# Patient Record
Sex: FEMALE | Race: WHITE | Employment: UNEMPLOYED | URBAN - METROPOLITAN AREA
[De-identification: names, ages, dates, MRNs, and addresses within clinical notes are randomized per-mention and may not be internally consistent; named-entity substitution may affect disease eponyms.]

---

## 2017-01-26 PROCEDURE — 86225 DNA ANTIBODY NATIVE: CPT | Performed by: PHYSICIAN ASSISTANT

## 2017-01-26 PROCEDURE — 86160 COMPLEMENT ANTIGEN: CPT | Performed by: PHYSICIAN ASSISTANT

## 2017-01-26 PROCEDURE — 84156 ASSAY OF PROTEIN URINE: CPT | Performed by: PHYSICIAN ASSISTANT

## 2017-01-26 PROCEDURE — 81001 URINALYSIS AUTO W/SCOPE: CPT | Performed by: PHYSICIAN ASSISTANT

## 2017-01-26 PROCEDURE — 82570 ASSAY OF URINE CREATININE: CPT | Performed by: PHYSICIAN ASSISTANT

## 2017-01-30 PROCEDURE — 86480 TB TEST CELL IMMUN MEASURE: CPT | Performed by: PHYSICIAN ASSISTANT

## 2017-02-07 PROBLEM — N28.9 RENAL INSUFFICIENCY: Status: ACTIVE | Noted: 2017-02-07

## 2017-02-09 ENCOUNTER — HOSPITAL ENCOUNTER (OUTPATIENT)
Age: 52
Discharge: HOME | End: 2017-02-09
Attending: FAMILY MEDICINE
Payer: COMMERCIAL

## 2017-02-09 VITALS
HEIGHT: 65 IN | SYSTOLIC BLOOD PRESSURE: 119 MMHG | WEIGHT: 160 LBS | OXYGEN SATURATION: 98 % | RESPIRATION RATE: 17 BRPM | DIASTOLIC BLOOD PRESSURE: 76 MMHG | HEART RATE: 76 BPM | BODY MASS INDEX: 26.66 KG/M2 | TEMPERATURE: 98 F

## 2017-02-09 DIAGNOSIS — H10.13 ALLERGIC CONJUNCTIVITIS, BILATERAL: Primary | ICD-10-CM

## 2017-02-09 PROCEDURE — 99204 OFFICE O/P NEW MOD 45 MIN: CPT

## 2017-02-09 PROCEDURE — 99213 OFFICE O/P EST LOW 20 MIN: CPT

## 2017-02-09 RX ORDER — OLOPATADINE HYDROCHLORIDE 1 MG/ML
1 SOLUTION/ DROPS OPHTHALMIC 2 TIMES DAILY
Qty: 1 BOTTLE | Refills: 0 | Status: SHIPPED | OUTPATIENT
Start: 2017-02-09 | End: 2017-02-16

## 2017-02-10 NOTE — ED PROVIDER NOTES
Patient Seen in: [address] [facility]    History   Patient presents with:   Eye Visual Problem (opthalmic)    Stated Complaint: PINK EYE     HPI    81-year-old female presents to the clinic today with chief complaints of bilateral eye redness, Raynaud disease            Past Surgical History    APPENDECTOMY      CHOLECYSTECTOMY      HERNIA SURGERY      HYSTERECTOMY  1999    Comment LUCA BSO for endometriosis    TONSILLECTOMY      BIOPSY SALIVARY GLAND,INCISIONAL  12/1/09    Comment Performed by ALON 2000 UNITS Oral Tab,  Take 4,000 Units by mouth daily. calciTRIOL (ROCALTROL) 0.25 MCG Oral Cap,  Take 0.25 mcg by mouth 2 (two) times daily.      cyanocobalamin (VITAMIN B12) 1000 MCG/ML Injection Solution,  Inject 1,000 mcg into the muscle every 30 (t Current:/76 mmHg  Pulse 76  Temp(Src) 98.2 °F (36.8 °C) (Temporal)  Resp 17  Ht 165.1 cm (5' 5\")  Wt 72.576 kg  BMI 26.63 kg/m2  SpO2 98%        Physical Exam    General: Well-nourished, well hydrated. No acute distress. No pallor.  No tachypne PM    START taking these medications    Olopatadine HCl 0.1 % Ophthalmic Solution  Place 1 drop into both eyes 2 (two) times daily. , Normal, Disp-1 Bottle, R-0

## 2017-03-23 ENCOUNTER — HOSPITAL ENCOUNTER (OUTPATIENT)
Dept: CV DIAGNOSTICS | Facility: HOSPITAL | Age: 52
Discharge: HOME | End: 2017-03-23
Attending: INTERNAL MEDICINE
Payer: COMMERCIAL

## 2017-03-23 ENCOUNTER — PRIOR ORIGINAL RECORDS (OUTPATIENT)
Dept: OTHER | Age: 52
End: 2017-03-23

## 2017-03-23 ENCOUNTER — LAB ENCOUNTER (OUTPATIENT)
Dept: LAB | Facility: HOSPITAL | Age: 52
End: 2017-03-23
Attending: INTERNAL MEDICINE
Payer: COMMERCIAL

## 2017-03-23 ENCOUNTER — MYAURORA ACCOUNT LINK (OUTPATIENT)
Dept: OTHER | Age: 52
End: 2017-03-23

## 2017-03-23 DIAGNOSIS — E55.9 VITAMIN D DEFICIENCY: ICD-10-CM

## 2017-03-23 DIAGNOSIS — E78.2 MIXED HYPERLIPIDEMIA: ICD-10-CM

## 2017-03-23 DIAGNOSIS — I77.810 ASCENDING AORTA DILATATION (HCC): ICD-10-CM

## 2017-03-23 DIAGNOSIS — D64.9 ANEMIA, UNSPECIFIED: Primary | ICD-10-CM

## 2017-03-23 DIAGNOSIS — R00.2 PALPITATIONS: ICD-10-CM

## 2017-03-23 DIAGNOSIS — I11.9 MALIGNANT HYPERTENSIVE HEART DISEASE WITHOUT HEART FAILURE: ICD-10-CM

## 2017-03-23 DIAGNOSIS — E05.90 PRETIBIAL MYXEDEMA: ICD-10-CM

## 2017-03-23 DIAGNOSIS — N18.30 CHRONIC KIDNEY DISEASE, STAGE III (MODERATE) (HCC): ICD-10-CM

## 2017-03-23 LAB
25-HYDROXYVITAMIN D (TOTAL): 26.2 NG/ML (ref 30–100)
ALBUMIN SERPL-MCNC: 3.9 G/DL (ref 3.5–4.8)
ALP LIVER SERPL-CCNC: 58 U/L (ref 39–100)
ALT SERPL-CCNC: 38 U/L (ref 14–54)
AST SERPL-CCNC: 32 U/L (ref 15–41)
BASOPHILS # BLD AUTO: 0.04 X10(3) UL (ref 0–0.1)
BASOPHILS NFR BLD AUTO: 0.8 %
BETA NATRIURETIC PEPTIDE: 106 PG/ML (ref 2–99)
BILIRUB SERPL-MCNC: 0.4 MG/DL (ref 0.1–2)
BUN BLD-MCNC: 19 MG/DL (ref 8–20)
CALCIUM BLD-MCNC: 8.1 MG/DL (ref 8.3–10.3)
CHLORIDE: 111 MMOL/L (ref 101–111)
CHOLEST SMN-MCNC: 126 MG/DL (ref ?–200)
CO2: 25 MMOL/L (ref 22–32)
CREAT BLD-MCNC: 1.34 MG/DL (ref 0.55–1.02)
EOSINOPHIL # BLD AUTO: 0.01 X10(3) UL (ref 0–0.3)
EOSINOPHIL NFR BLD AUTO: 0.2 %
ERYTHROCYTE [DISTWIDTH] IN BLOOD BY AUTOMATED COUNT: 15.1 % (ref 11.5–16)
EST. AVERAGE GLUCOSE BLD GHB EST-MCNC: 100 MG/DL (ref 68–126)
GLUCOSE BLD-MCNC: 84 MG/DL (ref 70–99)
HBA1C MFR BLD HPLC: 5.1 % (ref ?–5.7)
HCT VFR BLD AUTO: 34.9 % (ref 34–50)
HDLC SERPL-MCNC: 68 MG/DL (ref 45–?)
HDLC SERPL: 1.85 {RATIO} (ref ?–4.44)
HGB BLD-MCNC: 11.9 G/DL (ref 12–16)
IMMATURE GRANULOCYTE COUNT: 0.01 X10(3) UL (ref 0–1)
IMMATURE GRANULOCYTE RATIO %: 0.2 %
LDLC SERPL CALC-MCNC: 44 MG/DL (ref ?–130)
LYMPHOCYTES # BLD AUTO: 1.26 X10(3) UL (ref 0.9–4)
LYMPHOCYTES NFR BLD AUTO: 24.5 %
M PROTEIN MFR SERPL ELPH: 6.9 G/DL (ref 6.1–8.3)
MCH RBC QN AUTO: 32.7 PG (ref 27–33.2)
MCHC RBC AUTO-ENTMCNC: 34.1 G/DL (ref 31–37)
MCV RBC AUTO: 95.9 FL (ref 81–100)
MONOCYTES # BLD AUTO: 0.43 X10(3) UL (ref 0.1–0.6)
MONOCYTES NFR BLD AUTO: 8.4 %
NEUTROPHIL ABS PRELIM: 3.39 X10 (3) UL (ref 1.3–6.7)
NEUTROPHILS # BLD AUTO: 3.39 X10(3) UL (ref 1.3–6.7)
NEUTROPHILS NFR BLD AUTO: 65.9 %
NONHDLC SERPL-MCNC: 58 MG/DL (ref ?–130)
PLATELET # BLD AUTO: 239 10(3)UL (ref 150–450)
POTASSIUM SERPL-SCNC: 4 MMOL/L (ref 3.6–5.1)
RBC # BLD AUTO: 3.64 X10(6)UL (ref 3.8–5.1)
RED CELL DISTRIBUTION WIDTH-SD: 52.2 FL (ref 35.1–46.3)
SODIUM SERPL-SCNC: 144 MMOL/L (ref 136–144)
TRIGLYCERIDES: 71 MG/DL (ref ?–150)
TSI SER-ACNC: 0.48 MIU/ML (ref 0.35–5.5)
VLDL: 14 MG/DL (ref 5–40)
WBC # BLD AUTO: 5.1 X10(3) UL (ref 4–13)

## 2017-03-23 PROCEDURE — 80053 COMPREHEN METABOLIC PANEL: CPT

## 2017-03-23 PROCEDURE — 36415 COLL VENOUS BLD VENIPUNCTURE: CPT

## 2017-03-23 PROCEDURE — 84443 ASSAY THYROID STIM HORMONE: CPT

## 2017-03-23 PROCEDURE — 83036 HEMOGLOBIN GLYCOSYLATED A1C: CPT

## 2017-03-23 PROCEDURE — 80061 LIPID PANEL: CPT

## 2017-03-23 PROCEDURE — 82306 VITAMIN D 25 HYDROXY: CPT

## 2017-03-23 PROCEDURE — 83880 ASSAY OF NATRIURETIC PEPTIDE: CPT

## 2017-03-23 PROCEDURE — 85025 COMPLETE CBC W/AUTO DIFF WBC: CPT

## 2017-04-05 ENCOUNTER — PRIOR ORIGINAL RECORDS (OUTPATIENT)
Dept: OTHER | Age: 52
End: 2017-04-05

## 2017-04-11 LAB
ALBUMIN: 3.9 G/DL
ALKALINE PHOSPHATATE(ALK PHOS): 58 IU/L
BILIRUBIN TOTAL: 0.4 MG/DL
BNP: 106 PMOL/L
BUN: 19 MG/DL
CALCIUM: 8.1 MG/DL
CHLORIDE: 111 MEQ/L
CHOLESTEROL, TOTAL: 126 MG/DL
CREATININE, SERUM: 1.34 MG/DL
GLUCOSE: 84 MG/DL
HDL CHOLESTEROL: 68 MG/DL
HEMATOCRIT: 34.9 %
HEMOGLOBIN A1C: 5.1 %
HEMOGLOBIN: 11.9 G/DL
LDL CHOLESTEROL: 44 MG/DL
PLATELETS: 239 K/UL
POTASSIUM, SERUM: 4 MEQ/L
PROTEIN, TOTAL: 6.9 G/DL
RED BLOOD COUNT: 3.64 X 10-6/U
SGOT (AST): 32 IU/L
SGPT (ALT): 38 IU/L
SODIUM: 144 MEQ/L
THYROID STIMULATING HORMONE: 0.48 MLU/L
TRIGLYCERIDES: 71 MG/DL
VITAMIN D 25-OH: 26.2 NG/ML
WHITE BLOOD COUNT: 5.1 X 10-3/U

## 2017-04-17 ENCOUNTER — PRIOR ORIGINAL RECORDS (OUTPATIENT)
Dept: OTHER | Age: 52
End: 2017-04-17

## 2017-04-17 PROCEDURE — 86225 DNA ANTIBODY NATIVE: CPT | Performed by: INTERNAL MEDICINE

## 2017-04-17 PROCEDURE — 86160 COMPLEMENT ANTIGEN: CPT | Performed by: INTERNAL MEDICINE

## 2017-06-23 ENCOUNTER — PRIOR ORIGINAL RECORDS (OUTPATIENT)
Dept: OTHER | Age: 52
End: 2017-06-23

## 2017-06-28 LAB
ALKALINE PHOSPHATATE(ALK PHOS): 63 IU/L
BILIRUBIN TOTAL: 0.43 MG/DL
BUN: 25 MG/DL
CREATININE, SERUM: 1.35 MG/DL
ESR (SED RATE): 14 MM/HR
HEMATOCRIT: 34.8 %
HEMOGLOBIN: 11.9 G/DL
PLATELETS: 267 K/UL
PROTEIN, TOTAL: 7.2 G/DL
RED BLOOD COUNT: 3.61 X 10-6/U
SGOT (AST): 38 IU/L
SGPT (ALT): 44 IU/L
VITAMIN D 25-OH: 29.92 NG/ML
WHITE BLOOD COUNT: 5.7 X 10-3/U

## 2017-10-25 ENCOUNTER — HOSPITAL ENCOUNTER (OUTPATIENT)
Age: 52
Discharge: HOME | End: 2017-10-25
Attending: FAMILY MEDICINE
Payer: COMMERCIAL

## 2017-10-25 ENCOUNTER — APPOINTMENT (OUTPATIENT)
Dept: GENERAL RADIOLOGY | Age: 52
End: 2017-10-25
Attending: FAMILY MEDICINE
Payer: COMMERCIAL

## 2017-10-25 VITALS
DIASTOLIC BLOOD PRESSURE: 78 MMHG | SYSTOLIC BLOOD PRESSURE: 117 MMHG | TEMPERATURE: 97 F | RESPIRATION RATE: 16 BRPM | OXYGEN SATURATION: 98 % | HEART RATE: 90 BPM

## 2017-10-25 DIAGNOSIS — R05.8 SPASMODIC COUGH: ICD-10-CM

## 2017-10-25 DIAGNOSIS — J20.9 BRONCHITIS WITH BRONCHOSPASM: Primary | ICD-10-CM

## 2017-10-25 PROCEDURE — 36415 COLL VENOUS BLD VENIPUNCTURE: CPT

## 2017-10-25 PROCEDURE — 99214 OFFICE O/P EST MOD 30 MIN: CPT

## 2017-10-25 PROCEDURE — 85025 COMPLETE CBC W/AUTO DIFF WBC: CPT | Performed by: FAMILY MEDICINE

## 2017-10-25 PROCEDURE — 71020 XR CHEST PA + LAT CHEST (CPT=71020): CPT | Performed by: FAMILY MEDICINE

## 2017-10-25 RX ORDER — AZITHROMYCIN 250 MG/1
TABLET, FILM COATED ORAL
Qty: 1 PACKAGE | Refills: 0 | Status: SHIPPED | OUTPATIENT
Start: 2017-10-25 | End: 2017-10-30

## 2017-10-25 RX ORDER — CODEINE PHOSPHATE AND GUAIFENESIN 10; 100 MG/5ML; MG/5ML
10 SOLUTION ORAL NIGHTLY PRN
Qty: 50 ML | Refills: 0 | Status: SHIPPED | OUTPATIENT
Start: 2017-10-25 | End: 2017-10-30

## 2017-10-25 RX ORDER — BENZONATATE 100 MG/1
100 CAPSULE ORAL 3 TIMES DAILY PRN
Qty: 9 CAPSULE | Refills: 0 | Status: SHIPPED | OUTPATIENT
Start: 2017-10-25 | End: 2017-10-28

## 2017-10-25 NOTE — ED PROVIDER NOTES
Patient Seen in: [address] [facility]    History   Patient presents with:  Cough/URI    Stated Complaint: coughing chest hurst body aches sore throat    HPI  50 yo F here with complaints of coughing and notes that her chest is hurting excessiv [facility])    • Sjogren's disease [facility])        Past Surgical History:  No date: APPENDECTOMY  No date: BACK SURGERY      Comment: 3/16 lumbar discectomy  9/2016 lumbar fusion  12/1/09: BIOPSY SALIVARY GLAND,INCISIONAL      Comment: Performed by Natalia Beltrán Normocephalic, atraumatic  EENT: OP - wnl, moist, cobblestoning noted over the posterior OP, mildly erythematous, TMs - mild middle ear effusion, Nares normal  NECK: FROM, supple, anterior cervical LAD - mild   LUNGS: CTAB, no RRW  CV: RRR  ABD: not disten daily OTC Probiotics to avoid any GI distress   If any worsening of symptoms to go to the ER immediately   Rest and hydration emphasized       · Please increase your fluids and rest.   · Avoid pseudoephedrine or phenylephrine if you have heart problems or

## 2017-10-25 NOTE — ED INITIAL ASSESSMENT (HPI)
Pt states last May hospitalized for pneumonia. States at that time had very low WBC's. States has been coughing for approx 10 days. States achy and hot and cold. States now coughing up greenish yellow phlem. Pain in chest and back.  States at night it hurts

## 2017-10-27 NOTE — ED NOTES
Received [employer] requesting refill on cough med. Explained to pt the MD on duty today advised against a refill and suggested to f/u with PMD if not feeling better. Pt verb understanding.  Sts has only been using it at night and it has been helping her slee

## 2017-12-05 ENCOUNTER — PRIOR ORIGINAL RECORDS (OUTPATIENT)
Dept: OTHER | Age: 52
End: 2017-12-05

## 2017-12-07 ENCOUNTER — PRIOR ORIGINAL RECORDS (OUTPATIENT)
Dept: OTHER | Age: 52
End: 2017-12-07

## 2017-12-07 ENCOUNTER — MYAURORA ACCOUNT LINK (OUTPATIENT)
Dept: OTHER | Age: 52
End: 2017-12-07

## 2017-12-07 LAB
ALKALINE PHOSPHATATE(ALK PHOS): 56 IU/L
BILIRUBIN TOTAL: 0.6 MG/DL
BUN: 20 MG/DL
CALCIUM: 8.9 MG/DL
CHLORIDE: 107 MEQ/L
CREATININE, SERUM: 1.2 MG/DL
HEMATOCRIT: 35.9 %
HEMOGLOBIN: 12.2 G/DL
PLATELETS: 220 K/UL
PROTEIN, TOTAL: 7.4 G/DL
SGOT (AST): 52 IU/L
SGPT (ALT): 49 IU/L
SODIUM: 141 MEQ/L
WHITE BLOOD COUNT: 4.4 X 10-3/U

## 2017-12-08 LAB
ALT (SGPT): 49 U/L
AST (SGOT): 52 U/L
CHOLESTEROL, TOTAL: 138 MG/DL
HDL CHOLESTEROL: 68 MG/DL
LDL CHOLESTEROL: 47 MG/DL
PROBNP: 409 PG/ML
TRIGLYCERIDES: 79 MG/DL

## 2017-12-18 PROBLEM — M06.9 RHEUMATOID ARTHRITIS INVOLVING BOTH WRISTS, UNSPECIFIED RHEUMATOID FACTOR PRESENCE: Status: ACTIVE | Noted: 2017-12-18

## 2018-01-24 PROCEDURE — 86225 DNA ANTIBODY NATIVE: CPT | Performed by: INTERNAL MEDICINE

## 2018-01-24 PROCEDURE — 86160 COMPLEMENT ANTIGEN: CPT | Performed by: INTERNAL MEDICINE

## 2018-01-29 PROCEDURE — 86480 TB TEST CELL IMMUN MEASURE: CPT | Performed by: INTERNAL MEDICINE

## 2018-01-29 PROCEDURE — 36415 COLL VENOUS BLD VENIPUNCTURE: CPT | Performed by: INTERNAL MEDICINE

## 2018-01-31 PROBLEM — M06.9: Status: ACTIVE | Noted: 2018-01-31

## 2018-02-20 ENCOUNTER — HOSPITAL ENCOUNTER (EMERGENCY)
Facility: HOSPITAL | Age: 53
Discharge: HOME | End: 2018-02-20
Attending: EMERGENCY MEDICINE
Payer: COMMERCIAL

## 2018-02-20 ENCOUNTER — APPOINTMENT (OUTPATIENT)
Dept: GENERAL RADIOLOGY | Facility: HOSPITAL | Age: 53
End: 2018-02-20
Attending: EMERGENCY MEDICINE
Payer: COMMERCIAL

## 2018-02-20 VITALS
DIASTOLIC BLOOD PRESSURE: 68 MMHG | BODY MASS INDEX: 24.99 KG/M2 | RESPIRATION RATE: 16 BRPM | OXYGEN SATURATION: 99 % | TEMPERATURE: 99 F | WEIGHT: 150 LBS | HEIGHT: 65 IN | SYSTOLIC BLOOD PRESSURE: 110 MMHG | HEART RATE: 88 BPM

## 2018-02-20 DIAGNOSIS — B34.9 VIRAL SYNDROME: Primary | ICD-10-CM

## 2018-02-20 DIAGNOSIS — M79.10 MYALGIA: ICD-10-CM

## 2018-02-20 DIAGNOSIS — R50.9 FEVER, UNSPECIFIED FEVER CAUSE: ICD-10-CM

## 2018-02-20 LAB
ALBUMIN SERPL-MCNC: 4 G/DL (ref 3.5–4.8)
ALP LIVER SERPL-CCNC: 62 U/L (ref 41–108)
ALT SERPL-CCNC: 48 U/L (ref 14–54)
AST SERPL-CCNC: 62 U/L (ref 15–41)
BASOPHILS # BLD AUTO: 0.02 X10(3) UL (ref 0–0.1)
BASOPHILS NFR BLD AUTO: 0.5 %
BILIRUB SERPL-MCNC: 0.4 MG/DL (ref 0.1–2)
BILIRUB UR QL STRIP.AUTO: NEGATIVE
BUN BLD-MCNC: 18 MG/DL (ref 8–20)
CALCIUM BLD-MCNC: 8.7 MG/DL (ref 8.3–10.3)
CHLORIDE: 105 MMOL/L (ref 101–111)
CLARITY UR REFRACT.AUTO: CLEAR
CO2: 26 MMOL/L (ref 22–32)
COLOR UR AUTO: YELLOW
CREAT BLD-MCNC: 1.44 MG/DL (ref 0.55–1.02)
EOSINOPHIL # BLD AUTO: 0 X10(3) UL (ref 0–0.3)
EOSINOPHIL NFR BLD AUTO: 0 %
ERYTHROCYTE [DISTWIDTH] IN BLOOD BY AUTOMATED COUNT: 13.7 % (ref 11.5–16)
GLUCOSE BLD-MCNC: 92 MG/DL (ref 70–99)
GLUCOSE UR STRIP.AUTO-MCNC: NEGATIVE MG/DL
HCT VFR BLD AUTO: 39.1 % (ref 34–50)
HGB BLD-MCNC: 13.3 G/DL (ref 12–16)
IMMATURE GRANULOCYTE COUNT: 0.02 X10(3) UL (ref 0–1)
IMMATURE GRANULOCYTE RATIO %: 0.5 %
KETONES UR STRIP.AUTO-MCNC: NEGATIVE MG/DL
LEUKOCYTE ESTERASE UR QL STRIP.AUTO: NEGATIVE
LYMPHOCYTES # BLD AUTO: 0.29 X10(3) UL (ref 0.9–4)
LYMPHOCYTES NFR BLD AUTO: 6.9 %
M PROTEIN MFR SERPL ELPH: 8 G/DL (ref 6.1–8.3)
MCH RBC QN AUTO: 32.5 PG (ref 27–33.2)
MCHC RBC AUTO-ENTMCNC: 34 G/DL (ref 31–37)
MCV RBC AUTO: 95.6 FL (ref 81–100)
MONOCYTES # BLD AUTO: 0.18 X10(3) UL (ref 0.1–1)
MONOCYTES NFR BLD AUTO: 4.3 %
NEUTROPHIL ABS PRELIM: 3.67 X10 (3) UL (ref 1.3–6.7)
NEUTROPHILS # BLD AUTO: 3.67 X10(3) UL (ref 1.3–6.7)
NEUTROPHILS NFR BLD AUTO: 87.8 %
NITRITE UR QL STRIP.AUTO: NEGATIVE
PH UR STRIP.AUTO: 6 [PH] (ref 4.5–8)
PLATELET # BLD AUTO: 164 10(3)UL (ref 150–450)
POTASSIUM SERPL-SCNC: 3.9 MMOL/L (ref 3.6–5.1)
PROT UR STRIP.AUTO-MCNC: 100 MG/DL
RBC # BLD AUTO: 4.09 X10(6)UL (ref 3.8–5.1)
RBC UR QL AUTO: NEGATIVE
RED CELL DISTRIBUTION WIDTH-SD: 48.2 FL (ref 35.1–46.3)
SODIUM SERPL-SCNC: 137 MMOL/L (ref 136–144)
SP GR UR STRIP.AUTO: 1.02 (ref 1–1.03)
UROBILINOGEN UR STRIP.AUTO-MCNC: <2 MG/DL
WBC # BLD AUTO: 4.2 X10(3) UL (ref 4–13)

## 2018-02-20 PROCEDURE — 96360 HYDRATION IV INFUSION INIT: CPT

## 2018-02-20 PROCEDURE — 85025 COMPLETE CBC W/AUTO DIFF WBC: CPT | Performed by: EMERGENCY MEDICINE

## 2018-02-20 PROCEDURE — 81001 URINALYSIS AUTO W/SCOPE: CPT | Performed by: EMERGENCY MEDICINE

## 2018-02-20 PROCEDURE — 87081 CULTURE SCREEN ONLY: CPT | Performed by: EMERGENCY MEDICINE

## 2018-02-20 PROCEDURE — 80053 COMPREHEN METABOLIC PANEL: CPT | Performed by: EMERGENCY MEDICINE

## 2018-02-20 PROCEDURE — 87430 STREP A AG IA: CPT | Performed by: EMERGENCY MEDICINE

## 2018-02-20 PROCEDURE — 96361 HYDRATE IV INFUSION ADD-ON: CPT

## 2018-02-20 PROCEDURE — 71046 X-RAY EXAM CHEST 2 VIEWS: CPT | Performed by: EMERGENCY MEDICINE

## 2018-02-20 PROCEDURE — 87040 BLOOD CULTURE FOR BACTERIA: CPT | Performed by: EMERGENCY MEDICINE

## 2018-02-20 PROCEDURE — 99284 EMERGENCY DEPT VISIT MOD MDM: CPT

## 2018-02-20 RX ORDER — CYCLOBENZAPRINE HCL 10 MG
10 TABLET ORAL 3 TIMES DAILY PRN
Qty: 20 TABLET | Refills: 0 | Status: SHIPPED | OUTPATIENT
Start: 2018-02-20 | End: 2018-02-27

## 2018-02-20 RX ORDER — ACETAMINOPHEN 500 MG
1000 TABLET ORAL ONCE
Status: COMPLETED | OUTPATIENT
Start: 2018-02-20 | End: 2018-02-20

## 2018-02-20 RX ORDER — OSELTAMIVIR PHOSPHATE 75 MG/1
75 CAPSULE ORAL 2 TIMES DAILY
Qty: 10 CAPSULE | Refills: 0 | Status: SHIPPED | OUTPATIENT
Start: 2018-02-20 | End: 2018-02-25

## 2018-02-21 NOTE — ED PROVIDER NOTES
Patient Seen in: [facility] Emergency Department    History   Patient presents with:  Cough/URI  Fever (infectious)    Stated Complaint: URI, cough, fever    HPI    This is a 64-year-old female who presents with cold, runny nose, cough, congestion. discectomy  9/2016 lumbar fusion  12/1/09: BIOPSY SALIVARY GLAND,INCISIONAL      Comment: Performed by Michael Ulloa at [address]  No date: CHOLECYSTECTOMY  No date: HERNIA SURGERY  1/14: [facility] There is no rebound. There is no guarding. EXT: There is good pulses bilaterally. There is no calf tenderness. There is no rash noted. There is no edema    NEURO: Alert and oriented x3.   Muscle strength and sensory exam is grossly normal.  And the p fluid    Rapid strep was negative. ,Xr Chest Pa + Lat Chest (cpt=71046)    Result Date: 2/20/2018  PROCEDURE:  XR CHEST PA + LAT CHEST (CPT=71046)  INDICATIONS:  URI, cough, fever  COMPARISON:  None.   TECHNIQUE:  PA and lateral chest radiographs were obtain 10 MG Oral Tab  Take 1 tablet (10 mg total) by mouth 3 (three) times daily as needed for Muscle spasms. , Print Script, Disp-20 tablet, R-0

## 2018-05-15 PROBLEM — M06.9 RHEUMATOID ARTHRITIS INVOLVING BOTH WRISTS, UNSPECIFIED RHEUMATOID FACTOR PRESENCE: Status: RESOLVED | Noted: 2017-12-18 | Resolved: 2018-05-15

## 2018-05-15 PROBLEM — G89.29 CHRONIC BILATERAL LOW BACK PAIN WITH LEFT-SIDED SCIATICA: Status: ACTIVE | Noted: 2018-05-15

## 2018-05-15 PROBLEM — M06.9: Status: RESOLVED | Noted: 2018-01-31 | Resolved: 2018-05-15

## 2018-05-15 PROBLEM — M54.42 CHRONIC BILATERAL LOW BACK PAIN WITH LEFT-SIDED SCIATICA: Status: ACTIVE | Noted: 2018-05-15

## 2018-08-13 PROBLEM — T37.2X5A TOXIC MACULOPATHY FROM PLAQUENIL IN THERAPEUTIC USE: Status: ACTIVE | Noted: 2018-08-13

## 2018-08-13 PROBLEM — M85.80 OSTEOPENIA, UNSPECIFIED LOCATION: Status: ACTIVE | Noted: 2018-08-13

## 2018-08-13 PROBLEM — H35.389 TOXIC MACULOPATHY FROM PLAQUENIL IN THERAPEUTIC USE: Status: ACTIVE | Noted: 2018-08-13

## 2018-08-29 PROBLEM — M25.531 RIGHT WRIST PAIN: Status: ACTIVE | Noted: 2018-08-29

## 2018-09-05 ENCOUNTER — PRIOR ORIGINAL RECORDS (OUTPATIENT)
Dept: OTHER | Age: 53
End: 2018-09-05

## 2018-09-05 PROCEDURE — 81001 URINALYSIS AUTO W/SCOPE: CPT | Performed by: INTERNAL MEDICINE

## 2018-09-05 PROCEDURE — 84156 ASSAY OF PROTEIN URINE: CPT | Performed by: INTERNAL MEDICINE

## 2018-09-05 PROCEDURE — 82570 ASSAY OF URINE CREATININE: CPT | Performed by: INTERNAL MEDICINE

## 2018-09-05 PROCEDURE — 86160 COMPLEMENT ANTIGEN: CPT | Performed by: INTERNAL MEDICINE

## 2018-09-05 PROCEDURE — 86225 DNA ANTIBODY NATIVE: CPT | Performed by: INTERNAL MEDICINE

## 2018-09-05 PROCEDURE — 87086 URINE CULTURE/COLONY COUNT: CPT | Performed by: INTERNAL MEDICINE

## 2018-10-11 ENCOUNTER — HOSPITAL ENCOUNTER (OUTPATIENT)
Age: 53
Discharge: HOME | End: 2018-10-11
Attending: FAMILY MEDICINE
Payer: COMMERCIAL

## 2018-10-11 VITALS
RESPIRATION RATE: 16 BRPM | DIASTOLIC BLOOD PRESSURE: 78 MMHG | SYSTOLIC BLOOD PRESSURE: 118 MMHG | TEMPERATURE: 98 F | HEART RATE: 71 BPM | OXYGEN SATURATION: 98 %

## 2018-10-11 DIAGNOSIS — H92.02 LEFT EAR PAIN: Primary | ICD-10-CM

## 2018-10-11 PROCEDURE — 99213 OFFICE O/P EST LOW 20 MIN: CPT

## 2018-10-11 PROCEDURE — 99212 OFFICE O/P EST SF 10 MIN: CPT

## 2018-10-11 NOTE — ED PROVIDER NOTES
Patient [place]en [place] [address] [facility]    History   Patient presents with:  Ear Pain    Stated Complaint: ear pain    HPI    **52-year-old female presents to the immediate care today with a chief complaint of left ear pain.   Patient states abou hydronephrosi s and kidney stones   • KIDNEY STONE    • Lupus (systemic lupus erythematosus) (HCC)    • Migraines     bright lights,wine,sulfites,cheeses,sweeteners,barometeric pressure   • Muscle weakness     arm weakness   • Neuropathy     Boths legs   • presenting problem. Social History    Tobacco Use      Smoking status: Never Smoker      Smokeless tobacco: Never Used    Alcohol use:  Yes      Alcohol/week: 0.0 oz      Comment: rarely    Drug use: No      Review of Systems    Positive for stated compl Course   Labs Reviewed - No data to display      MDM   Patient was reassured. No signs of otitis media/ear infection. Recommend symptomatic treatment with Motrin 600 mg 3 times a day with food.   Maintain follow-up appointment with dentist.  In the interi

## 2018-10-23 ENCOUNTER — HOSPITAL ENCOUNTER (OUTPATIENT)
Dept: CV DIAGNOSTICS | Age: 53
Discharge: HOME | End: 2018-10-23
Attending: FAMILY MEDICINE
Payer: COMMERCIAL

## 2018-10-23 DIAGNOSIS — R94.31 INVERTED T WAVE: ICD-10-CM

## 2018-10-23 PROCEDURE — 93306 TTE W/DOPPLER COMPLETE: CPT | Performed by: FAMILY MEDICINE

## 2018-10-24 ENCOUNTER — LAB ENCOUNTER (OUTPATIENT)
Dept: LAB | Facility: HOSPITAL | Age: 53
End: 2018-10-24
Attending: INTERNAL MEDICINE
Payer: COMMERCIAL

## 2018-10-24 ENCOUNTER — PRIOR ORIGINAL RECORDS (OUTPATIENT)
Dept: OTHER | Age: 53
End: 2018-10-24

## 2018-10-24 DIAGNOSIS — Z01.818 PREOP TESTING: Primary | ICD-10-CM

## 2018-10-24 DIAGNOSIS — M96.0 PSEUDARTHROSIS AFTER FUSION OR ARTHRODESIS: ICD-10-CM

## 2018-10-24 DIAGNOSIS — Z01.818 PREOPERATIVE TESTING: ICD-10-CM

## 2018-10-24 PROCEDURE — 36415 COLL VENOUS BLD VENIPUNCTURE: CPT

## 2018-10-24 PROCEDURE — 80048 BASIC METABOLIC PNL TOTAL CA: CPT

## 2018-10-25 ENCOUNTER — PRIOR ORIGINAL RECORDS (OUTPATIENT)
Dept: OTHER | Age: 53
End: 2018-10-25

## 2018-10-25 ENCOUNTER — HOSPITAL ENCOUNTER (OUTPATIENT)
Dept: CT IMAGING | Facility: HOSPITAL | Age: 53
Discharge: HOME | End: 2018-10-25
Attending: INTERNAL MEDICINE
Payer: COMMERCIAL

## 2018-10-25 DIAGNOSIS — R07.9 CHEST PAIN: ICD-10-CM

## 2018-10-25 PROCEDURE — 36410 VNPNXR 3YR/> PHY/QHP DX/THER: CPT

## 2018-10-25 PROCEDURE — 75574 CT ANGIO HRT W/3D IMAGE: CPT | Performed by: INTERNAL MEDICINE

## 2018-10-25 PROCEDURE — 76937 US GUIDE VASCULAR ACCESS: CPT

## 2018-10-25 RX ORDER — NITROGLYCERIN 0.4 MG/1
TABLET SUBLINGUAL
Status: COMPLETED
Start: 2018-10-25 | End: 2018-10-25

## 2018-10-25 RX ADMIN — NITROGLYCERIN 0.4 MG: 0.4 TABLET SUBLINGUAL at 09:07:00

## 2018-10-25 NOTE — IMAGING NOTE
GATED CTA NOTE      Pt returned to CT department with 20G RAC PIV placed by PICC RN. Gated coronary CTA protocols implemented. Pt tolerated all procedures well.  After contrast injection, pt denied any difficulty breathing but noted some nausea and mild

## 2018-10-25 NOTE — IMAGING NOTE
GATED CTA NOTE      Patient here for outpatient gated coronary CTA, ordered by Dr. Claudio Zendejas, [address] [place] [address]. Pt arrived pre-medicated for contrast allergy.     Peripheral IV placed but infiltrated during 0.9NS flush prior to contrast injection and D

## 2018-10-26 ENCOUNTER — PRIOR ORIGINAL RECORDS (OUTPATIENT)
Dept: OTHER | Age: 53
End: 2018-10-26

## 2018-10-30 LAB
BUN: 27 MG/DL
CALCIUM: 9 MG/DL
CHLORIDE: 109 MEQ/L
CREATININE, SERUM: 1.2 MG/DL
GLUCOSE: 105 MG/DL
POTASSIUM, SERUM: 4.7 MEQ/L
SODIUM: 142 MEQ/L

## 2018-10-30 NOTE — PAT NURSING NOTE
Patient states she is a difficult IV start and may need to have vascular access placed. Instructed patient to notify doctors office prior to surgery.

## 2018-11-04 ENCOUNTER — HOSPITAL ENCOUNTER (OUTPATIENT)
Dept: MRI IMAGING | Age: 53
Discharge: HOME | End: 2018-11-04
Attending: ORTHOPAEDIC SURGERY
Payer: COMMERCIAL

## 2018-11-04 DIAGNOSIS — M54.16 LUMBAR RADICULOPATHY: ICD-10-CM

## 2018-11-04 PROCEDURE — 72148 MRI LUMBAR SPINE W/O DYE: CPT | Performed by: ORTHOPAEDIC SURGERY

## 2018-11-05 ENCOUNTER — APPOINTMENT (OUTPATIENT)
Dept: LAB | Facility: HOSPITAL | Age: 53
End: 2018-11-05
Attending: ORTHOPAEDIC SURGERY
Payer: COMMERCIAL

## 2018-11-05 ENCOUNTER — HOSPITAL ENCOUNTER (OUTPATIENT)
Dept: GENERAL RADIOLOGY | Facility: HOSPITAL | Age: 53
Discharge: HOME | End: 2018-11-05
Attending: ORTHOPAEDIC SURGERY
Payer: COMMERCIAL

## 2018-11-05 ENCOUNTER — PRIOR ORIGINAL RECORDS (OUTPATIENT)
Dept: OTHER | Age: 53
End: 2018-11-05

## 2018-11-05 DIAGNOSIS — M96.0 PSEUDARTHROSIS AFTER FUSION OR ARTHRODESIS: ICD-10-CM

## 2018-11-05 LAB
ALBUMIN: 4.3 G/DL
ALKALINE PHOSPHATATE(ALK PHOS): 47 IU/L
BILIRUBIN TOTAL: 0.45 MG/DL
BUN: 25 MG/DL
CREATININE, SERUM: 1.27 MG/DL
ESR (SED RATE): 9 MM/HR
HEMATOCRIT: 36 %
HEMOGLOBIN: 12.6 G/DL
PLATELETS: 213 K/UL
PROTEIN, TOTAL: 7.2 G/DL
RED BLOOD COUNT: 3.75 X 10-6/U
SGOT (AST): 45 IU/L
SGPT (ALT): 43 IU/L
WHITE BLOOD COUNT: 3.11 X 10-3/U

## 2018-11-05 PROCEDURE — 85611 PROTHROMBIN TEST: CPT

## 2018-11-05 PROCEDURE — 86901 BLOOD TYPING SEROLOGIC RH(D): CPT

## 2018-11-05 PROCEDURE — 80053 COMPREHEN METABOLIC PANEL: CPT

## 2018-11-05 PROCEDURE — 86900 BLOOD TYPING SEROLOGIC ABO: CPT

## 2018-11-05 PROCEDURE — 81003 URINALYSIS AUTO W/O SCOPE: CPT

## 2018-11-05 PROCEDURE — 86850 RBC ANTIBODY SCREEN: CPT

## 2018-11-05 PROCEDURE — 71045 X-RAY EXAM CHEST 1 VIEW: CPT | Performed by: ORTHOPAEDIC SURGERY

## 2018-11-05 PROCEDURE — 87081 CULTURE SCREEN ONLY: CPT

## 2018-11-05 PROCEDURE — 85025 COMPLETE CBC W/AUTO DIFF WBC: CPT

## 2018-11-05 PROCEDURE — 36415 COLL VENOUS BLD VENIPUNCTURE: CPT

## 2018-11-05 PROCEDURE — 85730 THROMBOPLASTIN TIME PARTIAL: CPT

## 2018-11-05 PROCEDURE — 85610 PROTHROMBIN TIME: CPT

## 2018-11-07 NOTE — PAT NURSING NOTE
Spoke to Emerson at Dr. Carrol Moon office and notified her of abnormal PT/PTT. Per Emerson she will inform Dr. Lieutenant Santiago of results.

## 2018-11-08 ENCOUNTER — ANESTHESIA (OUTPATIENT)
Dept: SURGERY | Facility: HOSPITAL | Age: 53
DRG: 460 | End: 2018-11-08
Payer: COMMERCIAL

## 2018-11-08 ENCOUNTER — APPOINTMENT (OUTPATIENT)
Dept: GENERAL RADIOLOGY | Facility: HOSPITAL | Age: 53
DRG: 460 | End: 2018-11-08
Attending: ORTHOPAEDIC SURGERY
Payer: COMMERCIAL

## 2018-11-08 ENCOUNTER — APPOINTMENT (OUTPATIENT)
Dept: PICC SERVICES | Facility: HOSPITAL | Age: 53
DRG: 460 | End: 2018-11-08
Attending: ORTHOPAEDIC SURGERY
Payer: COMMERCIAL

## 2018-11-08 ENCOUNTER — HOSPITAL ENCOUNTER (INPATIENT)
Facility: HOSPITAL | Age: 53
LOS: 3 days | Discharge: HOME | DRG: 460 | End: 2018-11-11
Attending: ORTHOPAEDIC SURGERY | Admitting: ORTHOPAEDIC SURGERY
Payer: COMMERCIAL

## 2018-11-08 ENCOUNTER — ANESTHESIA EVENT (OUTPATIENT)
Dept: SURGERY | Facility: HOSPITAL | Age: 53
DRG: 460 | End: 2018-11-08
Payer: COMMERCIAL

## 2018-11-08 DIAGNOSIS — Z01.818 PREOPERATIVE TESTING: Primary | ICD-10-CM

## 2018-11-08 PROBLEM — S32.009K LUMBAR PSEUDOARTHROSIS: Status: ACTIVE | Noted: 2018-11-08

## 2018-11-08 PROCEDURE — 76001 XR C-ARM FLUORO >1 HOUR  (CPT=76001): CPT | Performed by: ORTHOPAEDIC SURGERY

## 2018-11-08 PROCEDURE — 02HV33Z INSERTION OF INFUSION DEVICE INTO SUPERIOR VENA CAVA, PERCUTANEOUS APPROACH: ICD-10-PCS | Performed by: ORTHOPAEDIC SURGERY

## 2018-11-08 PROCEDURE — 0SG00K1 FUSION OF LUMBAR VERTEBRAL JOINT WITH NONAUTOLOGOUS TISSUE SUBSTITUTE, POSTERIOR APPROACH, POSTERIOR COLUMN, OPEN APPROACH: ICD-10-PCS | Performed by: ORTHOPAEDIC SURGERY

## 2018-11-08 PROCEDURE — 0SP004Z REMOVAL OF INTERNAL FIXATION DEVICE FROM LUMBAR VERTEBRAL JOINT, OPEN APPROACH: ICD-10-PCS | Performed by: ORTHOPAEDIC SURGERY

## 2018-11-08 PROCEDURE — B5181ZA FLUOROSCOPY OF SUPERIOR VENA CAVA USING LOW OSMOLAR CONTRAST, GUIDANCE: ICD-10-PCS | Performed by: ORTHOPAEDIC SURGERY

## 2018-11-08 PROCEDURE — 99232 SBSQ HOSP IP/OBS MODERATE 35: CPT | Performed by: HOSPITALIST

## 2018-11-08 DEVICE — RELINE MAS RED SCREW 8.5X45 PO: Type: IMPLANTABLE DEVICE | Site: BACK | Status: FUNCTIONAL

## 2018-11-08 DEVICE — RELINE MAS TI ROD 5.5X35 LRDTC: Type: IMPLANTABLE DEVICE | Site: BACK | Status: FUNCTIONAL

## 2018-11-08 DEVICE — OSTEOCEL PRO LARGE BULK BUY: Type: IMPLANTABLE DEVICE | Site: BACK | Status: FUNCTIONAL

## 2018-11-08 DEVICE — RELINE LCK SCRW 5.5 OPEN TULIP: Type: IMPLANTABLE DEVICE | Site: BACK | Status: FUNCTIONAL

## 2018-11-08 DEVICE — RELINE MAS RED SCREW 7.5X40 2C: Type: IMPLANTABLE DEVICE | Site: BACK | Status: FUNCTIONAL

## 2018-11-08 RX ORDER — MIDAZOLAM HYDROCHLORIDE 1 MG/ML
INJECTION INTRAMUSCULAR; INTRAVENOUS AS NEEDED
Status: DISCONTINUED | OUTPATIENT
Start: 2018-11-08 | End: 2018-11-08 | Stop reason: SURG

## 2018-11-08 RX ORDER — DOCUSATE SODIUM 100 MG/1
100 CAPSULE, LIQUID FILLED ORAL 2 TIMES DAILY
Status: DISCONTINUED | OUTPATIENT
Start: 2018-11-08 | End: 2018-11-11

## 2018-11-08 RX ORDER — SENNA AND DOCUSATE SODIUM 50; 8.6 MG/1; MG/1
2 TABLET, FILM COATED ORAL DAILY
Status: DISCONTINUED | OUTPATIENT
Start: 2018-11-08 | End: 2018-11-08

## 2018-11-08 RX ORDER — ROPINIROLE 0.5 MG/1
0.75 TABLET, FILM COATED ORAL 3 TIMES DAILY
Status: DISCONTINUED | OUTPATIENT
Start: 2018-11-08 | End: 2018-11-11

## 2018-11-08 RX ORDER — HYDROCODONE BITARTRATE AND ACETAMINOPHEN 5; 325 MG/1; MG/1
1 TABLET ORAL ONCE
Status: COMPLETED | OUTPATIENT
Start: 2018-11-08 | End: 2018-11-08

## 2018-11-08 RX ORDER — EPHEDRINE SULFATE 50 MG/ML
INJECTION, SOLUTION INTRAVENOUS AS NEEDED
Status: DISCONTINUED | OUTPATIENT
Start: 2018-11-08 | End: 2018-11-08 | Stop reason: SURG

## 2018-11-08 RX ORDER — ONDANSETRON 2 MG/ML
4 INJECTION INTRAMUSCULAR; INTRAVENOUS ONCE AS NEEDED
Status: DISCONTINUED | OUTPATIENT
Start: 2018-11-08 | End: 2018-11-08 | Stop reason: HOSPADM

## 2018-11-08 RX ORDER — HYDROCODONE BITARTRATE AND ACETAMINOPHEN 5; 325 MG/1; MG/1
1 TABLET ORAL EVERY 4 HOURS PRN
Status: DISCONTINUED | OUTPATIENT
Start: 2018-11-08 | End: 2018-11-10

## 2018-11-08 RX ORDER — CYCLOBENZAPRINE HCL 10 MG
10 TABLET ORAL ONCE
Status: COMPLETED | OUTPATIENT
Start: 2018-11-08 | End: 2018-11-08

## 2018-11-08 RX ORDER — NALOXONE HYDROCHLORIDE 0.4 MG/ML
80 INJECTION, SOLUTION INTRAMUSCULAR; INTRAVENOUS; SUBCUTANEOUS AS NEEDED
Status: DISCONTINUED | OUTPATIENT
Start: 2018-11-08 | End: 2018-11-08 | Stop reason: HOSPADM

## 2018-11-08 RX ORDER — LIDOCAINE HYDROCHLORIDE 10 MG/ML
0.5 INJECTION, SOLUTION INFILTRATION; PERINEURAL ONCE AS NEEDED
Status: DISCONTINUED | OUTPATIENT
Start: 2018-11-08 | End: 2018-11-08

## 2018-11-08 RX ORDER — ONDANSETRON 2 MG/ML
INJECTION INTRAMUSCULAR; INTRAVENOUS AS NEEDED
Status: DISCONTINUED | OUTPATIENT
Start: 2018-11-08 | End: 2018-11-08 | Stop reason: SURG

## 2018-11-08 RX ORDER — SODIUM CHLORIDE 0.9 % (FLUSH) 0.9 %
10 SYRINGE (ML) INJECTION AS NEEDED
Status: DISCONTINUED | OUTPATIENT
Start: 2018-11-08 | End: 2018-11-11

## 2018-11-08 RX ORDER — ACETAMINOPHEN 325 MG/1
650 TABLET ORAL EVERY 4 HOURS PRN
Status: DISCONTINUED | OUTPATIENT
Start: 2018-11-08 | End: 2018-11-11

## 2018-11-08 RX ORDER — SENNOSIDES 8.6 MG
8.6 TABLET ORAL NIGHTLY
Status: DISCONTINUED | OUTPATIENT
Start: 2018-11-08 | End: 2018-11-11

## 2018-11-08 RX ORDER — MORPHINE SULFATE 4 MG/ML
4 INJECTION, SOLUTION INTRAMUSCULAR; INTRAVENOUS EVERY 10 MIN PRN
Status: DISCONTINUED | OUTPATIENT
Start: 2018-11-08 | End: 2018-11-08 | Stop reason: HOSPADM

## 2018-11-08 RX ORDER — CLINDAMYCIN PHOSPHATE 600 MG/50ML
600 INJECTION INTRAVENOUS ONCE
Status: DISCONTINUED | OUTPATIENT
Start: 2018-11-08 | End: 2018-11-08 | Stop reason: HOSPADM

## 2018-11-08 RX ORDER — BUPIVACAINE HYDROCHLORIDE AND EPINEPHRINE 5; 5 MG/ML; UG/ML
INJECTION, SOLUTION PERINEURAL AS NEEDED
Status: DISCONTINUED | OUTPATIENT
Start: 2018-11-08 | End: 2018-11-08 | Stop reason: HOSPADM

## 2018-11-08 RX ORDER — PREGABALIN 75 MG/1
150 CAPSULE ORAL ONCE
Status: COMPLETED | OUTPATIENT
Start: 2018-11-08 | End: 2018-11-08

## 2018-11-08 RX ORDER — DIPHENHYDRAMINE HCL 25 MG
25 CAPSULE ORAL EVERY 4 HOURS PRN
Status: DISCONTINUED | OUTPATIENT
Start: 2018-11-08 | End: 2018-11-11

## 2018-11-08 RX ORDER — FAMOTIDINE 20 MG/1
20 TABLET ORAL ONCE
Status: DISCONTINUED | OUTPATIENT
Start: 2018-11-08 | End: 2018-11-08 | Stop reason: HOSPADM

## 2018-11-08 RX ORDER — CLINDAMYCIN PHOSPHATE 150 MG/ML
INJECTION, SOLUTION INTRAVENOUS AS NEEDED
Status: DISCONTINUED | OUTPATIENT
Start: 2018-11-08 | End: 2018-11-08 | Stop reason: SURG

## 2018-11-08 RX ORDER — MORPHINE SULFATE 2 MG/ML
1 INJECTION, SOLUTION INTRAMUSCULAR; INTRAVENOUS EVERY 2 HOUR PRN
Status: DISCONTINUED | OUTPATIENT
Start: 2018-11-08 | End: 2018-11-11

## 2018-11-08 RX ORDER — METOPROLOL TARTRATE 5 MG/5ML
2.5 INJECTION INTRAVENOUS ONCE
Status: DISCONTINUED | OUTPATIENT
Start: 2018-11-08 | End: 2018-11-08 | Stop reason: HOSPADM

## 2018-11-08 RX ORDER — DIPHENHYDRAMINE HYDROCHLORIDE 50 MG/ML
25 INJECTION INTRAMUSCULAR; INTRAVENOUS EVERY 4 HOURS PRN
Status: DISCONTINUED | OUTPATIENT
Start: 2018-11-08 | End: 2018-11-11

## 2018-11-08 RX ORDER — MORPHINE SULFATE 2 MG/ML
2 INJECTION, SOLUTION INTRAMUSCULAR; INTRAVENOUS EVERY 2 HOUR PRN
Status: DISCONTINUED | OUTPATIENT
Start: 2018-11-08 | End: 2018-11-11

## 2018-11-08 RX ORDER — SODIUM CHLORIDE, SODIUM LACTATE, POTASSIUM CHLORIDE, CALCIUM CHLORIDE 600; 310; 30; 20 MG/100ML; MG/100ML; MG/100ML; MG/100ML
INJECTION, SOLUTION INTRAVENOUS CONTINUOUS
Status: DISCONTINUED | OUTPATIENT
Start: 2018-11-08 | End: 2018-11-11

## 2018-11-08 RX ORDER — ROCURONIUM BROMIDE 10 MG/ML
INJECTION, SOLUTION INTRAVENOUS AS NEEDED
Status: DISCONTINUED | OUTPATIENT
Start: 2018-11-08 | End: 2018-11-08 | Stop reason: SURG

## 2018-11-08 RX ORDER — BACITRACIN 50000 [USP'U]/1
INJECTION, POWDER, LYOPHILIZED, FOR SOLUTION INTRAMUSCULAR AS NEEDED
Status: DISCONTINUED | OUTPATIENT
Start: 2018-11-08 | End: 2018-11-08 | Stop reason: HOSPADM

## 2018-11-08 RX ORDER — VANCOMYCIN HYDROCHLORIDE 1 G/20ML
INJECTION, POWDER, LYOPHILIZED, FOR SOLUTION INTRAVENOUS AS NEEDED
Status: DISCONTINUED | OUTPATIENT
Start: 2018-11-08 | End: 2018-11-08 | Stop reason: HOSPADM

## 2018-11-08 RX ORDER — MORPHINE SULFATE 10 MG/ML
6 INJECTION, SOLUTION INTRAMUSCULAR; INTRAVENOUS EVERY 10 MIN PRN
Status: DISCONTINUED | OUTPATIENT
Start: 2018-11-08 | End: 2018-11-08 | Stop reason: HOSPADM

## 2018-11-08 RX ORDER — ACETAMINOPHEN 500 MG
1000 TABLET ORAL ONCE
Status: COMPLETED | OUTPATIENT
Start: 2018-11-08 | End: 2018-11-08

## 2018-11-08 RX ORDER — SCOLOPAMINE TRANSDERMAL SYSTEM 1 MG/1
1 PATCH, EXTENDED RELEASE TRANSDERMAL ONCE
Status: COMPLETED | OUTPATIENT
Start: 2018-11-08 | End: 2018-11-11

## 2018-11-08 RX ORDER — CYCLOBENZAPRINE HCL 10 MG
10 TABLET ORAL 3 TIMES DAILY PRN
Status: DISCONTINUED | OUTPATIENT
Start: 2018-11-08 | End: 2018-11-11

## 2018-11-08 RX ORDER — ALLOPURINOL 100 MG/1
100 TABLET ORAL NIGHTLY
Status: DISCONTINUED | OUTPATIENT
Start: 2018-11-08 | End: 2018-11-11

## 2018-11-08 RX ORDER — ONDANSETRON 2 MG/ML
4 INJECTION INTRAMUSCULAR; INTRAVENOUS EVERY 4 HOURS PRN
Status: DISPENSED | OUTPATIENT
Start: 2018-11-08 | End: 2018-11-09

## 2018-11-08 RX ORDER — CLINDAMYCIN PHOSPHATE 900 MG/50ML
900 INJECTION INTRAVENOUS EVERY 8 HOURS
Status: COMPLETED | OUTPATIENT
Start: 2018-11-09 | End: 2018-11-09

## 2018-11-08 RX ORDER — FOLIC ACID 1 MG/1
6 TABLET ORAL DAILY
Status: DISCONTINUED | OUTPATIENT
Start: 2018-11-08 | End: 2018-11-11

## 2018-11-08 RX ORDER — METOCLOPRAMIDE HYDROCHLORIDE 5 MG/ML
10 INJECTION INTRAMUSCULAR; INTRAVENOUS EVERY 6 HOURS PRN
Status: DISCONTINUED | OUTPATIENT
Start: 2018-11-08 | End: 2018-11-08

## 2018-11-08 RX ORDER — SERTRALINE HYDROCHLORIDE 100 MG/1
200 TABLET, FILM COATED ORAL DAILY
Status: DISCONTINUED | OUTPATIENT
Start: 2018-11-09 | End: 2018-11-11

## 2018-11-08 RX ORDER — DIPHENOXYLATE HYDROCHLORIDE AND ATROPINE SULFATE 2.5; .025 MG/1; MG/1
1 TABLET ORAL 4 TIMES DAILY PRN
Status: DISCONTINUED | OUTPATIENT
Start: 2018-11-08 | End: 2018-11-11

## 2018-11-08 RX ORDER — MORPHINE SULFATE 4 MG/ML
4 INJECTION, SOLUTION INTRAMUSCULAR; INTRAVENOUS EVERY 2 HOUR PRN
Status: DISCONTINUED | OUTPATIENT
Start: 2018-11-08 | End: 2018-11-11

## 2018-11-08 RX ORDER — GABAPENTIN 300 MG/1
300 CAPSULE ORAL 3 TIMES DAILY
Status: DISCONTINUED | OUTPATIENT
Start: 2018-11-08 | End: 2018-11-11

## 2018-11-08 RX ORDER — DEXTROSE MONOHYDRATE 25 G/50ML
50 INJECTION, SOLUTION INTRAVENOUS
Status: DISCONTINUED | OUTPATIENT
Start: 2018-11-08 | End: 2018-11-08 | Stop reason: HOSPADM

## 2018-11-08 RX ORDER — HYDROCODONE BITARTRATE AND ACETAMINOPHEN 5; 325 MG/1; MG/1
2 TABLET ORAL EVERY 4 HOURS PRN
Status: DISCONTINUED | OUTPATIENT
Start: 2018-11-08 | End: 2018-11-10

## 2018-11-08 RX ORDER — MORPHINE SULFATE 4 MG/ML
2 INJECTION, SOLUTION INTRAMUSCULAR; INTRAVENOUS EVERY 10 MIN PRN
Status: DISCONTINUED | OUTPATIENT
Start: 2018-11-08 | End: 2018-11-08 | Stop reason: HOSPADM

## 2018-11-08 RX ORDER — ONDANSETRON 4 MG/1
4 TABLET, FILM COATED ORAL EVERY 8 HOURS PRN
Status: DISCONTINUED | OUTPATIENT
Start: 2018-11-08 | End: 2018-11-11

## 2018-11-08 RX ORDER — CEVIMELINE HYDROCHLORIDE 30 MG/1
30 CAPSULE ORAL 3 TIMES DAILY
Status: DISCONTINUED | OUTPATIENT
Start: 2018-11-08 | End: 2018-11-10

## 2018-11-08 RX ORDER — DEXAMETHASONE SODIUM PHOSPHATE 4 MG/ML
VIAL (ML) INJECTION AS NEEDED
Status: DISCONTINUED | OUTPATIENT
Start: 2018-11-08 | End: 2018-11-08 | Stop reason: SURG

## 2018-11-08 RX ORDER — HYDROCODONE BITARTRATE AND ACETAMINOPHEN 5; 325 MG/1; MG/1
1 TABLET ORAL AS NEEDED
Status: DISCONTINUED | OUTPATIENT
Start: 2018-11-08 | End: 2018-11-08 | Stop reason: HOSPADM

## 2018-11-08 RX ORDER — PANTOPRAZOLE SODIUM 40 MG/1
40 TABLET, DELAYED RELEASE ORAL
Status: DISCONTINUED | OUTPATIENT
Start: 2018-11-09 | End: 2018-11-11

## 2018-11-08 RX ORDER — SODIUM CHLORIDE, SODIUM LACTATE, POTASSIUM CHLORIDE, CALCIUM CHLORIDE 600; 310; 30; 20 MG/100ML; MG/100ML; MG/100ML; MG/100ML
INJECTION, SOLUTION INTRAVENOUS CONTINUOUS
Status: DISCONTINUED | OUTPATIENT
Start: 2018-11-08 | End: 2018-11-08 | Stop reason: HOSPADM

## 2018-11-08 RX ORDER — METOPROLOL SUCCINATE 25 MG/1
25 TABLET, EXTENDED RELEASE ORAL
Status: DISCONTINUED | OUTPATIENT
Start: 2018-11-09 | End: 2018-11-11

## 2018-11-08 RX ORDER — HYDROCODONE BITARTRATE AND ACETAMINOPHEN 5; 325 MG/1; MG/1
2 TABLET ORAL AS NEEDED
Status: DISCONTINUED | OUTPATIENT
Start: 2018-11-08 | End: 2018-11-08 | Stop reason: HOSPADM

## 2018-11-08 RX ADMIN — CLINDAMYCIN PHOSPHATE 900 MG: 150 INJECTION, SOLUTION INTRAVENOUS at 16:44:00

## 2018-11-08 RX ADMIN — EPHEDRINE SULFATE 10 MG: 50 INJECTION, SOLUTION INTRAVENOUS at 16:45:00

## 2018-11-08 RX ADMIN — ROCURONIUM BROMIDE 50 MG: 10 INJECTION, SOLUTION INTRAVENOUS at 16:37:00

## 2018-11-08 RX ADMIN — EPHEDRINE SULFATE 5 MG: 50 INJECTION, SOLUTION INTRAVENOUS at 18:12:00

## 2018-11-08 RX ADMIN — DEXAMETHASONE SODIUM PHOSPHATE 4 MG: 4 MG/ML VIAL (ML) INJECTION at 16:37:00

## 2018-11-08 RX ADMIN — SODIUM CHLORIDE, SODIUM LACTATE, POTASSIUM CHLORIDE, CALCIUM CHLORIDE: 600; 310; 30; 20 INJECTION, SOLUTION INTRAVENOUS at 18:28:00

## 2018-11-08 RX ADMIN — SODIUM CHLORIDE, SODIUM LACTATE, POTASSIUM CHLORIDE, CALCIUM CHLORIDE: 600; 310; 30; 20 INJECTION, SOLUTION INTRAVENOUS at 16:35:00

## 2018-11-08 RX ADMIN — EPHEDRINE SULFATE 5 MG: 50 INJECTION, SOLUTION INTRAVENOUS at 17:59:00

## 2018-11-08 RX ADMIN — MIDAZOLAM HYDROCHLORIDE 2 MG: 1 INJECTION INTRAMUSCULAR; INTRAVENOUS at 16:33:00

## 2018-11-08 RX ADMIN — SODIUM CHLORIDE, SODIUM LACTATE, POTASSIUM CHLORIDE, CALCIUM CHLORIDE: 600; 310; 30; 20 INJECTION, SOLUTION INTRAVENOUS at 18:58:00

## 2018-11-08 RX ADMIN — SODIUM CHLORIDE, SODIUM LACTATE, POTASSIUM CHLORIDE, CALCIUM CHLORIDE: 600; 310; 30; 20 INJECTION, SOLUTION INTRAVENOUS at 17:10:00

## 2018-11-08 RX ADMIN — ONDANSETRON 4 MG: 2 INJECTION INTRAMUSCULAR; INTRAVENOUS at 18:25:00

## 2018-11-08 NOTE — PROCEDURES
[facility] MAIN OR  Patients Name: Jared Teodora  Attending Physician: Kelle Mehta MD  Operating Physician: Arabella Castro MD  CSN: [id number]     Location:  OR  MRN: [id number]    YOB: 1965  Admission Date: 11/8/2018  Operation Date: 11/8/201

## 2018-11-08 NOTE — H&P
[facility] Patient Status:  Surgery Admit    1965 MRN [id number]   Location [facility] Attending Flaquita Lawrence MD   Hosp Day # 0 PCP [facility]     Active Problems:    Preoperativ STONE    • Lupus (systemic lupus erythematosus) (HCC)    • Migraines     bright lights,wine,sulfites,cheeses,sweeteners,barometeric pressure   • Muscle weakness     arm weakness   • Neuropathy     Boths legs   • OBESITY    • Osteoarthritis    • Pleurisy

## 2018-11-08 NOTE — ANESTHESIA PROCEDURE NOTES
ANESTHESIA INTUBATION  Urgency: elective    Airway not difficult    General Information and Staff    Patient location during procedure: OR  Anesthesiologist: Joselin Aquino DO  Resident/CRNA: Liliana Hernandezu, CRNA  Performed: CRNA     Indications

## 2018-11-08 NOTE — PROGRESS NOTES
Vascular Access Note    Vascular Access Screening:   Allergies to Lidocaine: No allergy to Lidocaine Injection as verified on the consent  Allergies to Latex: yes  Presence of Pacemaker/Defibrillator: No  Mastectomy with Lymph Node Dissection: No  AV Fistul

## 2018-11-08 NOTE — ANESTHESIA PROCEDURE NOTES
Peripheral IV  Date/Time: 11/8/2018 5:04 PM  Inserted by: Romel Amos MD    Placement  Needle size: 20 G  Laterality: left  Location: hand  Local anesthetic: none  Site prep: alcohol  Technique: anatomical landmarks  Attempts: 1

## 2018-11-08 NOTE — ANESTHESIA PREPROCEDURE EVALUATION
Anesthesia PreOp Note    HPI:     Usha Zamorano is a 47year old female who presents for preoperative consultation requested by: Andrei Mann MD    Date of Surgery: 11/8/2018    Procedure(s):  POSTERIOR LUMBAR INTERBODY FUSION - MIS TLIF 1 LEVEL  [place] Date Noted: 03/03/2010      Connective tissue disease, undifferentiated ([facility] [address] [address]         Date Noted: 02/05/2010      Inflammatory polyarthritis ([facility]         Date Noted: 02/05/2010      Vitamin D deficiency         Date Noted: 10/22/2009      Restless legs s SALIVARY GLAND,INCISIONAL  12/1/09    Performed by Eric Parsons at [address]     • EXCISIONAL BIOPSY CHEEK MASS N/A 12/1/2009    Performed by Tonny Prakash MD at [address] [facility] [address]   Ondansetron HCl (ZOFRAN) 4 mg tablet TAKE 1 TABLET(4 MG) BY MOUTH EVERY 8 HOURS AS NEEDED FOR NAUSEA Disp: 20 tablet Rfl: 0 Past Week at Unknown time   Metoprolol Succinate ER 25 MG Oral Tablet 24 Hr  Disp:  Rfl: 6 11/8/2018 at 1200   ergocalciferol SYRINGE 25G X 5/8\" 1 ML Does not apply Misc INJECT 1 SYRINGE INTO THE SKIN ONCE A WEEK Disp: 12 each Rfl: 3 11/6/2018   diphenoxylate-atropine 2.5-0.025 MG Oral Tab Take 1 tablet by mouth 4 (four) times daily as needed for Diarrhea.  Disp:  Rfl:  More than Relation Age of Onset   • Hypertension Father    • Prostate Cancer Father    • High Cholesterol Father    • Hypertension Mother      Social History    Socioeconomic History      Marital status:       Spouse name: Not on file      Number of children: blood pressure is 148/84 and her pulse is 77. Her respiration is 20 and oxygen saturation is 98%.     10/30/18  1630 11/08/18  1323   BP:  148/84   BP Location:  Right arm   Pulse:  77   Resp:  20   Temp:  98.3 °F (36.8 °C)   TempSrc:  Oral   SpO2:  98%   W

## 2018-11-09 ENCOUNTER — PRIOR ORIGINAL RECORDS (OUTPATIENT)
Dept: OTHER | Age: 53
End: 2018-11-09

## 2018-11-09 ENCOUNTER — APPOINTMENT (OUTPATIENT)
Dept: GENERAL RADIOLOGY | Facility: HOSPITAL | Age: 53
DRG: 460 | End: 2018-11-09
Attending: ORTHOPAEDIC SURGERY
Payer: COMMERCIAL

## 2018-11-09 PROCEDURE — 99233 SBSQ HOSP IP/OBS HIGH 50: CPT | Performed by: HOSPITALIST

## 2018-11-09 PROCEDURE — 72110 X-RAY EXAM L-2 SPINE 4/>VWS: CPT | Performed by: ORTHOPAEDIC SURGERY

## 2018-11-09 RX ORDER — TRAMADOL HYDROCHLORIDE 50 MG/1
100 TABLET ORAL EVERY 6 HOURS PRN
Status: DISCONTINUED | OUTPATIENT
Start: 2018-11-09 | End: 2018-11-11

## 2018-11-09 RX ORDER — HYDROCODONE BITARTRATE AND ACETAMINOPHEN 5; 325 MG/1; MG/1
2 TABLET ORAL EVERY 4 HOURS PRN
Qty: 30 TABLET | Refills: 0 | Status: SHIPPED | OUTPATIENT
Start: 2018-11-09 | End: 2019-01-21

## 2018-11-09 NOTE — PHYSICAL THERAPY NOTE
PHYSICAL THERAPY TREATMENT NOTE - INPATIENT     Room Number: 427/427-A       Presenting Problem: lumbar pseudoarthrosis s/p L4-5 revision posterior spinal fusion    Problem List  Principal Problem:    Lumbar pseudoarthrosis  Active Problems:    Connective Fair -    ACTIVITY TOLERANCE  Pulse: 76  Heart Rate Source: Monitor                   O2 WALK  SPO2 on Room Air at Rest: 96               AM-PAC '6-Clicks' INPATIENT SHORT FORM - BASIC MOBILITY  How much difficulty does the patient currently have. ..  -   T and supervision   Goal #4   Current Status NT    Goal #5 Patient to demonstrate independence with home activity/exercise instructions provided to patient in preparation for discharge.    Goal #5   Current Status NT    Goal #6    Goal #6  Current Status

## 2018-11-09 NOTE — ANESTHESIA POSTPROCEDURE EVALUATION
Patient: Marvel Galeana    Procedure Summary     Date:  11/08/18 Room / Location:  [address] [place] [address] MAIN OR 09 / 300 [facility] MAIN OR    Anesthesia Start:  0178 Anesthesia Stop:  1904    Procedure:  POSTERIOR LUMBAR INTERBODY FUSION - MIS TLIF 1 LEVEL (N/A Back) Diagnosis:  (P

## 2018-11-09 NOTE — PROGRESS NOTES
[facility] HOSP - [facility]    Progress Note    Aubree Kan Patient Status:  Surgery Admit    1965 MRN [id number]   Location [address] UNIT Attending Delma Garcia MD   Hosp Day # 0 PCP Brooke Iglesias DRAIN, PT/OT. Connective tissue disease, undifferentiated ([facility] [address]  HOLD IMMUNOMODULATORS       History of DVT (deep vein thrombosis)  HOLD XARELTO UNTIL OK TO CONT PER SPINE SURGERY. Results:     Lab Results   Component Value Date    WBC 5.

## 2018-11-09 NOTE — PHYSICAL THERAPY NOTE
PHYSICAL THERAPY EVALUATION - INPATIENT     Room Number: 427/427-A  Evaluation Date: 11/9/2018  Type of Evaluation: Initial   Physician Order: PT Eval and Treat    Presenting Problem: lumbar pseudoarthrosis s/p L4-5 revision posterior spinal fusion  Reaso training  Rehab Potential : Good  Frequency (Obs): (1-2xday)       PHYSICAL THERAPY MEDICAL/SOCIAL HISTORY     History related to current admission: Patient with hx of OA, HTN, HLD, and gout, and fibromyalgia     Problem List  Principal Problem:    Lumbar [address]   • CHOLECYSTECTOMY     • EXCISIONAL BIOPSY CHEEK MASS N/A 12/1/2009    Performed by Ryne Herrera MD at [address] [place] [address]   • HERNIA SURGERY     • HIP REPLACEMENT SURGERY  1/14    right: Dr. Amber Perales ASSESSMENT  Ratin  Location: lower back  Management Techniques: Activity promotion; Body mechanics;Repositioning    COGNITION  · Overall Cognitive Status:  WFL - within functional limits  · Following Commands:  follows all commands and directions withou assist     Transfers: CGA    Exercise/Education Provided:  Bed mobility  Body mechanics  Energy conservation  Gait training  Posture  Strengthening  Transfer training    Patient End of Session: Needs met;Call light within reach;RN aware of session/findings

## 2018-11-09 NOTE — PROGRESS NOTES
[facility]    Progress Note    Ibis Panchaladrien Patient Status:  Inpatient    1965 MRN [id number]   Location [facility] 4W/SW/SE Attending Annabella Harman, [address] [facility] Day # 1 PCP [facility]       Subjective:   Saba Miramontes 2 mg 2 mg Intravenous Q2H PRN   Or      morphINE sulfate (PF) 4 MG/ML injection 4 mg 4 mg Intravenous Q2H PRN   allopurinol (ZYLOPRIM) tab 100 mg 100 mg Oral Nightly   Cevimeline HCl (EVOXAC) cap 30 mg 30 mg Oral TID   diphenoxylate-atropine (LOMOTIL) 2.5- FLUOROSCOPY TIME:   21.2 sec # OF FLUOROGRAPHIC IMAGES:   3  * Multiple C-arm images were saved from the operating room. * Postop changes are noted. * Previous posterior fusion at the L4 and L5 levels the identified.  * Images were utilized during loose ins

## 2018-11-10 PROCEDURE — 99233 SBSQ HOSP IP/OBS HIGH 50: CPT | Performed by: HOSPITALIST

## 2018-11-10 RX ORDER — HYDROCODONE BITARTRATE AND ACETAMINOPHEN 10; 325 MG/1; MG/1
2 TABLET ORAL EVERY 6 HOURS PRN
Status: DISCONTINUED | OUTPATIENT
Start: 2018-11-10 | End: 2018-11-11

## 2018-11-10 RX ORDER — HYDROCODONE BITARTRATE AND ACETAMINOPHEN 10; 325 MG/1; MG/1
1-2 TABLET ORAL EVERY 4 HOURS PRN
Qty: 50 TABLET | Refills: 0 | Status: SHIPPED | OUTPATIENT
Start: 2018-11-10 | End: 2019-01-21

## 2018-11-10 RX ORDER — CYCLOBENZAPRINE HCL 10 MG
10 TABLET ORAL 3 TIMES DAILY
Status: DISCONTINUED | OUTPATIENT
Start: 2018-11-10 | End: 2018-11-11

## 2018-11-10 RX ORDER — CYCLOBENZAPRINE HCL 10 MG
10 TABLET ORAL 3 TIMES DAILY PRN
Qty: 50 TABLET | Refills: 0 | Status: SHIPPED | OUTPATIENT
Start: 2018-11-10 | End: 2019-01-21

## 2018-11-10 RX ORDER — HYDROCODONE BITARTRATE AND ACETAMINOPHEN 10; 325 MG/1; MG/1
1 TABLET ORAL EVERY 4 HOURS PRN
Status: DISCONTINUED | OUTPATIENT
Start: 2018-11-10 | End: 2018-11-11

## 2018-11-10 NOTE — PROGRESS NOTES
[facility] - [facility]    Progress Note    Daxa Groveaaron Patient Status:  Inpatient    1965 MRN [id number]   Location [facility] 4W/SW/SE Attending Jocy Mi, [address] [facility] Day # 2 PCP [facility]       Subjective:   Stephanie Stokes Intravenous Q2H PRN   Or      morphINE sulfate (PF) 4 MG/ML injection 4 mg 4 mg Intravenous Q2H PRN   allopurinol (ZYLOPRIM) tab 100 mg 100 mg Oral Nightly   Cevimeline HCl (EVOXAC) cap 30 mg 30 mg Oral TID   diphenoxylate-atropine (LOMOTIL) 2.5-0.025 MG p FLUOROSCOPY TIME:   21.2 sec # OF FLUOROGRAPHIC IMAGES:   3  * Multiple C-arm images were saved from the operating room. * Postop changes are noted. * Previous posterior fusion at the L4 and L5 levels the identified.  * Images were utilized during loose ins

## 2018-11-10 NOTE — PHYSICAL THERAPY NOTE
PHYSICAL THERAPY TREATMENT NOTE - INPATIENT     Room Number: 427/427-A       Presenting Problem: Lumbar pseudoarthrosis s/p L4-5 revision posterior spinal fusion     Problem List  Principal Problem:    Lumbar pseudoarthrosis  Active Problems:    Connective BEARING RESTRICTION  Weight Bearing Restriction: None                PAIN ASSESSMENT   Ratin  Location: lower back  Management Techniques:  Activity promotion;Breathing techniques;Relaxation;Repositioning    BALANCE modified independent      Goal #1   Current Status CGA / min assist for donning B LEs onto bed   Goal #2 Patient is able to demonstrate transfers Sit to/from Stand at assistance level: modified independent with walker - rolling      Goal #2  Current Status

## 2018-11-10 NOTE — PROGRESS NOTES
SPINE ATTENDING NOTE     No acute distress  Alert and oriented  No overnight issues  Preoperative pain resolved  Complains of rheumatoid pain      11/10/18  0351   BP: 112/65   Pulse: 87   Resp: 17   Temp: 98.1 °F (36.7 °C)      Alert and oriented  Dressin

## 2018-11-11 VITALS
HEART RATE: 83 BPM | WEIGHT: 153 LBS | HEIGHT: 65 IN | TEMPERATURE: 99 F | BODY MASS INDEX: 25.49 KG/M2 | RESPIRATION RATE: 18 BRPM | DIASTOLIC BLOOD PRESSURE: 63 MMHG | OXYGEN SATURATION: 96 % | SYSTOLIC BLOOD PRESSURE: 111 MMHG

## 2018-11-11 NOTE — PHYSICAL THERAPY NOTE
PHYSICAL THERAPY TREATMENT NOTE - INPATIENT     Room Number: 421/421-A       Presenting Problem: Lumbar pseudoarthrosis s/p L4-5 revision posterior spinal fusion     Problem List  Principal Problem:    Lumbar pseudoarthrosis  Active Problems:    Connective have surgery on \"torn labrum\" at a later date. Recommend pt to have 24hr SV initially upon discharge due to decreased insight and safety awareness.  Spoke with RN Gary Richardson, regarding pt's current level of mobility, symptoms during session, safety considerati Scale): 53.28   CMS Modifier (G-Code): CJ    FUNCTIONAL ABILITY STATUS  Gait Assessment   Gait Assistance: Supervision  Distance (ft): 300'  Assistive Device: Rolling walker  Pattern: L Decreased stance time(slow gait speed, decreased malika, decreased st

## 2018-11-14 NOTE — DISCHARGE SUMMARY
[facility]  Discharge Summary    Aubreebetsy James Patient Status:  Inpatient    1965 MRN [id number]   Location [facility] 4W/SW/SE Attending No att. providers found   Hosp Day # 3 PCP Brooke Iglesias     Date of Admission:  conjunctiva/corneas clear, EOM's intact, fundi     benign, both eyes                   Back:     Symmetric, no curvature, ROM normal, no CVA tenderness   Lungs:     Clear to auscultation bilaterally, respirations unlabored   Chest Wall:    No tenderness or medications which have NOT CHANGED    ROPINIROLE HCL ER 2 MG Oral Tablet 24 Hr  TAKE 1 TABLET BY MOUTH DAILY, Normal, Disp-90 tablet, R-0    GABAPENTIN 300 MG Oral Cap  TAKE ONE CAPSULE BY MOUTH THREE TIMES DAILY, Normal, Disp-270 capsule, R-0    !! HYDROc 1,000 mcg into the muscle every 30 (thirty) days. , Historical    Esomeprazole Magnesium (NEXIUM) 40 MG Oral Capsule Delayed Release  Take 40 mg by mouth 2 (two) times daily.   , Historical    Sertraline HCl (ZOLOFT) 100 MG Oral Tab  Take 200 mg by mouth d

## 2018-11-14 NOTE — OPERATIVE REPORT
PATIENT Bennetta Cabot    DATE OF SURGERY  11/8/18    SURGEON   Rupal Hamilton MD    ASSISTANT  Kimberly Singh, SARAH    PREOPERATIVE DIAGNOSIS   Pseudarthrosis L4-5    POSTOPERATIVE DIAGNOSIS   Pseudarthrosis L4-5    PROCEDURES   1.  Revision posterolateral fluoroscopy, an incision was made just off the   At the L4-5 level. The skin was sharply incised and bovie cautery was used to dissect the scar and paraspinal muscles subperiosteally from the midline. Care was taken on the right facetectomy side.  [facility]

## 2018-11-17 NOTE — PAYOR COMM NOTE
--------------  DISCHARGE REVIEW    Payor: ELDER AIDEN  Subscriber #:  [id number]  Authorization Number: [id number]    Admit date: 11/8/18  Admit time:  2141  Discharge Date: 11/11/2018 12:15 PM     Admitting Physician: Bret Monaco MD  Primary Care Phys

## 2018-12-14 ENCOUNTER — LAB ENCOUNTER (OUTPATIENT)
Dept: LAB | Facility: HOSPITAL | Age: 53
End: 2018-12-14
Attending: INTERNAL MEDICINE
Payer: COMMERCIAL

## 2018-12-14 ENCOUNTER — MYAURORA ACCOUNT LINK (OUTPATIENT)
Dept: OTHER | Age: 53
End: 2018-12-14

## 2018-12-14 ENCOUNTER — PRIOR ORIGINAL RECORDS (OUTPATIENT)
Dept: OTHER | Age: 53
End: 2018-12-14

## 2018-12-14 DIAGNOSIS — D64.9 ANEMIA, UNSPECIFIED: Primary | ICD-10-CM

## 2018-12-14 PROCEDURE — 84443 ASSAY THYROID STIM HORMONE: CPT

## 2018-12-14 PROCEDURE — 83540 ASSAY OF IRON: CPT

## 2018-12-14 PROCEDURE — 83550 IRON BINDING TEST: CPT

## 2018-12-14 PROCEDURE — 36415 COLL VENOUS BLD VENIPUNCTURE: CPT

## 2018-12-14 PROCEDURE — 82728 ASSAY OF FERRITIN: CPT

## 2018-12-19 ENCOUNTER — HOSPITAL ENCOUNTER (OUTPATIENT)
Dept: CV DIAGNOSTICS | Facility: HOSPITAL | Age: 53
Discharge: HOME | End: 2018-12-19
Attending: INTERNAL MEDICINE
Payer: COMMERCIAL

## 2018-12-19 DIAGNOSIS — R00.2 PALPITATIONS: ICD-10-CM

## 2018-12-19 PROCEDURE — 93270 REMOTE 30 DAY ECG REV/REPORT: CPT | Performed by: INTERNAL MEDICINE

## 2018-12-19 PROCEDURE — 93272 ECG/REVIEW INTERPRET ONLY: CPT | Performed by: INTERNAL MEDICINE

## 2018-12-19 PROCEDURE — 93271 ECG/MONITORING AND ANALYSIS: CPT | Performed by: INTERNAL MEDICINE

## 2018-12-28 LAB
ALBUMIN: 4 G/DL
ALKALINE PHOSPHATATE(ALK PHOS): 54 IU/L
BILIRUBIN TOTAL: 0.4 MG/DL
BUN: 25 MG/DL
CALCIUM: 8.3 MG/DL
CHLORIDE: 111 MEQ/L
CREATININE, SERUM: 1.02 MG/DL
GLOBULIN: 3.4 G/DL
GLUCOSE: 92 MG/DL
HEMATOCRIT: 32.1 %
HEMOGLOBIN: 10.9 G/DL
PLATELETS: 175 K/UL
POTASSIUM, SERUM: 4.4 MEQ/L
PROTEIN, TOTAL: 7.4 G/DL
RED BLOOD COUNT: 3.38 X 10-6/U
SGOT (AST): 38 IU/L
SGPT (ALT): 42 IU/L
SODIUM: 140 MEQ/L
WHITE BLOOD COUNT: 6.9 X 10-3/U

## 2019-01-01 ENCOUNTER — EXTERNAL RECORD (OUTPATIENT)
Dept: HEALTH INFORMATION MANAGEMENT | Facility: OTHER | Age: 54
End: 2019-01-01

## 2019-01-02 ENCOUNTER — PRIOR ORIGINAL RECORDS (OUTPATIENT)
Dept: OTHER | Age: 54
End: 2019-01-02

## 2019-01-03 LAB
IRON, TOTAL: 64 MCG/DL
THYROID STIMULATING HORMONE: 2.06 MLU/L

## 2019-01-25 ENCOUNTER — ANESTHESIA EVENT (OUTPATIENT)
Dept: SURGERY | Facility: HOSPITAL | Age: 54
End: 2019-01-25
Payer: COMMERCIAL

## 2019-01-25 ENCOUNTER — ANESTHESIA (OUTPATIENT)
Dept: SURGERY | Facility: HOSPITAL | Age: 54
End: 2019-01-25
Payer: COMMERCIAL

## 2019-01-25 ENCOUNTER — APPOINTMENT (OUTPATIENT)
Dept: GENERAL RADIOLOGY | Facility: HOSPITAL | Age: 54
End: 2019-01-25
Attending: ORTHOPAEDIC SURGERY
Payer: COMMERCIAL

## 2019-01-25 ENCOUNTER — PRIOR ORIGINAL RECORDS (OUTPATIENT)
Dept: OTHER | Age: 54
End: 2019-01-25

## 2019-01-25 ENCOUNTER — HOSPITAL ENCOUNTER (OUTPATIENT)
Facility: HOSPITAL | Age: 54
Setting detail: HOSPITAL OUTPATIENT SURGERY
Discharge: HOME | End: 2019-01-25
Attending: ORTHOPAEDIC SURGERY | Admitting: ORTHOPAEDIC SURGERY
Payer: COMMERCIAL

## 2019-01-25 ENCOUNTER — APPOINTMENT (OUTPATIENT)
Dept: PICC SERVICES | Facility: HOSPITAL | Age: 54
End: 2019-01-25
Attending: ORTHOPAEDIC SURGERY
Payer: COMMERCIAL

## 2019-01-25 VITALS
HEART RATE: 67 BPM | DIASTOLIC BLOOD PRESSURE: 79 MMHG | BODY MASS INDEX: 25.99 KG/M2 | TEMPERATURE: 97 F | WEIGHT: 156 LBS | HEIGHT: 65 IN | RESPIRATION RATE: 16 BRPM | SYSTOLIC BLOOD PRESSURE: 120 MMHG | OXYGEN SATURATION: 95 %

## 2019-01-25 DIAGNOSIS — M06.9: ICD-10-CM

## 2019-01-25 DIAGNOSIS — M25.831 ABUTMENT SYNDROME OF WRIST, RIGHT: ICD-10-CM

## 2019-01-25 DIAGNOSIS — M25.531 RIGHT WRIST PAIN: ICD-10-CM

## 2019-01-25 PROCEDURE — 05HY33Z INSERTION OF INFUSION DEVICE INTO UPPER VEIN, PERCUTANEOUS APPROACH: ICD-10-PCS | Performed by: ORTHOPAEDIC SURGERY

## 2019-01-25 PROCEDURE — 76000 FLUOROSCOPY <1 HR PHYS/QHP: CPT | Performed by: ORTHOPAEDIC SURGERY

## 2019-01-25 PROCEDURE — 0PBK0ZZ EXCISION OF RIGHT ULNA, OPEN APPROACH: ICD-10-PCS | Performed by: ORTHOPAEDIC SURGERY

## 2019-01-25 PROCEDURE — 76942 ECHO GUIDE FOR BIOPSY: CPT | Performed by: ORTHOPAEDIC SURGERY

## 2019-01-25 PROCEDURE — 36569 INSJ PICC 5 YR+ W/O IMAGING: CPT

## 2019-01-25 PROCEDURE — 99152 MOD SED SAME PHYS/QHP 5/>YRS: CPT | Performed by: ORTHOPAEDIC SURGERY

## 2019-01-25 PROCEDURE — 64415 NJX AA&/STRD BRCH PLXS IMG: CPT | Performed by: ORTHOPAEDIC SURGERY

## 2019-01-25 PROCEDURE — 94010 BREATHING CAPACITY TEST: CPT | Performed by: ORTHOPAEDIC SURGERY

## 2019-01-25 PROCEDURE — 76937 US GUIDE VASCULAR ACCESS: CPT

## 2019-01-25 DEVICE — 3.5MM X 12MM LOCKING HEXALOBE SCREW
Type: IMPLANTABLE DEVICE | Site: WRIST | Status: FUNCTIONAL
Brand: ACUMED

## 2019-01-25 DEVICE — 3.5MM X 14MM NON-LOCKING HEXALOBE SCREW
Type: IMPLANTABLE DEVICE | Site: WRIST | Status: FUNCTIONAL
Brand: ACUMED

## 2019-01-25 DEVICE — 3.5MM X 10MM NON-LOCKING HEXALOBE SCREW
Type: IMPLANTABLE DEVICE | Site: WRIST | Status: FUNCTIONAL
Brand: ACUMED

## 2019-01-25 DEVICE — 3.5MM X 10MM LOCKING HEXALOBE SCREW
Type: IMPLANTABLE DEVICE | Site: WRIST | Status: FUNCTIONAL
Brand: ACUMED

## 2019-01-25 RX ORDER — LIDOCAINE HYDROCHLORIDE 10 MG/ML
0.5 INJECTION, SOLUTION INFILTRATION; PERINEURAL ONCE AS NEEDED
Status: DISCONTINUED | OUTPATIENT
Start: 2019-01-25 | End: 2019-01-25

## 2019-01-25 RX ORDER — MIDAZOLAM HYDROCHLORIDE 1 MG/ML
INJECTION INTRAMUSCULAR; INTRAVENOUS
Status: COMPLETED | OUTPATIENT
Start: 2019-01-25 | End: 2019-01-25

## 2019-01-25 RX ORDER — DEXAMETHASONE SODIUM PHOSPHATE 10 MG/ML
INJECTION, SOLUTION INTRAMUSCULAR; INTRAVENOUS
Status: COMPLETED | OUTPATIENT
Start: 2019-01-25 | End: 2019-01-25

## 2019-01-25 RX ORDER — ONDANSETRON 2 MG/ML
4 INJECTION INTRAMUSCULAR; INTRAVENOUS EVERY 6 HOURS PRN
Status: DISCONTINUED | OUTPATIENT
Start: 2019-01-25 | End: 2019-01-25

## 2019-01-25 RX ORDER — DEXAMETHASONE SODIUM PHOSPHATE 4 MG/ML
4 VIAL (ML) INJECTION ONCE
Status: COMPLETED | OUTPATIENT
Start: 2019-01-25 | End: 2019-01-25

## 2019-01-25 RX ORDER — METOPROLOL TARTRATE 5 MG/5ML
2.5 INJECTION INTRAVENOUS ONCE
Status: DISCONTINUED | OUTPATIENT
Start: 2019-01-25 | End: 2019-01-25

## 2019-01-25 RX ORDER — NALOXONE HYDROCHLORIDE 0.4 MG/ML
80 INJECTION, SOLUTION INTRAMUSCULAR; INTRAVENOUS; SUBCUTANEOUS AS NEEDED
Status: DISCONTINUED | OUTPATIENT
Start: 2019-01-25 | End: 2019-01-25

## 2019-01-25 RX ORDER — LIDOCAINE HYDROCHLORIDE 10 MG/ML
INJECTION, SOLUTION EPIDURAL; INFILTRATION; INTRACAUDAL; PERINEURAL AS NEEDED
Status: DISCONTINUED | OUTPATIENT
Start: 2019-01-25 | End: 2019-01-25 | Stop reason: SURG

## 2019-01-25 RX ORDER — ACETAMINOPHEN 500 MG
1000 TABLET ORAL EVERY 8 HOURS
Status: DISCONTINUED | OUTPATIENT
Start: 2019-01-25 | End: 2019-01-25

## 2019-01-25 RX ORDER — HYDROCODONE BITARTRATE AND ACETAMINOPHEN 10; 325 MG/1; MG/1
1-2 TABLET ORAL EVERY 6 HOURS PRN
Qty: 30 TABLET | Refills: 0 | Status: SHIPPED | OUTPATIENT
Start: 2019-01-25 | End: 2019-02-04

## 2019-01-25 RX ORDER — ONDANSETRON 2 MG/ML
4 INJECTION INTRAMUSCULAR; INTRAVENOUS ONCE AS NEEDED
Status: DISCONTINUED | OUTPATIENT
Start: 2019-01-25 | End: 2019-01-25

## 2019-01-25 RX ORDER — EPHEDRINE SULFATE 50 MG/ML
INJECTION, SOLUTION INTRAVENOUS AS NEEDED
Status: DISCONTINUED | OUTPATIENT
Start: 2019-01-25 | End: 2019-01-25 | Stop reason: SURG

## 2019-01-25 RX ORDER — SODIUM CHLORIDE, SODIUM LACTATE, POTASSIUM CHLORIDE, CALCIUM CHLORIDE 600; 310; 30; 20 MG/100ML; MG/100ML; MG/100ML; MG/100ML
INJECTION, SOLUTION INTRAVENOUS CONTINUOUS
Status: DISCONTINUED | OUTPATIENT
Start: 2019-01-25 | End: 2019-01-25

## 2019-01-25 RX ORDER — ONDANSETRON 2 MG/ML
INJECTION INTRAMUSCULAR; INTRAVENOUS AS NEEDED
Status: DISCONTINUED | OUTPATIENT
Start: 2019-01-25 | End: 2019-01-25 | Stop reason: SURG

## 2019-01-25 RX ORDER — FAMOTIDINE 20 MG/1
20 TABLET ORAL ONCE
Status: DISCONTINUED | OUTPATIENT
Start: 2019-01-25 | End: 2019-01-25 | Stop reason: HOSPADM

## 2019-01-25 RX ORDER — ACETAMINOPHEN 500 MG
1000 TABLET ORAL ONCE
Status: COMPLETED | OUTPATIENT
Start: 2019-01-25 | End: 2019-01-25

## 2019-01-25 RX ORDER — DEXAMETHASONE SODIUM PHOSPHATE 4 MG/ML
VIAL (ML) INJECTION AS NEEDED
Status: DISCONTINUED | OUTPATIENT
Start: 2019-01-25 | End: 2019-01-25 | Stop reason: SURG

## 2019-01-25 RX ORDER — CLINDAMYCIN PHOSPHATE 900 MG/50ML
900 INJECTION INTRAVENOUS ONCE
Status: DISCONTINUED | OUTPATIENT
Start: 2019-01-25 | End: 2019-01-25 | Stop reason: HOSPADM

## 2019-01-25 RX ORDER — CLINDAMYCIN PHOSPHATE 150 MG/ML
INJECTION, SOLUTION INTRAVENOUS AS NEEDED
Status: DISCONTINUED | OUTPATIENT
Start: 2019-01-25 | End: 2019-01-25 | Stop reason: SURG

## 2019-01-25 RX ORDER — DIPHENHYDRAMINE HYDROCHLORIDE 50 MG/ML
12.5 INJECTION INTRAMUSCULAR; INTRAVENOUS ONCE
Status: COMPLETED | OUTPATIENT
Start: 2019-01-25 | End: 2019-01-25

## 2019-01-25 RX ORDER — SODIUM CHLORIDE 0.9 % (FLUSH) 0.9 %
10 SYRINGE (ML) INJECTION AS NEEDED
Status: DISCONTINUED | OUTPATIENT
Start: 2019-01-25 | End: 2019-01-25

## 2019-01-25 RX ORDER — ROPIVACAINE HYDROCHLORIDE 5 MG/ML
INJECTION, SOLUTION EPIDURAL; INFILTRATION; PERINEURAL
Status: COMPLETED | OUTPATIENT
Start: 2019-01-25 | End: 2019-01-25

## 2019-01-25 RX ADMIN — SODIUM CHLORIDE, SODIUM LACTATE, POTASSIUM CHLORIDE, CALCIUM CHLORIDE: 600; 310; 30; 20 INJECTION, SOLUTION INTRAVENOUS at 14:43:00

## 2019-01-25 RX ADMIN — LIDOCAINE HYDROCHLORIDE 25 MG: 10 INJECTION, SOLUTION EPIDURAL; INFILTRATION; INTRACAUDAL; PERINEURAL at 12:58:00

## 2019-01-25 RX ADMIN — SODIUM CHLORIDE, SODIUM LACTATE, POTASSIUM CHLORIDE, CALCIUM CHLORIDE: 600; 310; 30; 20 INJECTION, SOLUTION INTRAVENOUS at 12:52:00

## 2019-01-25 RX ADMIN — CLINDAMYCIN PHOSPHATE 900 MG: 150 INJECTION, SOLUTION INTRAVENOUS at 13:04:00

## 2019-01-25 RX ADMIN — ROPIVACAINE HYDROCHLORIDE 30 ML: 5 INJECTION, SOLUTION EPIDURAL; INFILTRATION; PERINEURAL at 12:31:00

## 2019-01-25 RX ADMIN — DEXAMETHASONE SODIUM PHOSPHATE 4 MG: 4 MG/ML VIAL (ML) INJECTION at 13:51:00

## 2019-01-25 RX ADMIN — MIDAZOLAM HYDROCHLORIDE 2 MG: 1 INJECTION INTRAMUSCULAR; INTRAVENOUS at 12:31:00

## 2019-01-25 RX ADMIN — EPHEDRINE SULFATE 10 MG: 50 INJECTION, SOLUTION INTRAVENOUS at 13:11:00

## 2019-01-25 RX ADMIN — ONDANSETRON 4 MG: 2 INJECTION INTRAMUSCULAR; INTRAVENOUS at 13:51:00

## 2019-01-25 RX ADMIN — MIDAZOLAM HYDROCHLORIDE 2 MG: 1 INJECTION INTRAMUSCULAR; INTRAVENOUS at 12:52:00

## 2019-01-25 RX ADMIN — DEXAMETHASONE SODIUM PHOSPHATE 4 MG: 10 INJECTION, SOLUTION INTRAMUSCULAR; INTRAVENOUS at 12:31:00

## 2019-01-25 NOTE — OR PREOP
RN called to patient's room to examine patients back of throat, patient c/o of pain in throat and into ear,  observed scrapes and bleeding in the back of the throat, this RN observed the same upon assessment.   Will notify anesthesia    Dr Duy Pittman wi

## 2019-01-25 NOTE — ANESTHESIA PROCEDURE NOTES
ANESTHESIA INTUBATION  Date/Time: 1/25/2019 12:58 PM  Urgency: elective    Airway not difficult    General Information and Staff    Patient location during procedure: OR  Anesthesiologist: Miladys Pichardo MD  Resident/CRNA: AJAY Magallanes

## 2019-01-25 NOTE — BRIEF OP NOTE
Pre-Operative Diagnosis: Right wrist pain [M25.531]  Rheumatoid arthritis involving right wrist, unspecified rheumatoid factor presence (HCC)   Abutment syndrome of wrist, right [M25.831]     Post-Operative Diagnosis: same     Procedure Performed:   Proced

## 2019-01-25 NOTE — ANESTHESIA PROCEDURE NOTES
Peripheral Block  Date/Time: 1/25/2019 12:31 PM    Anesthesiologist:  Adam Lorenzo MD  Performed by:   Anesthesiologist  Patient Location:  PACU  Start Time:  1/25/2019 12:31 PM  End Time:  1/25/2019 12:41 PM  Site Identification: ultrasound guided, re

## 2019-01-25 NOTE — OR PREOP
Dr Effie Mccarthy came to assess patient, order for decadron IV once and Cepacol lozenges, okay for patient to resume xarelto.

## 2019-01-25 NOTE — ANESTHESIA PREPROCEDURE EVALUATION
Anesthesia PreOp Note    HPI:     Jayme Reardon is a 47year old female who presents for preoperative consultation requested by: Eric Hendrickson MD    Date of Surgery: 1/25/2019    Procedure(s):  ULNAR SHORTENING OSTEOTOMY  Indication: Right wrist pain Osteoarthrosis, unspecified whether generalized or localized, pelvic region and thigh         Date Noted: 09/03/2013      Seizure [facility])         Date Noted: 08/10/2011      Neck pain         Date Noted: 04/07/2011      Preoperative testing         Date No Procedure Laterality Date   • APPENDECTOMY     • ARTHROSCOPY WRIST Right 6/25/2018    Performed by Brien Andres MD at [facility]   • BACK SURGERY      3/16 lumbar discectomy  9/2016 lumbar fusion   • CHOLECYSTECTOMY     • EXCISIONAL BIOP Calcium Carbonate-Vit D-Min (CALCIUM 1200 OR) Take by mouth.  Disp:  Rfl:  1/24/2019 at 2300   Ondansetron HCl (ZOFRAN) 4 mg tablet TAKE 1 TABLET(4 MG) BY MOUTH EVERY 8 HOURS AS NEEDED FOR NAUSEA Disp: 20 tablet Rfl: 0 Past Week at Unknown time   Metoprolol diphenoxylate-atropine 2.5-0.025 MG Oral Tab Take 1 tablet by mouth 4 (four) times daily as needed for Diarrhea.  Disp:  Rfl:  More than a month at Unknown time       Current Facility-Administered Medications Ordered in Epic:  lactated ringers infusion  Int Problem Relation Age of Onset   • Hypertension Father    • Prostate Cancer Father    • High Cholesterol Father    • Hypertension Mother      Social History    Socioeconomic History      Marital status:       Spouse name: Not on file      Number of c height is 1.651 m (5' 5\") and weight is 70.8 kg (156 lb). Her oral temperature is 98.5 °F (36.9 °C). Her blood pressure is 135/83 and her pulse is 71. Her respiration is 12 and oxygen saturation is 98%.     01/21/19  1450 01/25/19  1027 01/25/19  1212   B

## 2019-01-25 NOTE — ANESTHESIA POSTPROCEDURE EVALUATION
Patient: Tameka Pollard    Procedure Summary     Date:  01/25/19 Room / Location:  [facility] OR  / [facility] OR    Anesthesia Start:  1541 Anesthesia Stop:  6941    Procedure:  ULNAR SHORTENING OSTEOTOMY (Right Lower Arm) Diagnosis:       Right wrist pain

## 2019-01-25 NOTE — PROGRESS NOTES
Vascular Access Note    Vascular Access Screening:   Allergies to Lidocaine: no  Allergies to Latex: yes  Presence of Pacemaker/Defibrillator: No  Mastectomy with Lymph Node Dissection: No  AV Fistula / AV Graft: No  Dialysis Catheter: No  Central Line: No

## 2019-01-25 NOTE — H&P
Stephanie Forbes  155/1965  47year old   female  Anneliese James MD     HPI:   Patient presents with: Follow - Up: right wrist        The patient complains of pain located right wrist ulnarly. The pain is moderate and increased with use.   The patient d Oral Tablet 24 Hr TAKE 1 TABLET BY MOUTH DAILY Disp: 90 tablet Rfl: 0   GABAPENTIN 300 MG Oral Cap TAKE ONE CAPSULE BY MOUTH THREE TIMES DAILY Disp: 270 capsule Rfl: 0   HYDROcodone-acetaminophen (NORCO) 5-325 MG Oral Tab Take 1-2 tablets by mouth every 6 Connective tissue disease, undifferentiated ([facility]     • Deep vein thrombosis ([facility] 2016     Multiple DVTs: 2 in leg and 2 in arm   • DEPRESSION     • Depression     • Diabetes mellitus ([facility]       Only diet controlled now; lost weight   • Esophageal reflux SURGICAL HISTORY         parathyroidectomy   • OTHER SURGICAL HISTORY Right 2018     wrist surgery   • OTHER SURGICAL HISTORY Left 2018     left wrist surgery   • POSTERIOR LUMBAR INTERBODY FUSION - MIS TLIF 1 LEVEL N/A 11/8/2018     Performed by Desean Patterson supination of 80 degrees and pronation of 90 degrees on the opposite side. The patient's DRUJ is stable. She has no effusion or tenderness at her radiocarpal joint. The patient has negative Hunter's testing and negative pre-dynamic instability testing.

## 2019-01-26 NOTE — OPERATIVE REPORT
[facility]    PATIENT'S NAME: Val Logan   ATTENDING PHYSICIAN: Jacek Sewell MD   OPERATING PHYSICIAN: Jacek Sewell MD   PATIENT ACCOUNT#:   [id number]    LOCATION:  [place] [address]  MEDICAL RECORD #:   [id number]       D to the operating room and transferred to the operating table in the supine position. General anesthesia was induced. Preoperatively, she received an interscalene block. She was given antibiotics as prophylaxis within 1 hour of incision time.   Her right patient's tourniquet was deflated following wound closure. Blood loss was minimal.  Complications were none. Condition of the patient was aroused from anesthesia and transferred to the recovery room in stable condition.       DISPOSITION:  She was dischar

## 2019-01-28 ENCOUNTER — PRIOR ORIGINAL RECORDS (OUTPATIENT)
Dept: OTHER | Age: 54
End: 2019-01-28

## 2019-01-30 ENCOUNTER — PRIOR ORIGINAL RECORDS (OUTPATIENT)
Dept: OTHER | Age: 54
End: 2019-01-30

## 2019-01-30 ENCOUNTER — MYAURORA ACCOUNT LINK (OUTPATIENT)
Dept: OTHER | Age: 54
End: 2019-01-30

## 2019-02-05 PROBLEM — Z47.89 AFTERCARE FOLLOWING SURGERY OF THE MUSCULOSKELETAL SYSTEM: Status: ACTIVE | Noted: 2019-02-05

## 2019-02-07 ENCOUNTER — PRIOR ORIGINAL RECORDS (OUTPATIENT)
Dept: OTHER | Age: 54
End: 2019-02-07

## 2019-02-11 ENCOUNTER — HOSPITAL ENCOUNTER (OUTPATIENT)
Dept: MRI IMAGING | Facility: HOSPITAL | Age: 54
End: 2019-02-11
Attending: INTERNAL MEDICINE
Payer: COMMERCIAL

## 2019-02-11 ENCOUNTER — HOSPITAL ENCOUNTER (OUTPATIENT)
Dept: PERIOP | Facility: HOSPITAL | Age: 54
Discharge: HOME | End: 2019-02-11
Attending: INTERNAL MEDICINE
Payer: COMMERCIAL

## 2019-02-14 ENCOUNTER — PRIOR ORIGINAL RECORDS (OUTPATIENT)
Dept: OTHER | Age: 54
End: 2019-02-14

## 2019-02-21 ENCOUNTER — PRIOR ORIGINAL RECORDS (OUTPATIENT)
Dept: OTHER | Age: 54
End: 2019-02-21

## 2019-02-28 ENCOUNTER — PRIOR ORIGINAL RECORDS (OUTPATIENT)
Dept: OTHER | Age: 54
End: 2019-02-28

## 2019-02-28 VITALS
HEART RATE: 65 BPM | HEIGHT: 64 IN | SYSTOLIC BLOOD PRESSURE: 102 MMHG | BODY MASS INDEX: 26.12 KG/M2 | DIASTOLIC BLOOD PRESSURE: 52 MMHG | WEIGHT: 153 LBS

## 2019-02-28 VITALS
DIASTOLIC BLOOD PRESSURE: 76 MMHG | HEART RATE: 87 BPM | WEIGHT: 154 LBS | OXYGEN SATURATION: 98 % | SYSTOLIC BLOOD PRESSURE: 122 MMHG

## 2019-02-28 VITALS
WEIGHT: 156 LBS | SYSTOLIC BLOOD PRESSURE: 110 MMHG | DIASTOLIC BLOOD PRESSURE: 72 MMHG | HEIGHT: 64 IN | HEART RATE: 91 BPM | BODY MASS INDEX: 26.63 KG/M2

## 2019-02-28 VITALS
HEIGHT: 64 IN | BODY MASS INDEX: 26.29 KG/M2 | HEART RATE: 74 BPM | WEIGHT: 154 LBS | DIASTOLIC BLOOD PRESSURE: 70 MMHG | SYSTOLIC BLOOD PRESSURE: 112 MMHG

## 2019-02-28 VITALS
WEIGHT: 152 LBS | RESPIRATION RATE: 14 BRPM | HEIGHT: 64 IN | HEART RATE: 80 BPM | DIASTOLIC BLOOD PRESSURE: 70 MMHG | SYSTOLIC BLOOD PRESSURE: 106 MMHG | BODY MASS INDEX: 25.95 KG/M2

## 2019-03-01 PROCEDURE — 84156 ASSAY OF PROTEIN URINE: CPT | Performed by: INTERNAL MEDICINE

## 2019-03-01 PROCEDURE — 86225 DNA ANTIBODY NATIVE: CPT | Performed by: INTERNAL MEDICINE

## 2019-03-01 PROCEDURE — 81003 URINALYSIS AUTO W/O SCOPE: CPT | Performed by: INTERNAL MEDICINE

## 2019-03-01 PROCEDURE — 86160 COMPLEMENT ANTIGEN: CPT | Performed by: INTERNAL MEDICINE

## 2019-03-07 RX ORDER — VALACYCLOVIR HYDROCHLORIDE 500 MG/1
TABLET, FILM COATED ORAL
Qty: 180 TABLET | Refills: 0 | Status: SHIPPED | OUTPATIENT
Start: 2019-03-07 | End: 2019-09-12 | Stop reason: SDUPTHER

## 2019-03-12 ENCOUNTER — TELEPHONE (OUTPATIENT)
Dept: CARDIOLOGY | Age: 54
End: 2019-03-12

## 2019-03-12 ENCOUNTER — HOSPITAL ENCOUNTER (OUTPATIENT)
Age: 54
Discharge: HOME | End: 2019-03-12
Attending: FAMILY MEDICINE
Payer: COMMERCIAL

## 2019-03-12 VITALS
TEMPERATURE: 98 F | WEIGHT: 158 LBS | DIASTOLIC BLOOD PRESSURE: 78 MMHG | SYSTOLIC BLOOD PRESSURE: 123 MMHG | BODY MASS INDEX: 26 KG/M2 | RESPIRATION RATE: 16 BRPM | HEART RATE: 96 BPM | OXYGEN SATURATION: 97 %

## 2019-03-12 DIAGNOSIS — R05.8 SPASMODIC COUGH: ICD-10-CM

## 2019-03-12 DIAGNOSIS — J04.0 VIRAL LARYNGITIS: Primary | ICD-10-CM

## 2019-03-12 DIAGNOSIS — R09.81 SINUS CONGESTION: ICD-10-CM

## 2019-03-12 DIAGNOSIS — R07.89 CHEST DISCOMFORT: Primary | ICD-10-CM

## 2019-03-12 DIAGNOSIS — B97.89 VIRAL LARYNGITIS: Primary | ICD-10-CM

## 2019-03-12 LAB — POCT RAPID STREP: NEGATIVE

## 2019-03-12 PROCEDURE — 99214 OFFICE O/P EST MOD 30 MIN: CPT

## 2019-03-12 PROCEDURE — 87081 CULTURE SCREEN ONLY: CPT | Performed by: FAMILY MEDICINE

## 2019-03-12 PROCEDURE — 87430 STREP A AG IA: CPT | Performed by: FAMILY MEDICINE

## 2019-03-12 RX ORDER — CODEINE PHOSPHATE AND GUAIFENESIN 10; 100 MG/5ML; MG/5ML
10 SOLUTION ORAL NIGHTLY PRN
Qty: 50 ML | Refills: 0 | Status: SHIPPED | OUTPATIENT
Start: 2019-03-12 | End: 2019-03-17

## 2019-03-12 RX ORDER — VERAPAMIL HYDROCHLORIDE 120 MG/1
120 TABLET, FILM COATED ORAL 3 TIMES DAILY
COMMUNITY
End: 2020-03-19 | Stop reason: ALTCHOICE

## 2019-03-12 RX ORDER — BENZONATATE 100 MG/1
100 CAPSULE ORAL 3 TIMES DAILY PRN
Qty: 15 CAPSULE | Refills: 0 | Status: SHIPPED | OUTPATIENT
Start: 2019-03-12 | End: 2019-03-17

## 2019-03-12 NOTE — ED PROVIDER NOTES
Patient Seen in: [address] [facility]    History   Patient presents with:  Sore Throat  Cough/URI    Stated Complaint: sore throat    HPI  This is a 50-year-old female coming in with complaints of one week of sore throat open (pain associated at [address] REPLACEMENT SURGERY  1/14    right: Dr. Edgardo Dance   • Kathryn Can for endometriosis   • INTRAOPERATIVE NEURO MONITORING N/A 9/6/2016    Performed by Denita Sunshine MD at [facility] MAIN src 03/12/19 1713 Temporal   SpO2 03/12/19 1713 97 %   O2 Device 03/12/19 1713 None (Room air)       Current:/78   Pulse 96   Temp 98.3 °F (36.8 °C) (Temporal)   Resp 16   Wt 71.7 kg   SpO2 97%   BMI 26.29 kg/m²         Physical Exam      GEN: Not in negative, Throat Cx pending     Rx Tessalon Perles 100 mg three times per day X 3-5 days as needed for cough     Rx Cheratussin AC 10 ml at night time for cough spasms during night time - do not drive or operate heavy machinery while on this medication

## 2019-03-13 ENCOUNTER — TELEPHONE (OUTPATIENT)
Dept: CARDIOLOGY | Age: 54
End: 2019-03-13

## 2019-03-20 ENCOUNTER — TELEPHONE (OUTPATIENT)
Dept: CARDIOLOGY | Age: 54
End: 2019-03-20

## 2019-04-01 ENCOUNTER — HOSPITAL (OUTPATIENT)
Dept: OTHER | Age: 54
End: 2019-04-01
Attending: INTERNAL MEDICINE

## 2019-04-01 LAB
CREAT BLD-MCNC: 1.1 MG/DL (ref 0.51–0.95)
CREATININE, POC: 1.1 MG/DL (ref 0.51–0.95)

## 2019-04-02 PROCEDURE — 36415 COLL VENOUS BLD VENIPUNCTURE: CPT | Performed by: INTERNAL MEDICINE

## 2019-04-02 PROCEDURE — 86480 TB TEST CELL IMMUN MEASURE: CPT | Performed by: INTERNAL MEDICINE

## 2019-04-04 RX ORDER — BUTALBITAL, ACETAMINOPHEN AND CAFFEINE 50; 325; 40 MG/1; MG/1; MG/1
TABLET ORAL
COMMUNITY
Start: 2012-08-22 | End: 2019-05-30 | Stop reason: ALTCHOICE

## 2019-04-04 RX ORDER — ESOMEPRAZOLE MAGNESIUM 40 MG/1
CAPSULE, DELAYED RELEASE ORAL
COMMUNITY
Start: 2012-08-22 | End: 2019-05-15 | Stop reason: SDUPTHER

## 2019-04-04 RX ORDER — ERGOCALCIFEROL 1.25 MG/1
CAPSULE ORAL
COMMUNITY
Start: 2018-12-14 | End: 2019-11-11 | Stop reason: CLARIF

## 2019-04-04 RX ORDER — ALLOPURINOL 100 MG/1
TABLET ORAL
COMMUNITY
Start: 2010-04-15

## 2019-04-04 RX ORDER — VERAPAMIL HYDROCHLORIDE 120 MG/1
CAPSULE, EXTENDED RELEASE ORAL
COMMUNITY
End: 2019-05-06 | Stop reason: SDUPTHER

## 2019-04-04 RX ORDER — EAR PLUGS
EACH OTIC (EAR)
COMMUNITY
End: 2019-04-04 | Stop reason: CLARIF

## 2019-04-04 RX ORDER — ROPINIROLE 2 MG/1
TABLET, FILM COATED, EXTENDED RELEASE ORAL
COMMUNITY
Start: 2012-08-22 | End: 2019-05-15 | Stop reason: SDUPTHER

## 2019-04-04 RX ORDER — FOLIC ACID 1 MG/1
TABLET ORAL
COMMUNITY
Start: 2014-08-13 | End: 2019-05-15 | Stop reason: SDUPTHER

## 2019-04-04 RX ORDER — SERTRALINE HYDROCHLORIDE 100 MG/1
TABLET, FILM COATED ORAL
COMMUNITY
Start: 2018-12-14 | End: 2019-05-15 | Stop reason: SDUPTHER

## 2019-04-04 RX ORDER — FENOFIBRATE 145 MG/1
145 TABLET, COATED ORAL EVERY OTHER DAY
COMMUNITY
Start: 2010-04-15

## 2019-04-04 RX ORDER — PROMETHAZINE HYDROCHLORIDE 25 MG/1
TABLET ORAL
COMMUNITY
Start: 2012-08-22 | End: 2019-05-15 | Stop reason: SDUPTHER

## 2019-04-04 RX ORDER — DIPHENOXYLATE HYDROCHLORIDE AND ATROPINE SULFATE 2.5; .025 MG/1; MG/1
TABLET ORAL
COMMUNITY
Start: 2013-09-04 | End: 2019-05-30 | Stop reason: ALTCHOICE

## 2019-04-11 ENCOUNTER — ANCILLARY PROCEDURE (OUTPATIENT)
Dept: CARDIOLOGY | Age: 54
End: 2019-04-11
Attending: INTERNAL MEDICINE

## 2019-04-11 ENCOUNTER — OFFICE VISIT (OUTPATIENT)
Dept: CARDIOLOGY | Age: 54
End: 2019-04-11

## 2019-04-11 VITALS
HEART RATE: 96 BPM | BODY MASS INDEX: 27.59 KG/M2 | SYSTOLIC BLOOD PRESSURE: 128 MMHG | DIASTOLIC BLOOD PRESSURE: 80 MMHG | WEIGHT: 162 LBS

## 2019-04-11 DIAGNOSIS — I47.19 ATRIAL TACHYCARDIA: Primary | ICD-10-CM

## 2019-04-11 DIAGNOSIS — I47.19 ATRIAL TACHYCARDIA: ICD-10-CM

## 2019-04-11 DIAGNOSIS — R94.31 ABNORMAL EKG: ICD-10-CM

## 2019-04-11 PROCEDURE — 93224 XTRNL ECG REC UP TO 48 HRS: CPT | Performed by: INTERNAL MEDICINE

## 2019-04-11 PROCEDURE — 93000 ELECTROCARDIOGRAM COMPLETE: CPT | Performed by: INTERNAL MEDICINE

## 2019-04-11 PROCEDURE — 99215 OFFICE O/P EST HI 40 MIN: CPT | Performed by: INTERNAL MEDICINE

## 2019-04-11 ASSESSMENT — ENCOUNTER SYMPTOMS
HEMOPTYSIS: 0
SUSPICIOUS LESIONS: 0
HEMATOCHEZIA: 0
FEVER: 0
CHILLS: 0
BRUISES/BLEEDS EASILY: 0
ALLERGIC/IMMUNOLOGIC COMMENTS: NO NEW FOOD ALLERGIES
WEIGHT LOSS: 0
COUGH: 0
WEIGHT GAIN: 0

## 2019-04-19 ENCOUNTER — HOSPITAL ENCOUNTER (OUTPATIENT)
Dept: CV DIAGNOSTICS | Facility: HOSPITAL | Age: 54
Discharge: HOME | End: 2019-04-19
Attending: INTERNAL MEDICINE
Payer: COMMERCIAL

## 2019-04-19 DIAGNOSIS — I47.1 ATRIAL TACHYCARDIA (HCC): ICD-10-CM

## 2019-04-19 PROCEDURE — 93017 CV STRESS TEST TRACING ONLY: CPT | Performed by: INTERNAL MEDICINE

## 2019-04-19 PROCEDURE — 93018 CV STRESS TEST I&R ONLY: CPT | Performed by: INTERNAL MEDICINE

## 2019-04-24 ENCOUNTER — TELEPHONE (OUTPATIENT)
Dept: CARDIOLOGY | Age: 54
End: 2019-04-24

## 2019-04-29 ENCOUNTER — TELEPHONE (OUTPATIENT)
Dept: CARDIOLOGY | Age: 54
End: 2019-04-29

## 2019-04-29 DIAGNOSIS — I47.19 ATRIAL TACHYCARDIA: Primary | ICD-10-CM

## 2019-05-06 ENCOUNTER — TELEPHONE (OUTPATIENT)
Dept: CARDIOLOGY | Age: 54
End: 2019-05-06

## 2019-05-06 RX ORDER — FLECAINIDE ACETATE 50 MG/1
50 TABLET ORAL 2 TIMES DAILY
Qty: 60 TABLET | Refills: 0 | Status: SHIPPED | OUTPATIENT
Start: 2019-05-06 | End: 2019-05-06 | Stop reason: SDUPTHER

## 2019-05-06 RX ORDER — FLECAINIDE ACETATE 50 MG/1
50 TABLET ORAL 2 TIMES DAILY
Qty: 60 TABLET | Refills: 0 | Status: SHIPPED | OUTPATIENT
Start: 2019-05-06 | End: 2019-05-30 | Stop reason: ALTCHOICE

## 2019-05-06 RX ORDER — VERAPAMIL HYDROCHLORIDE 120 MG/1
120 CAPSULE, EXTENDED RELEASE ORAL NIGHTLY
Qty: 7 CAPSULE | Refills: 4 | Status: SHIPPED | OUTPATIENT
Start: 2019-05-06 | End: 2019-05-30 | Stop reason: ALTCHOICE

## 2019-05-13 ENCOUNTER — E-ADVICE (OUTPATIENT)
Dept: CARDIOLOGY | Age: 54
End: 2019-05-13

## 2019-05-14 ENCOUNTER — APPOINTMENT (OUTPATIENT)
Dept: CARDIOLOGY | Age: 54
End: 2019-05-14

## 2019-05-14 PROCEDURE — 93000 ELECTROCARDIOGRAM COMPLETE: CPT | Performed by: INTERNAL MEDICINE

## 2019-05-15 RX ORDER — ESOMEPRAZOLE MAGNESIUM 40 MG/1
40 CAPSULE, DELAYED RELEASE ORAL 2 TIMES DAILY
COMMUNITY
Start: 2014-03-18

## 2019-05-15 RX ORDER — SERTRALINE HYDROCHLORIDE 100 MG/1
150 TABLET, FILM COATED ORAL DAILY
COMMUNITY
Start: 2019-03-07

## 2019-05-15 RX ORDER — CEVIMELINE HYDROCHLORIDE 30 MG/1
30 CAPSULE ORAL PRN
COMMUNITY
Start: 2016-07-11

## 2019-05-15 RX ORDER — HYDROXYZINE HYDROCHLORIDE 25 MG/1
TABLET, FILM COATED ORAL
COMMUNITY
Start: 2018-01-08 | End: 2021-06-21 | Stop reason: ALTCHOICE

## 2019-05-15 RX ORDER — NITROFURANTOIN MACROCRYSTALS 100 MG/1
100 CAPSULE ORAL PRN
COMMUNITY
Start: 2018-01-09

## 2019-05-15 RX ORDER — FOLIC ACID 1 MG/1
6 TABLET ORAL DAILY
COMMUNITY
Start: 2019-01-25 | End: 2021-04-21

## 2019-05-15 RX ORDER — CYANOCOBALAMIN 1000 UG/ML
1000 INJECTION, SOLUTION INTRAMUSCULAR; SUBCUTANEOUS
COMMUNITY

## 2019-05-15 RX ORDER — ROPINIROLE 0.25 MG/1
TABLET, FILM COATED ORAL
COMMUNITY
Start: 2019-04-03

## 2019-05-16 ENCOUNTER — TELEPHONE (OUTPATIENT)
Dept: CARDIOLOGY | Age: 54
End: 2019-05-16

## 2019-05-16 ENCOUNTER — APPOINTMENT (OUTPATIENT)
Dept: CARDIOLOGY | Age: 54
End: 2019-05-16

## 2019-05-20 DIAGNOSIS — I47.19 ATRIAL TACHYCARDIA: ICD-10-CM

## 2019-05-22 ENCOUNTER — HOSPITAL ENCOUNTER (OUTPATIENT)
Dept: CV DIAGNOSTICS | Facility: HOSPITAL | Age: 54
Discharge: HOME | End: 2019-05-22
Attending: INTERNAL MEDICINE
Payer: COMMERCIAL

## 2019-05-22 DIAGNOSIS — I47.1 ATRIAL TACHYCARDIA (HCC): ICD-10-CM

## 2019-05-22 PROCEDURE — 93018 CV STRESS TEST I&R ONLY: CPT | Performed by: INTERNAL MEDICINE

## 2019-05-22 PROCEDURE — 93017 CV STRESS TEST TRACING ONLY: CPT | Performed by: INTERNAL MEDICINE

## 2019-05-28 PROBLEM — I47.19 ATRIAL TACHYCARDIA: Status: ACTIVE | Noted: 2019-05-28

## 2019-05-30 ENCOUNTER — OFFICE VISIT (OUTPATIENT)
Dept: CARDIOLOGY | Age: 54
End: 2019-05-30

## 2019-05-30 VITALS
BODY MASS INDEX: 26.99 KG/M2 | HEART RATE: 84 BPM | HEIGHT: 65 IN | WEIGHT: 162 LBS | SYSTOLIC BLOOD PRESSURE: 116 MMHG | DIASTOLIC BLOOD PRESSURE: 72 MMHG

## 2019-05-30 DIAGNOSIS — I47.19 ATRIAL TACHYCARDIA: Primary | ICD-10-CM

## 2019-05-30 PROCEDURE — 3078F DIAST BP <80 MM HG: CPT | Performed by: NURSE PRACTITIONER

## 2019-05-30 PROCEDURE — 99215 OFFICE O/P EST HI 40 MIN: CPT | Performed by: NURSE PRACTITIONER

## 2019-05-30 PROCEDURE — 93000 ELECTROCARDIOGRAM COMPLETE: CPT | Performed by: NURSE PRACTITIONER

## 2019-05-30 PROCEDURE — 3074F SYST BP LT 130 MM HG: CPT | Performed by: NURSE PRACTITIONER

## 2019-05-30 RX ORDER — VERAPAMIL HYDROCHLORIDE 120 MG/1
120 CAPSULE, EXTENDED RELEASE ORAL 2 TIMES DAILY
Qty: 60 CAPSULE | Refills: 3 | Status: SHIPPED | OUTPATIENT
Start: 2019-05-30 | End: 2019-08-08 | Stop reason: SDUPTHER

## 2019-05-30 ASSESSMENT — ENCOUNTER SYMPTOMS
HEMOPTYSIS: 0
NEAR-SYNCOPE: 1
BRUISES/BLEEDS EASILY: 0
WEIGHT GAIN: 0
SUSPICIOUS LESIONS: 0
FEVER: 0
ALLERGIC/IMMUNOLOGIC COMMENTS: NO NEW FOOD ALLERGIES
COUGH: 0
CHILLS: 0
WEIGHT LOSS: 0
HEMATOCHEZIA: 0

## 2019-06-12 ENCOUNTER — OFFICE VISIT (OUTPATIENT)
Dept: CARDIOLOGY | Age: 54
End: 2019-06-12

## 2019-06-12 ENCOUNTER — LAB ENCOUNTER (OUTPATIENT)
Dept: LAB | Facility: HOSPITAL | Age: 54
End: 2019-06-12
Attending: INTERNAL MEDICINE
Payer: COMMERCIAL

## 2019-06-12 VITALS
SYSTOLIC BLOOD PRESSURE: 122 MMHG | BODY MASS INDEX: 26.96 KG/M2 | HEART RATE: 86 BPM | DIASTOLIC BLOOD PRESSURE: 74 MMHG | WEIGHT: 162 LBS

## 2019-06-12 DIAGNOSIS — N18.30 CHRONIC KIDNEY DISEASE, STAGE III (MODERATE) (CMD): ICD-10-CM

## 2019-06-12 DIAGNOSIS — R00.2 PALPITATIONS: ICD-10-CM

## 2019-06-12 DIAGNOSIS — I10 ESSENTIAL HYPERTENSION: ICD-10-CM

## 2019-06-12 DIAGNOSIS — R00.2 PALPITATIONS: Primary | ICD-10-CM

## 2019-06-12 DIAGNOSIS — Z86.718 HISTORY OF DVT (DEEP VEIN THROMBOSIS): ICD-10-CM

## 2019-06-12 DIAGNOSIS — I47.19 ATRIAL TACHYCARDIA: Primary | ICD-10-CM

## 2019-06-12 DIAGNOSIS — D64.9 ANEMIA, UNSPECIFIED: ICD-10-CM

## 2019-06-12 DIAGNOSIS — E78.2 MIXED HYPERLIPIDEMIA: ICD-10-CM

## 2019-06-12 DIAGNOSIS — G40.89 VISUAL SEIZURE (HCC): ICD-10-CM

## 2019-06-12 DIAGNOSIS — I11.9 MALIGNANT HYPERTENSIVE HEART DISEASE WITHOUT HEART FAILURE: ICD-10-CM

## 2019-06-12 LAB
ABSOLUTE IMMATURE GRANULOCYTES (OFFPRE24): NORMAL
ALBUMIN SERPL-MCNC: 4.3 G/DL
ALBUMIN/GLOB SERPL: NORMAL {RATIO}
ALP SERPL-CCNC: 68 U/L
ALT SERPL-CCNC: 29 U/L
ANION GAP SERPL CALC-SCNC: NORMAL MMOL/L
AST SERPL-CCNC: 33 U/L
BASO+EOS+MONOS # BLD: NORMAL 10*3/UL
BASO+EOS+MONOS NFR BLD: NORMAL %
BASOPHILS # BLD: NORMAL 10*3/UL
BASOPHILS NFR BLD: NORMAL %
BILIRUB SERPL-MCNC: 0.4 MG/DL
BUN SERPL-MCNC: 20 MG/DL
BUN/CREAT SERPL: NORMAL
CALCIUM SERPL-MCNC: 8.8 MG/DL
CHLORIDE SERPL-SCNC: 109 MMOL/L
CHOLEST SERPL-MCNC: 183 MG/DL
CHOLEST/HDLC SERPL: NORMAL {RATIO}
CO2 SERPL-SCNC: NORMAL MMOL/L
CREAT SERPL-MCNC: 1.43 MG/DL
DIFFERENTIAL METHOD BLD: NORMAL
EOSINOPHIL # BLD: NORMAL 10*3/UL
EOSINOPHIL NFR BLD: NORMAL %
ERYTHROCYTE [DISTWIDTH] IN BLOOD: NORMAL %
EST. AVERAGE GLUCOSE BLD GHB EST-MCNC: NORMAL MG/DL
GLOBULIN SER-MCNC: 3.5 G/DL
GLUCOSE SERPL-MCNC: 85 MG/DL
HBA1C MFR BLD: 5.5 %
HBA1C MFR BLD: NORMAL % (ref 4.5–5.6)
HCT VFR BLD CALC: 38.1 %
HDLC SERPL-MCNC: 82 MG/DL
HGB BLD-MCNC: 12.6 G/DL
IMMATURE GRANULOCYTES (OFFPRE25): NORMAL
IRON SERPL-MCNC: 52 UG/DL
LDLC SERPL CALC-MCNC: 89 MG/DL
LENGTH OF FAST TIME PATIENT: NORMAL H
LENGTH OF FAST TIME PATIENT: NORMAL H
LYMPHOCYTES # BLD: NORMAL 10*3/UL
LYMPHOCYTES NFR BLD: NORMAL %
MCH RBC QN AUTO: NORMAL PG
MCHC RBC AUTO-ENTMCNC: NORMAL G/DL
MCV RBC AUTO: NORMAL FL
MONOCYTES # BLD: NORMAL 10*3/UL
MONOCYTES NFR BLD: NORMAL %
MPV (OFFPRE2): NORMAL
NEUTROPHILS # BLD: NORMAL 10*3/UL
NEUTROPHILS NFR BLD: NORMAL %
NONHDLC SERPL-MCNC: NORMAL MG/DL
NRBC BLD MANUAL-RTO: NORMAL %
PLAT MORPH BLD: NORMAL
PLATELET # BLD: 242 10*3/UL
POTASSIUM SERPL-SCNC: 4.1 MMOL/L
PROT SERPL-MCNC: 7.8 G/DL
RBC # BLD: 4.46 10*6/UL
RBC MORPH BLD: NORMAL
SODIUM SERPL-SCNC: 139 MMOL/L
TRIGL SERPL-MCNC: 62 MG/DL
TSH SERPL-ACNC: 1 M[IU]/L
VLDLC SERPL CALC-MCNC: NORMAL MG/DL
WBC # BLD: 5.3 10*3/UL
WBC MORPH BLD: NORMAL

## 2019-06-12 PROCEDURE — 80061 LIPID PANEL: CPT

## 2019-06-12 PROCEDURE — 80053 COMPREHEN METABOLIC PANEL: CPT

## 2019-06-12 PROCEDURE — 99214 OFFICE O/P EST MOD 30 MIN: CPT | Performed by: INTERNAL MEDICINE

## 2019-06-12 PROCEDURE — 84443 ASSAY THYROID STIM HORMONE: CPT

## 2019-06-12 PROCEDURE — 83036 HEMOGLOBIN GLYCOSYLATED A1C: CPT

## 2019-06-12 PROCEDURE — 3074F SYST BP LT 130 MM HG: CPT | Performed by: INTERNAL MEDICINE

## 2019-06-12 PROCEDURE — 36415 COLL VENOUS BLD VENIPUNCTURE: CPT

## 2019-06-12 PROCEDURE — 83880 ASSAY OF NATRIURETIC PEPTIDE: CPT

## 2019-06-12 PROCEDURE — 3078F DIAST BP <80 MM HG: CPT | Performed by: INTERNAL MEDICINE

## 2019-06-12 PROCEDURE — 83550 IRON BINDING TEST: CPT

## 2019-06-12 PROCEDURE — 82728 ASSAY OF FERRITIN: CPT

## 2019-06-12 PROCEDURE — 85025 COMPLETE CBC W/AUTO DIFF WBC: CPT

## 2019-06-12 PROCEDURE — 83540 ASSAY OF IRON: CPT

## 2019-06-12 ASSESSMENT — ENCOUNTER SYMPTOMS
HEMATOCHEZIA: 0
WEIGHT GAIN: 0
COUGH: 0
BRUISES/BLEEDS EASILY: 0
FEVER: 0
SUSPICIOUS LESIONS: 0
HEMOPTYSIS: 0
WEIGHT LOSS: 0
ALLERGIC/IMMUNOLOGIC COMMENTS: NO NEW FOOD ALLERGIES
CHILLS: 0

## 2019-06-14 ENCOUNTER — CLINICAL ABSTRACT (OUTPATIENT)
Dept: CARDIOLOGY | Age: 54
End: 2019-06-14

## 2019-06-26 ENCOUNTER — TELEPHONE (OUTPATIENT)
Dept: CARDIOLOGY | Age: 54
End: 2019-06-26

## 2019-07-01 ENCOUNTER — TELEPHONE (OUTPATIENT)
Dept: CARDIOLOGY | Age: 54
End: 2019-07-01

## 2019-07-03 NOTE — IMAGING NOTE
7/3/19 7/3/19 Ordered IV team to start IV at 1330 on 7/8/19, [place] to [place] IV team.

## 2019-07-05 ENCOUNTER — OFFICE VISIT (OUTPATIENT)
Dept: HEMATOLOGY/ONCOLOGY | Facility: HOSPITAL | Age: 54
End: 2019-07-05
Attending: INTERNAL MEDICINE
Payer: COMMERCIAL

## 2019-07-05 ENCOUNTER — E-ADVICE (OUTPATIENT)
Dept: CARDIOLOGY | Age: 54
End: 2019-07-05

## 2019-07-05 ENCOUNTER — TELEPHONE (OUTPATIENT)
Dept: CARDIOLOGY | Age: 54
End: 2019-07-05

## 2019-07-05 ENCOUNTER — TELEPHONE (OUTPATIENT)
Dept: HEMATOLOGY/ONCOLOGY | Facility: HOSPITAL | Age: 54
End: 2019-07-05

## 2019-07-05 VITALS
HEIGHT: 64.69 IN | HEART RATE: 95 BPM | BODY MASS INDEX: 27.32 KG/M2 | TEMPERATURE: 98 F | SYSTOLIC BLOOD PRESSURE: 138 MMHG | WEIGHT: 162 LBS | DIASTOLIC BLOOD PRESSURE: 75 MMHG | OXYGEN SATURATION: 98 % | RESPIRATION RATE: 18 BRPM

## 2019-07-05 DIAGNOSIS — D68.59 HYPERCOAGULABLE STATE (HCC): ICD-10-CM

## 2019-07-05 DIAGNOSIS — M35.9 CONNECTIVE TISSUE DISEASE, UNDIFFERENTIATED (HCC): ICD-10-CM

## 2019-07-05 DIAGNOSIS — D75.82 HIT (HEPARIN-INDUCED THROMBOCYTOPENIA) (HCC): ICD-10-CM

## 2019-07-05 DIAGNOSIS — Z87.898 HISTORY OF DIFFICULT VENOUS ACCESS: Primary | ICD-10-CM

## 2019-07-05 PROBLEM — D50.9 IRON DEFICIENCY ANEMIA, UNSPECIFIED: Status: ACTIVE | Noted: 2019-07-05

## 2019-07-05 PROCEDURE — 99205 OFFICE O/P NEW HI 60 MIN: CPT | Performed by: INTERNAL MEDICINE

## 2019-07-05 NOTE — PATIENT INSTRUCTIONS
For Dr. Tierra Cedillo nurse line, call [phone] with any questions or concerns, Monday through Friday 8:00a to 4:30p.      After hours or weekends for emergent needs [phone]    To schedule testing, Central [phone]  For Medical Records: 35

## 2019-07-05 NOTE — PROGRESS NOTES
MD consult for h/o recurrent DVT and ANASTASIYA. Previously by Dr. Guzman Borja at [facility] and switching providers for personal reasons. H/o 2 DVT in RUE and 3 DVT in RLE; 1st one occurring 15 years ago. Previously on coumadin, but switched to xarelto 6 years ago.  Pt is al

## 2019-07-08 ENCOUNTER — TELEPHONE (OUTPATIENT)
Dept: CARDIOLOGY | Age: 54
End: 2019-07-08

## 2019-07-08 ENCOUNTER — TELEPHONE (OUTPATIENT)
Dept: HEMATOLOGY/ONCOLOGY | Facility: HOSPITAL | Age: 54
End: 2019-07-08

## 2019-07-08 ENCOUNTER — HOSPITAL ENCOUNTER (OUTPATIENT)
Dept: CT IMAGING | Facility: HOSPITAL | Age: 54
Discharge: HOME | End: 2019-07-08
Attending: NURSE PRACTITIONER
Payer: COMMERCIAL

## 2019-07-09 NOTE — IMAGING NOTE
Spoke with patient regarding her pretreatment for contrast allergy and told her that since benadryl can cause drowsiness, she should have  accompany her. She verbalized understanding and has no questions on dosing of meds.  Pt also stated she will hav

## 2019-07-10 ENCOUNTER — HOSPITAL ENCOUNTER (OUTPATIENT)
Dept: INTERVENTIONAL RADIOLOGY/VASCULAR | Facility: HOSPITAL | Age: 54
Discharge: HOME | End: 2019-07-10
Attending: INTERNAL MEDICINE | Admitting: INTERNAL MEDICINE
Payer: COMMERCIAL

## 2019-07-10 VITALS
DIASTOLIC BLOOD PRESSURE: 89 MMHG | HEIGHT: 65 IN | OXYGEN SATURATION: 98 % | SYSTOLIC BLOOD PRESSURE: 122 MMHG | HEART RATE: 70 BPM | BODY MASS INDEX: 26.99 KG/M2 | RESPIRATION RATE: 15 BRPM | TEMPERATURE: 98 F | WEIGHT: 162 LBS

## 2019-07-10 DIAGNOSIS — Z87.898 HISTORY OF DIFFICULT VENOUS ACCESS: ICD-10-CM

## 2019-07-10 LAB
INR CARTRIDGE LOT #: NORMAL
INR: 0.9 (ref 0.8–1.3)

## 2019-07-10 PROCEDURE — 99153 MOD SED SAME PHYS/QHP EA: CPT

## 2019-07-10 PROCEDURE — 76937 US GUIDE VASCULAR ACCESS: CPT

## 2019-07-10 PROCEDURE — 99152 MOD SED SAME PHYS/QHP 5/>YRS: CPT

## 2019-07-10 PROCEDURE — B518ZZA FLUOROSCOPY OF SUPERIOR VENA CAVA, GUIDANCE: ICD-10-PCS | Performed by: RADIOLOGY

## 2019-07-10 PROCEDURE — 77001 FLUOROGUIDE FOR VEIN DEVICE: CPT

## 2019-07-10 PROCEDURE — 02HV33Z INSERTION OF INFUSION DEVICE INTO SUPERIOR VENA CAVA, PERCUTANEOUS APPROACH: ICD-10-PCS | Performed by: RADIOLOGY

## 2019-07-10 PROCEDURE — 85610 PROTHROMBIN TIME: CPT

## 2019-07-10 PROCEDURE — 36561 INSERT TUNNELED CV CATH: CPT

## 2019-07-10 PROCEDURE — 0JH60WZ INSERTION OF TOTALLY IMPLANTABLE VASCULAR ACCESS DEVICE INTO CHEST SUBCUTANEOUS TISSUE AND FASCIA, OPEN APPROACH: ICD-10-PCS | Performed by: RADIOLOGY

## 2019-07-10 PROCEDURE — B544ZZA ULTRASONOGRAPHY OF LEFT JUGULAR VEINS, GUIDANCE: ICD-10-PCS | Performed by: RADIOLOGY

## 2019-07-10 RX ORDER — ONDANSETRON 2 MG/ML
INJECTION INTRAMUSCULAR; INTRAVENOUS
Status: COMPLETED
Start: 2019-07-10 | End: 2019-07-10

## 2019-07-10 RX ORDER — ONDANSETRON 4 MG/1
4 TABLET, FILM COATED ORAL ONCE
Status: COMPLETED | OUTPATIENT
Start: 2019-07-10 | End: 2019-07-10

## 2019-07-10 RX ORDER — SODIUM CHLORIDE 9 MG/ML
INJECTION, SOLUTION INTRAVENOUS CONTINUOUS
Status: DISCONTINUED | OUTPATIENT
Start: 2019-07-10 | End: 2019-07-10

## 2019-07-10 RX ORDER — MIDAZOLAM HYDROCHLORIDE 1 MG/ML
INJECTION INTRAMUSCULAR; INTRAVENOUS
Status: COMPLETED
Start: 2019-07-10 | End: 2019-07-10

## 2019-07-10 RX ADMIN — ONDANSETRON 4 MG: 4 TABLET, FILM COATED ORAL at 10:50:00

## 2019-07-10 NOTE — PROGRESS NOTES
Discharge instructions explained to the patient and family, port access area is clean and dry, patient was taken in a wheelchair to the car and went home in stable condition

## 2019-07-10 NOTE — H&P
[address] Patient Status:  Outpatient in a Bed    1965 MRN [id number]   Location [address] [facility] Attending Shaun Hood MD   Hosp Day # 0 PCP BALA PORTER [facility]     Admitting Performed by Lucero Jin MD at [address] [place] [address]   • HERNIA SURGERY     • HIP REPLACEMENT SURGERY  1/14    right: Dr. Saleem Martin for endometriosis   • INTRAOPERATIVE NEURO MONITORING N/A 9/6/2016    Performed b red blood cells\"  Lidocaine               SWELLING    Comment:Lidocaine patch  Radiology Contrast *    RASH, OTHER (SEE COMMENTS)    Comment:History of iodine allergy and chronic renal             failure  Topamax [Topiramate]    ANAPHYLAXIS  Toradol not receiving this procedure.       Recommendations: Chest port placement    Zacarias Juarez MD  7/10/2019  10:16 AM

## 2019-07-10 NOTE — PROCEDURES
[facility]  Procedure Note    Jared Araiza Patient Status:  Outpatient in a Bed    1965 MRN [id number]   [facility] Att[facility] Jasminal Sam MD   [facility] Day # 0 PCP Clarisse Jaramillo     Procedure: Tato Back

## 2019-07-11 ENCOUNTER — OFFICE VISIT (OUTPATIENT)
Dept: CARDIOLOGY | Age: 54
End: 2019-07-11

## 2019-07-11 ENCOUNTER — APPOINTMENT (OUTPATIENT)
Dept: CARDIOLOGY | Age: 54
End: 2019-07-11

## 2019-07-11 ENCOUNTER — TELEPHONE (OUTPATIENT)
Dept: HEMATOLOGY/ONCOLOGY | Facility: HOSPITAL | Age: 54
End: 2019-07-11

## 2019-07-11 VITALS
WEIGHT: 163 LBS | SYSTOLIC BLOOD PRESSURE: 120 MMHG | BODY MASS INDEX: 27.16 KG/M2 | DIASTOLIC BLOOD PRESSURE: 80 MMHG | HEART RATE: 82 BPM | HEIGHT: 65 IN

## 2019-07-11 DIAGNOSIS — I47.19 ATRIAL TACHYCARDIA: Primary | ICD-10-CM

## 2019-07-11 PROCEDURE — 3079F DIAST BP 80-89 MM HG: CPT | Performed by: INTERNAL MEDICINE

## 2019-07-11 PROCEDURE — 3074F SYST BP LT 130 MM HG: CPT | Performed by: INTERNAL MEDICINE

## 2019-07-11 PROCEDURE — 99215 OFFICE O/P EST HI 40 MIN: CPT | Performed by: INTERNAL MEDICINE

## 2019-07-11 ASSESSMENT — ENCOUNTER SYMPTOMS
FEVER: 0
CHILLS: 0
HEMOPTYSIS: 0
ALLERGIC/IMMUNOLOGIC COMMENTS: NO NEW FOOD ALLERGIES
COUGH: 0
WEIGHT GAIN: 0
SUSPICIOUS LESIONS: 0
HEMATOCHEZIA: 0
BRUISES/BLEEDS EASILY: 0
WEIGHT LOSS: 0

## 2019-07-11 NOTE — TELEPHONE ENCOUNTER
Pt left a message stating she needs to have a cardiac ablation by Dr. Marlene Villalobos. She states she cannot have heparin and cardiologist needs to know what he can use. I did return her phone call.    I left her a message asking her if cardiologist was going to ca

## 2019-07-12 ENCOUNTER — OFFICE VISIT (OUTPATIENT)
Dept: HEMATOLOGY/ONCOLOGY | Facility: HOSPITAL | Age: 54
End: 2019-07-12
Attending: INTERNAL MEDICINE
Payer: COMMERCIAL

## 2019-07-12 ENCOUNTER — TELEPHONE (OUTPATIENT)
Dept: CARDIOLOGY | Age: 54
End: 2019-07-12

## 2019-07-12 VITALS
RESPIRATION RATE: 16 BRPM | HEART RATE: 83 BPM | TEMPERATURE: 99 F | DIASTOLIC BLOOD PRESSURE: 76 MMHG | OXYGEN SATURATION: 99 % | SYSTOLIC BLOOD PRESSURE: 112 MMHG

## 2019-07-12 DIAGNOSIS — D50.9 IRON DEFICIENCY ANEMIA, UNSPECIFIED: Primary | ICD-10-CM

## 2019-07-12 PROCEDURE — 96374 THER/PROPH/DIAG INJ IV PUSH: CPT

## 2019-07-12 RX ORDER — METHYLPREDNISOLONE SODIUM SUCCINATE 40 MG/ML
40 INJECTION, POWDER, LYOPHILIZED, FOR SOLUTION INTRAMUSCULAR; INTRAVENOUS ONCE
Status: CANCELLED | OUTPATIENT
Start: 2019-07-12

## 2019-07-12 RX ORDER — METHYLPREDNISOLONE SODIUM SUCCINATE 125 MG/2ML
125 INJECTION, POWDER, LYOPHILIZED, FOR SOLUTION INTRAMUSCULAR; INTRAVENOUS ONCE
Status: CANCELLED | OUTPATIENT
Start: 2019-07-12

## 2019-07-12 RX ORDER — DIPHENHYDRAMINE HYDROCHLORIDE 50 MG/ML
25 INJECTION INTRAMUSCULAR; INTRAVENOUS ONCE
Status: CANCELLED | OUTPATIENT
Start: 2019-07-12

## 2019-07-12 RX ORDER — MEPERIDINE HYDROCHLORIDE 25 MG/ML
25 INJECTION INTRAMUSCULAR; INTRAVENOUS; SUBCUTANEOUS ONCE
Status: CANCELLED | OUTPATIENT
Start: 2019-07-12

## 2019-07-12 RX ORDER — FAMOTIDINE 10 MG/ML
20 INJECTION, SOLUTION INTRAVENOUS ONCE
Status: CANCELLED | OUTPATIENT
Start: 2019-07-12

## 2019-07-12 NOTE — PATIENT INSTRUCTIONS
Iron Sucrose injection  Brand Name: Venofer  What is this medicine? IRON SUCROSE (BRYAN flores MOON gold) is an iron complex. Iron is used to make healthy red blood cells, which carry oxygen and nutrients throughout the body.  This medicine is used to treat i This drug is given in a hospital or clinic and will not be stored at home. What should I tell my health care provider before I take this medicine?   They need to know if you have any of these conditions:  · anemia not caused by low iron levels  · heart dis

## 2019-07-12 NOTE — PROGRESS NOTES
Education Record    Learner:  Patient    Disease / Diagnosis:Iron deficiency    Barriers / Limitations:  None   Comments:    Method:  Discussion   Comments:    General Topics:  Medication, Precautions, Procedure and Side effects and symptom management   Co

## 2019-07-14 ENCOUNTER — PATIENT MESSAGE (OUTPATIENT)
Dept: HEMATOLOGY/ONCOLOGY | Facility: HOSPITAL | Age: 54
End: 2019-07-14

## 2019-07-15 ENCOUNTER — HOSPITAL ENCOUNTER (OUTPATIENT)
Dept: CT IMAGING | Facility: HOSPITAL | Age: 54
Discharge: HOME | End: 2019-07-15
Attending: NURSE PRACTITIONER
Payer: COMMERCIAL

## 2019-07-15 DIAGNOSIS — R42 DIZZINESS: ICD-10-CM

## 2019-07-15 DIAGNOSIS — G44.84 EXERTIONAL HEADACHE: ICD-10-CM

## 2019-07-15 PROCEDURE — 70496 CT ANGIOGRAPHY HEAD: CPT | Performed by: NURSE PRACTITIONER

## 2019-07-15 PROCEDURE — 70498 CT ANGIOGRAPHY NECK: CPT | Performed by: NURSE PRACTITIONER

## 2019-07-15 NOTE — PROGRESS NOTES
CTA of the head and neck is normal - this may be more heart related - await Cardiology plan and the will look for options once that is determine on how she is doing

## 2019-07-15 NOTE — TELEPHONE ENCOUNTER
From: Ernesto Smyth  To: Marli Nash MD  Sent: 7/14/2019 1:03 PM CDT  Subject: Prescription Question    Hi,    I need a refill of my Xarelto. Since Im a new patient I need it to be called into [id number] [place] [address] [address] please.  Let me know i

## 2019-07-16 NOTE — PROGRESS NOTES
Spoke to pt advised per NP. Pt verbalized understanding. Pt would like to know what kind of options you are thinking? States she really does not think her headaches are related to her heart.

## 2019-07-26 ENCOUNTER — OFFICE VISIT (OUTPATIENT)
Dept: HEMATOLOGY/ONCOLOGY | Facility: HOSPITAL | Age: 54
End: 2019-07-26
Attending: INTERNAL MEDICINE
Payer: COMMERCIAL

## 2019-07-26 VITALS
HEART RATE: 83 BPM | DIASTOLIC BLOOD PRESSURE: 72 MMHG | OXYGEN SATURATION: 100 % | TEMPERATURE: 98 F | RESPIRATION RATE: 16 BRPM | SYSTOLIC BLOOD PRESSURE: 108 MMHG

## 2019-07-26 DIAGNOSIS — D50.9 IRON DEFICIENCY ANEMIA, UNSPECIFIED: Primary | ICD-10-CM

## 2019-07-26 PROCEDURE — 96374 THER/PROPH/DIAG INJ IV PUSH: CPT

## 2019-07-26 RX ORDER — MEPERIDINE HYDROCHLORIDE 25 MG/ML
25 INJECTION INTRAMUSCULAR; INTRAVENOUS; SUBCUTANEOUS ONCE
Status: CANCELLED | OUTPATIENT
Start: 2019-07-26

## 2019-07-26 RX ORDER — METHYLPREDNISOLONE SODIUM SUCCINATE 125 MG/2ML
125 INJECTION, POWDER, LYOPHILIZED, FOR SOLUTION INTRAMUSCULAR; INTRAVENOUS ONCE
Status: CANCELLED | OUTPATIENT
Start: 2019-07-26

## 2019-07-26 RX ORDER — DIPHENHYDRAMINE HYDROCHLORIDE 50 MG/ML
25 INJECTION INTRAMUSCULAR; INTRAVENOUS ONCE
Status: CANCELLED | OUTPATIENT
Start: 2019-07-26

## 2019-07-26 RX ORDER — DIPHENHYDRAMINE HCL 25 MG
25 CAPSULE ORAL ONCE
Status: COMPLETED | OUTPATIENT
Start: 2019-07-26 | End: 2019-07-26

## 2019-07-26 RX ORDER — METHYLPREDNISOLONE SODIUM SUCCINATE 40 MG/ML
40 INJECTION, POWDER, LYOPHILIZED, FOR SOLUTION INTRAMUSCULAR; INTRAVENOUS ONCE
Status: CANCELLED | OUTPATIENT
Start: 2019-07-26

## 2019-07-26 RX ORDER — FAMOTIDINE 10 MG/ML
20 INJECTION, SOLUTION INTRAVENOUS ONCE
Status: CANCELLED | OUTPATIENT
Start: 2019-07-26

## 2019-07-26 RX ADMIN — DIPHENHYDRAMINE HCL 25 MG: 25 MG CAPSULE ORAL at 14:45:00

## 2019-07-26 NOTE — PROGRESS NOTES
Pt here for 2nd dose of Venofer 200 mg. States she usually takes Benadryl 25 mg PO prior to coming to infusion appointment, but today states she forgot to take a dose.  Pt reports that in the past she would get pruritus and mild tongue swelling at home afte

## 2019-07-26 NOTE — H&P
2019 [facility]  Clinic Note    [place]  : 1965  PCP: Matthew London MD    Reason for Visit:    Chief Complaint   Patient presents with   • Follow-up   palpitations stil Other seizures (CMS/HCC)   • Palpitations   • Scarlet fever   age 12     PSHx:    Past Surgical History:   Procedure Laterality Date   • Case request clinic to cath/vasc   2009   • Cholecystectomy   • Hysterectomy   • Tonsillectomy     Family Hx:  History r DINNER   • folic acid (FOLATE) 1 MG tablet Take 6 mg by mouth. • esomeprazole (NEXIUM) 40 MG capsule Take 40 mg by mouth.    • Syringe/Needle, Disp, (B-D LUER-LG SYRINGE) 23G X 1\" 3 ML Misc USE AS DIRECTED ONCE A MONTH   • Pyridoxine HCl (VITAMIN B-6) 2 acute distress. HEENT: Mucosa moist, no scleral icterus  Lungs: Cear to auscultation  Cardiac: JVP normal. No carotid bruit.    RRR,  No edema  Pulses: Pedal pulses nl   Femoral pulses nl   Carotid pulses nl  Abdomen: Soft, non tender, difficult to assess

## 2019-07-30 ENCOUNTER — HOSPITAL ENCOUNTER (OUTPATIENT)
Dept: INTERVENTIONAL RADIOLOGY/VASCULAR | Facility: HOSPITAL | Age: 54
Discharge: HOME | End: 2019-07-30
Attending: INTERNAL MEDICINE | Admitting: INTERNAL MEDICINE
Payer: COMMERCIAL

## 2019-07-30 VITALS
HEART RATE: 87 BPM | HEIGHT: 65 IN | WEIGHT: 162 LBS | RESPIRATION RATE: 12 BRPM | DIASTOLIC BLOOD PRESSURE: 70 MMHG | SYSTOLIC BLOOD PRESSURE: 118 MMHG | OXYGEN SATURATION: 98 % | TEMPERATURE: 99 F | BODY MASS INDEX: 26.99 KG/M2

## 2019-07-30 DIAGNOSIS — I47.1 ATRIAL TACHYCARDIA (HCC): ICD-10-CM

## 2019-07-30 DIAGNOSIS — R00.2 PALPITATIONS: ICD-10-CM

## 2019-07-30 PROCEDURE — 93623 PRGRMD STIMJ&PACG IV RX NFS: CPT

## 2019-07-30 PROCEDURE — 99152 MOD SED SAME PHYS/QHP 5/>YRS: CPT | Performed by: INTERNAL MEDICINE

## 2019-07-30 PROCEDURE — 99152 MOD SED SAME PHYS/QHP 5/>YRS: CPT

## 2019-07-30 PROCEDURE — 99153 MOD SED SAME PHYS/QHP EA: CPT

## 2019-07-30 PROCEDURE — 93620 COMP EP EVL R AT VEN PAC&REC: CPT | Performed by: INTERNAL MEDICINE

## 2019-07-30 PROCEDURE — 93613 INTRACARDIAC EPHYS 3D MAPG: CPT

## 2019-07-30 PROCEDURE — 4A0234Z MEASUREMENT OF CARDIAC ELECTRICAL ACTIVITY, PERCUTANEOUS APPROACH: ICD-10-PCS | Performed by: INTERNAL MEDICINE

## 2019-07-30 PROCEDURE — 4A023FZ MEASUREMENT OF CARDIAC RHYTHM, PERCUTANEOUS APPROACH: ICD-10-PCS | Performed by: INTERNAL MEDICINE

## 2019-07-30 PROCEDURE — 3E033KZ INTRODUCTION OF OTHER DIAGNOSTIC SUBSTANCE INTO PERIPHERAL VEIN, PERCUTANEOUS APPROACH: ICD-10-PCS | Performed by: INTERNAL MEDICINE

## 2019-07-30 PROCEDURE — 93623 PRGRMD STIMJ&PACG IV RX NFS: CPT | Performed by: INTERNAL MEDICINE

## 2019-07-30 PROCEDURE — 02K83ZZ MAP CONDUCTION MECHANISM, PERCUTANEOUS APPROACH: ICD-10-PCS | Performed by: INTERNAL MEDICINE

## 2019-07-30 PROCEDURE — 93613 INTRACARDIAC EPHYS 3D MAPG: CPT | Performed by: INTERNAL MEDICINE

## 2019-07-30 PROCEDURE — 93621 COMP EP EVL L PAC&REC C SINS: CPT

## 2019-07-30 PROCEDURE — 93620 COMP EP EVL R AT VEN PAC&REC: CPT

## 2019-07-30 RX ORDER — MIDAZOLAM HYDROCHLORIDE 1 MG/ML
INJECTION INTRAMUSCULAR; INTRAVENOUS
Status: COMPLETED
Start: 2019-07-30 | End: 2019-07-30

## 2019-07-30 RX ORDER — LIDOCAINE HYDROCHLORIDE 10 MG/ML
INJECTION, SOLUTION EPIDURAL; INFILTRATION; INTRACAUDAL; PERINEURAL
Status: COMPLETED
Start: 2019-07-30 | End: 2019-07-30

## 2019-07-30 RX ORDER — SODIUM CHLORIDE 9 MG/ML
INJECTION, SOLUTION INTRAVENOUS
Status: COMPLETED | OUTPATIENT
Start: 2019-07-31 | End: 2019-07-30

## 2019-07-30 RX ORDER — ISOPROTERENOL HYDROCHLORIDE 0.2 MG/ML
INJECTION, SOLUTION INTRAMUSCULAR; INTRAVENOUS
Status: COMPLETED
Start: 2019-07-30 | End: 2019-07-30

## 2019-07-30 RX ORDER — SODIUM CHLORIDE 9 MG/ML
INJECTION, SOLUTION INTRAVENOUS CONTINUOUS
Status: DISCONTINUED | OUTPATIENT
Start: 2019-07-30 | End: 2019-07-30

## 2019-07-30 RX ORDER — DIPHENHYDRAMINE HYDROCHLORIDE 50 MG/ML
INJECTION INTRAMUSCULAR; INTRAVENOUS
Status: COMPLETED
Start: 2019-07-30 | End: 2019-07-30

## 2019-07-30 RX ADMIN — SODIUM CHLORIDE: 9 INJECTION, SOLUTION INTRAVENOUS at 15:00:00

## 2019-07-30 RX ADMIN — SODIUM CHLORIDE: 9 INJECTION, SOLUTION INTRAVENOUS at 13:08:00

## 2019-07-30 NOTE — PROGRESS NOTES
Pt post ablation via right groin. Groin c/d/i & soft on arrival back to unit. VSS. Pt tolerating PO. Dr Jordyn Birch at bedside to discuss results. After required bedrest & voiding, pt ambulated in hallway w/o difficulty. Groin remains intact.  Discharge instruction

## 2019-07-30 NOTE — PROCEDURES
Procedures performed:  1. Comprehensive EP study  2. CS recording  3.  3D mapping  4. Isuprel infusion.     : Anita Cox MD    Indication: 48 y/o female with a h/o symptomatic atrial tachycardia presents for EPS and possible ablation    Complicati

## 2019-08-05 ENCOUNTER — TELEPHONE (OUTPATIENT)
Dept: HEMATOLOGY/ONCOLOGY | Facility: HOSPITAL | Age: 54
End: 2019-08-05

## 2019-08-05 ENCOUNTER — APPOINTMENT (OUTPATIENT)
Dept: HEMATOLOGY/ONCOLOGY | Facility: HOSPITAL | Age: 54
End: 2019-08-05
Attending: INTERNAL MEDICINE
Payer: COMMERCIAL

## 2019-08-05 DIAGNOSIS — D50.0 IRON DEFICIENCY ANEMIA DUE TO CHRONIC BLOOD LOSS: Primary | ICD-10-CM

## 2019-08-05 RX ORDER — DIPHENHYDRAMINE HCL 25 MG
25 CAPSULE ORAL ONCE
Status: CANCELLED
Start: 2019-08-12

## 2019-08-05 NOTE — TELEPHONE ENCOUNTER
Spoke to patient. Had reaction after infusion - with itching and flu like symptoms that lasted 4-5 days. No mouth or airway involvement. Got 400/1000 planned. Will try feraheme - one dose to get close to 1000 mg.   Will check labs 2 months later with CB

## 2019-08-08 ENCOUNTER — OFFICE VISIT (OUTPATIENT)
Dept: CARDIOLOGY | Age: 54
End: 2019-08-08

## 2019-08-08 VITALS
DIASTOLIC BLOOD PRESSURE: 90 MMHG | HEIGHT: 65 IN | HEART RATE: 65 BPM | BODY MASS INDEX: 26.99 KG/M2 | WEIGHT: 162 LBS | SYSTOLIC BLOOD PRESSURE: 138 MMHG

## 2019-08-08 DIAGNOSIS — I47.19 ATRIAL TACHYCARDIA: Primary | ICD-10-CM

## 2019-08-08 DIAGNOSIS — R00.2 PALPITATIONS: ICD-10-CM

## 2019-08-08 DIAGNOSIS — I10 ESSENTIAL HYPERTENSION: ICD-10-CM

## 2019-08-08 PROCEDURE — 3075F SYST BP GE 130 - 139MM HG: CPT | Performed by: NURSE PRACTITIONER

## 2019-08-08 PROCEDURE — 99215 OFFICE O/P EST HI 40 MIN: CPT | Performed by: NURSE PRACTITIONER

## 2019-08-08 RX ORDER — VERAPAMIL HYDROCHLORIDE 120 MG/1
CAPSULE, EXTENDED RELEASE ORAL
Qty: 90 CAPSULE | Refills: 3 | Status: SHIPPED | OUTPATIENT
Start: 2019-08-08 | End: 2020-09-11 | Stop reason: ALTCHOICE

## 2019-08-08 ASSESSMENT — ENCOUNTER SYMPTOMS
FEVER: 0
NEAR-SYNCOPE: 1
WEIGHT GAIN: 0
COUGH: 0
WEIGHT LOSS: 0
ALLERGIC/IMMUNOLOGIC COMMENTS: NO NEW FOOD ALLERGIES
HEMOPTYSIS: 0
BRUISES/BLEEDS EASILY: 0
HEMATOCHEZIA: 0
CHILLS: 0
SUSPICIOUS LESIONS: 0

## 2019-08-14 ENCOUNTER — OFFICE VISIT (OUTPATIENT)
Dept: HEMATOLOGY/ONCOLOGY | Facility: HOSPITAL | Age: 54
End: 2019-08-14
Attending: INTERNAL MEDICINE
Payer: COMMERCIAL

## 2019-08-14 VITALS
DIASTOLIC BLOOD PRESSURE: 68 MMHG | HEART RATE: 81 BPM | RESPIRATION RATE: 16 BRPM | OXYGEN SATURATION: 94 % | SYSTOLIC BLOOD PRESSURE: 104 MMHG | TEMPERATURE: 97 F

## 2019-08-14 DIAGNOSIS — D50.0 IRON DEFICIENCY ANEMIA DUE TO CHRONIC BLOOD LOSS: Primary | ICD-10-CM

## 2019-08-14 PROCEDURE — 96375 TX/PRO/DX INJ NEW DRUG ADDON: CPT

## 2019-08-14 PROCEDURE — 96365 THER/PROPH/DIAG IV INF INIT: CPT

## 2019-08-14 RX ORDER — METHYLPREDNISOLONE SODIUM SUCCINATE 125 MG/2ML
40 INJECTION, POWDER, LYOPHILIZED, FOR SOLUTION INTRAMUSCULAR; INTRAVENOUS ONCE
Status: COMPLETED | OUTPATIENT
Start: 2019-08-14 | End: 2019-08-14

## 2019-08-14 RX ADMIN — METHYLPREDNISOLONE SODIUM SUCCINATE 40 MG: 125 INJECTION, POWDER, LYOPHILIZED, FOR SOLUTION INTRAMUSCULAR; INTRAVENOUS at 15:33:00

## 2019-08-14 NOTE — PATIENT INSTRUCTIONS
Can take hydroxyzine 25mg three times a day for itching as needed. Call [phone] during business hours (Monday-Friday 8-4:30) if having any issues at home. If issues occur outside of business hours please call [phone].     Follow up: two months for

## 2019-08-14 NOTE — PROGRESS NOTES
Patient here for iron infusion today. Patient with iron reaction to venofer. Patient also stated she got IV iron \"in a bag\" at [facility] and had a reaction to that but isn't sure what the name of the drug is.  Feraheme infused over 30 minutes instead of

## 2019-08-15 ENCOUNTER — TELEPHONE (OUTPATIENT)
Dept: HEMATOLOGY/ONCOLOGY | Facility: HOSPITAL | Age: 54
End: 2019-08-15

## 2019-08-15 NOTE — TELEPHONE ENCOUNTER
Called patient to assess condition after mild IV iron infusion reaction yesterday 8/14/19. No answer-left VM. Provided call back information if any issues or concerns.  Also mentioned follow up recommended in 3-4 weeks for saline port flush to ensure port p

## 2019-08-16 DIAGNOSIS — I47.19 ATRIAL TACHYCARDIA: ICD-10-CM

## 2019-08-19 ENCOUNTER — APPOINTMENT (OUTPATIENT)
Dept: HEMATOLOGY/ONCOLOGY | Facility: HOSPITAL | Age: 54
End: 2019-08-19
Attending: INTERNAL MEDICINE
Payer: COMMERCIAL

## 2019-09-12 ENCOUNTER — TELEPHONE (OUTPATIENT)
Dept: CARDIOLOGY | Age: 54
End: 2019-09-12

## 2019-09-17 ENCOUNTER — E-ADVICE (OUTPATIENT)
Dept: CARDIOLOGY | Age: 54
End: 2019-09-17

## 2019-09-18 RX ORDER — VALACYCLOVIR HYDROCHLORIDE 500 MG/1
TABLET, FILM COATED ORAL
Qty: 180 TABLET | Refills: 0 | Status: SHIPPED | OUTPATIENT
Start: 2019-09-18 | End: 2019-11-11 | Stop reason: CLARIF

## 2019-09-27 ENCOUNTER — APPOINTMENT (OUTPATIENT)
Dept: HEMATOLOGY/ONCOLOGY | Facility: HOSPITAL | Age: 54
End: 2019-09-27
Attending: INTERNAL MEDICINE
Payer: COMMERCIAL

## 2019-09-30 ENCOUNTER — ORDERS FOR ADMISSION (OUTPATIENT)
Dept: HEMATOLOGY/ONCOLOGY | Facility: HOSPITAL | Age: 54
End: 2019-09-30

## 2019-09-30 ENCOUNTER — NURSE ONLY (OUTPATIENT)
Dept: HEMATOLOGY/ONCOLOGY | Facility: HOSPITAL | Age: 54
End: 2019-09-30
Attending: INTERNAL MEDICINE
Payer: COMMERCIAL

## 2019-09-30 DIAGNOSIS — M06.00 SERONEGATIVE RHEUMATOID ARTHRITIS (HCC): ICD-10-CM

## 2019-09-30 DIAGNOSIS — D50.0 IRON DEFICIENCY ANEMIA DUE TO CHRONIC BLOOD LOSS: Primary | ICD-10-CM

## 2019-09-30 DIAGNOSIS — D50.0 IRON DEFICIENCY ANEMIA DUE TO CHRONIC BLOOD LOSS: ICD-10-CM

## 2019-09-30 DIAGNOSIS — Z79.899 ENCOUNTER FOR LONG-TERM (CURRENT) USE OF HIGH-RISK MEDICATION: ICD-10-CM

## 2019-09-30 LAB
ALBUMIN SERPL-MCNC: 4.4 G/DL (ref 3.4–5)
ALP LIVER SERPL-CCNC: 61 U/L (ref 41–108)
ALT SERPL-CCNC: 41 U/L (ref 13–56)
AST SERPL-CCNC: 40 U/L (ref 15–37)
BASOPHILS # BLD AUTO: 0.05 X10(3) UL (ref 0–0.2)
BASOPHILS NFR BLD AUTO: 0.9 %
BILIRUB DIRECT SERPL-MCNC: 0.2 MG/DL (ref 0–0.2)
BILIRUB SERPL-MCNC: 0.6 MG/DL (ref 0.1–2)
BUN BLD-MCNC: 27 MG/DL (ref 7–18)
CREAT BLD-MCNC: 1.55 MG/DL (ref 0.55–1.02)
CRP SERPL-MCNC: <0.29 MG/DL (ref ?–0.3)
DEPRECATED RDW RBC AUTO: 51.8 FL (ref 35.1–46.3)
EOSINOPHIL # BLD AUTO: 0.14 X10(3) UL (ref 0–0.7)
EOSINOPHIL NFR BLD AUTO: 2.6 %
ERYTHROCYTE [DISTWIDTH] IN BLOOD BY AUTOMATED COUNT: 15.9 % (ref 11–15)
HCT VFR BLD AUTO: 41.2 % (ref 35–48)
HGB BLD-MCNC: 13.9 G/DL (ref 12–16)
IMM GRANULOCYTES # BLD AUTO: 0.02 X10(3) UL (ref 0–1)
IMM GRANULOCYTES NFR BLD: 0.4 %
IRON SATURATION: 26 % (ref 15–50)
IRON SERPL-MCNC: 132 UG/DL (ref 50–170)
LYMPHOCYTES # BLD AUTO: 1.18 X10(3) UL (ref 1–4)
LYMPHOCYTES NFR BLD AUTO: 21.6 %
M PROTEIN MFR SERPL ELPH: 7.7 G/DL (ref 6.4–8.2)
MCH RBC QN AUTO: 30.3 PG (ref 26–34)
MCHC RBC AUTO-ENTMCNC: 33.7 G/DL (ref 31–37)
MCV RBC AUTO: 90 FL (ref 80–100)
MONOCYTES # BLD AUTO: 0.43 X10(3) UL (ref 0.1–1)
MONOCYTES NFR BLD AUTO: 7.9 %
NEUTROPHILS # BLD AUTO: 3.65 X10 (3) UL (ref 1.5–7.7)
NEUTROPHILS # BLD AUTO: 3.65 X10(3) UL (ref 1.5–7.7)
NEUTROPHILS NFR BLD AUTO: 66.6 %
PLATELET # BLD AUTO: 213 10(3)UL (ref 150–450)
RBC # BLD AUTO: 4.58 X10(6)UL (ref 3.8–5.3)
SED RATE-ML: 6 MM/HR (ref 0–25)
TOTAL IRON BINDING CAPACITY: 510 UG/DL (ref 240–450)
TRANSFERRIN SERPL-MCNC: 342 MG/DL (ref 200–360)
WBC # BLD AUTO: 5.5 X10(3) UL (ref 4–11)

## 2019-09-30 PROCEDURE — 36591 DRAW BLOOD OFF VENOUS DEVICE: CPT

## 2019-09-30 PROCEDURE — 84520 ASSAY OF UREA NITROGEN: CPT

## 2019-09-30 PROCEDURE — 85025 COMPLETE CBC W/AUTO DIFF WBC: CPT

## 2019-09-30 PROCEDURE — 83540 ASSAY OF IRON: CPT

## 2019-09-30 PROCEDURE — 85652 RBC SED RATE AUTOMATED: CPT

## 2019-09-30 PROCEDURE — 86140 C-REACTIVE PROTEIN: CPT

## 2019-09-30 PROCEDURE — 82565 ASSAY OF CREATININE: CPT

## 2019-09-30 PROCEDURE — 83550 IRON BINDING TEST: CPT

## 2019-09-30 PROCEDURE — 80076 HEPATIC FUNCTION PANEL: CPT

## 2019-10-01 NOTE — PROGRESS NOTES
[employer] message sent to patient. Drea,  Methotrexate/enbrel labs are overall stable. One of the liver tests is borderline elevated (AST) and kidney function is higher than it has been. Inflammation marker (sed rate) is better.    Please make sure to ke

## 2019-10-10 ENCOUNTER — APPOINTMENT (OUTPATIENT)
Dept: CARDIOLOGY | Age: 54
End: 2019-10-10

## 2019-10-10 RX ORDER — VALACYCLOVIR HYDROCHLORIDE 500 MG/1
TABLET, FILM COATED ORAL
Qty: 180 TABLET | Refills: 0 | OUTPATIENT
Start: 2019-10-10

## 2019-10-18 ENCOUNTER — NURSE ONLY (OUTPATIENT)
Dept: HEMATOLOGY/ONCOLOGY | Facility: HOSPITAL | Age: 54
End: 2019-10-18
Attending: INTERNAL MEDICINE
Payer: COMMERCIAL

## 2019-10-18 VITALS
OXYGEN SATURATION: 98 % | DIASTOLIC BLOOD PRESSURE: 81 MMHG | HEART RATE: 83 BPM | TEMPERATURE: 97 F | RESPIRATION RATE: 18 BRPM | SYSTOLIC BLOOD PRESSURE: 124 MMHG

## 2019-10-18 DIAGNOSIS — D50.0 IRON DEFICIENCY ANEMIA DUE TO CHRONIC BLOOD LOSS: Primary | ICD-10-CM

## 2019-10-18 PROCEDURE — 99214 OFFICE O/P EST MOD 30 MIN: CPT | Performed by: INTERNAL MEDICINE

## 2019-10-18 PROCEDURE — 96523 IRRIG DRUG DELIVERY DEVICE: CPT

## 2019-10-18 RX ORDER — CLINDAMYCIN HYDROCHLORIDE 300 MG/1
300 CAPSULE ORAL
COMMUNITY
Start: 2019-09-30 | End: 2020-01-21

## 2019-10-18 NOTE — PROGRESS NOTES
Education Record    Learner:  Patient    Disease / Diagnosis: port flush    Barriers / Limitations:  None   Comments:    Method:  Brief focused   Comments:    General Topics:  Plan of care reviewed   Comments:    Outcome:  Shows understanding   Comments:

## 2019-10-18 NOTE — PROGRESS NOTES
Patient is here for MD f/u for hypercoagulable state and anemia. Patient had a colonoscopy on Wednesday. She is off her Xarelto. She had hemorrhoids banded and was told to stay off blood thinners for 2 weeks.  Patient had labs drawn on 9/30 including iron s

## 2019-10-21 NOTE — PROGRESS NOTES
[facility]    PATIENT'S NAME: Juan Merida   ATTENDING PHYSICIAN: Rober Grossman M.D.    PATIENT ACCOUNT #: [de-identified] LOCATION: [address] [place] [address] RECORD #: [id number] YOB: 1965   DATE OF SERVICE: 10/18/2019       [facility] ondansetron 8 mg q.8 h. p.r.n., quinacrine 100 mg p.o. daily, rivaroxaban 20 mg daily, ropinirole 0.25 to 0.5 mg at bedtime p.r.n. and 2 mg extended release once daily, sertraline 150 mg daily, valacyclovir 1000 mg twice daily, verapamil 120 mg 3 times a d can see me in 1 year. In the future, if she needs iron, we will give it to her in the form of Injectafer.        Dictated By Dario Sanz M.D.  d: 10/21/2019 10:55:45  t: 10/21/2019 11:48:08  [facility] [id number]  /    cc: Toyin Mary M.D.

## 2019-10-24 PROCEDURE — 93268 ECG RECORD/REVIEW: CPT | Performed by: INTERNAL MEDICINE

## 2019-10-25 ENCOUNTER — ANCILLARY PROCEDURE (OUTPATIENT)
Dept: CARDIOLOGY | Age: 54
End: 2019-10-25
Attending: INTERNAL MEDICINE

## 2019-10-25 ENCOUNTER — OFFICE VISIT (OUTPATIENT)
Dept: CARDIOLOGY | Age: 54
End: 2019-10-25

## 2019-10-25 ENCOUNTER — APPOINTMENT (OUTPATIENT)
Dept: CARDIOLOGY | Age: 54
End: 2019-10-25
Attending: INTERNAL MEDICINE

## 2019-10-25 VITALS
BODY MASS INDEX: 27.32 KG/M2 | WEIGHT: 164 LBS | HEART RATE: 92 BPM | DIASTOLIC BLOOD PRESSURE: 62 MMHG | HEIGHT: 65 IN | SYSTOLIC BLOOD PRESSURE: 110 MMHG

## 2019-10-25 DIAGNOSIS — I47.19 ATRIAL TACHYCARDIA: ICD-10-CM

## 2019-10-25 DIAGNOSIS — I47.19 ATRIAL TACHYCARDIA: Primary | ICD-10-CM

## 2019-10-25 PROCEDURE — 3074F SYST BP LT 130 MM HG: CPT | Performed by: INTERNAL MEDICINE

## 2019-10-25 PROCEDURE — 93000 ELECTROCARDIOGRAM COMPLETE: CPT | Performed by: INTERNAL MEDICINE

## 2019-10-25 PROCEDURE — 3078F DIAST BP <80 MM HG: CPT | Performed by: INTERNAL MEDICINE

## 2019-10-25 PROCEDURE — 99215 OFFICE O/P EST HI 40 MIN: CPT | Performed by: INTERNAL MEDICINE

## 2019-10-25 ASSESSMENT — ENCOUNTER SYMPTOMS
HEMATOCHEZIA: 0
ALLERGIC/IMMUNOLOGIC COMMENTS: NO NEW FOOD ALLERGIES
FEVER: 0
SHORTNESS OF BREATH: 1
CHILLS: 0
SUSPICIOUS LESIONS: 0
HEMOPTYSIS: 0
WEIGHT LOSS: 0
WEIGHT GAIN: 0
BRUISES/BLEEDS EASILY: 0
COUGH: 0

## 2019-10-27 ENCOUNTER — APPOINTMENT (OUTPATIENT)
Dept: CT IMAGING | Facility: HOSPITAL | Age: 54
End: 2019-10-27
Attending: EMERGENCY MEDICINE
Payer: COMMERCIAL

## 2019-10-27 ENCOUNTER — HOSPITAL ENCOUNTER (EMERGENCY)
Facility: HOSPITAL | Age: 54
Discharge: HOME | End: 2019-10-28
Attending: EMERGENCY MEDICINE
Payer: COMMERCIAL

## 2019-10-27 ENCOUNTER — APPOINTMENT (OUTPATIENT)
Dept: ULTRASOUND IMAGING | Facility: HOSPITAL | Age: 54
End: 2019-10-27
Attending: EMERGENCY MEDICINE
Payer: COMMERCIAL

## 2019-10-27 ENCOUNTER — APPOINTMENT (OUTPATIENT)
Dept: GENERAL RADIOLOGY | Facility: HOSPITAL | Age: 54
End: 2019-10-27
Attending: EMERGENCY MEDICINE
Payer: COMMERCIAL

## 2019-10-27 DIAGNOSIS — M79.669 CALF PAIN, UNSPECIFIED LATERALITY: Primary | ICD-10-CM

## 2019-10-27 PROCEDURE — 71275 CT ANGIOGRAPHY CHEST: CPT | Performed by: EMERGENCY MEDICINE

## 2019-10-27 PROCEDURE — 84484 ASSAY OF TROPONIN QUANT: CPT | Performed by: EMERGENCY MEDICINE

## 2019-10-27 PROCEDURE — 93005 ELECTROCARDIOGRAM TRACING: CPT

## 2019-10-27 PROCEDURE — 85025 COMPLETE CBC W/AUTO DIFF WBC: CPT | Performed by: EMERGENCY MEDICINE

## 2019-10-27 PROCEDURE — 93010 ELECTROCARDIOGRAM REPORT: CPT

## 2019-10-27 PROCEDURE — 80053 COMPREHEN METABOLIC PANEL: CPT | Performed by: EMERGENCY MEDICINE

## 2019-10-27 PROCEDURE — 99285 EMERGENCY DEPT VISIT HI MDM: CPT

## 2019-10-27 PROCEDURE — 96376 TX/PRO/DX INJ SAME DRUG ADON: CPT

## 2019-10-27 PROCEDURE — 96374 THER/PROPH/DIAG INJ IV PUSH: CPT

## 2019-10-27 PROCEDURE — 93971 EXTREMITY STUDY: CPT | Performed by: EMERGENCY MEDICINE

## 2019-10-27 PROCEDURE — 96375 TX/PRO/DX INJ NEW DRUG ADDON: CPT

## 2019-10-27 RX ORDER — MORPHINE SULFATE 4 MG/ML
INJECTION, SOLUTION INTRAMUSCULAR; INTRAVENOUS
Status: COMPLETED
Start: 2019-10-27 | End: 2019-10-27

## 2019-10-27 RX ORDER — HYDROXYZINE HYDROCHLORIDE 25 MG/1
25 TABLET, FILM COATED ORAL 3 TIMES DAILY PRN
Qty: 15 TABLET | Refills: 0 | Status: SHIPPED | OUTPATIENT
Start: 2019-10-27 | End: 2019-11-01

## 2019-10-27 RX ORDER — HYDROXYZINE HYDROCHLORIDE 25 MG/1
25 TABLET, FILM COATED ORAL ONCE
Status: COMPLETED | OUTPATIENT
Start: 2019-10-27 | End: 2019-10-27

## 2019-10-27 RX ORDER — METHYLPREDNISOLONE SODIUM SUCCINATE 125 MG/2ML
125 INJECTION, POWDER, LYOPHILIZED, FOR SOLUTION INTRAMUSCULAR; INTRAVENOUS ONCE
Status: COMPLETED | OUTPATIENT
Start: 2019-10-27 | End: 2019-10-27

## 2019-10-27 RX ORDER — MORPHINE SULFATE 4 MG/ML
4 INJECTION, SOLUTION INTRAMUSCULAR; INTRAVENOUS ONCE
Status: COMPLETED | OUTPATIENT
Start: 2019-10-27 | End: 2019-10-27

## 2019-10-27 RX ORDER — ONDANSETRON 2 MG/ML
4 INJECTION INTRAMUSCULAR; INTRAVENOUS ONCE
Status: COMPLETED | OUTPATIENT
Start: 2019-10-27 | End: 2019-10-27

## 2019-10-27 RX ORDER — DIPHENHYDRAMINE HYDROCHLORIDE 50 MG/ML
25 INJECTION INTRAMUSCULAR; INTRAVENOUS ONCE
Status: COMPLETED | OUTPATIENT
Start: 2019-10-27 | End: 2019-10-27

## 2019-10-28 VITALS
RESPIRATION RATE: 18 BRPM | WEIGHT: 164 LBS | BODY MASS INDEX: 28 KG/M2 | OXYGEN SATURATION: 93 % | HEART RATE: 79 BPM | DIASTOLIC BLOOD PRESSURE: 78 MMHG | TEMPERATURE: 96 F | SYSTOLIC BLOOD PRESSURE: 129 MMHG

## 2019-10-28 NOTE — ED INITIAL ASSESSMENT (HPI)
53YF c/c of r/o DVT Pt state she been having L calf pain for the last few days hx of clots. Pt having right posterior leg pain since she had a colonoscopy 1.5 weeks ago. Was off her blood thinners during colonoscopy.  She also went on a [address] t

## 2019-10-28 NOTE — ED PROVIDER NOTES
Patient Seen in: [facility] Emergency Department      History   Patient presents with:  Deep Vein Thrombosis (cardiovascular)    Stated Complaint: right leg pain, r/o dvt    HPI    Patient is a 50-year-old female comes to ED for evaluation of right disorder ([facility]     had x 1 year and went away/unknown etiology/ age 56   • Sjogren's disease ([facility]    • Visual impairment     wears glasses              Past Surgical History:   Procedure Laterality Date   • APPENDECTOMY     • ARTHROSCOPY WRIST Right 6/25/ complaint: right leg pain, r/o dvt  Other systems are as noted in HPI. Constitutional and vital signs reviewed. All other systems reviewed and negative except as noted above.     Physical Exam     ED Triage Vitals [10/27/19 1908]   /90   Pulse 9 normal limits   TROPONIN I - Normal   CBC WITH DIFFERENTIAL WITH PLATELET    Narrative: The following orders were created for panel order CBC WITH DIFFERENTIAL WITH PLATELET.   Procedure                               Abnormality         Status extremity.     Dictated by: Purnima Chavira MD on 10/27/2019 at 21:55     Approved by: Purnima Chavira MD on 10/27/2019 at 21:56            CTA chest negative    Medications   hydrOXYzine HCl (ATARAX) tab 25 mg (has no administration in time range)   diphenhydr further evaluation and treatment, but to return immediately to the ER for worsening, concerning, new, changing or persisting symptoms. I discussed the case with the patient and they had no questions, complaints, or concerns.   Patient felt comfortable elfego

## 2019-10-28 NOTE — ED NOTES
Pt reports chest pain got worst while she was in [address].  Chest Pain worst when taking deep breaths,

## 2019-10-28 NOTE — IMAGING NOTE
Pt allergic to contrast, ER gave pt Benadrly/Prednisone, still developed hives and c/o some diff breathing, as pt had [employer] [place], [employer] had escorted pt to CT and pt was in his continual care.

## 2019-10-31 ENCOUNTER — APPOINTMENT (OUTPATIENT)
Dept: CARDIOLOGY | Age: 54
End: 2019-10-31

## 2019-11-05 ENCOUNTER — APPOINTMENT (OUTPATIENT)
Dept: CARDIOLOGY | Age: 54
End: 2019-11-05
Attending: INTERNAL MEDICINE

## 2019-11-08 ENCOUNTER — NURSE ONLY (OUTPATIENT)
Dept: HEMATOLOGY/ONCOLOGY | Facility: HOSPITAL | Age: 54
End: 2019-11-08
Attending: INTERNAL MEDICINE
Payer: COMMERCIAL

## 2019-11-08 DIAGNOSIS — N18.30 CHRONIC KIDNEY DISEASE, STAGE III (MODERATE) (HCC): Primary | ICD-10-CM

## 2019-11-08 DIAGNOSIS — M06.00 SERONEGATIVE RHEUMATOID ARTHRITIS (HCC): ICD-10-CM

## 2019-11-08 DIAGNOSIS — Z79.899 ENCOUNTER FOR LONG-TERM (CURRENT) USE OF HIGH-RISK MEDICATION: ICD-10-CM

## 2019-11-08 PROCEDURE — 80076 HEPATIC FUNCTION PANEL: CPT

## 2019-11-08 PROCEDURE — 36591 DRAW BLOOD OFF VENOUS DEVICE: CPT

## 2019-11-08 PROCEDURE — 84520 ASSAY OF UREA NITROGEN: CPT

## 2019-11-08 PROCEDURE — 86140 C-REACTIVE PROTEIN: CPT

## 2019-11-08 PROCEDURE — 82565 ASSAY OF CREATININE: CPT

## 2019-11-08 PROCEDURE — 85652 RBC SED RATE AUTOMATED: CPT

## 2019-11-08 PROCEDURE — 85025 COMPLETE CBC W/AUTO DIFF WBC: CPT

## 2019-11-08 NOTE — PROGRESS NOTES
Patient with outpatient lab order from Dr. Jacinto Dias, nephrologist from [facility], for next month. Dr. Gaby Stanley does not have privileges here. Dr. Karon Nesbitt notified and okay to order labs under his name.  Labs entered and order scann

## 2019-11-11 ENCOUNTER — OFFICE VISIT (OUTPATIENT)
Dept: CARDIOLOGY | Age: 54
End: 2019-11-11

## 2019-11-11 VITALS
DIASTOLIC BLOOD PRESSURE: 60 MMHG | WEIGHT: 167 LBS | RESPIRATION RATE: 18 BRPM | HEART RATE: 96 BPM | HEIGHT: 65 IN | OXYGEN SATURATION: 97 % | BODY MASS INDEX: 27.82 KG/M2 | SYSTOLIC BLOOD PRESSURE: 122 MMHG

## 2019-11-11 DIAGNOSIS — I47.19 ATRIAL TACHYCARDIA: Primary | ICD-10-CM

## 2019-11-11 PROBLEM — R94.31 ABNORMAL ELECTROCARDIOGRAM (ECG) (EKG): Status: ACTIVE | Noted: 2019-11-11

## 2019-11-11 PROCEDURE — 3074F SYST BP LT 130 MM HG: CPT | Performed by: INTERNAL MEDICINE

## 2019-11-11 PROCEDURE — 3078F DIAST BP <80 MM HG: CPT | Performed by: INTERNAL MEDICINE

## 2019-11-11 PROCEDURE — 99245 OFF/OP CONSLTJ NEW/EST HI 55: CPT | Performed by: INTERNAL MEDICINE

## 2019-11-11 PROCEDURE — 93000 ELECTROCARDIOGRAM COMPLETE: CPT | Performed by: INTERNAL MEDICINE

## 2019-11-11 ASSESSMENT — ENCOUNTER SYMPTOMS
PSYCHIATRIC NEGATIVE: 1
ENDOCRINE NEGATIVE: 1
NEUROLOGICAL NEGATIVE: 1
GASTROINTESTINAL NEGATIVE: 1
CONSTITUTIONAL NEGATIVE: 1
EYES NEGATIVE: 1
SHORTNESS OF BREATH: 1
ALLERGIC/IMMUNOLOGIC NEGATIVE: 1
HEMATOLOGIC/LYMPHATIC NEGATIVE: 1

## 2019-11-11 NOTE — PROGRESS NOTES
[employer] message sent to patient. Natalee,  Your Enbrel/methotrexate labs are stable. Please repeat labs every 3 months. Continue current treatment plan. Follow up as planned.   Dr. Rufus Baker

## 2019-11-12 ENCOUNTER — E-ADVICE (OUTPATIENT)
Dept: CARDIOLOGY | Age: 54
End: 2019-11-12

## 2019-11-12 ENCOUNTER — TELEPHONE (OUTPATIENT)
Dept: CARDIOLOGY | Age: 54
End: 2019-11-12

## 2019-12-05 ENCOUNTER — TELEPHONE (OUTPATIENT)
Dept: CARDIOLOGY | Age: 54
End: 2019-12-05

## 2019-12-05 ENCOUNTER — E-ADVICE (OUTPATIENT)
Dept: CARDIOLOGY | Age: 54
End: 2019-12-05

## 2019-12-06 ENCOUNTER — NURSE ONLY (OUTPATIENT)
Dept: HEMATOLOGY/ONCOLOGY | Facility: HOSPITAL | Age: 54
End: 2019-12-06
Attending: INTERNAL MEDICINE
Payer: COMMERCIAL

## 2019-12-06 DIAGNOSIS — N18.30 CHRONIC KIDNEY DISEASE, STAGE III (MODERATE) (HCC): ICD-10-CM

## 2019-12-06 PROCEDURE — 82570 ASSAY OF URINE CREATININE: CPT

## 2019-12-06 PROCEDURE — 85025 COMPLETE CBC W/AUTO DIFF WBC: CPT

## 2019-12-06 PROCEDURE — 80053 COMPREHEN METABOLIC PANEL: CPT

## 2019-12-06 PROCEDURE — 36591 DRAW BLOOD OFF VENOUS DEVICE: CPT

## 2019-12-06 PROCEDURE — 82728 ASSAY OF FERRITIN: CPT

## 2019-12-06 PROCEDURE — 84156 ASSAY OF PROTEIN URINE: CPT

## 2019-12-06 PROCEDURE — 86038 ANTINUCLEAR ANTIBODIES: CPT

## 2019-12-06 PROCEDURE — 85652 RBC SED RATE AUTOMATED: CPT

## 2019-12-06 PROCEDURE — 83540 ASSAY OF IRON: CPT

## 2019-12-06 PROCEDURE — 83550 IRON BINDING TEST: CPT

## 2019-12-06 NOTE — PROGRESS NOTES
Education Record    Learner:  Patient    Disease / Skip Zavala lab/kidney disease    Barriers / Limitations:  None   Comments:    Method:  Discussion and Reinforcement   Comments:    General Topics:  Procedure and Plan of care reviewed   Comments:     Prem Kumar

## 2019-12-07 ASSESSMENT — ENCOUNTER SYMPTOMS
SHORTNESS OF BREATH: 1
EYES NEGATIVE: 1
ENDOCRINE NEGATIVE: 1
HEMATOLOGIC/LYMPHATIC NEGATIVE: 1
CONSTITUTIONAL NEGATIVE: 1
PSYCHIATRIC NEGATIVE: 1
ALLERGIC/IMMUNOLOGIC NEGATIVE: 1
GASTROINTESTINAL NEGATIVE: 1
NEUROLOGICAL NEGATIVE: 1

## 2019-12-11 ENCOUNTER — TELEPHONE (OUTPATIENT)
Dept: CARDIOLOGY | Age: 54
End: 2019-12-11

## 2019-12-11 ENCOUNTER — APPOINTMENT (OUTPATIENT)
Dept: CARDIOLOGY | Age: 54
End: 2019-12-11

## 2019-12-12 ENCOUNTER — APPOINTMENT (OUTPATIENT)
Dept: CARDIOLOGY | Age: 54
End: 2019-12-12

## 2019-12-12 ASSESSMENT — ENCOUNTER SYMPTOMS
CONSTITUTIONAL NEGATIVE: 1
EYES NEGATIVE: 1
HEMATOLOGIC/LYMPHATIC NEGATIVE: 1
SHORTNESS OF BREATH: 1
PSYCHIATRIC NEGATIVE: 1
ALLERGIC/IMMUNOLOGIC NEGATIVE: 1
NEUROLOGICAL NEGATIVE: 1
GASTROINTESTINAL NEGATIVE: 1
ENDOCRINE NEGATIVE: 1

## 2019-12-18 ENCOUNTER — OFFICE VISIT (OUTPATIENT)
Dept: CARDIOLOGY | Age: 54
End: 2019-12-18

## 2019-12-18 VITALS
OXYGEN SATURATION: 98 % | WEIGHT: 166 LBS | BODY MASS INDEX: 27.66 KG/M2 | HEART RATE: 84 BPM | HEIGHT: 65 IN | SYSTOLIC BLOOD PRESSURE: 110 MMHG | DIASTOLIC BLOOD PRESSURE: 64 MMHG

## 2019-12-18 DIAGNOSIS — R00.2 PALPITATIONS: Primary | ICD-10-CM

## 2019-12-18 DIAGNOSIS — E78.2 MIXED HYPERLIPIDEMIA: ICD-10-CM

## 2019-12-18 DIAGNOSIS — I10 ESSENTIAL HYPERTENSION: ICD-10-CM

## 2019-12-18 DIAGNOSIS — I47.19 ATRIAL TACHYCARDIA: ICD-10-CM

## 2019-12-18 PROCEDURE — 3078F DIAST BP <80 MM HG: CPT | Performed by: INTERNAL MEDICINE

## 2019-12-18 PROCEDURE — 3074F SYST BP LT 130 MM HG: CPT | Performed by: INTERNAL MEDICINE

## 2019-12-18 PROCEDURE — 99214 OFFICE O/P EST MOD 30 MIN: CPT | Performed by: INTERNAL MEDICINE

## 2019-12-18 PROCEDURE — 93000 ELECTROCARDIOGRAM COMPLETE: CPT | Performed by: INTERNAL MEDICINE

## 2019-12-18 RX ORDER — ERENUMAB-AOOE 140 MG/ML
INJECTION, SOLUTION SUBCUTANEOUS
Refills: 3 | COMMUNITY
Start: 2019-12-04

## 2019-12-18 RX ORDER — ESTRADIOL 2 MG/1
RING VAGINAL
COMMUNITY
Start: 2019-12-17

## 2019-12-18 RX ORDER — VALACYCLOVIR HYDROCHLORIDE 500 MG/1
500 TABLET, FILM COATED ORAL 2 TIMES DAILY
Refills: 0 | COMMUNITY
Start: 2019-11-26 | End: 2020-05-08

## 2019-12-18 RX ORDER — ROPINIROLE 2 MG/1
2 TABLET, FILM COATED, EXTENDED RELEASE ORAL DAILY
COMMUNITY
Start: 2019-12-05

## 2019-12-18 SDOH — HEALTH STABILITY: MENTAL HEALTH: HOW OFTEN DO YOU HAVE 6 OR MORE DRINKS ON ONE OCCASION?: LESS THAN MONTHLY

## 2019-12-18 SDOH — HEALTH STABILITY: MENTAL HEALTH: HOW OFTEN DO YOU HAVE A DRINK CONTAINING ALCOHOL?: MONTHLY OR LESS

## 2019-12-18 SDOH — HEALTH STABILITY: MENTAL HEALTH: HOW MANY STANDARD DRINKS CONTAINING ALCOHOL DO YOU HAVE ON A TYPICAL DAY?: 1 OR 2

## 2020-01-06 ASSESSMENT — ENCOUNTER SYMPTOMS
GASTROINTESTINAL NEGATIVE: 1
EYES NEGATIVE: 1
SHORTNESS OF BREATH: 1
CONSTITUTIONAL NEGATIVE: 1
NEUROLOGICAL NEGATIVE: 1
PSYCHIATRIC NEGATIVE: 1
ENDOCRINE NEGATIVE: 1
ALLERGIC/IMMUNOLOGIC NEGATIVE: 1
HEMATOLOGIC/LYMPHATIC NEGATIVE: 1

## 2020-01-10 ENCOUNTER — APPOINTMENT (OUTPATIENT)
Dept: CARDIOLOGY | Age: 55
End: 2020-01-10

## 2020-01-15 ENCOUNTER — OFFICE VISIT (OUTPATIENT)
Dept: CARDIOLOGY | Age: 55
End: 2020-01-15

## 2020-01-15 VITALS
SYSTOLIC BLOOD PRESSURE: 100 MMHG | HEART RATE: 80 BPM | HEIGHT: 65 IN | WEIGHT: 161 LBS | OXYGEN SATURATION: 97 % | DIASTOLIC BLOOD PRESSURE: 70 MMHG | BODY MASS INDEX: 26.82 KG/M2

## 2020-01-15 DIAGNOSIS — I10 ESSENTIAL HYPERTENSION: ICD-10-CM

## 2020-01-15 DIAGNOSIS — I47.19 ATRIAL TACHYCARDIA: Primary | ICD-10-CM

## 2020-01-15 DIAGNOSIS — Z86.718 HISTORY OF DVT (DEEP VEIN THROMBOSIS): ICD-10-CM

## 2020-01-15 PROCEDURE — 99214 OFFICE O/P EST MOD 30 MIN: CPT | Performed by: INTERNAL MEDICINE

## 2020-01-15 PROCEDURE — 93000 ELECTROCARDIOGRAM COMPLETE: CPT | Performed by: INTERNAL MEDICINE

## 2020-01-15 PROCEDURE — 3074F SYST BP LT 130 MM HG: CPT | Performed by: INTERNAL MEDICINE

## 2020-01-15 PROCEDURE — 3078F DIAST BP <80 MM HG: CPT | Performed by: INTERNAL MEDICINE

## 2020-01-15 RX ORDER — FLECAINIDE ACETATE 100 MG/1
100 TABLET ORAL 2 TIMES DAILY
Qty: 180 TABLET | Refills: 3 | Status: SHIPPED | OUTPATIENT
Start: 2020-01-15 | End: 2021-01-18

## 2020-01-16 ENCOUNTER — APPOINTMENT (OUTPATIENT)
Dept: CARDIOLOGY | Age: 55
End: 2020-01-16

## 2020-01-17 ENCOUNTER — APPOINTMENT (OUTPATIENT)
Dept: HEMATOLOGY/ONCOLOGY | Facility: HOSPITAL | Age: 55
End: 2020-01-17
Attending: INTERNAL MEDICINE
Payer: COMMERCIAL

## 2020-01-21 RX ORDER — FAMOTIDINE 20 MG/1
20 TABLET ORAL ONCE
Status: CANCELLED | OUTPATIENT
Start: 2020-01-21 | End: 2020-01-21

## 2020-01-23 ENCOUNTER — OFFICE VISIT (OUTPATIENT)
Dept: CARDIOLOGY | Age: 55
End: 2020-01-23

## 2020-01-23 ENCOUNTER — APPOINTMENT (OUTPATIENT)
Dept: CARDIOLOGY | Age: 55
End: 2020-01-23

## 2020-01-23 ENCOUNTER — TELEPHONE (OUTPATIENT)
Dept: CARDIOLOGY | Age: 55
End: 2020-01-23

## 2020-01-23 VITALS
BODY MASS INDEX: 27.49 KG/M2 | DIASTOLIC BLOOD PRESSURE: 60 MMHG | SYSTOLIC BLOOD PRESSURE: 110 MMHG | WEIGHT: 165 LBS | HEART RATE: 90 BPM | HEIGHT: 65 IN

## 2020-01-23 DIAGNOSIS — R00.2 PALPITATIONS: Primary | ICD-10-CM

## 2020-01-23 PROCEDURE — 3074F SYST BP LT 130 MM HG: CPT | Performed by: NURSE PRACTITIONER

## 2020-01-23 PROCEDURE — 3078F DIAST BP <80 MM HG: CPT | Performed by: NURSE PRACTITIONER

## 2020-01-23 PROCEDURE — 99213 OFFICE O/P EST LOW 20 MIN: CPT | Performed by: NURSE PRACTITIONER

## 2020-01-23 ASSESSMENT — ENCOUNTER SYMPTOMS
BRUISES/BLEEDS EASILY: 0
SUSPICIOUS LESIONS: 0
COUGH: 0
HEMOPTYSIS: 0
FEVER: 0
CHILLS: 0
HEMATOCHEZIA: 0
WEIGHT GAIN: 0
BACK PAIN: 1
ALLERGIC/IMMUNOLOGIC COMMENTS: NO NEW FOOD ALLERGIES
WEIGHT LOSS: 0

## 2020-01-24 ENCOUNTER — ANESTHESIA (OUTPATIENT)
Dept: SURGERY | Facility: HOSPITAL | Age: 55
End: 2020-01-24
Payer: COMMERCIAL

## 2020-01-24 ENCOUNTER — ANESTHESIA EVENT (OUTPATIENT)
Dept: SURGERY | Facility: HOSPITAL | Age: 55
End: 2020-01-24
Payer: COMMERCIAL

## 2020-01-24 ENCOUNTER — HOSPITAL ENCOUNTER (OUTPATIENT)
Facility: HOSPITAL | Age: 55
Setting detail: HOSPITAL OUTPATIENT SURGERY
Discharge: HOME | End: 2020-01-24
Attending: ORTHOPAEDIC SURGERY | Admitting: ORTHOPAEDIC SURGERY
Payer: COMMERCIAL

## 2020-01-24 VITALS
SYSTOLIC BLOOD PRESSURE: 140 MMHG | TEMPERATURE: 98 F | DIASTOLIC BLOOD PRESSURE: 80 MMHG | BODY MASS INDEX: 27.82 KG/M2 | OXYGEN SATURATION: 96 % | WEIGHT: 167 LBS | HEIGHT: 65 IN | RESPIRATION RATE: 16 BRPM | HEART RATE: 79 BPM

## 2020-01-24 DIAGNOSIS — M06.9 RHEUMATOID ARTHRITIS INVOLVING BOTH WRISTS, UNSPECIFIED RHEUMATOID FACTOR PRESENCE: ICD-10-CM

## 2020-01-24 DIAGNOSIS — M25.532 LEFT WRIST PAIN: ICD-10-CM

## 2020-01-24 PROCEDURE — 0RBP4ZZ EXCISION OF LEFT WRIST JOINT, PERCUTANEOUS ENDOSCOPIC APPROACH: ICD-10-PCS | Performed by: ORTHOPAEDIC SURGERY

## 2020-01-24 PROCEDURE — 94010 BREATHING CAPACITY TEST: CPT | Performed by: ORTHOPAEDIC SURGERY

## 2020-01-24 RX ORDER — DEXAMETHASONE SODIUM PHOSPHATE 4 MG/ML
VIAL (ML) INJECTION AS NEEDED
Status: DISCONTINUED | OUTPATIENT
Start: 2020-01-24 | End: 2020-01-24 | Stop reason: SURG

## 2020-01-24 RX ORDER — SODIUM CHLORIDE, SODIUM LACTATE, POTASSIUM CHLORIDE, CALCIUM CHLORIDE 600; 310; 30; 20 MG/100ML; MG/100ML; MG/100ML; MG/100ML
INJECTION, SOLUTION INTRAVENOUS CONTINUOUS
Status: DISCONTINUED | OUTPATIENT
Start: 2020-01-24 | End: 2020-01-24

## 2020-01-24 RX ORDER — MORPHINE SULFATE 10 MG/ML
6 INJECTION, SOLUTION INTRAMUSCULAR; INTRAVENOUS EVERY 10 MIN PRN
Status: DISCONTINUED | OUTPATIENT
Start: 2020-01-24 | End: 2020-01-24

## 2020-01-24 RX ORDER — HALOPERIDOL 5 MG/ML
0.25 INJECTION INTRAMUSCULAR ONCE AS NEEDED
Status: DISCONTINUED | OUTPATIENT
Start: 2020-01-24 | End: 2020-01-24

## 2020-01-24 RX ORDER — CLINDAMYCIN PHOSPHATE 150 MG/ML
INJECTION, SOLUTION INTRAVENOUS AS NEEDED
Status: DISCONTINUED | OUTPATIENT
Start: 2020-01-24 | End: 2020-01-24 | Stop reason: SURG

## 2020-01-24 RX ORDER — NALOXONE HYDROCHLORIDE 0.4 MG/ML
80 INJECTION, SOLUTION INTRAMUSCULAR; INTRAVENOUS; SUBCUTANEOUS AS NEEDED
Status: DISCONTINUED | OUTPATIENT
Start: 2020-01-24 | End: 2020-01-24

## 2020-01-24 RX ORDER — METOCLOPRAMIDE HYDROCHLORIDE 5 MG/ML
INJECTION INTRAMUSCULAR; INTRAVENOUS AS NEEDED
Status: DISCONTINUED | OUTPATIENT
Start: 2020-01-24 | End: 2020-01-24 | Stop reason: SURG

## 2020-01-24 RX ORDER — ONDANSETRON 2 MG/ML
4 INJECTION INTRAMUSCULAR; INTRAVENOUS ONCE AS NEEDED
Status: DISCONTINUED | OUTPATIENT
Start: 2020-01-24 | End: 2020-01-24

## 2020-01-24 RX ORDER — MORPHINE SULFATE 4 MG/ML
4 INJECTION, SOLUTION INTRAMUSCULAR; INTRAVENOUS EVERY 10 MIN PRN
Status: DISCONTINUED | OUTPATIENT
Start: 2020-01-24 | End: 2020-01-24

## 2020-01-24 RX ORDER — MORPHINE SULFATE 4 MG/ML
2 INJECTION, SOLUTION INTRAMUSCULAR; INTRAVENOUS EVERY 10 MIN PRN
Status: DISCONTINUED | OUTPATIENT
Start: 2020-01-24 | End: 2020-01-24

## 2020-01-24 RX ORDER — ACETAMINOPHEN 500 MG
1000 TABLET ORAL ONCE
Status: DISCONTINUED | OUTPATIENT
Start: 2020-01-24 | End: 2020-01-24 | Stop reason: HOSPADM

## 2020-01-24 RX ORDER — MIDAZOLAM HYDROCHLORIDE 1 MG/ML
INJECTION INTRAMUSCULAR; INTRAVENOUS AS NEEDED
Status: DISCONTINUED | OUTPATIENT
Start: 2020-01-24 | End: 2020-01-24 | Stop reason: SURG

## 2020-01-24 RX ORDER — CLINDAMYCIN PHOSPHATE 900 MG/50ML
900 INJECTION INTRAVENOUS ONCE
Status: DISCONTINUED | OUTPATIENT
Start: 2020-01-24 | End: 2020-01-24 | Stop reason: HOSPADM

## 2020-01-24 RX ORDER — SODIUM CHLORIDE, SODIUM LACTATE, POTASSIUM CHLORIDE, CALCIUM CHLORIDE 600; 310; 30; 20 MG/100ML; MG/100ML; MG/100ML; MG/100ML
INJECTION, SOLUTION INTRAVENOUS CONTINUOUS PRN
Status: DISCONTINUED | OUTPATIENT
Start: 2020-01-24 | End: 2020-01-24 | Stop reason: SURG

## 2020-01-24 RX ORDER — ONDANSETRON 2 MG/ML
4 INJECTION INTRAMUSCULAR; INTRAVENOUS EVERY 6 HOURS PRN
Status: DISCONTINUED | OUTPATIENT
Start: 2020-01-24 | End: 2020-01-24

## 2020-01-24 RX ORDER — HYDROCODONE BITARTRATE AND ACETAMINOPHEN 5; 325 MG/1; MG/1
1 TABLET ORAL EVERY 4 HOURS PRN
Qty: 20 TABLET | Refills: 0 | Status: SHIPPED | OUTPATIENT
Start: 2020-01-24 | End: 2020-02-03

## 2020-01-24 RX ADMIN — DEXAMETHASONE SODIUM PHOSPHATE 8 MG: 4 MG/ML VIAL (ML) INJECTION at 07:45:00

## 2020-01-24 RX ADMIN — MIDAZOLAM HYDROCHLORIDE 2 MG: 1 INJECTION INTRAMUSCULAR; INTRAVENOUS at 07:40:00

## 2020-01-24 RX ADMIN — SODIUM CHLORIDE, SODIUM LACTATE, POTASSIUM CHLORIDE, CALCIUM CHLORIDE: 600; 310; 30; 20 INJECTION, SOLUTION INTRAVENOUS at 08:46:00

## 2020-01-24 RX ADMIN — METOCLOPRAMIDE HYDROCHLORIDE 10 MG: 5 INJECTION INTRAMUSCULAR; INTRAVENOUS at 07:52:00

## 2020-01-24 RX ADMIN — CLINDAMYCIN PHOSPHATE 900 MG: 150 INJECTION, SOLUTION INTRAVENOUS at 07:50:00

## 2020-01-24 RX ADMIN — SODIUM CHLORIDE, SODIUM LACTATE, POTASSIUM CHLORIDE, CALCIUM CHLORIDE: 600; 310; 30; 20 INJECTION, SOLUTION INTRAVENOUS at 07:43:00

## 2020-01-24 NOTE — ANESTHESIA POSTPROCEDURE EVALUATION
Patient: Selene Situ    Procedure Summary     Date:  01/24/20 Room / Location:  [address] [place] [address] MAIN OR 06 / [address] [place] [address] MAIN OR    Anesthesia Start:  8702 Anesthesia Stop:  0901    Procedures:       WRIST ARTHROSCOPY (Left Wrist)      HAND SYNOVECTOMY (Left Wrist) Diagno

## 2020-01-24 NOTE — ANESTHESIA PROCEDURE NOTES
Airway  Date/Time: 1/24/2020 7:45 AM  Urgency: Elective    Airway not difficult    General Information and Staff    Patient location during procedure: OR  Anesthesiologist: Paulina Martin MD  Resident/CRNA: Farooq Nickerson CRNA  Performed: CRNA     I

## 2020-01-24 NOTE — PROGRESS NOTES
Chase Zhang  155/1965  47year old   female  Jordy Snyder MD     HPI:   Patient presents with:  Wrist Pain: left wrist pain with bending/carrying. No injury. Finger Pain: Right thumb pain.         The patient is right-handed.   Date of injury/ o TIMES DAILY 270 capsule 0   • Methotrexate Sodium, PF, 50 MG/2ML Injection Solution Inject 0.5 mL as directed every 7 days.  multiuse vials 6 mL 0   • rOPINIRole HCl 0.25 MG Oral Tab 1 tab nightly but can use an extra tab if needed 60 tablet 5   • Ondansetr Take 150 mg by mouth daily.             HISTORY:       Past Medical History:   Diagnosis Date   • Anesthesia complication     • Arrhythmia       \"extra beat\"   • Back problem     • Calculus of kidney     • Connective tissue disease, undifferentiated ([facility] MAIN OR   • LUMBAR MICRODISCECTOMY 1 LEVEL Right 3/15/2016     Performed by Obie Dubin, MD at [facility] MAIN OR   • OTHER   11/2015     reconstructive surgery of left thumb joint   • OTHER   8/2015     vaginal sling   • OTHER SURGICAL HISTORY         pa soft.  The patient's upper extremities are warm and pink with brisk capillary refill and 2+ radial pulses. Sensation is intact to light touch in axillary, median, ulnar, and radial nerve distributions.   The patient has 5/5 strength in finger flexion, fing reviewed by me today reveals rheumatoid arthritis and synovitis     ASSESSMENT AND PLAN:   Bilateral wrist pain  (primary encounter diagnosis)  Rheumatoid arthritis involving both wrists, unspecified rheumatoid factor presence (hcc)  Arthritis of carpometa

## 2020-01-24 NOTE — OPERATIVE REPORT
[facility]    PATIENT'S NAME: Lencho Nuñez   ATTENDING PHYSICIAN: Megan Aguiar MD   OPERATING PHYSICIAN: Megan Aguiar MD   PATIENT ACCOUNT#:   [id number]    LOCATION:  [facility] [address] PACU [address] [place] [address]  MEDICAL RECORD #:   [id number] Marcaine in a 1:1 mixture at the planned sites of the portal incisions. The patient had a tourniquet inflated to 250 mmHg following Esmarch exsanguination. A diagnostic arthroscopy proceeded through the 3-4 and 4-5 portals.   It revealed evidence of synov

## 2020-01-24 NOTE — PROGRESS NOTES
Stephanie Lediy Patient Status:  Hospital Outpatient Surgery    1965 MRN [id number]   Location [address] [facility] Attending Yolanda Rodriguez MD   [facility] Day # 0 PCP BALA JOHNSTON [facility]     Patient is unable to remove jewelry from ri

## 2020-01-24 NOTE — ANESTHESIA PREPROCEDURE EVALUATION
Anesthesia PreOp Note    HPI:     Paz Reynoso is a 49year old female who presents for preoperative consultation requested by: Keily Muller MD    Date of Surgery: 1/24/2020    Procedure(s):  WRIST ARTHROSCOPY  HAND SYNOVECTOMY  Indication: Rheumat carpometacarpal joint of left thumb, unspecified osteoarthritis type         Date Noted: 07/08/2015      Rheumatoid arthritis [facility])         Date Noted: 06/11/2014      Osteoarthrosis, unspecified whether generalized or localized, pelvic region and thigh • Renal disorder     Chronic Kidney failure-stage 3    • Rheumatoid arthritis ([facility]    • Seizure disorder (HCC)     had x 1 year and went away/unknown etiology/ age 56   • Sjogren's disease ([facility]    • Visual impairment     wears glasses       Past Surgic [facility], [facility]       AIMOVIG 140 MG/ML Subcutaneous Solution Auto-injector, ADMINISTER 1 ML(140 MG) UNDER THE SKIN 1 TIME FOR 1 DOSE (Patient taking differently: Inject 140 mg into the skin every 30 (thirty) days.  ), Disp: 1 mL, Rfl: 5, 1/2/2020  GA , Rfl: , 1/23/2020 at Unknown time  cyanocobalamin (VITAMIN B12) 1000 MCG/ML Injection Solution, Inject 1,000 mcg into the muscle every 30 (thirty) days.   , Disp: , Rfl: , 1/23/2020 at Unknown time  Esomeprazole Magnesium (NEXIUM) 40 MG Oral Capsule [place] [Metoclopram*    OTHER (SEE COMMENTS)    Comment:Restless legs, skin crawling  Pcn [Bicillin L-A]      RASH    Comment:ampicillin    Family History   Problem Relation Age of Onset   • Hypertension Father    • Prostate Cancer Father    • High Cholesterol Fa HGB 12.6 12/06/2019    HCT 36.4 12/06/2019    MCV 91.2 12/06/2019    MCH 31.6 12/06/2019    MCHC 34.6 12/06/2019    RDW 13.1 12/06/2019    .0 12/06/2019     Lab Results   Component Value Date     12/06/2019    K 3.8 12/06/2019     12/06/

## 2020-01-24 NOTE — BRIEF OP NOTE
Pre-Operative Diagnosis: Rheumatoid arthritis involving both wrists, unspecified rheumatoid factor presence (HCC) [M06.9]  Left wrist pain [M25.532]     Post-Operative Diagnosis: Rheumatoid arthritis involving both wrists, unspecified rheumatoid factor pre

## 2020-01-25 NOTE — H&P
Arreola Batch  155/1965  47year old   female  Rehan Mcqueen MD     HPI:   Patient presents with:  Wrist Pain: left wrist pain with bending/carrying. No injury. Finger Pain: Right thumb pain.         The patient is right-handed.   Date of injury/ o TIMES DAILY 270 capsule 0   • Methotrexate Sodium, PF, 50 MG/2ML Injection Solution Inject 0.5 mL as directed every 7 days.  multiuse vials 6 mL 0   • rOPINIRole HCl 0.25 MG Oral Tab 1 tab nightly but can use an extra tab if needed 60 tablet 5   • Ondansetr Take 150 mg by mouth daily.             HISTORY:       Past Medical History:   Diagnosis Date   • Anesthesia complication     • Arrhythmia       \"extra beat\"   • Back problem     • Calculus of kidney     • Connective tissue disease, undifferentiated ([facility] MAIN OR   • LUMBAR MICRODISCECTOMY 1 LEVEL Right 3/15/2016     Performed by Navdeep Barahona MD at [facility] MAIN OR   • OTHER   11/2015     reconstructive surgery of left thumb joint   • OTHER   8/2015     vaginal sling   • OTHER SURGICAL HISTORY         pa soft.  The patient's upper extremities are warm and pink with brisk capillary refill and 2+ radial pulses. Sensation is intact to light touch in axillary, median, ulnar, and radial nerve distributions.   The patient has 5/5 strength in finger flexion, fing today reveals rheumatoid arthritis and synovitis     ASSESSMENT AND PLAN:   Bilateral wrist pain  (primary encounter diagnosis)  Rheumatoid arthritis involving both wrists, unspecified rheumatoid factor presence (hcc)  Arthritis of carpometacarpal ([facility]) cody

## 2020-02-07 ASSESSMENT — ENCOUNTER SYMPTOMS
ALLERGIC/IMMUNOLOGIC NEGATIVE: 1
CONSTITUTIONAL NEGATIVE: 1
ENDOCRINE NEGATIVE: 1
SHORTNESS OF BREATH: 1
HEMATOLOGIC/LYMPHATIC NEGATIVE: 1
EYES NEGATIVE: 1
GASTROINTESTINAL NEGATIVE: 1
PSYCHIATRIC NEGATIVE: 1
NEUROLOGICAL NEGATIVE: 1

## 2020-02-18 ENCOUNTER — APPOINTMENT (OUTPATIENT)
Dept: CARDIOLOGY | Age: 55
End: 2020-02-18

## 2020-02-29 ASSESSMENT — ENCOUNTER SYMPTOMS
EYES NEGATIVE: 1
ENDOCRINE NEGATIVE: 1
ALLERGIC/IMMUNOLOGIC NEGATIVE: 1
PSYCHIATRIC NEGATIVE: 1
HEMATOLOGIC/LYMPHATIC NEGATIVE: 1
SHORTNESS OF BREATH: 1
GASTROINTESTINAL NEGATIVE: 1
CONSTITUTIONAL NEGATIVE: 1
NEUROLOGICAL NEGATIVE: 1

## 2020-03-04 ENCOUNTER — APPOINTMENT (OUTPATIENT)
Dept: CARDIOLOGY | Age: 55
End: 2020-03-04

## 2020-03-05 ENCOUNTER — APPOINTMENT (OUTPATIENT)
Dept: HEMATOLOGY/ONCOLOGY | Facility: HOSPITAL | Age: 55
End: 2020-03-05
Attending: INTERNAL MEDICINE
Payer: COMMERCIAL

## 2020-03-06 ENCOUNTER — APPOINTMENT (OUTPATIENT)
Dept: HEMATOLOGY/ONCOLOGY | Facility: HOSPITAL | Age: 55
End: 2020-03-06
Attending: INTERNAL MEDICINE
Payer: COMMERCIAL

## 2020-03-06 ENCOUNTER — TELEPHONE (OUTPATIENT)
Dept: HEMATOLOGY/ONCOLOGY | Facility: HOSPITAL | Age: 55
End: 2020-03-06

## 2020-03-06 NOTE — TELEPHONE ENCOUNTER
Delmy Heath called to reschedule her lab because she woke up with chest pain, a sore throat, and a cough.  Please call

## 2020-03-10 ENCOUNTER — APPOINTMENT (OUTPATIENT)
Dept: HEMATOLOGY/ONCOLOGY | Facility: HOSPITAL | Age: 55
End: 2020-03-10
Attending: INTERNAL MEDICINE
Payer: COMMERCIAL

## 2020-03-13 ENCOUNTER — NURSE ONLY (OUTPATIENT)
Dept: HEMATOLOGY/ONCOLOGY | Facility: HOSPITAL | Age: 55
End: 2020-03-13
Attending: INTERNAL MEDICINE
Payer: COMMERCIAL

## 2020-03-13 ENCOUNTER — HOSPITAL ENCOUNTER (EMERGENCY)
Facility: HOSPITAL | Age: 55
Discharge: HOME | End: 2020-03-13
Attending: EMERGENCY MEDICINE
Payer: COMMERCIAL

## 2020-03-13 ENCOUNTER — APPOINTMENT (OUTPATIENT)
Dept: GENERAL RADIOLOGY | Facility: HOSPITAL | Age: 55
End: 2020-03-13
Attending: PHYSICIAN ASSISTANT
Payer: COMMERCIAL

## 2020-03-13 VITALS
HEIGHT: 65 IN | OXYGEN SATURATION: 99 % | RESPIRATION RATE: 18 BRPM | DIASTOLIC BLOOD PRESSURE: 88 MMHG | WEIGHT: 164 LBS | TEMPERATURE: 98 F | SYSTOLIC BLOOD PRESSURE: 126 MMHG | BODY MASS INDEX: 27.32 KG/M2 | HEART RATE: 61 BPM

## 2020-03-13 DIAGNOSIS — J40 BRONCHITIS: Primary | ICD-10-CM

## 2020-03-13 DIAGNOSIS — D50.0 IRON DEFICIENCY ANEMIA DUE TO CHRONIC BLOOD LOSS: ICD-10-CM

## 2020-03-13 DIAGNOSIS — M06.00 SERONEGATIVE RHEUMATOID ARTHRITIS (HCC): ICD-10-CM

## 2020-03-13 DIAGNOSIS — Z79.899 ENCOUNTER FOR LONG-TERM (CURRENT) USE OF HIGH-RISK MEDICATION: ICD-10-CM

## 2020-03-13 LAB
ALBUMIN SERPL-MCNC: 4.1 G/DL (ref 3.4–5)
ALP LIVER SERPL-CCNC: 53 U/L (ref 41–108)
ALT SERPL-CCNC: 26 U/L (ref 13–56)
AST SERPL-CCNC: 26 U/L (ref 15–37)
BASOPHILS # BLD AUTO: 0.07 X10(3) UL (ref 0–0.2)
BASOPHILS NFR BLD AUTO: 1.4 %
BILIRUB DIRECT SERPL-MCNC: 0.2 MG/DL (ref 0–0.2)
BILIRUB SERPL-MCNC: 0.4 MG/DL (ref 0.1–2)
BUN BLD-MCNC: 24 MG/DL (ref 7–18)
CREAT BLD-MCNC: 1.53 MG/DL (ref 0.55–1.02)
CRP SERPL-MCNC: <0.29 MG/DL (ref ?–0.3)
DEPRECATED HBV CORE AB SER IA-ACNC: 119.6 NG/ML (ref 18–340)
DEPRECATED RDW RBC AUTO: 43.1 FL (ref 35.1–46.3)
EOSINOPHIL # BLD AUTO: 0.48 X10(3) UL (ref 0–0.7)
EOSINOPHIL NFR BLD AUTO: 9.7 %
ERYTHROCYTE [DISTWIDTH] IN BLOOD BY AUTOMATED COUNT: 13.2 % (ref 11–15)
HCT VFR BLD AUTO: 40.8 % (ref 35–48)
HGB BLD-MCNC: 14.1 G/DL (ref 12–16)
IMM GRANULOCYTES # BLD AUTO: 0.03 X10(3) UL (ref 0–1)
IMM GRANULOCYTES NFR BLD: 0.6 %
IRON SATURATION: 20 % (ref 15–50)
IRON SERPL-MCNC: 95 UG/DL (ref 50–170)
LYMPHOCYTES # BLD AUTO: 1.31 X10(3) UL (ref 1–4)
LYMPHOCYTES NFR BLD AUTO: 26.4 %
M PROTEIN MFR SERPL ELPH: 7.8 G/DL (ref 6.4–8.2)
MCH RBC QN AUTO: 31.1 PG (ref 26–34)
MCHC RBC AUTO-ENTMCNC: 34.6 G/DL (ref 31–37)
MCV RBC AUTO: 89.9 FL (ref 80–100)
MONOCYTES # BLD AUTO: 0.46 X10(3) UL (ref 0.1–1)
MONOCYTES NFR BLD AUTO: 9.3 %
NEUTROPHILS # BLD AUTO: 2.62 X10 (3) UL (ref 1.5–7.7)
NEUTROPHILS # BLD AUTO: 2.62 X10(3) UL (ref 1.5–7.7)
NEUTROPHILS NFR BLD AUTO: 52.6 %
PLATELET # BLD AUTO: 229 10(3)UL (ref 150–450)
PROCALCITONIN SERPL-MCNC: <0.05 NG/ML (ref ?–0.16)
RBC # BLD AUTO: 4.54 X10(6)UL (ref 3.8–5.3)
SED RATE-ML: 12 MM/HR (ref 0–25)
TOTAL IRON BINDING CAPACITY: 483 UG/DL (ref 240–450)
TRANSFERRIN SERPL-MCNC: 324 MG/DL (ref 200–360)
WBC # BLD AUTO: 5 X10(3) UL (ref 4–11)

## 2020-03-13 PROCEDURE — 71046 X-RAY EXAM CHEST 2 VIEWS: CPT | Performed by: PHYSICIAN ASSISTANT

## 2020-03-13 PROCEDURE — 36591 DRAW BLOOD OFF VENOUS DEVICE: CPT

## 2020-03-13 PROCEDURE — 99285 EMERGENCY DEPT VISIT HI MDM: CPT

## 2020-03-13 PROCEDURE — 80076 HEPATIC FUNCTION PANEL: CPT

## 2020-03-13 PROCEDURE — 84520 ASSAY OF UREA NITROGEN: CPT

## 2020-03-13 PROCEDURE — 82728 ASSAY OF FERRITIN: CPT

## 2020-03-13 PROCEDURE — 99284 EMERGENCY DEPT VISIT MOD MDM: CPT

## 2020-03-13 PROCEDURE — 36415 COLL VENOUS BLD VENIPUNCTURE: CPT

## 2020-03-13 PROCEDURE — 83550 IRON BINDING TEST: CPT

## 2020-03-13 PROCEDURE — 93010 ELECTROCARDIOGRAM REPORT: CPT

## 2020-03-13 PROCEDURE — 86140 C-REACTIVE PROTEIN: CPT

## 2020-03-13 PROCEDURE — 82565 ASSAY OF CREATININE: CPT

## 2020-03-13 PROCEDURE — 83540 ASSAY OF IRON: CPT

## 2020-03-13 PROCEDURE — 84145 PROCALCITONIN (PCT): CPT | Performed by: EMERGENCY MEDICINE

## 2020-03-13 PROCEDURE — 85025 COMPLETE CBC W/AUTO DIFF WBC: CPT

## 2020-03-13 PROCEDURE — 93005 ELECTROCARDIOGRAM TRACING: CPT

## 2020-03-13 PROCEDURE — 85652 RBC SED RATE AUTOMATED: CPT

## 2020-03-13 RX ORDER — DEXTROMETHORPHAN HYDROBROMIDE AND PROMETHAZINE HYDROCHLORIDE 15; 6.25 MG/5ML; MG/5ML
5 SYRUP ORAL 4 TIMES DAILY PRN
Qty: 100 ML | Refills: 0 | Status: SHIPPED | OUTPATIENT
Start: 2020-03-13 | End: 2020-03-18

## 2020-03-13 NOTE — ED INITIAL ASSESSMENT (HPI)
48 yo F complaining of productive cough \"yellow phlegm\", nasal congestion,headache, sorethroat, malaise, fever x 1 week, reports symptoms are improving but she is feeling worst. She had influenza A last January and developed pneumonia.  Denies any recent

## 2020-03-13 NOTE — ED PROVIDER NOTES
Patient Seen in: [facility] Emergency Department      History   Patient presents with:  Dyspnea HAMILTON SOB    Stated Complaint: Flu A in January admiteed for pneumonia, feeling the same.  Cough, fever, body a*    HPI    CHIEF COMPLAINT: Cough, congestio rashes  Neurological: no headache    Otherwise a complete review of systems was obtained and other than the HPI was negative     The patient's medication list, past medical history and social history elements is as listed in today's nurse's notes are revie CHEEK MASS N/A 12/1/2009    Performed by Daysi Chan MD at [address] [place] [address]   • HAND SYNOVECTOMY Left 1/24/2020    Performed by Mercedes Bull MD at [facility] OR   • HERNIA SURGERY     • HIP REPLACEMENT SURGERY  1/14    right: Dr. Olvera Sat Cough, fever, body a*  Other systems are as noted in HPI. Constitutional and vital signs reviewed. All other systems reviewed and negative except as noted above.     Physical Exam     ED Triage Vitals [03/13/20 1545]   BP (!) 129/91   Pulse 78   Resp 95, patient's ferritin was 119, which is in normal range. Patient C-reactive protein was less than 0.29. CBC revealed a unremarkable CBC with a white blood count count of 5, hemoglobin of 14, normal platelets. No shift.     Xr Chest Pa + Lat Chest (cpt=7 states that she is used that medication in the past when taking her Zoloft and it does not cause any reactions. MDM     I discussed the radiology and laboratory results with the patient.  I discussed the diagnosis and need for followup with their PCP for

## 2020-03-13 NOTE — PROGRESS NOTES
Patient states she has not been feeling well for about 6 days. She has a cough. She states she has not had a fever today. She does have some ear pain as well. Standing order labs drawn.  She was encouraged to call her primary care physician so she could be

## 2020-03-14 ASSESSMENT — ENCOUNTER SYMPTOMS
HEMATOLOGIC/LYMPHATIC NEGATIVE: 1
ALLERGIC/IMMUNOLOGIC NEGATIVE: 1
GASTROINTESTINAL NEGATIVE: 1
SHORTNESS OF BREATH: 1
ENDOCRINE NEGATIVE: 1
PSYCHIATRIC NEGATIVE: 1
NEUROLOGICAL NEGATIVE: 1
CONSTITUTIONAL NEGATIVE: 1
EYES NEGATIVE: 1

## 2020-03-16 LAB
ATRIAL RATE: 75 BPM
P AXIS: 63 DEGREES
P-R INTERVAL: 176 MS
Q-T INTERVAL: 414 MS
QRS DURATION: 114 MS
QTC CALCULATION (BEZET): 462 MS
R AXIS: -24 DEGREES
T AXIS: 65 DEGREES
VENTRICULAR RATE: 75 BPM

## 2020-03-17 ENCOUNTER — APPOINTMENT (OUTPATIENT)
Dept: CARDIOLOGY | Age: 55
End: 2020-03-17

## 2020-03-19 ENCOUNTER — TELEPHONE (OUTPATIENT)
Dept: CARDIOLOGY | Age: 55
End: 2020-03-19

## 2020-03-24 ENCOUNTER — OFFICE VISIT (OUTPATIENT)
Dept: CARDIOLOGY | Age: 55
End: 2020-03-24

## 2020-03-24 VITALS — TEMPERATURE: 98.4 F | BODY MASS INDEX: 27.32 KG/M2 | HEIGHT: 65 IN | WEIGHT: 164 LBS

## 2020-03-24 DIAGNOSIS — I47.19 ATRIAL TACHYCARDIA: Primary | ICD-10-CM

## 2020-03-24 DIAGNOSIS — I10 ESSENTIAL HYPERTENSION: ICD-10-CM

## 2020-03-24 PROCEDURE — 99214 OFFICE O/P EST MOD 30 MIN: CPT | Performed by: INTERNAL MEDICINE

## 2020-03-24 RX ORDER — VALACYCLOVIR HYDROCHLORIDE 500 MG/1
TABLET, FILM COATED ORAL
Qty: 180 TABLET | OUTPATIENT
Start: 2020-03-24

## 2020-03-24 ASSESSMENT — ENCOUNTER SYMPTOMS
COUGH: 1
GASTROINTESTINAL NEGATIVE: 1
HEMATOLOGIC/LYMPHATIC NEGATIVE: 1
EYES NEGATIVE: 1
CONSTITUTIONAL NEGATIVE: 1
SHORTNESS OF BREATH: 1
NEUROLOGICAL NEGATIVE: 1
ALLERGIC/IMMUNOLOGIC NEGATIVE: 1
ENDOCRINE NEGATIVE: 1
PSYCHIATRIC NEGATIVE: 1

## 2020-04-07 ENCOUNTER — TELEPHONE (OUTPATIENT)
Dept: HEMATOLOGY/ONCOLOGY | Facility: HOSPITAL | Age: 55
End: 2020-04-07

## 2020-04-07 NOTE — TELEPHONE ENCOUNTER
----- Message from Zaina Merchant sent at 4/6/2020  3:21 PM CDT -----  Jerry Lopez called concerned about coming in for her lab appointment that's set for 4/17 and was curious if the labs being ordered were needed at this time or if she can push her appointmen

## 2020-04-07 NOTE — TELEPHONE ENCOUNTER
Pt notified per Dr. Samina Clark that she should have port flushed and labs drawn as scheduled. Last done 3/13 and will be due in June. Pt states she usually has PAC flushed every month.  Pt will cancel next weeks appointment and will push out to next month when

## 2020-04-08 ENCOUNTER — TELEPHONE (OUTPATIENT)
Dept: HEMATOLOGY/ONCOLOGY | Facility: HOSPITAL | Age: 55
End: 2020-04-08

## 2020-04-08 NOTE — TELEPHONE ENCOUNTER
----- Message from Amelia Méndez RN sent at 4/7/2020  4:33 PM CDT -----  Please call pt tomorrow- she is wanting to schedule CLL for next month.    Thanks, savage

## 2020-04-17 ENCOUNTER — APPOINTMENT (OUTPATIENT)
Dept: HEMATOLOGY/ONCOLOGY | Facility: HOSPITAL | Age: 55
End: 2020-04-17
Attending: INTERNAL MEDICINE
Payer: COMMERCIAL

## 2020-05-04 ENCOUNTER — DOCUMENTATION ONLY (OUTPATIENT)
Dept: HEMATOLOGY/ONCOLOGY | Facility: HOSPITAL | Age: 55
End: 2020-05-04

## 2020-05-04 DIAGNOSIS — D50.0 IRON DEFICIENCY ANEMIA DUE TO CHRONIC BLOOD LOSS: Primary | ICD-10-CM

## 2020-05-04 DIAGNOSIS — D68.59 HYPERCOAGULABLE STATE (HCC): ICD-10-CM

## 2020-05-04 RX ORDER — VALACYCLOVIR HYDROCHLORIDE 500 MG/1
TABLET, FILM COATED ORAL
Qty: 180 TABLET | OUTPATIENT
Start: 2020-05-04

## 2020-05-04 RX ORDER — CYANOCOBALAMIN 1000 UG/ML
1000 INJECTION INTRAMUSCULAR; SUBCUTANEOUS
Qty: 1 ML | Refills: 3 | Status: SHIPPED | OUTPATIENT
Start: 2020-05-04 | End: 2020-07-27

## 2020-05-04 RX ORDER — FOLIC ACID 1 MG/1
6 TABLET ORAL DAILY
Qty: 180 TABLET | Refills: 3 | Status: SHIPPED | OUTPATIENT
Start: 2020-05-04 | End: 2021-03-12

## 2020-05-04 RX ORDER — SYRINGE, DISPOSABLE, 1 ML
SYRINGE, EMPTY DISPOSABLE MISCELLANEOUS
Qty: 12 EACH | Refills: 0 | Status: SHIPPED | OUTPATIENT
Start: 2020-05-04 | End: 2021-08-07 | Stop reason: CLARIF

## 2020-05-04 NOTE — PROGRESS NOTES
Per Dr. Sarah Weber re: b12 inj and syringes, folic acid and Xarelto RX:  Fine to order     Sent to mail order pharm as requested.

## 2020-05-08 ENCOUNTER — TELEPHONE (OUTPATIENT)
Dept: CARDIOLOGY | Age: 55
End: 2020-05-08

## 2020-05-08 DIAGNOSIS — D68.59 HYPERCOAGULABLE STATE (HCC): ICD-10-CM

## 2020-05-08 DIAGNOSIS — D50.0 IRON DEFICIENCY ANEMIA DUE TO CHRONIC BLOOD LOSS: ICD-10-CM

## 2020-05-08 RX ORDER — VALACYCLOVIR HYDROCHLORIDE 500 MG/1
TABLET, FILM COATED ORAL
Qty: 14 TABLET | Refills: 0 | Status: SHIPPED | OUTPATIENT
Start: 2020-05-08 | End: 2020-05-12 | Stop reason: SDUPTHER

## 2020-05-08 RX ORDER — RIVAROXABAN 20 MG/1
TABLET, FILM COATED ORAL
Qty: 90 TABLET | Refills: 0 | Status: SHIPPED | OUTPATIENT
Start: 2020-05-08 | End: 2020-07-21

## 2020-05-12 RX ORDER — VALACYCLOVIR HYDROCHLORIDE 500 MG/1
500 TABLET, FILM COATED ORAL 2 TIMES DAILY
Qty: 180 TABLET | Refills: 2 | Status: SHIPPED | OUTPATIENT
Start: 2020-05-12 | End: 2021-05-21

## 2020-05-27 ENCOUNTER — NURSE ONLY (OUTPATIENT)
Dept: HEMATOLOGY/ONCOLOGY | Facility: HOSPITAL | Age: 55
End: 2020-05-27
Attending: INTERNAL MEDICINE
Payer: COMMERCIAL

## 2020-05-27 ENCOUNTER — TELEPHONE (OUTPATIENT)
Dept: HEMATOLOGY/ONCOLOGY | Facility: HOSPITAL | Age: 55
End: 2020-05-27

## 2020-05-27 DIAGNOSIS — Z79.899 ENCOUNTER FOR LONG-TERM (CURRENT) USE OF HIGH-RISK MEDICATION: ICD-10-CM

## 2020-05-27 DIAGNOSIS — D50.0 IRON DEFICIENCY ANEMIA DUE TO CHRONIC BLOOD LOSS: Primary | ICD-10-CM

## 2020-05-27 DIAGNOSIS — M06.00 SERONEGATIVE RHEUMATOID ARTHRITIS (HCC): ICD-10-CM

## 2020-05-27 DIAGNOSIS — N18.30 CHRONIC KIDNEY DISEASE, STAGE III (MODERATE) (HCC): Primary | ICD-10-CM

## 2020-05-27 DIAGNOSIS — D50.0 IRON DEFICIENCY ANEMIA DUE TO CHRONIC BLOOD LOSS: ICD-10-CM

## 2020-05-27 PROCEDURE — 80061 LIPID PANEL: CPT

## 2020-05-27 PROCEDURE — 86480 TB TEST CELL IMMUN MEASURE: CPT

## 2020-05-27 PROCEDURE — 83540 ASSAY OF IRON: CPT

## 2020-05-27 PROCEDURE — 85025 COMPLETE CBC W/AUTO DIFF WBC: CPT

## 2020-05-27 PROCEDURE — 36591 DRAW BLOOD OFF VENOUS DEVICE: CPT

## 2020-05-27 PROCEDURE — 82570 ASSAY OF URINE CREATININE: CPT

## 2020-05-27 PROCEDURE — 80076 HEPATIC FUNCTION PANEL: CPT

## 2020-05-27 PROCEDURE — 84156 ASSAY OF PROTEIN URINE: CPT

## 2020-05-27 PROCEDURE — 82728 ASSAY OF FERRITIN: CPT

## 2020-05-27 PROCEDURE — 86140 C-REACTIVE PROTEIN: CPT

## 2020-05-27 PROCEDURE — 84520 ASSAY OF UREA NITROGEN: CPT

## 2020-05-27 PROCEDURE — 82565 ASSAY OF CREATININE: CPT

## 2020-05-27 PROCEDURE — 85652 RBC SED RATE AUTOMATED: CPT

## 2020-05-27 PROCEDURE — 81001 URINALYSIS AUTO W/SCOPE: CPT

## 2020-05-27 PROCEDURE — 83550 IRON BINDING TEST: CPT

## 2020-05-27 NOTE — TELEPHONE ENCOUNTER
Test(s) completed: CBC, iron studies   Results: per Dr Richelle Isabel \"Counts are ok - iron levels are drifting down but not there yet - recheck CBC, Iron studies in 2 months.  Reacted to venofer and maybe to ferraheme.  May try injectafer in the future. \"  Plan:

## 2020-05-28 ENCOUNTER — NURSE ONLY (OUTPATIENT)
Dept: HEMATOLOGY/ONCOLOGY | Facility: HOSPITAL | Age: 55
End: 2020-05-28

## 2020-05-28 NOTE — PROGRESS NOTES
[employer] message sent to patient. Ayana Shoulders labs are stable. LDL cholesterol is slightly higher than in the past - please discuss w/ your doctor who manages your cholesterol. Inflammation markers are normal.   Continue current treatment plan.   Fo

## 2020-05-29 NOTE — PROGRESS NOTES
[employer] message sent to patient. TB test is negative.   Dr. Elias Pelayo    Pt reviewed prior result note

## 2020-07-21 DIAGNOSIS — D50.0 IRON DEFICIENCY ANEMIA DUE TO CHRONIC BLOOD LOSS: ICD-10-CM

## 2020-07-21 DIAGNOSIS — D68.59 HYPERCOAGULABLE STATE (HCC): ICD-10-CM

## 2020-07-21 RX ORDER — RIVAROXABAN 20 MG/1
TABLET, FILM COATED ORAL
Qty: 90 TABLET | Refills: 2 | Status: SHIPPED | OUTPATIENT
Start: 2020-07-21 | End: 2021-03-15

## 2020-07-27 ENCOUNTER — HOSPITAL ENCOUNTER (OUTPATIENT)
Dept: CV DIAGNOSTICS | Facility: HOSPITAL | Age: 55
Discharge: HOME | End: 2020-07-27
Attending: INTERNAL MEDICINE
Payer: COMMERCIAL

## 2020-07-27 DIAGNOSIS — R00.2 PALPITATIONS: ICD-10-CM

## 2020-07-27 DIAGNOSIS — D50.0 IRON DEFICIENCY ANEMIA DUE TO CHRONIC BLOOD LOSS: ICD-10-CM

## 2020-07-27 DIAGNOSIS — D68.59 HYPERCOAGULABLE STATE (HCC): ICD-10-CM

## 2020-07-27 PROCEDURE — 93306 TTE W/DOPPLER COMPLETE: CPT | Performed by: INTERNAL MEDICINE

## 2020-07-27 RX ORDER — CYANOCOBALAMIN 1000 UG/ML
INJECTION INTRAMUSCULAR; SUBCUTANEOUS
Qty: 3 ML | Refills: 0 | Status: SHIPPED | OUTPATIENT
Start: 2020-07-27 | End: 2020-12-14

## 2020-07-28 DIAGNOSIS — R00.2 PALPITATIONS: ICD-10-CM

## 2020-07-30 ENCOUNTER — NURSE ONLY (OUTPATIENT)
Dept: HEMATOLOGY/ONCOLOGY | Facility: HOSPITAL | Age: 55
End: 2020-07-30
Attending: INTERNAL MEDICINE
Payer: COMMERCIAL

## 2020-07-30 DIAGNOSIS — Z79.899 ENCOUNTER FOR LONG-TERM (CURRENT) USE OF HIGH-RISK MEDICATION: ICD-10-CM

## 2020-07-30 DIAGNOSIS — M06.9 RHEUMATOID ARTHRITIS FLARE (HCC): ICD-10-CM

## 2020-07-30 DIAGNOSIS — D50.0 IRON DEFICIENCY ANEMIA DUE TO CHRONIC BLOOD LOSS: Primary | ICD-10-CM

## 2020-07-30 DIAGNOSIS — M06.00 SERONEGATIVE RHEUMATOID ARTHRITIS (HCC): ICD-10-CM

## 2020-07-30 LAB
ALBUMIN SERPL-MCNC: 4.1 G/DL (ref 3.4–5)
ALP LIVER SERPL-CCNC: 61 U/L (ref 41–108)
ALT SERPL-CCNC: 28 U/L (ref 13–56)
AST SERPL-CCNC: 26 U/L (ref 15–37)
BASOPHILS # BLD AUTO: 0.03 X10(3) UL (ref 0–0.2)
BASOPHILS NFR BLD AUTO: 0.8 %
BILIRUB DIRECT SERPL-MCNC: 0.2 MG/DL (ref 0–0.2)
BILIRUB SERPL-MCNC: 0.6 MG/DL (ref 0.1–2)
BUN BLD-MCNC: 21 MG/DL (ref 7–18)
CHOLEST SMN-MCNC: 209 MG/DL (ref ?–200)
CREAT BLD-MCNC: 1.24 MG/DL (ref 0.55–1.02)
CRP SERPL-MCNC: <0.29 MG/DL (ref ?–0.3)
DEPRECATED HBV CORE AB SER IA-ACNC: 117.1 NG/ML (ref 18–340)
DEPRECATED RDW RBC AUTO: 47.5 FL (ref 35.1–46.3)
EOSINOPHIL # BLD AUTO: 0.07 X10(3) UL (ref 0–0.7)
EOSINOPHIL NFR BLD AUTO: 2 %
ERYTHROCYTE [DISTWIDTH] IN BLOOD BY AUTOMATED COUNT: 14.1 % (ref 11–15)
HCT VFR BLD AUTO: 38.9 % (ref 35–48)
HDLC SERPL-MCNC: 68 MG/DL (ref 40–59)
HGB BLD-MCNC: 13.2 G/DL (ref 12–16)
IMM GRANULOCYTES # BLD AUTO: 0 X10(3) UL (ref 0–1)
IMM GRANULOCYTES NFR BLD: 0 %
IRON SATURATION: 22 % (ref 15–50)
IRON SERPL-MCNC: 119 UG/DL (ref 50–170)
LDLC SERPL CALC-MCNC: 123 MG/DL (ref ?–100)
LYMPHOCYTES # BLD AUTO: 1.2 X10(3) UL (ref 1–4)
LYMPHOCYTES NFR BLD AUTO: 33.7 %
M PROTEIN MFR SERPL ELPH: 7 G/DL (ref 6.4–8.2)
MCH RBC QN AUTO: 31 PG (ref 26–34)
MCHC RBC AUTO-ENTMCNC: 33.9 G/DL (ref 31–37)
MCV RBC AUTO: 91.3 FL (ref 80–100)
MONOCYTES # BLD AUTO: 0.32 X10(3) UL (ref 0.1–1)
MONOCYTES NFR BLD AUTO: 9 %
NEUTROPHILS # BLD AUTO: 1.94 X10 (3) UL (ref 1.5–7.7)
NEUTROPHILS # BLD AUTO: 1.94 X10(3) UL (ref 1.5–7.7)
NEUTROPHILS NFR BLD AUTO: 54.5 %
NONHDLC SERPL-MCNC: 141 MG/DL (ref ?–130)
PATIENT FASTING Y/N/NP: YES
PLATELET # BLD AUTO: 169 10(3)UL (ref 150–450)
RBC # BLD AUTO: 4.26 X10(6)UL (ref 3.8–5.3)
SED RATE-ML: 12 MM/HR (ref 0–25)
TOTAL IRON BINDING CAPACITY: 547 UG/DL (ref 240–450)
TRANSFERRIN SERPL-MCNC: 367 MG/DL (ref 200–360)
TRIGL SERPL-MCNC: 92 MG/DL (ref 30–149)
VLDLC SERPL CALC-MCNC: 18 MG/DL (ref 0–30)
WBC # BLD AUTO: 3.6 X10(3) UL (ref 4–11)

## 2020-07-30 PROCEDURE — 85652 RBC SED RATE AUTOMATED: CPT

## 2020-07-30 PROCEDURE — 82565 ASSAY OF CREATININE: CPT

## 2020-07-30 PROCEDURE — 85025 COMPLETE CBC W/AUTO DIFF WBC: CPT

## 2020-07-30 PROCEDURE — 83550 IRON BINDING TEST: CPT

## 2020-07-30 PROCEDURE — 36591 DRAW BLOOD OFF VENOUS DEVICE: CPT

## 2020-07-30 PROCEDURE — 84520 ASSAY OF UREA NITROGEN: CPT

## 2020-07-30 PROCEDURE — 83540 ASSAY OF IRON: CPT

## 2020-07-30 PROCEDURE — 80076 HEPATIC FUNCTION PANEL: CPT

## 2020-07-30 PROCEDURE — 86140 C-REACTIVE PROTEIN: CPT

## 2020-07-30 PROCEDURE — 82728 ASSAY OF FERRITIN: CPT

## 2020-07-30 PROCEDURE — 80061 LIPID PANEL: CPT

## 2020-07-30 RX ORDER — SODIUM CHLORIDE 0.9 % (FLUSH) 0.9 %
10 SYRINGE (ML) INJECTION ONCE
Status: COMPLETED | OUTPATIENT
Start: 2020-07-30 | End: 2020-07-30

## 2020-07-30 RX ORDER — SODIUM CHLORIDE 0.9 % (FLUSH) 0.9 %
10 SYRINGE (ML) INJECTION ONCE
Status: CANCELLED | OUTPATIENT
Start: 2020-07-30

## 2020-07-30 RX ADMIN — SODIUM CHLORIDE 0.9 % (FLUSH) 10 ML: 0.9 % SYRINGE (ML) INJECTION at 13:20:00

## 2020-07-30 NOTE — PROGRESS NOTES
Education Record    Learner:  Patient    Disease / Diagnosis: central line lab draw    Barriers / Limitations:  None   Comments:    Method:  Brief focused   Comments:    General Topics:  Plan of care reviewed   Comments:    Outcome:  Shows understanding

## 2020-07-31 ENCOUNTER — APPOINTMENT (OUTPATIENT)
Dept: HEMATOLOGY/ONCOLOGY | Facility: HOSPITAL | Age: 55
End: 2020-07-31
Attending: INTERNAL MEDICINE
Payer: COMMERCIAL

## 2020-07-31 ENCOUNTER — E-ADVICE (OUTPATIENT)
Dept: CARDIOLOGY | Age: 55
End: 2020-07-31

## 2020-07-31 NOTE — PROGRESS NOTES
[employer] message sent to patient. Debbie Ped show that white blood cell counts are slightly low - please repeat in 2 weeks after increasing actemra dose to ensure it is not getting any lower.    Cholesterol is slightly higher than past; please discuss wit

## 2020-08-05 ENCOUNTER — APPOINTMENT (OUTPATIENT)
Dept: CARDIOLOGY | Age: 55
End: 2020-08-05

## 2020-08-20 ENCOUNTER — APPOINTMENT (OUTPATIENT)
Dept: HEMATOLOGY/ONCOLOGY | Facility: HOSPITAL | Age: 55
End: 2020-08-20
Attending: INTERNAL MEDICINE
Payer: COMMERCIAL

## 2020-08-27 ENCOUNTER — NURSE ONLY (OUTPATIENT)
Dept: HEMATOLOGY/ONCOLOGY | Facility: HOSPITAL | Age: 55
End: 2020-08-27
Attending: INTERNAL MEDICINE
Payer: COMMERCIAL

## 2020-08-27 DIAGNOSIS — Z79.899 ENCOUNTER FOR LONG-TERM (CURRENT) USE OF HIGH-RISK MEDICATION: ICD-10-CM

## 2020-08-27 DIAGNOSIS — M06.00 SERONEGATIVE RHEUMATOID ARTHRITIS (HCC): ICD-10-CM

## 2020-08-27 DIAGNOSIS — D72.819 LEUKOPENIA, UNSPECIFIED TYPE: ICD-10-CM

## 2020-08-27 LAB
BASOPHILS # BLD AUTO: 0.04 X10(3) UL (ref 0–0.2)
BASOPHILS NFR BLD AUTO: 1.3 %
DEPRECATED RDW RBC AUTO: 49.7 FL (ref 35.1–46.3)
EOSINOPHIL # BLD AUTO: 0.12 X10(3) UL (ref 0–0.7)
EOSINOPHIL NFR BLD AUTO: 3.8 %
ERYTHROCYTE [DISTWIDTH] IN BLOOD BY AUTOMATED COUNT: 14.1 % (ref 11–15)
HCT VFR BLD AUTO: 37.9 % (ref 35–48)
HGB BLD-MCNC: 12.4 G/DL (ref 12–16)
IMM GRANULOCYTES # BLD AUTO: 0.01 X10(3) UL (ref 0–1)
IMM GRANULOCYTES NFR BLD: 0.3 %
LYMPHOCYTES # BLD AUTO: 1.06 X10(3) UL (ref 1–4)
LYMPHOCYTES NFR BLD AUTO: 33.4 %
MCH RBC QN AUTO: 31.5 PG (ref 26–34)
MCHC RBC AUTO-ENTMCNC: 32.7 G/DL (ref 31–37)
MCV RBC AUTO: 96.2 FL (ref 80–100)
MONOCYTES # BLD AUTO: 0.28 X10(3) UL (ref 0.1–1)
MONOCYTES NFR BLD AUTO: 8.8 %
NEUTROPHILS # BLD AUTO: 1.66 X10 (3) UL (ref 1.5–7.7)
NEUTROPHILS # BLD AUTO: 1.66 X10(3) UL (ref 1.5–7.7)
NEUTROPHILS NFR BLD AUTO: 52.4 %
PLATELET # BLD AUTO: 177 10(3)UL (ref 150–450)
RBC # BLD AUTO: 3.94 X10(6)UL (ref 3.8–5.3)
WBC # BLD AUTO: 3.2 X10(3) UL (ref 4–11)

## 2020-08-27 PROCEDURE — 36591 DRAW BLOOD OFF VENOUS DEVICE: CPT

## 2020-08-27 PROCEDURE — 85025 COMPLETE CBC W/AUTO DIFF WBC: CPT

## 2020-08-30 NOTE — PROGRESS NOTES
[employer] message sent to patient. Drea,  white blood cell counts are still slightly low - still in an acceptable range.    Please repeat cbc lab test in 2 weeks to ensure that it stays in this range  Dr. Babatunde Berger

## 2020-09-02 ENCOUNTER — APPOINTMENT (OUTPATIENT)
Dept: CARDIOLOGY | Age: 55
End: 2020-09-02

## 2020-09-10 ENCOUNTER — APPOINTMENT (OUTPATIENT)
Dept: CARDIOLOGY | Age: 55
End: 2020-09-10

## 2020-09-11 ENCOUNTER — NURSE ONLY (OUTPATIENT)
Dept: HEMATOLOGY/ONCOLOGY | Facility: HOSPITAL | Age: 55
End: 2020-09-11
Attending: INTERNAL MEDICINE
Payer: COMMERCIAL

## 2020-09-11 ENCOUNTER — OFFICE VISIT (OUTPATIENT)
Dept: CARDIOLOGY | Age: 55
End: 2020-09-11

## 2020-09-11 VITALS
HEART RATE: 72 BPM | SYSTOLIC BLOOD PRESSURE: 118 MMHG | WEIGHT: 140 LBS | DIASTOLIC BLOOD PRESSURE: 72 MMHG | HEIGHT: 65 IN | BODY MASS INDEX: 23.32 KG/M2

## 2020-09-11 DIAGNOSIS — Z86.718 HISTORY OF DVT (DEEP VEIN THROMBOSIS): ICD-10-CM

## 2020-09-11 DIAGNOSIS — R00.2 PALPITATIONS: ICD-10-CM

## 2020-09-11 DIAGNOSIS — E78.5 DYSLIPIDEMIA: ICD-10-CM

## 2020-09-11 DIAGNOSIS — N18.30 CHRONIC KIDNEY DISEASE, STAGE III (MODERATE) (CMD): ICD-10-CM

## 2020-09-11 DIAGNOSIS — R94.31 ABNORMAL ELECTROCARDIOGRAM (ECG) (EKG): Primary | ICD-10-CM

## 2020-09-11 DIAGNOSIS — E78.2 MIXED HYPERLIPIDEMIA: ICD-10-CM

## 2020-09-11 DIAGNOSIS — Z79.899 ENCOUNTER FOR LONG-TERM (CURRENT) USE OF HIGH-RISK MEDICATION: ICD-10-CM

## 2020-09-11 DIAGNOSIS — D72.819 LEUKOPENIA, UNSPECIFIED TYPE: ICD-10-CM

## 2020-09-11 DIAGNOSIS — I10 ESSENTIAL HYPERTENSION: ICD-10-CM

## 2020-09-11 DIAGNOSIS — I47.19 ATRIAL TACHYCARDIA: ICD-10-CM

## 2020-09-11 LAB
APTT PPP: 29.5 SECONDS (ref 25.4–36.1)
BASOPHILS # BLD AUTO: 0.03 X10(3) UL (ref 0–0.2)
BASOPHILS NFR BLD AUTO: 0.7 %
BUN BLD-MCNC: 32 MG/DL (ref 7–18)
C3 SERPL-MCNC: 96.2 MG/DL (ref 90–180)
C4 SERPL-MCNC: 10.6 MG/DL (ref 10–40)
CREAT BLD-MCNC: 1.35 MG/DL (ref 0.55–1.02)
CRP SERPL-MCNC: <0.29 MG/DL (ref ?–0.3)
DEPRECATED RDW RBC AUTO: 44.6 FL (ref 35.1–46.3)
EOSINOPHIL # BLD AUTO: 0.09 X10(3) UL (ref 0–0.7)
EOSINOPHIL NFR BLD AUTO: 2.2 %
ERYTHROCYTE [DISTWIDTH] IN BLOOD BY AUTOMATED COUNT: 13.1 % (ref 11–15)
HCT VFR BLD AUTO: 40.1 % (ref 35–48)
HGB BLD-MCNC: 13.5 G/DL (ref 12–16)
IMM GRANULOCYTES # BLD AUTO: 0.01 X10(3) UL (ref 0–1)
IMM GRANULOCYTES NFR BLD: 0.2 %
INR BLD: 1.08 (ref 0.89–1.11)
LYMPHOCYTES # BLD AUTO: 1.24 X10(3) UL (ref 1–4)
LYMPHOCYTES NFR BLD AUTO: 29.7 %
MCH RBC QN AUTO: 31.3 PG (ref 26–34)
MCHC RBC AUTO-ENTMCNC: 33.7 G/DL (ref 31–37)
MCV RBC AUTO: 92.8 FL (ref 80–100)
MONOCYTES # BLD AUTO: 0.35 X10(3) UL (ref 0.1–1)
MONOCYTES NFR BLD AUTO: 8.4 %
NEUTROPHILS # BLD AUTO: 2.45 X10 (3) UL (ref 1.5–7.7)
NEUTROPHILS # BLD AUTO: 2.45 X10(3) UL (ref 1.5–7.7)
NEUTROPHILS NFR BLD AUTO: 58.8 %
PLATELET # BLD AUTO: 176 10(3)UL (ref 150–450)
PSA SERPL DL<=0.01 NG/ML-MCNC: 14.3 SECONDS (ref 12.4–14.6)
RBC # BLD AUTO: 4.32 X10(6)UL (ref 3.8–5.3)
SED RATE-ML: 5 MM/HR (ref 0–25)
WBC # BLD AUTO: 4.2 X10(3) UL (ref 4–11)

## 2020-09-11 PROCEDURE — 36591 DRAW BLOOD OFF VENOUS DEVICE: CPT

## 2020-09-11 PROCEDURE — 85730 THROMBOPLASTIN TIME PARTIAL: CPT

## 2020-09-11 PROCEDURE — 99214 OFFICE O/P EST MOD 30 MIN: CPT | Performed by: INTERNAL MEDICINE

## 2020-09-11 PROCEDURE — 85652 RBC SED RATE AUTOMATED: CPT

## 2020-09-11 PROCEDURE — 82565 ASSAY OF CREATININE: CPT

## 2020-09-11 PROCEDURE — 85610 PROTHROMBIN TIME: CPT

## 2020-09-11 PROCEDURE — 86140 C-REACTIVE PROTEIN: CPT

## 2020-09-11 PROCEDURE — 86160 COMPLEMENT ANTIGEN: CPT

## 2020-09-11 PROCEDURE — 84520 ASSAY OF UREA NITROGEN: CPT

## 2020-09-11 PROCEDURE — 3074F SYST BP LT 130 MM HG: CPT | Performed by: INTERNAL MEDICINE

## 2020-09-11 PROCEDURE — 85025 COMPLETE CBC W/AUTO DIFF WBC: CPT

## 2020-09-11 PROCEDURE — 3078F DIAST BP <80 MM HG: CPT | Performed by: INTERNAL MEDICINE

## 2020-09-11 SDOH — HEALTH STABILITY: MENTAL HEALTH: HOW OFTEN DO YOU HAVE 6 OR MORE DRINKS ON ONE OCCASION?: LESS THAN MONTHLY

## 2020-09-11 SDOH — HEALTH STABILITY: PHYSICAL HEALTH: ON AVERAGE, HOW MANY DAYS PER WEEK DO YOU ENGAGE IN MODERATE TO STRENUOUS EXERCISE (LIKE A BRISK WALK)?: 0 DAYS

## 2020-09-11 SDOH — HEALTH STABILITY: MENTAL HEALTH: HOW MANY STANDARD DRINKS CONTAINING ALCOHOL DO YOU HAVE ON A TYPICAL DAY?: 1 OR 2

## 2020-09-11 SDOH — HEALTH STABILITY: MENTAL HEALTH: HOW OFTEN DO YOU HAVE A DRINK CONTAINING ALCOHOL?: MONTHLY OR LESS

## 2020-09-11 SDOH — HEALTH STABILITY: PHYSICAL HEALTH: ON AVERAGE, HOW MANY MINUTES DO YOU ENGAGE IN EXERCISE AT THIS LEVEL?: 0 MIN

## 2020-09-11 ASSESSMENT — PATIENT HEALTH QUESTIONNAIRE - PHQ9
2. FEELING DOWN, DEPRESSED OR HOPELESS: NOT AT ALL
CLINICAL INTERPRETATION OF PHQ9 SCORE: NO FURTHER SCREENING NEEDED
SUM OF ALL RESPONSES TO PHQ9 QUESTIONS 1 AND 2: 0
SUM OF ALL RESPONSES TO PHQ9 QUESTIONS 1 AND 2: 0
1. LITTLE INTEREST OR PLEASURE IN DOING THINGS: NOT AT ALL
CLINICAL INTERPRETATION OF PHQ2 SCORE: NO FURTHER SCREENING NEEDED

## 2020-09-11 ASSESSMENT — ENCOUNTER SYMPTOMS
SUSPICIOUS LESIONS: 0
COUGH: 0
BRUISES/BLEEDS EASILY: 0
SHORTNESS OF BREATH: 1
ALLERGIC/IMMUNOLOGIC COMMENTS: NO NEW FOOD ALLERGIES
FEVER: 0
WEIGHT LOSS: 0
WEIGHT GAIN: 0
HEMOPTYSIS: 0
HEMATOCHEZIA: 0
CHILLS: 0

## 2020-09-13 NOTE — PROGRESS NOTES
[employer] message sent to patient. Erica Dickey labs are stable. Platelets and coagulation test are normal.   Are you still getting those lesions?    Dr. Wicho Guzman

## 2020-09-16 ENCOUNTER — TELEPHONE (OUTPATIENT)
Dept: OTHER | Age: 55
End: 2020-09-16

## 2020-09-16 ENCOUNTER — TELEPHONE (OUTPATIENT)
Dept: CARDIOLOGY | Age: 55
End: 2020-09-16

## 2020-09-16 DIAGNOSIS — R00.2 PALPITATIONS: ICD-10-CM

## 2020-09-16 DIAGNOSIS — N18.30 CHRONIC KIDNEY DISEASE, STAGE III (MODERATE) (CMD): ICD-10-CM

## 2020-09-16 DIAGNOSIS — I10 ESSENTIAL HYPERTENSION: ICD-10-CM

## 2020-09-16 DIAGNOSIS — E78.2 MIXED HYPERLIPIDEMIA: ICD-10-CM

## 2020-09-16 DIAGNOSIS — Z86.718 HISTORY OF DVT (DEEP VEIN THROMBOSIS): ICD-10-CM

## 2020-09-16 DIAGNOSIS — E78.5 DYSLIPIDEMIA: Primary | ICD-10-CM

## 2020-09-16 DIAGNOSIS — I47.19 ATRIAL TACHYCARDIA: ICD-10-CM

## 2020-09-24 ENCOUNTER — V-VISIT (OUTPATIENT)
Dept: CARDIOLOGY | Age: 55
End: 2020-09-24

## 2020-09-24 DIAGNOSIS — R00.2 PALPITATIONS: ICD-10-CM

## 2020-09-24 DIAGNOSIS — I47.19 ATRIAL TACHYCARDIA: Primary | ICD-10-CM

## 2020-09-24 PROCEDURE — 99214 OFFICE O/P EST MOD 30 MIN: CPT | Performed by: INTERNAL MEDICINE

## 2020-09-24 ASSESSMENT — ENCOUNTER SYMPTOMS
NEUROLOGICAL NEGATIVE: 1
COUGH: 0
ENDOCRINE NEGATIVE: 1
CONSTITUTIONAL NEGATIVE: 1
HEMATOLOGIC/LYMPHATIC NEGATIVE: 1
GASTROINTESTINAL NEGATIVE: 1
ALLERGIC/IMMUNOLOGIC NEGATIVE: 1
EYES NEGATIVE: 1
SHORTNESS OF BREATH: 0
PSYCHIATRIC NEGATIVE: 1

## 2020-10-10 ENCOUNTER — HOSPITAL ENCOUNTER (OUTPATIENT)
Age: 55
Discharge: HOME | End: 2020-10-10
Payer: COMMERCIAL

## 2020-10-10 VITALS
RESPIRATION RATE: 16 BRPM | HEART RATE: 68 BPM | SYSTOLIC BLOOD PRESSURE: 133 MMHG | DIASTOLIC BLOOD PRESSURE: 80 MMHG | OXYGEN SATURATION: 97 % | TEMPERATURE: 99 F

## 2020-10-10 DIAGNOSIS — Z20.822 SUSPECTED COVID-19 VIRUS INFECTION: Primary | ICD-10-CM

## 2020-10-10 PROCEDURE — 99202 OFFICE O/P NEW SF 15 MIN: CPT | Performed by: NURSE PRACTITIONER

## 2020-10-10 PROCEDURE — 87081 CULTURE SCREEN ONLY: CPT | Performed by: NURSE PRACTITIONER

## 2020-10-10 PROCEDURE — 36415 COLL VENOUS BLD VENIPUNCTURE: CPT | Performed by: NURSE PRACTITIONER

## 2020-10-10 PROCEDURE — 85025 COMPLETE CBC W/AUTO DIFF WBC: CPT | Performed by: NURSE PRACTITIONER

## 2020-10-10 PROCEDURE — U0003 INFECTIOUS AGENT DETECTION BY NUCLEIC ACID (DNA OR RNA); SEVERE ACUTE RESPIRATORY SYNDROME CORONAVIRUS 2 (SARS-COV-2) (CORONAVIRUS DISEASE [COVID-19]), AMPLIFIED PROBE TECHNIQUE, MAKING USE OF HIGH THROUGHPUT TECHNOLOGIES AS DESCRIBED BY CMS-2020-01-R: HCPCS | Performed by: NURSE PRACTITIONER

## 2020-10-10 PROCEDURE — 87880 STREP A ASSAY W/OPTIC: CPT | Performed by: NURSE PRACTITIONER

## 2020-10-10 NOTE — ED PROVIDER NOTES
Patient Seen in: [address] [facility] - [place], [place]      History   Patient presents with:  Cough  Sore Throat    Stated Complaint: testing    52-year-old female presents today with complaints of body aches, congestion runny nose sore throat and chills.   [facility] • PONV (postoperative nausea and vomiting)    • Raynaud disease    • Renal disorder     Chronic Kidney failure-stage 3    • Rheumatoid arthritis ([facility]    • Seizure disorder (HCC)     had x 1 year and went away/unknown etiology/ age 56   • Sjogren's disea OSTEOTOMY Right 1/25/2019    Performed by Zia Scanlon MD at [address] [place] [address] OR   • WRIST ARTHROSCOPY Left 1/24/2020    Performed by Zia Scanlon MD at [address] [place] [address] OR   • WRIST SYNOVECTOMY Right 6/25/2018    Performed by Zia Scanlon MD at [facility] Labs Reviewed   POCT RAPID STREP - Normal   POCT CBC   SARS-COV-2 RNA,QUAL RT-PCR ([facility])   GRP A STREP CULT, THROAT                  MDM     Presented today with 1 week of congestion runny nose sore throat body aches and general malaise.   Rapid strep

## 2020-10-10 NOTE — ED INITIAL ASSESSMENT (HPI)
Cough, sore throat and body aches since this past Thursday, her granddaughter was sick this past week, she also is complaining of bilat ear fullness to both ears, bilat eye pressure too.

## 2020-10-14 NOTE — PROGRESS NOTES
Notified patient of negative covid test results. Instructed to return call with any questions or concerns.

## 2020-11-06 ENCOUNTER — OFFICE VISIT (OUTPATIENT)
Dept: UROLOGY | Facility: HOSPITAL | Age: 55
End: 2020-11-06
Attending: OBSTETRICS & GYNECOLOGY
Payer: COMMERCIAL

## 2020-11-06 VITALS — WEIGHT: 139 LBS | HEIGHT: 63 IN | BODY MASS INDEX: 24.63 KG/M2

## 2020-11-06 DIAGNOSIS — D50.0 IRON DEFICIENCY ANEMIA DUE TO CHRONIC BLOOD LOSS: ICD-10-CM

## 2020-11-06 DIAGNOSIS — N39.0 RECURRENT UTI: Primary | ICD-10-CM

## 2020-11-06 DIAGNOSIS — D68.59 HYPERCOAGULABLE STATE (HCC): ICD-10-CM

## 2020-11-06 DIAGNOSIS — R10.2 SUPRAPUBIC PAIN: ICD-10-CM

## 2020-11-06 DIAGNOSIS — N95.2 POSTMENOPAUSAL ATROPHIC VAGINITIS: ICD-10-CM

## 2020-11-06 PROCEDURE — 20552 NJX 1/MLT TRIGGER POINT 1/2: CPT

## 2020-11-06 PROCEDURE — 99212 OFFICE O/P EST SF 10 MIN: CPT

## 2020-11-06 RX ORDER — NITROFURANTOIN MACROCRYSTALS 100 MG/1
100 CAPSULE ORAL DAILY
Qty: 90 CAPSULE | Refills: 2 | Status: SHIPPED | OUTPATIENT
Start: 2020-11-06

## 2020-11-06 RX ORDER — CYANOCOBALAMIN 1000 UG/ML
INJECTION, SOLUTION INTRAMUSCULAR; SUBCUTANEOUS
Qty: 3 ML | Refills: 0 | OUTPATIENT
Start: 2020-11-06

## 2020-11-06 RX ORDER — BUPIVACAINE HYDROCHLORIDE 5 MG/ML
10 INJECTION, SOLUTION EPIDURAL; INTRACAUDAL ONCE
Status: COMPLETED | OUTPATIENT
Start: 2020-11-06 | End: 2020-11-06

## 2020-11-06 RX ADMIN — BUPIVACAINE HYDROCHLORIDE 10 ML: 5 INJECTION, SOLUTION EPIDURAL; INTRACAUDAL at 11:45:00

## 2020-11-27 ENCOUNTER — NURSE ONLY (OUTPATIENT)
Dept: HEMATOLOGY/ONCOLOGY | Facility: HOSPITAL | Age: 55
End: 2020-11-27
Attending: INTERNAL MEDICINE
Payer: COMMERCIAL

## 2020-11-27 DIAGNOSIS — Z79.899 ENCOUNTER FOR LONG-TERM (CURRENT) USE OF HIGH-RISK MEDICATION: ICD-10-CM

## 2020-11-27 DIAGNOSIS — D50.0 IRON DEFICIENCY ANEMIA DUE TO CHRONIC BLOOD LOSS: ICD-10-CM

## 2020-11-27 DIAGNOSIS — M06.00 SERONEGATIVE RHEUMATOID ARTHRITIS (HCC): ICD-10-CM

## 2020-11-27 DIAGNOSIS — R63.4 WEIGHT LOSS: Primary | ICD-10-CM

## 2020-11-27 PROCEDURE — 84443 ASSAY THYROID STIM HORMONE: CPT

## 2020-11-27 PROCEDURE — 85652 RBC SED RATE AUTOMATED: CPT

## 2020-11-27 PROCEDURE — 86140 C-REACTIVE PROTEIN: CPT

## 2020-11-27 PROCEDURE — 36591 DRAW BLOOD OFF VENOUS DEVICE: CPT

## 2020-11-27 PROCEDURE — 82248 BILIRUBIN DIRECT: CPT

## 2020-11-27 PROCEDURE — 84439 ASSAY OF FREE THYROXINE: CPT

## 2020-11-27 PROCEDURE — 80053 COMPREHEN METABOLIC PANEL: CPT

## 2020-11-27 PROCEDURE — 85025 COMPLETE CBC W/AUTO DIFF WBC: CPT

## 2020-11-27 RX ORDER — SODIUM CHLORIDE 0.9 % (FLUSH) 0.9 %
10 SYRINGE (ML) INJECTION ONCE
Status: CANCELLED | OUTPATIENT
Start: 2020-11-27

## 2020-11-27 RX ORDER — SODIUM CHLORIDE 0.9 % (FLUSH) 0.9 %
10 SYRINGE (ML) INJECTION ONCE
Status: COMPLETED | OUTPATIENT
Start: 2020-11-27 | End: 2020-11-27

## 2020-11-27 RX ADMIN — SODIUM CHLORIDE 0.9 % (FLUSH) 10 ML: 0.9 % SYRINGE (ML) INJECTION at 13:20:00

## 2020-11-27 NOTE — PROGRESS NOTES
Education Record    Learner:  Patient    Disease / Diagnosis: central line lab draw    Barriers / Limitations:  None   Comments:    Method:  Brief focused and Discussion   Comments:    General Topics:  Precautions and Plan of care reviewed   Comments:    O

## 2020-11-27 NOTE — PROGRESS NOTES
[employer] message sent to patient. Monitoring labs are stable. Continue current treatment plan. Repeat labs every 3 months. Follow up as planned.

## 2020-11-30 DIAGNOSIS — D68.59 HYPERCOAGULABLE STATE (HCC): ICD-10-CM

## 2020-11-30 DIAGNOSIS — D50.0 IRON DEFICIENCY ANEMIA DUE TO CHRONIC BLOOD LOSS: ICD-10-CM

## 2020-12-01 RX ORDER — CYANOCOBALAMIN 1000 UG/ML
INJECTION, SOLUTION INTRAMUSCULAR; SUBCUTANEOUS
Qty: 3 ML | Refills: 0 | OUTPATIENT
Start: 2020-12-01

## 2020-12-03 DIAGNOSIS — D68.59 HYPERCOAGULABLE STATE (HCC): ICD-10-CM

## 2020-12-03 DIAGNOSIS — D50.0 IRON DEFICIENCY ANEMIA DUE TO CHRONIC BLOOD LOSS: ICD-10-CM

## 2020-12-03 RX ORDER — CYANOCOBALAMIN 1000 UG/ML
INJECTION, SOLUTION INTRAMUSCULAR; SUBCUTANEOUS
Qty: 3 ML | Refills: 0 | OUTPATIENT
Start: 2020-12-03

## 2020-12-12 DIAGNOSIS — D68.59 HYPERCOAGULABLE STATE (HCC): ICD-10-CM

## 2020-12-12 DIAGNOSIS — D50.0 IRON DEFICIENCY ANEMIA DUE TO CHRONIC BLOOD LOSS: ICD-10-CM

## 2020-12-14 RX ORDER — CYANOCOBALAMIN 1000 UG/ML
INJECTION, SOLUTION INTRAMUSCULAR; SUBCUTANEOUS
Qty: 3 ML | Refills: 0 | Status: SHIPPED | OUTPATIENT
Start: 2020-12-14 | End: 2021-02-05

## 2020-12-18 ENCOUNTER — TELEPHONE (OUTPATIENT)
Dept: HEMATOLOGY/ONCOLOGY | Facility: HOSPITAL | Age: 55
End: 2020-12-18

## 2020-12-31 PROBLEM — M77.11 RIGHT LATERAL EPICONDYLITIS: Status: ACTIVE | Noted: 2020-12-31

## 2021-01-01 ENCOUNTER — EXTERNAL RECORD (OUTPATIENT)
Dept: OTHER | Age: 56
End: 2021-01-01

## 2021-01-18 RX ORDER — FLECAINIDE ACETATE 100 MG/1
TABLET ORAL
Qty: 180 TABLET | Refills: 3 | Status: SHIPPED | OUTPATIENT
Start: 2021-01-18 | End: 2022-08-02

## 2021-01-25 ENCOUNTER — TELEPHONE (OUTPATIENT)
Dept: HEMATOLOGY/ONCOLOGY | Facility: HOSPITAL | Age: 56
End: 2021-01-25

## 2021-01-25 DIAGNOSIS — D50.0 IRON DEFICIENCY ANEMIA DUE TO CHRONIC BLOOD LOSS: Primary | ICD-10-CM

## 2021-01-25 NOTE — TELEPHONE ENCOUNTER
Danetta Baron called per the request of Dr Liliana Vázquez, Rheumatology. Two weeks ago Fer Root had small red dots on the tops of her feet and on her left forearm. She said they looked like \"blood under the skin. \" They have since completely resolved.  No active bleeding or un

## 2021-01-25 NOTE — TELEPHONE ENCOUNTER
MD Suzanna Oela, RN; Em Onc Gina Rns; Behzad Swift 1 hour ago (12:54 PM)     This is an [place] patient.  I am asking the [facility] nurses to reach out to her. Zeenat Awad is overdue to see me. Deborah Copas labs (CBC, Fe/TIBC/Ferritin

## 2021-02-05 ENCOUNTER — APPOINTMENT (OUTPATIENT)
Dept: HEMATOLOGY/ONCOLOGY | Facility: HOSPITAL | Age: 56
End: 2021-02-05
Attending: INTERNAL MEDICINE
Payer: COMMERCIAL

## 2021-02-05 DIAGNOSIS — D68.59 HYPERCOAGULABLE STATE (HCC): ICD-10-CM

## 2021-02-05 DIAGNOSIS — D50.0 IRON DEFICIENCY ANEMIA DUE TO CHRONIC BLOOD LOSS: ICD-10-CM

## 2021-02-05 RX ORDER — CYANOCOBALAMIN 1000 UG/ML
INJECTION INTRAMUSCULAR; SUBCUTANEOUS
Qty: 3 ML | Refills: 0 | Status: SHIPPED | OUTPATIENT
Start: 2021-02-05 | End: 2021-06-10

## 2021-02-19 ENCOUNTER — NURSE ONLY (OUTPATIENT)
Dept: HEMATOLOGY/ONCOLOGY | Facility: HOSPITAL | Age: 56
End: 2021-02-19
Attending: INTERNAL MEDICINE
Payer: COMMERCIAL

## 2021-02-19 DIAGNOSIS — D50.0 IRON DEFICIENCY ANEMIA DUE TO CHRONIC BLOOD LOSS: ICD-10-CM

## 2021-02-19 LAB
ALBUMIN SERPL-MCNC: 4 G/DL (ref 3.4–5)
ALBUMIN/GLOB SERPL: 1.3 {RATIO} (ref 1–2)
ALP LIVER SERPL-CCNC: 60 U/L
ALT SERPL-CCNC: 23 U/L
ANION GAP SERPL CALC-SCNC: 4 MMOL/L (ref 0–18)
AST SERPL-CCNC: 22 U/L (ref 15–37)
BASOPHILS # BLD AUTO: 0.03 X10(3) UL (ref 0–0.2)
BASOPHILS NFR BLD AUTO: 0.8 %
BILIRUB SERPL-MCNC: 0.4 MG/DL (ref 0.1–2)
BILIRUB UR QL STRIP.AUTO: NEGATIVE
BUN BLD-MCNC: 24 MG/DL (ref 7–18)
BUN/CREAT SERPL: 18.6 (ref 10–20)
CALCIUM BLD-MCNC: 8.4 MG/DL (ref 8.5–10.1)
CHLORIDE SERPL-SCNC: 110 MMOL/L (ref 98–112)
CHOLEST SMN-MCNC: 192 MG/DL (ref ?–200)
CLARITY UR REFRACT.AUTO: CLEAR
CO2 SERPL-SCNC: 28 MMOL/L (ref 21–32)
COLOR UR AUTO: YELLOW
CREAT BLD-MCNC: 1.29 MG/DL
DEPRECATED HBV CORE AB SER IA-ACNC: 106.1 NG/ML
DEPRECATED RDW RBC AUTO: 43.9 FL (ref 35.1–46.3)
EOSINOPHIL # BLD AUTO: 0.09 X10(3) UL (ref 0–0.7)
EOSINOPHIL NFR BLD AUTO: 2.3 %
ERYTHROCYTE [DISTWIDTH] IN BLOOD BY AUTOMATED COUNT: 13.2 % (ref 11–15)
GLOBULIN PLAS-MCNC: 3.1 G/DL (ref 2.8–4.4)
GLUCOSE BLD-MCNC: 89 MG/DL (ref 70–99)
GLUCOSE UR STRIP.AUTO-MCNC: NEGATIVE MG/DL
HCT VFR BLD AUTO: 38.3 %
HDLC SERPL-MCNC: 67 MG/DL (ref 40–59)
HGB BLD-MCNC: 12.8 G/DL
IMM GRANULOCYTES # BLD AUTO: 0.01 X10(3) UL (ref 0–1)
IMM GRANULOCYTES NFR BLD: 0.3 %
IRON SATURATION: 15 %
IRON SERPL-MCNC: 74 UG/DL
KETONES UR STRIP.AUTO-MCNC: NEGATIVE MG/DL
LDLC SERPL CALC-MCNC: 110 MG/DL (ref ?–100)
LEUKOCYTE ESTERASE UR QL STRIP.AUTO: NEGATIVE
LYMPHOCYTES # BLD AUTO: 1.11 X10(3) UL (ref 1–4)
LYMPHOCYTES NFR BLD AUTO: 28.2 %
M PROTEIN MFR SERPL ELPH: 7.1 G/DL (ref 6.4–8.2)
MCH RBC QN AUTO: 30.5 PG (ref 26–34)
MCHC RBC AUTO-ENTMCNC: 33.4 G/DL (ref 31–37)
MCV RBC AUTO: 91.4 FL
MONOCYTES # BLD AUTO: 0.35 X10(3) UL (ref 0.1–1)
MONOCYTES NFR BLD AUTO: 8.9 %
NEUTROPHILS # BLD AUTO: 2.34 X10 (3) UL (ref 1.5–7.7)
NEUTROPHILS # BLD AUTO: 2.34 X10(3) UL (ref 1.5–7.7)
NEUTROPHILS NFR BLD AUTO: 59.5 %
NITRITE UR QL STRIP.AUTO: NEGATIVE
NONHDLC SERPL-MCNC: 125 MG/DL (ref ?–130)
OSMOLALITY SERPL CALC.SUM OF ELEC: 298 MOSM/KG (ref 275–295)
PATIENT FASTING Y/N/NP: YES
PATIENT FASTING Y/N/NP: YES
PH UR STRIP.AUTO: 5 [PH] (ref 4.5–8)
PLATELET # BLD AUTO: 192 10(3)UL (ref 150–450)
POTASSIUM SERPL-SCNC: 4.2 MMOL/L (ref 3.5–5.1)
PROT UR STRIP.AUTO-MCNC: NEGATIVE MG/DL
RBC # BLD AUTO: 4.19 X10(6)UL
RBC UR QL AUTO: NEGATIVE
SODIUM SERPL-SCNC: 142 MMOL/L (ref 136–145)
SP GR UR STRIP.AUTO: 1.02 (ref 1–1.03)
TOTAL IRON BINDING CAPACITY: 493 UG/DL (ref 240–450)
TRANSFERRIN SERPL-MCNC: 331 MG/DL (ref 200–360)
TRIGL SERPL-MCNC: 77 MG/DL (ref 30–149)
UROBILINOGEN UR STRIP.AUTO-MCNC: <2 MG/DL
VLDLC SERPL CALC-MCNC: 15 MG/DL (ref 0–30)
WBC # BLD AUTO: 3.9 X10(3) UL (ref 4–11)

## 2021-02-19 PROCEDURE — 83540 ASSAY OF IRON: CPT

## 2021-02-19 PROCEDURE — 83550 IRON BINDING TEST: CPT

## 2021-02-19 PROCEDURE — 80061 LIPID PANEL: CPT

## 2021-02-19 PROCEDURE — 85025 COMPLETE CBC W/AUTO DIFF WBC: CPT

## 2021-02-19 PROCEDURE — 36591 DRAW BLOOD OFF VENOUS DEVICE: CPT

## 2021-02-19 PROCEDURE — 81003 URINALYSIS AUTO W/O SCOPE: CPT

## 2021-02-19 PROCEDURE — 82728 ASSAY OF FERRITIN: CPT

## 2021-02-19 PROCEDURE — 80053 COMPREHEN METABOLIC PANEL: CPT

## 2021-03-12 ENCOUNTER — APPOINTMENT (OUTPATIENT)
Dept: HEMATOLOGY/ONCOLOGY | Facility: HOSPITAL | Age: 56
End: 2021-03-12
Attending: INTERNAL MEDICINE
Payer: COMMERCIAL

## 2021-03-12 VITALS
DIASTOLIC BLOOD PRESSURE: 79 MMHG | RESPIRATION RATE: 18 BRPM | HEIGHT: 65 IN | TEMPERATURE: 99 F | OXYGEN SATURATION: 98 % | BODY MASS INDEX: 22.91 KG/M2 | HEART RATE: 77 BPM | SYSTOLIC BLOOD PRESSURE: 132 MMHG | WEIGHT: 137.5 LBS

## 2021-03-12 DIAGNOSIS — D68.59 HYPERCOAGULABLE STATE (HCC): ICD-10-CM

## 2021-03-12 DIAGNOSIS — D50.0 IRON DEFICIENCY ANEMIA DUE TO CHRONIC BLOOD LOSS: Primary | ICD-10-CM

## 2021-03-12 PROCEDURE — 99214 OFFICE O/P EST MOD 30 MIN: CPT | Performed by: INTERNAL MEDICINE

## 2021-03-12 NOTE — PROGRESS NOTES
Received prescription renewal request from [facility] for Folic Acid. Per Dr Ansley Watson, he advised patient to discontinue this med. Refill denied.

## 2021-03-12 NOTE — PROGRESS NOTES
Patient is here for MD f/u for Anemia. Patient had labs on 2/19. Patient c/o feeling very tired all the time. C/o petechiae on her arms and legs. Skin tears easily without any kind of trauma and bleeds easily. Denies pain at present.        Education Record

## 2021-03-15 DIAGNOSIS — D50.0 IRON DEFICIENCY ANEMIA DUE TO CHRONIC BLOOD LOSS: ICD-10-CM

## 2021-03-15 DIAGNOSIS — D68.59 HYPERCOAGULABLE STATE (HCC): ICD-10-CM

## 2021-03-15 NOTE — PROGRESS NOTES
[facility]    PATIENT'S NAME: Lesa Zhu   ATTENDING PHYSICIAN: Gisele Casanova M.D.    PATIENT ACCOUNT #: [de-identified] LOCATION: [address] [place] [address] RECORD #: [id number] YOB: 1965   DATE OF SERVICE: 03/12/2021       CANCER CENT every 3 months, fenofibrate 145 mg every other day, flecainide 100 mg twice daily, gabapentin 300 mg 3 times daily, Macrodantin 100 mg p.r.n., ondansetron 4 mg q.8 h. p.r.n., ropinirole 0.25 mg p.r.n. and ropinirole 2 mg extended release daily, sertraline

## 2021-03-23 ASSESSMENT — ENCOUNTER SYMPTOMS
NEUROLOGICAL NEGATIVE: 1
ENDOCRINE NEGATIVE: 1
HEMATOLOGIC/LYMPHATIC NEGATIVE: 1
CONSTITUTIONAL NEGATIVE: 1
EYES NEGATIVE: 1
GASTROINTESTINAL NEGATIVE: 1
PSYCHIATRIC NEGATIVE: 1
SHORTNESS OF BREATH: 0
ALLERGIC/IMMUNOLOGIC NEGATIVE: 1
COUGH: 0

## 2021-03-25 ENCOUNTER — APPOINTMENT (OUTPATIENT)
Dept: CARDIOLOGY | Age: 56
End: 2021-03-25

## 2021-04-11 ENCOUNTER — IMMUNIZATION (OUTPATIENT)
Dept: LAB | Facility: HOSPITAL | Age: 56
End: 2021-04-11
Attending: HOSPITALIST
Payer: COMMERCIAL

## 2021-04-11 DIAGNOSIS — Z23 NEED FOR VACCINATION: Primary | ICD-10-CM

## 2021-04-11 PROCEDURE — 0011A SARSCOV2 VAC 100MCG/0.5ML IM: CPT

## 2021-04-14 ENCOUNTER — ORDER TRANSCRIPTION (OUTPATIENT)
Dept: ADMINISTRATIVE | Facility: HOSPITAL | Age: 56
End: 2021-04-14

## 2021-04-14 DIAGNOSIS — M25.521 RIGHT ELBOW PAIN: Primary | ICD-10-CM

## 2021-04-15 ENCOUNTER — NURSE ONLY (OUTPATIENT)
Dept: HEMATOLOGY/ONCOLOGY | Facility: HOSPITAL | Age: 56
End: 2021-04-15
Attending: INTERNAL MEDICINE
Payer: COMMERCIAL

## 2021-04-15 DIAGNOSIS — M06.4 INFLAMMATORY POLYARTHRITIS (HCC): ICD-10-CM

## 2021-04-15 DIAGNOSIS — Z79.899 ENCOUNTER FOR LONG-TERM (CURRENT) USE OF HIGH-RISK MEDICATION: ICD-10-CM

## 2021-04-15 DIAGNOSIS — M32.19 SYSTEMIC LUPUS ERYTHEMATOSUS WITH OTHER ORGAN INVOLVEMENT, UNSPECIFIED SLE TYPE (HCC): ICD-10-CM

## 2021-04-15 DIAGNOSIS — Z91.89 AT HIGH RISK FOR PNEUMONIA: ICD-10-CM

## 2021-04-15 DIAGNOSIS — Z23 NEED FOR VACCINATION WITH 13-POLYVALENT PNEUMOCOCCAL CONJUGATE VACCINE: ICD-10-CM

## 2021-04-15 DIAGNOSIS — E55.9 VITAMIN D DEFICIENCY: ICD-10-CM

## 2021-04-15 DIAGNOSIS — M25.521 RIGHT ELBOW PAIN: ICD-10-CM

## 2021-04-15 DIAGNOSIS — M06.00 SERONEGATIVE RHEUMATOID ARTHRITIS (HCC): ICD-10-CM

## 2021-04-15 PROCEDURE — 82570 ASSAY OF URINE CREATININE: CPT

## 2021-04-15 PROCEDURE — 85025 COMPLETE CBC W/AUTO DIFF WBC: CPT

## 2021-04-15 PROCEDURE — 36591 DRAW BLOOD OFF VENOUS DEVICE: CPT

## 2021-04-15 PROCEDURE — 86140 C-REACTIVE PROTEIN: CPT

## 2021-04-15 PROCEDURE — 86160 COMPLEMENT ANTIGEN: CPT

## 2021-04-15 PROCEDURE — 81003 URINALYSIS AUTO W/O SCOPE: CPT

## 2021-04-15 PROCEDURE — 80048 BASIC METABOLIC PNL TOTAL CA: CPT

## 2021-04-15 PROCEDURE — 80076 HEPATIC FUNCTION PANEL: CPT

## 2021-04-15 PROCEDURE — 84156 ASSAY OF PROTEIN URINE: CPT

## 2021-04-15 PROCEDURE — 85652 RBC SED RATE AUTOMATED: CPT

## 2021-04-15 PROCEDURE — 82306 VITAMIN D 25 HYDROXY: CPT

## 2021-04-15 PROCEDURE — 86256 FLUORESCENT ANTIBODY TITER: CPT

## 2021-04-16 NOTE — PROGRESS NOTES
Lio El show that one of the inflammation markers, CRP, is mildly elevated but other inflammation marker, ESR, is normal.   Lymphocytes, one of the white blood cell subsets, is low. This can be seen in lupus patients but also with viral infections.

## 2021-04-20 ENCOUNTER — LAB ENCOUNTER (OUTPATIENT)
Dept: LAB | Facility: HOSPITAL | Age: 56
End: 2021-04-20
Attending: ORTHOPAEDIC SURGERY
Payer: COMMERCIAL

## 2021-04-20 DIAGNOSIS — M25.521 RIGHT ELBOW PAIN: ICD-10-CM

## 2021-04-20 LAB
SARS-COV-2 RNA SPEC QL NAA+PROBE: NOT DETECTED
SPECIMEN SOURCE: NORMAL
SPECIMEN SOURCE: NORMAL

## 2021-04-20 ASSESSMENT — ENCOUNTER SYMPTOMS
ENDOCRINE NEGATIVE: 1
SHORTNESS OF BREATH: 0
CONSTITUTIONAL NEGATIVE: 1
NEUROLOGICAL NEGATIVE: 1
HEMATOLOGIC/LYMPHATIC NEGATIVE: 1
EYES NEGATIVE: 1
COUGH: 0
GASTROINTESTINAL NEGATIVE: 1
PSYCHIATRIC NEGATIVE: 1
ALLERGIC/IMMUNOLOGIC NEGATIVE: 1

## 2021-04-21 ENCOUNTER — TELEPHONE (OUTPATIENT)
Dept: CARDIOLOGY | Age: 56
End: 2021-04-21

## 2021-04-21 ENCOUNTER — OFFICE VISIT (OUTPATIENT)
Dept: CARDIOLOGY | Age: 56
End: 2021-04-21

## 2021-04-21 VITALS
DIASTOLIC BLOOD PRESSURE: 70 MMHG | OXYGEN SATURATION: 99 % | HEART RATE: 116 BPM | WEIGHT: 135 LBS | HEIGHT: 65 IN | SYSTOLIC BLOOD PRESSURE: 102 MMHG | BODY MASS INDEX: 22.49 KG/M2 | TEMPERATURE: 97.5 F

## 2021-04-21 DIAGNOSIS — I47.19 ATRIAL TACHYCARDIA: Primary | ICD-10-CM

## 2021-04-21 DIAGNOSIS — R00.2 PALPITATIONS: ICD-10-CM

## 2021-04-21 DIAGNOSIS — Z86.718 HISTORY OF DVT (DEEP VEIN THROMBOSIS): ICD-10-CM

## 2021-04-21 PROCEDURE — 99215 OFFICE O/P EST HI 40 MIN: CPT | Performed by: INTERNAL MEDICINE

## 2021-04-21 PROCEDURE — 93000 ELECTROCARDIOGRAM COMPLETE: CPT | Performed by: INTERNAL MEDICINE

## 2021-04-21 RX ORDER — BELIMUMAB 200 MG/ML
200 SOLUTION SUBCUTANEOUS
COMMUNITY
Start: 2020-09-18

## 2021-04-21 RX ORDER — UBROGEPANT 50 MG/1
TABLET ORAL PRN
COMMUNITY
Start: 2021-04-14

## 2021-04-22 ENCOUNTER — APPOINTMENT (OUTPATIENT)
Dept: CARDIOLOGY | Age: 56
End: 2021-04-22
Attending: INTERNAL MEDICINE

## 2021-04-22 ENCOUNTER — TELEPHONE (OUTPATIENT)
Dept: CARDIOLOGY | Age: 56
End: 2021-04-22

## 2021-04-22 ENCOUNTER — HOSPITAL ENCOUNTER (OUTPATIENT)
Dept: NUCLEAR MEDICINE | Age: 56
Discharge: HOME | End: 2021-04-22
Attending: INTERNAL MEDICINE

## 2021-04-22 ENCOUNTER — HOSPITAL ENCOUNTER (OUTPATIENT)
Dept: CARDIOLOGY | Age: 56
Discharge: HOME | End: 2021-04-22
Attending: INTERNAL MEDICINE

## 2021-04-22 DIAGNOSIS — Z86.718 HISTORY OF DVT (DEEP VEIN THROMBOSIS): ICD-10-CM

## 2021-04-22 DIAGNOSIS — R00.2 PALPITATIONS: ICD-10-CM

## 2021-04-22 DIAGNOSIS — I47.19 ATRIAL TACHYCARDIA: ICD-10-CM

## 2021-04-22 PROCEDURE — 78452 HT MUSCLE IMAGE SPECT MULT: CPT | Performed by: INTERNAL MEDICINE

## 2021-04-22 PROCEDURE — 78452 HT MUSCLE IMAGE SPECT MULT: CPT

## 2021-04-22 PROCEDURE — 93018 CV STRESS TEST I&R ONLY: CPT | Performed by: INTERNAL MEDICINE

## 2021-04-22 PROCEDURE — A9500 TC99M SESTAMIBI: HCPCS | Performed by: INTERNAL MEDICINE

## 2021-04-22 PROCEDURE — 10006150 HB RX 343: Performed by: INTERNAL MEDICINE

## 2021-04-22 PROCEDURE — 93016 CV STRESS TEST SUPVJ ONLY: CPT | Performed by: INTERNAL MEDICINE

## 2021-04-22 PROCEDURE — 93017 CV STRESS TEST TRACING ONLY: CPT

## 2021-04-22 RX ORDER — TETRAKIS(2-METHOXYISOBUTYLISOCYANIDE)COPPER(I) TETRAFLUOROBORATE 1 MG/ML
12.5 INJECTION, POWDER, LYOPHILIZED, FOR SOLUTION INTRAVENOUS ONCE
Status: COMPLETED | OUTPATIENT
Start: 2021-04-22 | End: 2021-04-22

## 2021-04-22 RX ORDER — TETRAKIS(2-METHOXYISOBUTYLISOCYANIDE)COPPER(I) TETRAFLUOROBORATE 1 MG/ML
4.1 INJECTION, POWDER, LYOPHILIZED, FOR SOLUTION INTRAVENOUS ONCE
Status: COMPLETED | OUTPATIENT
Start: 2021-04-22 | End: 2021-04-22

## 2021-04-22 RX ADMIN — TETRAKIS(2-METHOXYISOBUTYLISOCYANIDE)COPPER(I) TETRAFLUOROBORATE 12.5 MILLICURIE: 1 INJECTION, POWDER, LYOPHILIZED, FOR SOLUTION INTRAVENOUS at 09:05

## 2021-04-22 RX ADMIN — TETRAKIS(2-METHOXYISOBUTYLISOCYANIDE)COPPER(I) TETRAFLUOROBORATE 4.1 MILLICURIE: 1 INJECTION, POWDER, LYOPHILIZED, FOR SOLUTION INTRAVENOUS at 07:25

## 2021-04-22 ASSESSMENT — PAIN SCALES - GENERAL: PAINLEVEL: 0

## 2021-05-09 ENCOUNTER — IMMUNIZATION (OUTPATIENT)
Dept: LAB | Facility: HOSPITAL | Age: 56
End: 2021-05-09
Attending: EMERGENCY MEDICINE
Payer: COMMERCIAL

## 2021-05-09 DIAGNOSIS — Z23 NEED FOR VACCINATION: Primary | ICD-10-CM

## 2021-05-09 PROCEDURE — 0012A SARSCOV2 VAC 100MCG/0.5ML IM: CPT

## 2021-05-20 ENCOUNTER — TELEPHONE (OUTPATIENT)
Dept: CARDIOLOGY | Age: 56
End: 2021-05-20

## 2021-05-21 RX ORDER — VALACYCLOVIR HYDROCHLORIDE 500 MG/1
TABLET, FILM COATED ORAL
Qty: 180 TABLET | Refills: 0 | Status: SHIPPED | OUTPATIENT
Start: 2021-05-21

## 2021-05-25 VITALS
DIASTOLIC BLOOD PRESSURE: 76 MMHG | HEIGHT: 65 IN | SYSTOLIC BLOOD PRESSURE: 121 MMHG | HEART RATE: 98 BPM | WEIGHT: 134.48 LBS | BODY MASS INDEX: 22.41 KG/M2

## 2021-06-08 ENCOUNTER — NURSE ONLY (OUTPATIENT)
Dept: HEMATOLOGY/ONCOLOGY | Facility: HOSPITAL | Age: 56
End: 2021-06-08
Attending: INTERNAL MEDICINE
Payer: COMMERCIAL

## 2021-06-08 PROCEDURE — 96523 IRRIG DRUG DELIVERY DEVICE: CPT

## 2021-06-08 NOTE — PROGRESS NOTES
Education Record    Learner:  Patient    Disease / Diagnosis: Port flush     Barriers / Limitations:  None   Comments:    Method:  Discussion   Comments:    General Topics:  Plan of care reviewed   Comments:    Outcome:  Shows understanding   Comments:

## 2021-06-09 DIAGNOSIS — D68.59 HYPERCOAGULABLE STATE (HCC): ICD-10-CM

## 2021-06-09 DIAGNOSIS — D50.0 IRON DEFICIENCY ANEMIA DUE TO CHRONIC BLOOD LOSS: ICD-10-CM

## 2021-06-10 ENCOUNTER — HOSPITAL ENCOUNTER (EMERGENCY)
Facility: HOSPITAL | Age: 56
Discharge: HOME | End: 2021-06-10
Attending: EMERGENCY MEDICINE
Payer: COMMERCIAL

## 2021-06-10 ENCOUNTER — APPOINTMENT (OUTPATIENT)
Dept: GENERAL RADIOLOGY | Facility: HOSPITAL | Age: 56
End: 2021-06-10
Attending: EMERGENCY MEDICINE
Payer: COMMERCIAL

## 2021-06-10 VITALS
DIASTOLIC BLOOD PRESSURE: 73 MMHG | BODY MASS INDEX: 21.99 KG/M2 | TEMPERATURE: 98 F | SYSTOLIC BLOOD PRESSURE: 130 MMHG | WEIGHT: 132 LBS | HEIGHT: 65 IN | RESPIRATION RATE: 13 BRPM | OXYGEN SATURATION: 97 % | HEART RATE: 59 BPM

## 2021-06-10 DIAGNOSIS — R07.89 CHEST PAIN, ATYPICAL: Primary | ICD-10-CM

## 2021-06-10 PROCEDURE — 96374 THER/PROPH/DIAG INJ IV PUSH: CPT

## 2021-06-10 PROCEDURE — 93010 ELECTROCARDIOGRAM REPORT: CPT | Performed by: EMERGENCY MEDICINE

## 2021-06-10 PROCEDURE — 85025 COMPLETE CBC W/AUTO DIFF WBC: CPT | Performed by: EMERGENCY MEDICINE

## 2021-06-10 PROCEDURE — 99284 EMERGENCY DEPT VISIT MOD MDM: CPT

## 2021-06-10 PROCEDURE — 96375 TX/PRO/DX INJ NEW DRUG ADDON: CPT

## 2021-06-10 PROCEDURE — 80048 BASIC METABOLIC PNL TOTAL CA: CPT | Performed by: EMERGENCY MEDICINE

## 2021-06-10 PROCEDURE — 71045 X-RAY EXAM CHEST 1 VIEW: CPT | Performed by: EMERGENCY MEDICINE

## 2021-06-10 PROCEDURE — 93005 ELECTROCARDIOGRAM TRACING: CPT

## 2021-06-10 PROCEDURE — 84484 ASSAY OF TROPONIN QUANT: CPT | Performed by: EMERGENCY MEDICINE

## 2021-06-10 RX ORDER — ONDANSETRON 2 MG/ML
4 INJECTION INTRAMUSCULAR; INTRAVENOUS ONCE
Status: COMPLETED | OUTPATIENT
Start: 2021-06-10 | End: 2021-06-10

## 2021-06-10 RX ORDER — CYANOCOBALAMIN 1000 UG/ML
INJECTION, SOLUTION INTRAMUSCULAR; SUBCUTANEOUS
Qty: 3 ML | Refills: 3 | Status: SHIPPED | OUTPATIENT
Start: 2021-06-10 | End: 2021-08-07 | Stop reason: CLARIF

## 2021-06-10 RX ORDER — ONDANSETRON 2 MG/ML
INJECTION INTRAMUSCULAR; INTRAVENOUS
Status: COMPLETED
Start: 2021-06-10 | End: 2021-06-10

## 2021-06-10 RX ORDER — MORPHINE SULFATE 4 MG/ML
4 INJECTION, SOLUTION INTRAMUSCULAR; INTRAVENOUS ONCE
Status: COMPLETED | OUTPATIENT
Start: 2021-06-10 | End: 2021-06-10

## 2021-06-10 NOTE — ED INITIAL ASSESSMENT (HPI)
Awoke with CP and dizziness.  States the pain become so severe that she had to pull over while driving to hospital

## 2021-06-10 NOTE — ED PROVIDER NOTES
Patient Seen in: [facility] AND [facility] Emergency Department      History   No chief complaint on file.     Stated Complaint: chest pain     HPI/Subjective:   HPI    67-year-old female with past medical history significant for connective tissue disease, DVTs o Neuropathy     Boths legs   • OBESITY    • Osteoarthritis    • Pleurisy    • Pneumonia due to organism 05/2017   • PONV (postoperative nausea and vomiting)    • Raynaud disease    • Renal disorder     Chronic Kidney failure-stage 3    • Rheumatoid arthriti (36.7 °C)   Resp 13   Ht 165.1 cm (5' 5\")   Wt 59.9 kg   SpO2 97%   BMI 21.97 kg/m²         Physical Exam    Physical Exam   Constitutional: AAOx3, well nourished, NAD  Head: Normocephalic and atraumatic.    Ears: TM's clear b/l  Nose: Nose normal.   Mouth block, lateral T wave inversions, no significant change from previous EKG              XR CHEST AP PORTABLE  (CPT=71045)    Result Date: 6/10/2021  CONCLUSION: No acute cardiopulmonary disease.     Dictated by (CST): Tatyana Guerrero MD on 6/10/2021 at 4:0

## 2021-06-11 ENCOUNTER — TELEPHONE (OUTPATIENT)
Dept: CARDIOLOGY | Age: 56
End: 2021-06-11

## 2021-06-18 ASSESSMENT — ENCOUNTER SYMPTOMS
CONSTITUTIONAL NEGATIVE: 1
PSYCHIATRIC NEGATIVE: 1
COUGH: 0
NEUROLOGICAL NEGATIVE: 1
ALLERGIC/IMMUNOLOGIC NEGATIVE: 1
ENDOCRINE NEGATIVE: 1
SHORTNESS OF BREATH: 0
EYES NEGATIVE: 1
GASTROINTESTINAL NEGATIVE: 1
HEMATOLOGIC/LYMPHATIC NEGATIVE: 1

## 2021-06-21 ENCOUNTER — OFFICE VISIT (OUTPATIENT)
Dept: CARDIOLOGY | Age: 56
End: 2021-06-21

## 2021-06-21 VITALS
HEIGHT: 65 IN | HEART RATE: 88 BPM | OXYGEN SATURATION: 97 % | BODY MASS INDEX: 21.49 KG/M2 | TEMPERATURE: 98.4 F | DIASTOLIC BLOOD PRESSURE: 62 MMHG | RESPIRATION RATE: 18 BRPM | WEIGHT: 129 LBS | SYSTOLIC BLOOD PRESSURE: 100 MMHG

## 2021-06-21 DIAGNOSIS — R00.2 PALPITATIONS: ICD-10-CM

## 2021-06-21 DIAGNOSIS — I47.19 ATRIAL TACHYCARDIA: Primary | ICD-10-CM

## 2021-06-21 PROCEDURE — 99214 OFFICE O/P EST MOD 30 MIN: CPT | Performed by: INTERNAL MEDICINE

## 2021-06-21 PROCEDURE — 93000 ELECTROCARDIOGRAM COMPLETE: CPT | Performed by: INTERNAL MEDICINE

## 2021-06-21 RX ORDER — ERGOCALCIFEROL 1.25 MG/1
1.25 CAPSULE ORAL
COMMUNITY
Start: 2021-06-14

## 2021-06-21 RX ORDER — ONDANSETRON 4 MG/1
TABLET, FILM COATED ORAL
COMMUNITY
Start: 2021-04-23

## 2021-06-28 ENCOUNTER — APPOINTMENT (OUTPATIENT)
Dept: CARDIOLOGY | Age: 56
End: 2021-06-28

## 2021-07-30 ENCOUNTER — TELEPHONE (OUTPATIENT)
Dept: FAMILY MEDICINE CLINIC | Facility: CLINIC | Age: 56
End: 2021-07-30

## 2021-07-30 NOTE — TELEPHONE ENCOUNTER
Surgery on 08/24/21, LTHA with Dr. Oxana Arguello @ [facility]    H&P- complete ADRIAN  Labs- BUN (20), MRSA neg, WNL  EKG- abnormal, when compared with 06/10/21 no change  X-ray- done 06/10/21    Cardiologist:Dr. Watson Screen Last seen 08/02/21 and had Cardiac Cath done 08

## 2021-08-06 ENCOUNTER — OFFICE VISIT (OUTPATIENT)
Dept: FAMILY MEDICINE CLINIC | Facility: CLINIC | Age: 56
End: 2021-08-06
Payer: COMMERCIAL

## 2021-08-06 ENCOUNTER — TELEPHONE (OUTPATIENT)
Dept: HEMATOLOGY/ONCOLOGY | Facility: HOSPITAL | Age: 56
End: 2021-08-06

## 2021-08-06 ENCOUNTER — LAB ENCOUNTER (OUTPATIENT)
Dept: LAB | Facility: HOSPITAL | Age: 56
End: 2021-08-06
Attending: FAMILY MEDICINE
Payer: COMMERCIAL

## 2021-08-06 ENCOUNTER — NURSE ONLY (OUTPATIENT)
Dept: HEMATOLOGY/ONCOLOGY | Facility: HOSPITAL | Age: 56
End: 2021-08-06
Attending: FAMILY MEDICINE
Payer: COMMERCIAL

## 2021-08-06 VITALS
RESPIRATION RATE: 16 BRPM | BODY MASS INDEX: 20.99 KG/M2 | DIASTOLIC BLOOD PRESSURE: 78 MMHG | OXYGEN SATURATION: 99 % | SYSTOLIC BLOOD PRESSURE: 116 MMHG | HEART RATE: 74 BPM | WEIGHT: 126 LBS | HEIGHT: 65 IN | TEMPERATURE: 98 F

## 2021-08-06 DIAGNOSIS — M06.4 INFLAMMATORY POLYARTHRITIS (HCC): ICD-10-CM

## 2021-08-06 DIAGNOSIS — Z01.818 PREOP EXAMINATION: ICD-10-CM

## 2021-08-06 DIAGNOSIS — E55.9 VITAMIN D DEFICIENCY: ICD-10-CM

## 2021-08-06 DIAGNOSIS — M35.9 CONNECTIVE TISSUE DISEASE, UNDIFFERENTIATED (HCC): ICD-10-CM

## 2021-08-06 DIAGNOSIS — G25.81 RESTLESS LEGS SYNDROME (RLS): ICD-10-CM

## 2021-08-06 DIAGNOSIS — J45.20 MILD INTERMITTENT ASTHMA, UNSPECIFIED WHETHER COMPLICATED: ICD-10-CM

## 2021-08-06 DIAGNOSIS — M85.80 OSTEOPENIA, UNSPECIFIED LOCATION: ICD-10-CM

## 2021-08-06 DIAGNOSIS — D68.59 HYPERCOAGULABLE STATE (HCC): ICD-10-CM

## 2021-08-06 DIAGNOSIS — N28.9 RENAL INSUFFICIENCY: ICD-10-CM

## 2021-08-06 DIAGNOSIS — M06.00 SERONEGATIVE RHEUMATOID ARTHRITIS (HCC): ICD-10-CM

## 2021-08-06 DIAGNOSIS — R56.9 SEIZURE (HCC): ICD-10-CM

## 2021-08-06 DIAGNOSIS — M06.9 RHEUMATOID ARTHRITIS INVOLVING RIGHT WRIST, UNSPECIFIED WHETHER RHEUMATOID FACTOR PRESENT (HCC): ICD-10-CM

## 2021-08-06 DIAGNOSIS — D50.0 IRON DEFICIENCY ANEMIA DUE TO CHRONIC BLOOD LOSS: ICD-10-CM

## 2021-08-06 DIAGNOSIS — M54.42 CHRONIC BILATERAL LOW BACK PAIN WITH LEFT-SIDED SCIATICA: ICD-10-CM

## 2021-08-06 DIAGNOSIS — Z01.812 PRE-OPERATIVE LABORATORY EXAMINATION: ICD-10-CM

## 2021-08-06 DIAGNOSIS — Z86.718 HISTORY OF DVT (DEEP VEIN THROMBOSIS): ICD-10-CM

## 2021-08-06 DIAGNOSIS — M51.26 LUMBAR DISC HERNIATION: ICD-10-CM

## 2021-08-06 DIAGNOSIS — I49.3 PVC (PREMATURE VENTRICULAR CONTRACTION): ICD-10-CM

## 2021-08-06 DIAGNOSIS — I74.9 EMBOLISM AND THROMBOSIS (HCC): ICD-10-CM

## 2021-08-06 DIAGNOSIS — R68.2 DRY MOUTH: ICD-10-CM

## 2021-08-06 DIAGNOSIS — M16.12 PRIMARY OSTEOARTHRITIS OF LEFT HIP: Primary | ICD-10-CM

## 2021-08-06 DIAGNOSIS — G89.29 CHRONIC BILATERAL LOW BACK PAIN WITH LEFT-SIDED SCIATICA: ICD-10-CM

## 2021-08-06 LAB
ALBUMIN SERPL-MCNC: 4 G/DL (ref 3.4–5)
ALBUMIN/GLOB SERPL: 1.3 {RATIO} (ref 1–2)
ALP LIVER SERPL-CCNC: 49 U/L
ALT SERPL-CCNC: 29 U/L
ANION GAP SERPL CALC-SCNC: 7 MMOL/L (ref 0–18)
ANTIBODY SCREEN: NEGATIVE
AST SERPL-CCNC: 24 U/L (ref 15–37)
BASOPHILS # BLD AUTO: 0.04 X10(3) UL (ref 0–0.2)
BASOPHILS NFR BLD AUTO: 0.9 %
BILIRUB SERPL-MCNC: 0.5 MG/DL (ref 0.1–2)
BUN BLD-MCNC: 20 MG/DL (ref 7–18)
BUN/CREAT SERPL: 19.6 (ref 10–20)
CALCIUM BLD-MCNC: 9 MG/DL (ref 8.5–10.1)
CHLORIDE SERPL-SCNC: 107 MMOL/L (ref 98–112)
CO2 SERPL-SCNC: 27 MMOL/L (ref 21–32)
CREAT BLD-MCNC: 1.02 MG/DL
DEPRECATED RDW RBC AUTO: 44.6 FL (ref 35.1–46.3)
EOSINOPHIL # BLD AUTO: 0.09 X10(3) UL (ref 0–0.7)
EOSINOPHIL NFR BLD AUTO: 1.9 %
ERYTHROCYTE [DISTWIDTH] IN BLOOD BY AUTOMATED COUNT: 13.7 % (ref 11–15)
GLOBULIN PLAS-MCNC: 3 G/DL (ref 2.8–4.4)
GLUCOSE BLD-MCNC: 76 MG/DL (ref 70–99)
HCT VFR BLD AUTO: 36.8 %
HGB BLD-MCNC: 12.5 G/DL
IMM GRANULOCYTES # BLD AUTO: 0.01 X10(3) UL (ref 0–1)
IMM GRANULOCYTES NFR BLD: 0.2 %
LYMPHOCYTES # BLD AUTO: 1.32 X10(3) UL (ref 1–4)
LYMPHOCYTES NFR BLD AUTO: 28.4 %
M PROTEIN MFR SERPL ELPH: 7 G/DL (ref 6.4–8.2)
MCH RBC QN AUTO: 30.2 PG (ref 26–34)
MCHC RBC AUTO-ENTMCNC: 34 G/DL (ref 31–37)
MCV RBC AUTO: 88.9 FL
MONOCYTES # BLD AUTO: 0.35 X10(3) UL (ref 0.1–1)
MONOCYTES NFR BLD AUTO: 7.5 %
NEUTROPHILS # BLD AUTO: 2.84 X10 (3) UL (ref 1.5–7.7)
NEUTROPHILS # BLD AUTO: 2.84 X10(3) UL (ref 1.5–7.7)
NEUTROPHILS NFR BLD AUTO: 61.1 %
OSMOLALITY SERPL CALC.SUM OF ELEC: 293 MOSM/KG (ref 275–295)
PLATELET # BLD AUTO: 185 10(3)UL (ref 150–450)
POTASSIUM SERPL-SCNC: 3.9 MMOL/L (ref 3.5–5.1)
RBC # BLD AUTO: 4.14 X10(6)UL
RH BLOOD TYPE: POSITIVE
SODIUM SERPL-SCNC: 141 MMOL/L (ref 136–145)
WBC # BLD AUTO: 4.7 X10(3) UL (ref 4–11)

## 2021-08-06 PROCEDURE — 93005 ELECTROCARDIOGRAM TRACING: CPT

## 2021-08-06 PROCEDURE — 80053 COMPREHEN METABOLIC PANEL: CPT

## 2021-08-06 PROCEDURE — 87081 CULTURE SCREEN ONLY: CPT

## 2021-08-06 PROCEDURE — 3074F SYST BP LT 130 MM HG: CPT | Performed by: FAMILY MEDICINE

## 2021-08-06 PROCEDURE — 3078F DIAST BP <80 MM HG: CPT | Performed by: FAMILY MEDICINE

## 2021-08-06 PROCEDURE — 85025 COMPLETE CBC W/AUTO DIFF WBC: CPT

## 2021-08-06 PROCEDURE — 3008F BODY MASS INDEX DOCD: CPT | Performed by: FAMILY MEDICINE

## 2021-08-06 PROCEDURE — 81015 MICROSCOPIC EXAM OF URINE: CPT

## 2021-08-06 PROCEDURE — 86850 RBC ANTIBODY SCREEN: CPT

## 2021-08-06 PROCEDURE — 86901 BLOOD TYPING SEROLOGIC RH(D): CPT

## 2021-08-06 PROCEDURE — 86900 BLOOD TYPING SEROLOGIC ABO: CPT

## 2021-08-06 PROCEDURE — 36591 DRAW BLOOD OFF VENOUS DEVICE: CPT

## 2021-08-06 PROCEDURE — 93010 ELECTROCARDIOGRAM REPORT: CPT | Performed by: FAMILY MEDICINE

## 2021-08-06 PROCEDURE — 99244 OFF/OP CNSLTJ NEW/EST MOD 40: CPT | Performed by: FAMILY MEDICINE

## 2021-08-06 RX ORDER — SODIUM CHLORIDE 9 MG/ML
INJECTION INTRAVENOUS
Status: DISCONTINUED
Start: 2021-08-06 | End: 2021-08-06

## 2021-08-06 NOTE — H&P
[facility] PRE-OP CLINIC [facility]    PRE-OP NOTE    HPI:   I have been consulted by Dr. Naomi Pantoja see Siddhartha Sanders 48year old female for a preoperative evaluation and medical clearance.  She has a long history of worsening severe degenerative arthritis of Take 1 tablet (20 mg total) by mouth daily with food.  90 tablet 2   • rOPINIRole HCl 0.25 MG Oral Tab 1 tab nightly but can use an extra tab if needed 180 tablet 3   • Nitrofurantoin Macrocrystal 100 MG Oral Cap Take 1 capsule (100 mg total) by mouth daily Neuropathy     Boths legs   • OBESITY    • Osteoarthritis    • Pleurisy    • Pneumonia due to organism 05/2017   • PONV (postoperative nausea and vomiting)    • Raynaud disease    • Renal disorder     Chronic Kidney failure-stage 3    • Rheumatoid arthriti Currently        Partners: Male    Other Topics      Concerns:        Not on file    Social History Narrative      Not on file    Social Determinants of Health  Financial Resource Strain:       Difficulty of Paying Living Expenses:   Food Insecurity:       EXAM:   /78 (BP Location: Left arm)   Pulse 74   Temp 97.6 °F (36.4 °C) (Temporal)   Resp 16   Ht 5' 5\" (1.651 m)   Wt 126 lb (57.2 kg)   SpO2 99%   BMI 20.97 kg/m²  Estimated body mass index is 20.97 kg/m² as calculated from the following:    H found.          ASSESSMENT AND PLAN:   Yousif Osei is a 48year old female, with a hx of severe degenerative arthritis of her left hip who presents for a pre-operative physical exam. Patient is to have a left total hip arthroplasty by Dr. Marilyn Chang on 8/2 tissue disease, undifferentiated ([address]     Inflammatory polyarthritis ([address]     Preoperative testing     Sicca ([address]     Neck pain     Seizure ([address]     Osteoarthrosis, unspecified whether generalized or localized, pelvic region and thigh     Rheumatoid ar DM: QID glucoscans and sliding scale insulin as needed   Renal protection: (hydration / NSAID and ACE/ARB avoidance)   Cognitive protection: (minimize narcotics, benzodiazepines, scopolamine)     Pain management and Physical therapy as per Orthopedic ser

## 2021-08-06 NOTE — TELEPHONE ENCOUNTER
Audra Ramirez from Dr. Freda Miles's office called. Patient is having surgery on August 24th for a hip replacement. Audra Ramirez asked Dr. Laurie Chavira to give his recommendation as to when the patient should come off of the [facility] prior to surgery.  If you need to speak with one of

## 2021-08-06 NOTE — TELEPHONE ENCOUNTER
MD Caryn Segura, RN  Caller: Unspecified (Today, 11:42 AM)  OK to stop xarelto 48 hours preop. NO HEPARIN or enoxaparin. Ok to restart when surgeon routinely would start anticoagulation.      Can go back on xarelto or could use osvaldo

## 2021-08-07 ENCOUNTER — APPOINTMENT (OUTPATIENT)
Dept: CV DIAGNOSTICS | Facility: HOSPITAL | Age: 56
End: 2021-08-07
Attending: STUDENT IN AN ORGANIZED HEALTH CARE EDUCATION/TRAINING PROGRAM
Payer: COMMERCIAL

## 2021-08-07 ENCOUNTER — HOSPITAL ENCOUNTER (OUTPATIENT)
Age: 56
Discharge: ANOTHER HOSPITAL | End: 2021-08-07
Payer: COMMERCIAL

## 2021-08-07 ENCOUNTER — HOSPITAL ENCOUNTER (OUTPATIENT)
Facility: HOSPITAL | Age: 56
Setting detail: OBSERVATION
Discharge: HOME | End: 2021-08-10
Attending: STUDENT IN AN ORGANIZED HEALTH CARE EDUCATION/TRAINING PROGRAM | Admitting: HOSPITALIST
Payer: COMMERCIAL

## 2021-08-07 ENCOUNTER — APPOINTMENT (OUTPATIENT)
Dept: GENERAL RADIOLOGY | Age: 56
End: 2021-08-07
Attending: PHYSICIAN ASSISTANT
Payer: COMMERCIAL

## 2021-08-07 VITALS
SYSTOLIC BLOOD PRESSURE: 112 MMHG | RESPIRATION RATE: 18 BRPM | DIASTOLIC BLOOD PRESSURE: 68 MMHG | TEMPERATURE: 98 F | HEART RATE: 89 BPM | OXYGEN SATURATION: 97 %

## 2021-08-07 DIAGNOSIS — R94.31 ABNORMAL EKG: ICD-10-CM

## 2021-08-07 DIAGNOSIS — R55 SYNCOPE AND COLLAPSE: ICD-10-CM

## 2021-08-07 DIAGNOSIS — S99.912A INJURY OF LEFT ANKLE, INITIAL ENCOUNTER: Primary | ICD-10-CM

## 2021-08-07 DIAGNOSIS — S93.402A SPRAIN OF LEFT ANKLE, UNSPECIFIED LIGAMENT, INITIAL ENCOUNTER: ICD-10-CM

## 2021-08-07 DIAGNOSIS — R00.2 PALPITATIONS: Primary | ICD-10-CM

## 2021-08-07 DIAGNOSIS — R55 SYNCOPE, NEAR: ICD-10-CM

## 2021-08-07 LAB
ALBUMIN SERPL-MCNC: 3.9 G/DL (ref 3.4–5)
ALBUMIN/GLOB SERPL: 1.3 {RATIO} (ref 1–2)
ALP LIVER SERPL-CCNC: 55 U/L
ALT SERPL-CCNC: 30 U/L
ANION GAP SERPL CALC-SCNC: 5 MMOL/L (ref 0–18)
AST SERPL-CCNC: 26 U/L (ref 15–37)
ATRIAL RATE: 78 BPM
BASOPHILS # BLD AUTO: 0.03 X10(3) UL (ref 0–0.2)
BASOPHILS NFR BLD AUTO: 0.5 %
BILIRUB SERPL-MCNC: 0.4 MG/DL (ref 0.1–2)
BUN BLD-MCNC: 22 MG/DL (ref 7–18)
CALCIUM BLD-MCNC: 8.4 MG/DL (ref 8.5–10.1)
CHLORIDE SERPL-SCNC: 109 MMOL/L (ref 98–112)
CO2 SERPL-SCNC: 26 MMOL/L (ref 21–32)
CREAT BLD-MCNC: 1.12 MG/DL
EOSINOPHIL # BLD AUTO: 0.06 X10(3) UL (ref 0–0.7)
EOSINOPHIL NFR BLD AUTO: 1.1 %
ERYTHROCYTE [DISTWIDTH] IN BLOOD BY AUTOMATED COUNT: 14.1 %
GLOBULIN PLAS-MCNC: 3.1 G/DL (ref 2.8–4.4)
GLUCOSE BLD-MCNC: 77 MG/DL (ref 70–99)
HCT VFR BLD AUTO: 37 %
HGB BLD-MCNC: 12.6 G/DL
IMM GRANULOCYTES # BLD AUTO: 0.02 X10(3) UL (ref 0–1)
IMM GRANULOCYTES NFR BLD: 0.4 %
LYMPHOCYTES # BLD AUTO: 1.33 X10(3) UL (ref 1–4)
LYMPHOCYTES NFR BLD AUTO: 24.1 %
M PROTEIN MFR SERPL ELPH: 7 G/DL (ref 6.4–8.2)
MCH RBC QN AUTO: 30.1 PG (ref 26–34)
MCHC RBC AUTO-ENTMCNC: 34.1 G/DL (ref 31–37)
MCV RBC AUTO: 88.3 FL
MONOCYTES # BLD AUTO: 0.36 X10(3) UL (ref 0.1–1)
MONOCYTES NFR BLD AUTO: 6.5 %
NEUTROPHILS # BLD AUTO: 3.71 X10 (3) UL (ref 1.5–7.7)
NEUTROPHILS # BLD AUTO: 3.71 X10(3) UL (ref 1.5–7.7)
NEUTROPHILS NFR BLD AUTO: 67.4 %
OSMOLALITY SERPL CALC.SUM OF ELEC: 292 MOSM/KG (ref 275–295)
P AXIS: 64 DEGREES
P-R INTERVAL: 140 MS
PLATELET # BLD AUTO: 195 10(3)UL (ref 150–450)
POTASSIUM SERPL-SCNC: 3.9 MMOL/L (ref 3.5–5.1)
Q-T INTERVAL: 356 MS
QRS DURATION: 90 MS
QTC CALCULATION (BEZET): 405 MS
R AXIS: 27 DEGREES
RBC # BLD AUTO: 4.19 X10(6)UL
SODIUM SERPL-SCNC: 140 MMOL/L (ref 136–145)
T AXIS: -61 DEGREES
TROPONIN I SERPL-MCNC: <0.045 NG/ML (ref ?–0.04)
VENTRICULAR RATE: 78 BPM
WBC # BLD AUTO: 5.5 X10(3) UL (ref 4–11)

## 2021-08-07 PROCEDURE — 84484 ASSAY OF TROPONIN QUANT: CPT | Performed by: STUDENT IN AN ORGANIZED HEALTH CARE EDUCATION/TRAINING PROGRAM

## 2021-08-07 PROCEDURE — 96374 THER/PROPH/DIAG INJ IV PUSH: CPT

## 2021-08-07 PROCEDURE — 85025 COMPLETE CBC W/AUTO DIFF WBC: CPT | Performed by: STUDENT IN AN ORGANIZED HEALTH CARE EDUCATION/TRAINING PROGRAM

## 2021-08-07 PROCEDURE — 99285 EMERGENCY DEPT VISIT HI MDM: CPT

## 2021-08-07 PROCEDURE — 80053 COMPREHEN METABOLIC PANEL: CPT | Performed by: STUDENT IN AN ORGANIZED HEALTH CARE EDUCATION/TRAINING PROGRAM

## 2021-08-07 PROCEDURE — 99205 OFFICE O/P NEW HI 60 MIN: CPT | Performed by: PHYSICIAN ASSISTANT

## 2021-08-07 PROCEDURE — 93005 ELECTROCARDIOGRAM TRACING: CPT

## 2021-08-07 PROCEDURE — 93000 ELECTROCARDIOGRAM COMPLETE: CPT | Performed by: PHYSICIAN ASSISTANT

## 2021-08-07 PROCEDURE — 73610 X-RAY EXAM OF ANKLE: CPT | Performed by: PHYSICIAN ASSISTANT

## 2021-08-07 PROCEDURE — 93010 ELECTROCARDIOGRAM REPORT: CPT

## 2021-08-07 PROCEDURE — 93306 TTE W/DOPPLER COMPLETE: CPT | Performed by: STUDENT IN AN ORGANIZED HEALTH CARE EDUCATION/TRAINING PROGRAM

## 2021-08-07 RX ORDER — ERENUMAB-AOOE 140 MG/ML
140 INJECTION, SOLUTION SUBCUTANEOUS
COMMUNITY
End: 2021-09-07

## 2021-08-07 RX ORDER — ROPINIROLE 0.25 MG/1
0.25 TABLET, FILM COATED ORAL NIGHTLY PRN
Status: DISCONTINUED | OUTPATIENT
Start: 2021-08-07 | End: 2021-08-10

## 2021-08-07 RX ORDER — SODIUM PHOSPHATE, DIBASIC AND SODIUM PHOSPHATE, MONOBASIC 7; 19 G/133ML; G/133ML
1 ENEMA RECTAL ONCE AS NEEDED
Status: DISCONTINUED | OUTPATIENT
Start: 2021-08-07 | End: 2021-08-10

## 2021-08-07 RX ORDER — HYDROCODONE BITARTRATE AND ACETAMINOPHEN 5; 325 MG/1; MG/1
1 TABLET ORAL EVERY 4 HOURS PRN
Status: DISCONTINUED | OUTPATIENT
Start: 2021-08-07 | End: 2021-08-10

## 2021-08-07 RX ORDER — ACETAMINOPHEN 325 MG/1
650 TABLET ORAL EVERY 4 HOURS PRN
Status: DISCONTINUED | OUTPATIENT
Start: 2021-08-07 | End: 2021-08-10

## 2021-08-07 RX ORDER — ROPINIROLE 2 MG/1
2 TABLET, FILM COATED, EXTENDED RELEASE ORAL
Status: DISCONTINUED | OUTPATIENT
Start: 2021-08-08 | End: 2021-08-08

## 2021-08-07 RX ORDER — POLYETHYLENE GLYCOL 3350 17 G/17G
17 POWDER, FOR SOLUTION ORAL DAILY PRN
Status: DISCONTINUED | OUTPATIENT
Start: 2021-08-07 | End: 2021-08-10

## 2021-08-07 RX ORDER — BISACODYL 10 MG
10 SUPPOSITORY, RECTAL RECTAL
Status: DISCONTINUED | OUTPATIENT
Start: 2021-08-07 | End: 2021-08-10

## 2021-08-07 RX ORDER — PANTOPRAZOLE SODIUM 40 MG/1
40 TABLET, DELAYED RELEASE ORAL
Status: DISCONTINUED | OUTPATIENT
Start: 2021-08-08 | End: 2021-08-10

## 2021-08-07 RX ORDER — UBROGEPANT 50 MG/1
50 TABLET ORAL ONCE
COMMUNITY
End: 2022-01-11

## 2021-08-07 RX ORDER — FENOFIBRATE 134 MG/1
134 CAPSULE ORAL
Status: DISCONTINUED | OUTPATIENT
Start: 2021-08-08 | End: 2021-08-10

## 2021-08-07 RX ORDER — ONDANSETRON 4 MG/1
TABLET, ORALLY DISINTEGRATING ORAL
Status: DISPENSED
Start: 2021-08-07 | End: 2021-08-08

## 2021-08-07 RX ORDER — ONDANSETRON 2 MG/ML
4 INJECTION INTRAMUSCULAR; INTRAVENOUS EVERY 6 HOURS PRN
Status: DISCONTINUED | OUTPATIENT
Start: 2021-08-07 | End: 2021-08-10

## 2021-08-07 RX ORDER — PANTOPRAZOLE SODIUM 40 MG/1
40 TABLET, DELAYED RELEASE ORAL ONCE
Status: COMPLETED | OUTPATIENT
Start: 2021-08-07 | End: 2021-08-07

## 2021-08-07 RX ORDER — GABAPENTIN 300 MG/1
300 CAPSULE ORAL 3 TIMES DAILY
Status: DISCONTINUED | OUTPATIENT
Start: 2021-08-07 | End: 2021-08-10

## 2021-08-07 RX ORDER — VALACYCLOVIR HYDROCHLORIDE 500 MG/1
1000 TABLET, FILM COATED ORAL 2 TIMES DAILY
Status: DISCONTINUED | OUTPATIENT
Start: 2021-08-07 | End: 2021-08-08

## 2021-08-07 RX ORDER — MORPHINE SULFATE 4 MG/ML
4 INJECTION, SOLUTION INTRAMUSCULAR; INTRAVENOUS ONCE
Status: COMPLETED | OUTPATIENT
Start: 2021-08-07 | End: 2021-08-07

## 2021-08-07 RX ORDER — ROPINIROLE 0.25 MG/1
TABLET, FILM COATED ORAL NIGHTLY
COMMUNITY

## 2021-08-07 RX ORDER — ALLOPURINOL 100 MG/1
100 TABLET ORAL NIGHTLY
Status: DISCONTINUED | OUTPATIENT
Start: 2021-08-07 | End: 2021-08-10

## 2021-08-07 RX ORDER — CYANOCOBALAMIN 1000 UG/ML
1000 INJECTION INTRAMUSCULAR; SUBCUTANEOUS
COMMUNITY

## 2021-08-07 RX ORDER — ONDANSETRON 4 MG/1
4 TABLET, FILM COATED ORAL EVERY 8 HOURS PRN
COMMUNITY

## 2021-08-07 RX ORDER — HYDROCODONE BITARTRATE AND ACETAMINOPHEN 5; 325 MG/1; MG/1
2 TABLET ORAL EVERY 4 HOURS PRN
Status: DISCONTINUED | OUTPATIENT
Start: 2021-08-07 | End: 2021-08-10

## 2021-08-07 NOTE — ED PROVIDER NOTES
Patient Seen in: [facility] Emergency Department      History   Patient presents with:  Fall  Dizziness    Stated Complaint: fall    HPI/Subjective:   HPI    Patient is a 27-year-old female with previous history of SVT and multifocal atrial tachycar PONV (postoperative nausea and vomiting)    • Raynaud disease    • Renal disorder     Chronic Kidney failure-stage 3    • Rhe[facility] ([facility]    • Seizure disorder (HCC)     had x 1 year and went away/unknown etiology/ age 56   • Sjogren's disease ( oriented to person, place, and time, appears well-developed and well-nourished. HENT:   Head: Normocephalic and atraumatic. Eyes: No scleral icterus. Neck: Normal range of motion. Neck supple.    Cardiovascular: Normal rate, regular rhythm, normal hear inferior distribution. EKG is somewhat changed from prior. Patient remained on telemetry while in the ER. Normal sinus rhythm noted with no arrhythmia.        MDM      Extensive differential diagnosis was considered for the patient including Unknown

## 2021-08-07 NOTE — ED INITIAL ASSESSMENT (HPI)
Patient states approximately 90 minutes ago she was sitting on the toilet and became dizzy. She got up and fell in the bathroom. States she injured her left ankle. Had a cardiac ablation on 8/2/21 and has been having intermittent dizziness since then.

## 2021-08-07 NOTE — ED INITIAL ASSESSMENT (HPI)
Pt arrives via EMS from immediate care. Pt was at [address] [place] [address] for a fall, while at [address] [place] [address] pt began to feel dizzy, pt states having intermittent dizziness for a few days. Pt had cardiac ablation 6 days ago At [facility].  Pt states she has intermittent feelings of

## 2021-08-07 NOTE — HISTORICAL OFFICE NOTE
CARDIAC CATHETERIZATION     NAMEColtkaren Weems  MRN:    [id number]  :    1965     PROCEDURE DATE:  2021        NAME OF PROCEDURE:  Invasive electrophysiologic testing with coronary sinus mapping with programmed electrical stimulation (PES) before.     CATHETERS PLACED:  6-Malaysian deflectable quadripolar x2 to HIS and to RV. 7-Malaysian DecaNav to CS.  After diagnoses made, D-F SF ablation catheter placed with non-irrigated RF ablation on irrigated platform in the usual slow pathway modification p atypical AV node re-entry was observed at 650/340, 650/330, 650/310, 650/300, and 650/290 with AERP encountered at 650/270. There was no A2H2 jump. At drive cycle length D6G8=930 msec, atypical AV node re-entry was encountered at 600/310 and 600/300.  AERP simultaneously in the HIS (from the right then in the left) indicating complete elimination of pathway. A final echocardiogram was done proving absence of any pericardial effusion. There was excellent contractility.  There was no evidence of clot.      ACTs

## 2021-08-07 NOTE — ED PROVIDER NOTES
Patient Seen in: Immediate 234       History   Patient presents with: Ankle Injury    Stated Complaint: Ankle Injury    HPI/Subjective:   HPI    Pleasant 50-year-old female. Moderate medical history.   Patient underwent cardiac ablation 6 days pr • Sjogren's disease [facility])    • Visual impairment     wears glasses              Past Surgical History:   Procedure Laterality Date   • APPENDECTOMY     • BACK SURGERY      3/16 lumbar discectomy  9/2016 lumbar fusion   • BIOPSY SALIVARY GLAND,INCISIONAL lymphadenopathy. Eye examination: EOMs are intact, normal conjunctival  ENT: Atraumatic  Lung: No distress, RR, no retraction,   Extremities: Swelling left lateral malleoli region. Pain to palpation diffusely. No Achilles insertion tenderness.   Maintain yesterday. Plain films of the left ankle be performed. EKG. I discussed with the patient that a near syncopal episode after a recent cardiac ablation needs significant evaluation.   It is our recommendation that she report to the emergency department f

## 2021-08-07 NOTE — PROGRESS NOTES
See Note dated 8/2   EP Study ablation  Uploaded to [facility]

## 2021-08-07 NOTE — ED QUICK NOTES
Patient refused blood draw for CBC. States she only wants it drawn out of her port and will wait until she gets to the hospital. Dinora MCBRIDE aware.

## 2021-08-08 ENCOUNTER — APPOINTMENT (OUTPATIENT)
Dept: CT IMAGING | Facility: HOSPITAL | Age: 56
End: 2021-08-08
Attending: HOSPITALIST
Payer: COMMERCIAL

## 2021-08-08 LAB
ANION GAP SERPL CALC-SCNC: 2 MMOL/L (ref 0–18)
ATRIAL RATE: 80 BPM
BUN BLD-MCNC: 20 MG/DL (ref 7–18)
CALCIUM BLD-MCNC: 8 MG/DL (ref 8.5–10.1)
CHLORIDE SERPL-SCNC: 107 MMOL/L (ref 98–112)
CO2 SERPL-SCNC: 30 MMOL/L (ref 21–32)
CREAT BLD-MCNC: 1.15 MG/DL
GLUCOSE BLD-MCNC: 104 MG/DL (ref 70–99)
HAV IGM SER QL: 1.8 MG/DL (ref 1.6–2.6)
OSMOLALITY SERPL CALC.SUM OF ELEC: 291 MOSM/KG (ref 275–295)
P AXIS: 59 DEGREES
P-R INTERVAL: 130 MS
POTASSIUM SERPL-SCNC: 3.7 MMOL/L (ref 3.5–5.1)
Q-T INTERVAL: 386 MS
QRS DURATION: 92 MS
QTC CALCULATION (BEZET): 445 MS
R AXIS: 8 DEGREES
SODIUM SERPL-SCNC: 139 MMOL/L (ref 136–145)
T AXIS: 35 DEGREES
VENTRICULAR RATE: 80 BPM

## 2021-08-08 PROCEDURE — 74176 CT ABD & PELVIS W/O CONTRAST: CPT | Performed by: HOSPITALIST

## 2021-08-08 PROCEDURE — 96375 TX/PRO/DX INJ NEW DRUG ADDON: CPT

## 2021-08-08 PROCEDURE — 96376 TX/PRO/DX INJ SAME DRUG ADON: CPT

## 2021-08-08 PROCEDURE — 97116 GAIT TRAINING THERAPY: CPT

## 2021-08-08 PROCEDURE — 80048 BASIC METABOLIC PNL TOTAL CA: CPT | Performed by: HOSPITALIST

## 2021-08-08 PROCEDURE — 92610 EVALUATE SWALLOWING FUNCTION: CPT

## 2021-08-08 PROCEDURE — 97165 OT EVAL LOW COMPLEX 30 MIN: CPT

## 2021-08-08 PROCEDURE — 83735 ASSAY OF MAGNESIUM: CPT | Performed by: HOSPITALIST

## 2021-08-08 PROCEDURE — 97162 PT EVAL MOD COMPLEX 30 MIN: CPT

## 2021-08-08 PROCEDURE — 36592 COLLECT BLOOD FROM PICC: CPT

## 2021-08-08 RX ORDER — PREDNISONE 50 MG/1
50 TABLET ORAL EVERY 6 HOURS
Status: COMPLETED | OUTPATIENT
Start: 2021-08-08 | End: 2021-08-09

## 2021-08-08 RX ORDER — ROPINIROLE 2 MG/1
2 TABLET, FILM COATED, EXTENDED RELEASE ORAL
Status: DISCONTINUED | OUTPATIENT
Start: 2021-08-08 | End: 2021-08-10

## 2021-08-08 RX ORDER — MELATONIN
3 NIGHTLY PRN
Status: DISCONTINUED | OUTPATIENT
Start: 2021-08-08 | End: 2021-08-10

## 2021-08-08 RX ORDER — DIPHENHYDRAMINE HCL 25 MG
50 CAPSULE ORAL ONCE
Status: COMPLETED | OUTPATIENT
Start: 2021-08-09 | End: 2021-08-09

## 2021-08-08 RX ORDER — VALACYCLOVIR HYDROCHLORIDE 500 MG/1
500 TABLET, FILM COATED ORAL 2 TIMES DAILY
Status: DISCONTINUED | OUTPATIENT
Start: 2021-08-08 | End: 2021-08-10

## 2021-08-08 NOTE — CONSULTS
Gideon Maurer is a 48year old female  Patient presents with:  Fall  Dizziness      HPI: patient admitted after fall in bathroom  Recent cardiac ablation  Cat scan preformed for abd pain  Air noted in biliary tree  Study done without contrast secondary t HYSTERECTOMY  1999    LUCA BSO for endometriosis   • [address] W/O BYPASS  08/2021   • OTHER  11/2015    reconstructive surgery of left thumb joint   • OTHER  8/2015    vaginal sling   • OTHER Right     WRIST SURGERY   • OTHER SURGIC

## 2021-08-08 NOTE — PHYSICAL THERAPY NOTE
PHYSICAL THERAPY QUICK EVALUATION - INPATIENT    Room Number: 2629/6218-I  Evaluation Date: 8/8/2021  Presenting Problem: fall with L ankle sprain  Physician Order: PT Eval and Treat    Problem List  Principal Problem:    Palpitations  Active Problems: ELBOW SURGERY     • HERNIA SURGERY     • HIP REPLACEMENT SURGERY  1/14    right: Dr. Rachel Flores   • Hina Coats for endometriosis   • [address] W/O BYPASS  08/2021   • OTHER  11/2015    reconstructive surgery of lef blankets)?: None   -   Sitting down on and standing up from a chair with arms (e.g., wheelchair, bedside commode, etc.): None   -   Moving from lying on back to sitting on the side of the bed?: None   How much help from another person does the patient curr PLAN  Patient currently does not meet criteria for skilled inpatient physical therapy services, however patient will remain on Inpatient Mobility Team and will continue with supervised ambulation to maintain current level of mobility.    The rehab aide wi

## 2021-08-08 NOTE — PROGRESS NOTES
48 y.o. admitted after near syncope--  I defer to Dr. Eyad Khan for all medical/cardiologic decisions    No evidence arrhythmia, anemia suspect hypotensive orthostatic event  Chest clr  S1S2 No S3 No m no rub  Trace R edema dep  Monitor sinus all  Echo ULN Pt given discharge instructions/ home care instructions, pt verbalized understanding of instructions given, pt ambulatory to ER reba in stable condition, VSS.

## 2021-08-08 NOTE — PLAN OF CARE
Received pt at 0730. A&o x4. NSR on tele. VSS. Denies chest pain and SOB. WNL on room air. Up SBA. Last BM 8/7 with good urine output. Pt c/o headache. Ubrelvy ordered PRN. Pt asked to bring from home.      Problem: Patient/Family Goals  Goal: Patient/Famil

## 2021-08-08 NOTE — OCCUPATIONAL THERAPY NOTE
OCCUPATIONAL THERAPY QUICK EVALUATION - INPATIENT    Room Number: 6401/4549-K  Evaluation Date: 8/8/2021     Type of Evaluation: Quick Eval  Presenting Problem: syncope, palpitations, fall, L ankle sprain    Physician Order: IP Consult to Occupational Ther no longer functions; LT kidney working about 30%; presently has hydronephrosi s and kidney stones   • KIDNEY STONE    • Lupus (systemic lupus erythematosus) ([facility]    • Migraines     bright lights,wine,sulfites,cheeses,sweeteners,barometeric pressure   • Mu Function: Pt lives with her . Independent with all ADL. Pt has RW, but does not use it. SUBJECTIVE  \"I've been dealing with this for 2 years. \" Pt was referring to cardiac issues.     OBJECTIVE  Precautions: Cardiac (wearing ankle brace L)  Fal addressed; Family present (Pt was left with MD, pt seated EOB, RN aware)    ASSESSMENT     Patient is a 48year old female admitted from immediate care on 8/7 with palpitations and dizziness.  Pt went to immediate care on 8/7 after syncopal episode in the ba

## 2021-08-08 NOTE — ED QUICK NOTES
This PCT spoke with Echo cardiographic tech, states she will be on campus in 40 minutes. Will do bedside in ED or Inpatient, reading will be by Dr. Luis Carlos lAexander.

## 2021-08-08 NOTE — TELEPHONE ENCOUNTER
Dr. Harry Paget- please see H&P in Notes 08/08/21 Dr. Burak Desai- patient went to ER yesterday (08/07) due to getting up from the bathroom and almost passing out.  She did fall and twisted her left ankle and they ended up keeping her due to palpitations wors

## 2021-08-08 NOTE — SIGNIFICANT EVENT
Received patient from ED, accompanied by a family member. She was alert and oriented and not showing any signs of distress. She was oriented to room set up. Needs attended.

## 2021-08-08 NOTE — ED QUICK NOTES
Orders for admission, patient is aware of plan and ready to go upstairs. Any questions, please call ED RN Torres at extension [phone]    Vaccinated?  yes  Type of COVID test sent:  COVID Suspicion level: Low/High      Titratable drug(s) infusing:  Rate:    LO

## 2021-08-08 NOTE — H&P
[facility]    History and Physical     Marielle Goode Patient Status:  Observation    1965 MRN [id number]   [facility] 8NE-A Attending Deborah Gerardo MD   Hosp Day # 0 PCP Clarisse Jaramillo     Chief Complaint: Palpitations month for which she had a nuclear stress test this year which was negative for acute pathology. She mentions difficulty swallowing as well at times. Patient denies any other positive review of systems.      Past Medical History:  Past Medical History:   Evelin • [address] W/O BYPASS  08/2021   • OTHER  11/2015    reconstructive surgery of left thumb joint   • OTHER  8/2015    vaginal sling   • OTHER Right     WRIST SURGERY   • OTHER SURGICAL HISTORY      parathyroidectomy   • OTHER SURG COMMENTS)    Comment:Restless legs, skin crawling  Pcn [Bicillin L-A]      RASH    Comment:ampicillin    Medications:  No current facility-administered medications on file prior to encounter.   Erenumab-aooe (AIMOVIG) 140 MG/ML Subcutaneous Solution Auto-in HCl (ZOLOFT) 100 MG Oral Tab, Take 150 mg by mouth daily. , Disp: , Rfl:   Belimumab (BENLYSTA) 200 MG/ML Subcutaneous Solution Auto-injector, Inject 200 mg into the skin every 7 days. , Disp: 12 Syringe, Rfl: 0        Review of Systems:   A comprehensive Epic.      ASSESSMENT / PLAN:   48year old female with history of DVT on xarelto, kidney stones, depression, gerd, gout, hld, htn, IBS, fibromyalgia, atrial tachycardia/svt, Lupus, migraines, neuropathy, RA, seizure hx (long time ago), Sjogren, raynaud,

## 2021-08-08 NOTE — CONSULTS
Ismael Gonzales is a 48year old female who I assessed as follows:  Patient presents with:  Fall  Dizziness         REASON FOR CONSULTATION:    palpitations            ASSESSMENT/PLAN:     IMPRESSIONS:  1.  Fall leading to • KIDNEY STONE    • Lupus (systemic lupus erythematosus) (HCC)    • Migraines     bright lights,wine,sulfites,cheeses,sweeteners,barometeric pressure   • Muscle weakness     arm weakness   • Neuropathy     Boths legs   • OBESITY    • Osteoarthritis    • Frequency: 7.0 times per week      Types: Cannabis         Medications:  acetaminophen (TYLENOL) tab 650 mg, 650 mg, Oral, Q4H PRN   Or  HYDROcodone-acetaminophen (NORCO) 5-325 MG per tab 1 tablet, 1 tablet, Oral, Q4H PRN   Or  HYDROcodone-acetaminophen (N Comment:POWDER \"CHOKES\" HER  Phenergan [Prometha*    OTHER (SEE COMMENTS)    Comment:HYPERACTIVITY  Reglan [Metoclopram*    OTHER (SEE COMMENTS)    Comment:Restless legs, skin crawling  Pcn [Bicillin L-A]      RASH    Comment:ampicillin            REVIEW 08/07/21  1748   RBC 4.19   HGB 12.6   HCT 37.0   MCV 88.3   MCH 30.1   MCHC 34.1   RDW 14.1   NEPRELIM 3.71   WBC 5.5   .0         Imaging:  XR ANKLE (MIN 3 VIEWS), LEFT (CPT=73610)    Result Date: 8/7/2021  CONCLUSION:  No acute fracture or disloc

## 2021-08-08 NOTE — SLP NOTE
ADULT SWALLOWING EVALUATION    ASSESSMENT    ASSESSMENT/OVERALL IMPRESSION:  Orders were received for a bedside swallowing evaluation as patient reported that food appears to get stuck as she swallows requiring frequent sips of water to facilitate transit tissue disease, undifferentiated ([facility]    • Deep vein thrombosis ([facility] 2016    Multiple DVTs: 2 in leg and 2 in arm   • DEPRESSION    • Depression    • Esophageal reflux    • Fibromyalgia    • Gout    • High cholesterol    • History of blood transfusion 20 Swallow: Within Functional Limits           (Please note: Silent aspiration cannot be evaluated clinically.  Videofluoroscopic Swallow Study is required to rule-out silent aspiration.)    Esophageal Phase of Swallow: Complaints consistent with possible esop

## 2021-08-09 ENCOUNTER — APPOINTMENT (OUTPATIENT)
Dept: CT IMAGING | Facility: HOSPITAL | Age: 56
End: 2021-08-09
Attending: SURGERY
Payer: COMMERCIAL

## 2021-08-09 LAB
ANION GAP SERPL CALC-SCNC: 6 MMOL/L (ref 0–18)
BASOPHILS # BLD AUTO: 0.01 X10(3) UL (ref 0–0.2)
BASOPHILS NFR BLD AUTO: 0.2 %
BUN BLD-MCNC: 27 MG/DL (ref 7–18)
CALCIUM BLD-MCNC: 9 MG/DL (ref 8.5–10.1)
CHLORIDE SERPL-SCNC: 102 MMOL/L (ref 98–112)
CO2 SERPL-SCNC: 27 MMOL/L (ref 21–32)
CREAT BLD-MCNC: 1.19 MG/DL
EOSINOPHIL # BLD AUTO: 0 X10(3) UL (ref 0–0.7)
EOSINOPHIL NFR BLD AUTO: 0 %
ERYTHROCYTE [DISTWIDTH] IN BLOOD BY AUTOMATED COUNT: 13.2 %
GLUCOSE BLD-MCNC: 142 MG/DL (ref 70–99)
HAV IGM SER QL: 1.9 MG/DL (ref 1.6–2.6)
HCT VFR BLD AUTO: 37.7 %
HGB BLD-MCNC: 12.6 G/DL
IMM GRANULOCYTES # BLD AUTO: 0.03 X10(3) UL (ref 0–1)
IMM GRANULOCYTES NFR BLD: 0.5 %
LYMPHOCYTES # BLD AUTO: 0.55 X10(3) UL (ref 1–4)
LYMPHOCYTES NFR BLD AUTO: 10 %
MCH RBC QN AUTO: 29.9 PG (ref 26–34)
MCHC RBC AUTO-ENTMCNC: 33.4 G/DL (ref 31–37)
MCV RBC AUTO: 89.3 FL
MONOCYTES # BLD AUTO: 0.04 X10(3) UL (ref 0.1–1)
MONOCYTES NFR BLD AUTO: 0.7 %
NEUTROPHILS # BLD AUTO: 4.86 X10 (3) UL (ref 1.5–7.7)
NEUTROPHILS # BLD AUTO: 4.86 X10(3) UL (ref 1.5–7.7)
NEUTROPHILS NFR BLD AUTO: 88.6 %
OSMOLALITY SERPL CALC.SUM OF ELEC: 288 MOSM/KG (ref 275–295)
PLATELET # BLD AUTO: 207 10(3)UL (ref 150–450)
POTASSIUM SERPL-SCNC: 3.9 MMOL/L (ref 3.5–5.1)
RBC # BLD AUTO: 4.22 X10(6)UL
SODIUM SERPL-SCNC: 135 MMOL/L (ref 136–145)
WBC # BLD AUTO: 5.5 X10(3) UL (ref 4–11)

## 2021-08-09 PROCEDURE — 96375 TX/PRO/DX INJ NEW DRUG ADDON: CPT

## 2021-08-09 PROCEDURE — 96376 TX/PRO/DX INJ SAME DRUG ADON: CPT

## 2021-08-09 PROCEDURE — 85025 COMPLETE CBC W/AUTO DIFF WBC: CPT | Performed by: HOSPITALIST

## 2021-08-09 PROCEDURE — 80048 BASIC METABOLIC PNL TOTAL CA: CPT | Performed by: HOSPITALIST

## 2021-08-09 PROCEDURE — 74178 CT ABD&PLV WO CNTR FLWD CNTR: CPT | Performed by: SURGERY

## 2021-08-09 PROCEDURE — 83735 ASSAY OF MAGNESIUM: CPT | Performed by: HOSPITALIST

## 2021-08-09 RX ORDER — MORPHINE SULFATE 2 MG/ML
1 INJECTION, SOLUTION INTRAMUSCULAR; INTRAVENOUS EVERY 4 HOURS PRN
Status: DISCONTINUED | OUTPATIENT
Start: 2021-08-09 | End: 2021-08-10

## 2021-08-09 RX ORDER — MORPHINE SULFATE 2 MG/ML
2 INJECTION, SOLUTION INTRAMUSCULAR; INTRAVENOUS EVERY 4 HOURS PRN
Status: DISCONTINUED | OUTPATIENT
Start: 2021-08-09 | End: 2021-08-10

## 2021-08-09 NOTE — PROGRESS NOTES
[address] Note                                                                   [address]  12/5/1965    SUBJECTIVE: No chest pain, palpitations, shortness of breath, HYDROcodone-acetaminophen, PEG 3350, bisacodyl, Fleet Enema, ondansetron, rOPINIRole HCl      ASSESSMENT / PLAN:   48year old female with history of DVT on xarelto, kidney stones, depression, gerd, gout, hld, htn, IBS, fibromyalgia, atrial tachycardia/svt

## 2021-08-09 NOTE — PLAN OF CARE
Assumed care of the patient at [address]. Patient alert and oriented x4. Adequate saturation on RA with clear lung sounds. NSR on cardiac monitor. Up with one and walker. Bed alarm is on. Bed at the lowest position. Call light within reach.        Problem: Charoe

## 2021-08-09 NOTE — H&P (VIEW-ONLY)
[facility] IP Report of Consultation   [facility] Department of  Gastroenterology    8/9/2021    [facility]  female   Alanmo[place], [place]     [id number]  12/5/1965 Primary Care Physician  Charles Conception     Reason for Consultation: abd Renal disorder     Chronic Kidney failure-stage 3    • Rheumatoid arthritis ([facility] [address]    • Seizure disorder (HCC)     had x 1 year and went away/unknown etiology/ age 56   • Sjogren's disease ([facility]    • Visual impairment     wears glasses       Past Surgical H FLUSHING  Latex                   RASH    Comment:POWDER \"CHOKES\" HER  Phenergan [Prometha*    OTHER (SEE COMMENTS)    Comment:HYPERACTIVITY  Reglan [Metoclopram*    OTHER (SEE COMMENTS)    Comment:Restless legs, skin crawling  Pcn [Bicillin L-A]      RA History   Problem Relation Age of Onset   • Hypertension Father    • Prostate Cancer Father    • High Cholesterol Father    • Hypertension Mother        Review of Systems:  10 point ros reviewed. Pertinent positives per HPI.     /69 (BP Location: [facility] ileus and pain.   - tap water enema q4h x 3 while awake  - mrcp    Alexandra Del Rio MD  8/9/2021  12:38 PM

## 2021-08-09 NOTE — PROGRESS NOTES
[facility] Cardiology Progress Note        Paz Reynoso Patient Status:  Observation    1965 MRN [id number]   [facility] 8NE-A Attending Dewey Sykes MD   Hosp Day # 0 [facility]     Subjective: 126 lb (57.2 kg)  08/06/21 1019 : 126 lb (57.2 kg)  06/11/21 1421 : 135 lb 9.6 oz (61.5 kg)           Physical Exam:    Temp:  [97.9 °F (36.6 °C)-98.5 °F (36.9 °C)] 98.5 °F (36.9 °C)  Pulse:  [] 64  Resp:  [18] 18  BP: (109-136)/(64-82) 129/75    Gen

## 2021-08-09 NOTE — CONSULTS
[facility] IP Report of Consultation   [address] [facility] Department of  Gastroenterology    8/9/2021    Alix Ortizs  female   [place], [place]     [id number]  12/5/1965 Primary Care Physician  Adriana Das     Reason for Consultation: abd Renal disorder     Chronic Kidney failure-stage 3    • Rheumatoid arthritis ([facility]    • Seizure disorder (HCC)     had x 1 year and went away/unknown etiology/ age 56   • Sjogren's disease ([facility]    • Visual impairment     wears glasses       Past Surgical H FLUSHING  Latex                   RASH    Comment:POWDER \"CHOKES\" HER  Phenergan [Prometha*    OTHER (SEE COMMENTS)    Comment:HYPERACTIVITY  Reglan [Metoclopram*    OTHER (SEE COMMENTS)    Comment:Restless legs, skin crawling  Pcn [Bicillin L-A]      RA History   Problem Relation Age of Onset   • Hypertension Father    • Prostate Cancer Father    • High Cholesterol Father    • Hypertension Mother        Review of Systems:  10 point ros reviewed. Pertinent positives per HPI.     /69 (BP Location: [facility] ileus and pain.   - tap water enema q4h x 3 while awake  - mrcp    Yoselyn Jones MD  8/9/2021  12:38 PM

## 2021-08-09 NOTE — PROGRESS NOTES
[facility]  Progress Note    Paulina Del Toro Patient Status:  Observation    1965 MRN [id number]   [facility] 8NE-A Attending Lei Villasenor MD   Hosp Day # 0 PCP Atilio MAZA     Subjective:    Patient reports she is havi carpometacarpal joint, left hand     Other secondary osteoarthritis of first carpometacarpal joint of left hand     Hypercoagulable state (HCC)     PVC (premature ventricular contraction)     Lumbar disc herniation     Seronegative rheumatoid arthritis (HC

## 2021-08-09 NOTE — PLAN OF CARE
Assumed pt care at 0730. A&Ox4, glasses. RA, denies pain/SOB, lung sounds clear, VSS, NSR on tele. Voids. Up with SB/walker, split to L foot. Reporting RUQ tenderness. POC CT ABD with contrast this am, premedicated.  pain management, GI consult, POC updated administer replacement therapy as ordered  Outcome: Progressing     Problem: GASTROINTESTINAL - ADULT  Goal: Minimal or absence of nausea and vomiting  Description: INTERVENTIONS:  - Maintain adequate hydration with IV or PO as ordered and tolerated  - Vargas INTERVENTIONS:  - Encourage pt to monitor pain and request assistance  - Assess pain using appropriate pain scale  - Administer analgesics based on type and severity of pain and evaluate response  - Implement non-pharmacological measures as appropriate and discharge as needed  - Consider post-discharge preferences of patient/family/discharge partner  - Complete POLST form as appropriate  - Assess patient's ability to be responsible for managing their own health  - Refer to Case Management Department for coor

## 2021-08-10 ENCOUNTER — APPOINTMENT (OUTPATIENT)
Dept: MRI IMAGING | Facility: HOSPITAL | Age: 56
End: 2021-08-10
Attending: INTERNAL MEDICINE
Payer: COMMERCIAL

## 2021-08-10 VITALS
BODY MASS INDEX: 21.38 KG/M2 | SYSTOLIC BLOOD PRESSURE: 100 MMHG | RESPIRATION RATE: 18 BRPM | TEMPERATURE: 98 F | WEIGHT: 128.31 LBS | HEART RATE: 77 BPM | OXYGEN SATURATION: 96 % | DIASTOLIC BLOOD PRESSURE: 59 MMHG | HEIGHT: 65 IN

## 2021-08-10 LAB
ALBUMIN SERPL-MCNC: 3.5 G/DL (ref 3.4–5)
ALBUMIN/GLOB SERPL: 1.3 {RATIO} (ref 1–2)
ALP LIVER SERPL-CCNC: 53 U/L
ALT SERPL-CCNC: 38 U/L
ANION GAP SERPL CALC-SCNC: 2 MMOL/L (ref 0–18)
AST SERPL-CCNC: 37 U/L (ref 15–37)
BASOPHILS # BLD AUTO: 0.02 X10(3) UL (ref 0–0.2)
BASOPHILS NFR BLD AUTO: 0.2 %
BILIRUB SERPL-MCNC: 0.3 MG/DL (ref 0.1–2)
BUN BLD-MCNC: 26 MG/DL (ref 7–18)
CALCIUM BLD-MCNC: 8.2 MG/DL (ref 8.5–10.1)
CHLORIDE SERPL-SCNC: 107 MMOL/L (ref 98–112)
CO2 SERPL-SCNC: 30 MMOL/L (ref 21–32)
CREAT BLD-MCNC: 1.18 MG/DL
EOSINOPHIL # BLD AUTO: 0.02 X10(3) UL (ref 0–0.7)
EOSINOPHIL NFR BLD AUTO: 0.2 %
ERYTHROCYTE [DISTWIDTH] IN BLOOD BY AUTOMATED COUNT: 13.5 %
GLOBULIN PLAS-MCNC: 2.7 G/DL (ref 2.8–4.4)
GLUCOSE BLD-MCNC: 94 MG/DL (ref 70–99)
HAV IGM SER QL: 1.9 MG/DL (ref 1.6–2.6)
HCT VFR BLD AUTO: 33.9 %
HGB BLD-MCNC: 11.2 G/DL
IMM GRANULOCYTES # BLD AUTO: 0.03 X10(3) UL (ref 0–1)
IMM GRANULOCYTES NFR BLD: 0.3 %
LYMPHOCYTES # BLD AUTO: 1.51 X10(3) UL (ref 1–4)
LYMPHOCYTES NFR BLD AUTO: 17.6 %
M PROTEIN MFR SERPL ELPH: 6.2 G/DL (ref 6.4–8.2)
MCH RBC QN AUTO: 29.2 PG (ref 26–34)
MCHC RBC AUTO-ENTMCNC: 33 G/DL (ref 31–37)
MCV RBC AUTO: 88.5 FL
MONOCYTES # BLD AUTO: 0.57 X10(3) UL (ref 0.1–1)
MONOCYTES NFR BLD AUTO: 6.6 %
NEUTROPHILS # BLD AUTO: 6.44 X10 (3) UL (ref 1.5–7.7)
NEUTROPHILS # BLD AUTO: 6.44 X10(3) UL (ref 1.5–7.7)
NEUTROPHILS NFR BLD AUTO: 75.1 %
OSMOLALITY SERPL CALC.SUM OF ELEC: 293 MOSM/KG (ref 275–295)
PLATELET # BLD AUTO: 183 10(3)UL (ref 150–450)
POTASSIUM SERPL-SCNC: 3.7 MMOL/L (ref 3.5–5.1)
RBC # BLD AUTO: 3.83 X10(6)UL
SODIUM SERPL-SCNC: 139 MMOL/L (ref 136–145)
WBC # BLD AUTO: 8.6 X10(3) UL (ref 4–11)

## 2021-08-10 PROCEDURE — 83735 ASSAY OF MAGNESIUM: CPT | Performed by: HOSPITALIST

## 2021-08-10 PROCEDURE — 76376 3D RENDER W/INTRP POSTPROCES: CPT | Performed by: INTERNAL MEDICINE

## 2021-08-10 PROCEDURE — 80053 COMPREHEN METABOLIC PANEL: CPT | Performed by: HOSPITALIST

## 2021-08-10 PROCEDURE — 85025 COMPLETE CBC W/AUTO DIFF WBC: CPT | Performed by: HOSPITALIST

## 2021-08-10 PROCEDURE — 74181 MRI ABDOMEN W/O CONTRAST: CPT | Performed by: INTERNAL MEDICINE

## 2021-08-10 NOTE — PLAN OF CARE
Pt being discharged to home. Being driven home by spouse. Reviewed all medications with patient. Instructed to follow up with all providers as directed. States understanding of all discharge teaching. Port deaccessed. Await transport home.

## 2021-08-10 NOTE — PLAN OF CARE
Assumed care of the patient at [address]. Patient alert and oriented x4. Adequate saturation on RA with clear lung sounds. NSR on cardiac monitor. Up with one and walker. Bed alarm is on. Bed at the lowest position. Call light within reach. 0030:  Water tap e of nausea and vomiting  Description: INTERVENTIONS:  - Maintain adequate hydration with IV or PO as ordered and tolerated  - Nasogastric tube to low intermittent suction as ordered  - Evaluate effectiveness of ordered antiemetic medications  - Provide nonp analgesics based on type and severity of pain and evaluate response  - Implement non-pharmacological measures as appropriate and evaluate response  - Consider cultural and social influences on pain and pain management  - Manage/alleviate anxiety  - Utilize Assess patient's ability to be responsible for managing their own health  - Refer to Case Management Department for coordinating discharge planning if the patient needs post-hospital services based on physician/LIP order or complex needs related to functio

## 2021-08-10 NOTE — PROGRESS NOTES
[facility] Cardiology Progress Note        Jorge Luis Lee Patient Status:  Observation    1965 MRN [id number]   [facility] 8NE-A Attending Shirley Contreras MD   Hosp Day # 0 PCP [facility]     Subjective: : 126 lb (57.2 kg)  08/06/21 1019 : 126 lb (57.2 kg)  06/11/21 1421 : 135 lb 9.6 oz (61.5 kg)           Physical Exam:    Temp:  [97.4 °F (36.3 °C)-99.5 °F (37.5 °C)] 98 °F (36.7 °C)  Pulse:  [63-76] 63  Resp:  [18-20] 20  BP: (114-123)/(61-75) 123/73    G

## 2021-08-10 NOTE — PROGRESS NOTES
[address] Note                                                                   [address]  12/5/1965    SUBJECTIVE: No chest pain, palpitations, shortness of breath, Oral Daily     Continuous Infusions:   PRN: morphINE sulfate **OR** morphINE sulfate, Heparin Lock Flush, melatonin, acetaminophen **OR** HYDROcodone-acetaminophen **OR** HYDROcodone-acetaminophen, PEG 3350, bisacodyl, Fleet Enema, ondansetron, rOPINIRole 1.2  -stable  -trend     Hx Migraines  -no active sx  -erenumab one a month-done     Quality:  · DVT Prophylaxis: on xarelto  · CODE status:   · Bernstein: none     Plan of care discussed with patient and staff     Dispo:  No discharge     MD BRADY Landa

## 2021-08-10 NOTE — PROGRESS NOTES
[address] [facility] [facility]  12/5/1965  [id number]   Provider:    Adriana Das         Tolerating diet. Chronic abdominal pain.   Allergies:    Cipro [Ciprofloxaci*    OTHER (SEE COMMENTS)    Comment:Drug induced hepatitis  Epinephrine (MIRALAX) powder packet 17 g, 17 g, Oral, Daily PRN  •  bisacodyl (DULCOLAX) rectal suppository 10 mg, 10 mg, Rectal, Daily PRN  •  Fleet Enema (FLEET) 7-19 GM/118ML enema 133 mL, 1 enema, Rectal, Once PRN  •  ondansetron (ZOFRAN) injection 4 mg, 4 mg, Int • Visual impairment     wears glasses      Past Surgical History:   Procedure Laterality Date   • APPENDECTOMY     • BACK SURGERY      3/16 lumbar discectomy  9/2016 lumbar fusion   • BIOPSY SALIVARY GLAND,INCISIONAL  12/1/09    Performed by Dominick Blank sounds are present.   Extremities: No edema      LAB/IMAGING RESULTS:     Lab Results   Component Value Date    WBC 8.6 08/10/2021    HGB 11.2 08/10/2021    HCT 33.9 08/10/2021    .0 08/10/2021    CREATSERUM 1.18 08/10/2021    BUN 26 08/10/2021    NA sign off at this time.     Yoselyn Jones MD  8/10/2021

## 2021-08-15 NOTE — DISCHARGE SUMMARY
[address] [facility] Patient Status:  Observation    1965 MRN [id number]   [facility] 8NE-A Attending No att. providers found   Hosp Day # 0 PCP Ester Brooks     Date of Admission: 2021  D Refills: 0     allopurinol 100 MG Tabs  Commonly known as: ZYLOPRIM      Take 100 mg by mouth nightly. Refills: 0     Benlysta 200 MG/ML Soaj  Generic drug: Belimumab      Inject 200 mg into the skin every 7 days.    Quantity: 12 Syringe  Refills: 0     c 0     valACYclovir 500 MG Tabs  Commonly known as: VALTREX      Take 1,000 mg by mouth 2 (two) times daily.    Refills: 0        STOP taking these medications    Flecainide 100 MG Tabs  Commonly known as: TAMBOCOR             Follow-up appointment:   Nhan Candelaria SATURDAY 6:10AM-2:50PM               9/10/2021  2:00 PM  [facility] [[facility]75] 30 min [address] [facility]    Patient Instructions:         Location Instructions:      Your appointment is on the [address] [place] [address] in the xarelto, kidney stones, depression, gerd, gout, hld, htn, IBS, fibromyalgia, atrial tachycardia/svt, Lupus, migraines, neuropathy, RA, seizure hx (long time ago), Sjogren, raynaud,  ckd (baseline creat 1.2) presenting with palpitations.     Palpitations--> minutes

## 2021-08-17 RX ORDER — SODIUM CHLORIDE, SODIUM LACTATE, POTASSIUM CHLORIDE, CALCIUM CHLORIDE 600; 310; 30; 20 MG/100ML; MG/100ML; MG/100ML; MG/100ML
INJECTION, SOLUTION INTRAVENOUS CONTINUOUS
Status: CANCELLED | OUTPATIENT
Start: 2021-08-17

## 2021-08-18 NOTE — TELEPHONE ENCOUNTER
Dr. Yolanda Salgado, please review:    Labs- BUN (20), MRSA neg, WNL  EKG- abnormal, when compared with 06/10/21 no change  X-ray- done 06/10/21    Spoke to Dr. Andrea Simon patient's Cardiologist. He states patient is clear for the hip surgery with Dr. Shyanne Navarro.  She has l

## 2021-08-18 NOTE — TELEPHONE ENCOUNTER
Left message with pt to call back with an update. Looks like she is scheduled for an Endo on 08/23.  Martha Araujo from Dr. Sahhriar Patel office

## 2021-08-21 ENCOUNTER — LAB ENCOUNTER (OUTPATIENT)
Dept: LAB | Facility: HOSPITAL | Age: 56
End: 2021-08-21
Attending: ORTHOPAEDIC SURGERY
Payer: COMMERCIAL

## 2021-08-21 DIAGNOSIS — Z01.818 PRE-OP TESTING: ICD-10-CM

## 2021-08-22 LAB — SARS-COV-2 RNA RESP QL NAA+PROBE: NOT DETECTED

## 2021-08-24 ENCOUNTER — ANESTHESIA (OUTPATIENT)
Dept: SURGERY | Facility: HOSPITAL | Age: 56
DRG: 470 | End: 2021-08-24
Payer: COMMERCIAL

## 2021-08-24 ENCOUNTER — ANESTHESIA EVENT (OUTPATIENT)
Dept: SURGERY | Facility: HOSPITAL | Age: 56
DRG: 470 | End: 2021-08-24
Payer: COMMERCIAL

## 2021-08-24 ENCOUNTER — APPOINTMENT (OUTPATIENT)
Dept: GENERAL RADIOLOGY | Facility: HOSPITAL | Age: 56
DRG: 470 | End: 2021-08-24
Attending: ORTHOPAEDIC SURGERY
Payer: COMMERCIAL

## 2021-08-24 ENCOUNTER — HOSPITAL ENCOUNTER (INPATIENT)
Facility: HOSPITAL | Age: 56
LOS: 8 days | Discharge: HOME HEALTH | DRG: 470 | End: 2021-09-01
Attending: ORTHOPAEDIC SURGERY | Admitting: ORTHOPAEDIC SURGERY
Payer: COMMERCIAL

## 2021-08-24 DIAGNOSIS — Z01.818 PRE-OP TESTING: Primary | ICD-10-CM

## 2021-08-24 PROBLEM — M16.12 ARTHRITIS OF LEFT HIP: Status: ACTIVE | Noted: 2021-08-24

## 2021-08-24 PROBLEM — Z96.659 HISTORY OF REVISION OF TOTAL KNEE ARTHROPLASTY: Status: ACTIVE | Noted: 2021-08-24

## 2021-08-24 PROCEDURE — 88305 TISSUE EXAM BY PATHOLOGIST: CPT | Performed by: ORTHOPAEDIC SURGERY

## 2021-08-24 PROCEDURE — 0SRB0JZ REPLACEMENT OF LEFT HIP JOINT WITH SYNTHETIC SUBSTITUTE, OPEN APPROACH: ICD-10-PCS | Performed by: ORTHOPAEDIC SURGERY

## 2021-08-24 PROCEDURE — 88311 DECALCIFY TISSUE: CPT | Performed by: ORTHOPAEDIC SURGERY

## 2021-08-24 PROCEDURE — 73502 X-RAY EXAM HIP UNI 2-3 VIEWS: CPT | Performed by: ORTHOPAEDIC SURGERY

## 2021-08-24 DEVICE — BONE SCREW 6.5X30 SELF-TAP: Type: IMPLANTABLE DEVICE | Site: HIP | Status: FUNCTIONAL

## 2021-08-24 DEVICE — G7 OSTI MLTHLE 52MM E: Type: IMPLANTABLE DEVICE | Site: HIP | Status: FUNCTIONAL

## 2021-08-24 RX ORDER — ROPINIROLE 0.25 MG/1
TABLET, FILM COATED ORAL NIGHTLY
Status: DISCONTINUED | OUTPATIENT
Start: 2021-08-24 | End: 2021-09-01

## 2021-08-24 RX ORDER — MIDAZOLAM HYDROCHLORIDE 1 MG/ML
INJECTION INTRAMUSCULAR; INTRAVENOUS AS NEEDED
Status: DISCONTINUED | OUTPATIENT
Start: 2021-08-24 | End: 2021-08-24 | Stop reason: SURG

## 2021-08-24 RX ORDER — NALOXONE HYDROCHLORIDE 0.4 MG/ML
80 INJECTION, SOLUTION INTRAMUSCULAR; INTRAVENOUS; SUBCUTANEOUS AS NEEDED
Status: DISCONTINUED | OUTPATIENT
Start: 2021-08-24 | End: 2021-08-24 | Stop reason: HOSPADM

## 2021-08-24 RX ORDER — HYDROMORPHONE HYDROCHLORIDE 1 MG/ML
0.4 INJECTION, SOLUTION INTRAMUSCULAR; INTRAVENOUS; SUBCUTANEOUS EVERY 5 MIN PRN
Status: DISCONTINUED | OUTPATIENT
Start: 2021-08-24 | End: 2021-08-24 | Stop reason: HOSPADM

## 2021-08-24 RX ORDER — GLYCOPYRROLATE 0.2 MG/ML
INJECTION, SOLUTION INTRAMUSCULAR; INTRAVENOUS AS NEEDED
Status: DISCONTINUED | OUTPATIENT
Start: 2021-08-24 | End: 2021-08-24 | Stop reason: SURG

## 2021-08-24 RX ORDER — TRAMADOL HYDROCHLORIDE 50 MG/1
TABLET ORAL EVERY 6 HOURS PRN
Qty: 60 TABLET | Refills: 1 | Status: SHIPPED | OUTPATIENT
Start: 2021-08-24 | End: 2021-10-19

## 2021-08-24 RX ORDER — HYDROMORPHONE HYDROCHLORIDE 1 MG/ML
0.6 INJECTION, SOLUTION INTRAMUSCULAR; INTRAVENOUS; SUBCUTANEOUS EVERY 5 MIN PRN
Status: DISCONTINUED | OUTPATIENT
Start: 2021-08-24 | End: 2021-08-24 | Stop reason: HOSPADM

## 2021-08-24 RX ORDER — FAMOTIDINE 10 MG/ML
20 INJECTION, SOLUTION INTRAVENOUS 2 TIMES DAILY
Status: DISCONTINUED | OUTPATIENT
Start: 2021-08-24 | End: 2021-08-24 | Stop reason: ALTCHOICE

## 2021-08-24 RX ORDER — TRANEXAMIC ACID 10 MG/ML
1000 INJECTION, SOLUTION INTRAVENOUS ONCE
Status: DISCONTINUED | OUTPATIENT
Start: 2021-08-25 | End: 2021-08-24 | Stop reason: HOSPADM

## 2021-08-24 RX ORDER — HYDROCODONE BITARTRATE AND ACETAMINOPHEN 5; 325 MG/1; MG/1
1 TABLET ORAL AS NEEDED
Status: DISCONTINUED | OUTPATIENT
Start: 2021-08-24 | End: 2021-08-24 | Stop reason: HOSPADM

## 2021-08-24 RX ORDER — HALOPERIDOL 5 MG/ML
0.25 INJECTION INTRAMUSCULAR ONCE AS NEEDED
Status: DISCONTINUED | OUTPATIENT
Start: 2021-08-24 | End: 2021-08-24 | Stop reason: HOSPADM

## 2021-08-24 RX ORDER — HYDROCODONE BITARTRATE AND ACETAMINOPHEN 5; 325 MG/1; MG/1
2 TABLET ORAL AS NEEDED
Status: DISCONTINUED | OUTPATIENT
Start: 2021-08-24 | End: 2021-08-24 | Stop reason: HOSPADM

## 2021-08-24 RX ORDER — DIPHENHYDRAMINE HYDROCHLORIDE 50 MG/ML
25 INJECTION INTRAMUSCULAR; INTRAVENOUS ONCE AS NEEDED
Status: ACTIVE | OUTPATIENT
Start: 2021-08-24 | End: 2021-08-24

## 2021-08-24 RX ORDER — SODIUM CHLORIDE, SODIUM LACTATE, POTASSIUM CHLORIDE, CALCIUM CHLORIDE 600; 310; 30; 20 MG/100ML; MG/100ML; MG/100ML; MG/100ML
INJECTION, SOLUTION INTRAVENOUS CONTINUOUS
Status: DISCONTINUED | OUTPATIENT
Start: 2021-08-24 | End: 2021-08-24 | Stop reason: HOSPADM

## 2021-08-24 RX ORDER — MORPHINE SULFATE 4 MG/ML
4 INJECTION, SOLUTION INTRAMUSCULAR; INTRAVENOUS EVERY 10 MIN PRN
Status: DISCONTINUED | OUTPATIENT
Start: 2021-08-24 | End: 2021-08-24 | Stop reason: HOSPADM

## 2021-08-24 RX ORDER — TOBRAMYCIN 3 MG/ML
1 SOLUTION/ DROPS OPHTHALMIC ONCE
Status: COMPLETED | OUTPATIENT
Start: 2021-08-24 | End: 2021-08-24

## 2021-08-24 RX ORDER — ACETAMINOPHEN 500 MG
1000 TABLET ORAL ONCE
Status: COMPLETED | OUTPATIENT
Start: 2021-08-24 | End: 2021-08-24

## 2021-08-24 RX ORDER — ACETAMINOPHEN 500 MG
TABLET ORAL EVERY 6 HOURS PRN
Qty: 60 TABLET | Refills: 2 | Status: SHIPPED | OUTPATIENT
Start: 2021-08-24 | End: 2021-10-19

## 2021-08-24 RX ORDER — ALLOPURINOL 100 MG/1
100 TABLET ORAL NIGHTLY
Status: DISCONTINUED | OUTPATIENT
Start: 2021-08-24 | End: 2021-09-01

## 2021-08-24 RX ORDER — BISACODYL 10 MG
10 SUPPOSITORY, RECTAL RECTAL
Status: DISCONTINUED | OUTPATIENT
Start: 2021-08-24 | End: 2021-09-01

## 2021-08-24 RX ORDER — HYDROMORPHONE HYDROCHLORIDE 1 MG/ML
0.2 INJECTION, SOLUTION INTRAMUSCULAR; INTRAVENOUS; SUBCUTANEOUS EVERY 5 MIN PRN
Status: DISCONTINUED | OUTPATIENT
Start: 2021-08-24 | End: 2021-08-24 | Stop reason: HOSPADM

## 2021-08-24 RX ORDER — MIDODRINE HYDROCHLORIDE 2.5 MG/1
2.5 TABLET ORAL EVERY MORNING
Status: DISCONTINUED | OUTPATIENT
Start: 2021-08-25 | End: 2021-08-25

## 2021-08-24 RX ORDER — ONDANSETRON 2 MG/ML
4 INJECTION INTRAMUSCULAR; INTRAVENOUS ONCE AS NEEDED
Status: DISCONTINUED | OUTPATIENT
Start: 2021-08-24 | End: 2021-08-24 | Stop reason: HOSPADM

## 2021-08-24 RX ORDER — CYCLOBENZAPRINE HCL 5 MG
5 TABLET ORAL EVERY 8 HOURS PRN
Status: DISCONTINUED | OUTPATIENT
Start: 2021-08-24 | End: 2021-09-01

## 2021-08-24 RX ORDER — PROCHLORPERAZINE EDISYLATE 5 MG/ML
5 INJECTION INTRAMUSCULAR; INTRAVENOUS ONCE AS NEEDED
Status: DISCONTINUED | OUTPATIENT
Start: 2021-08-24 | End: 2021-08-24 | Stop reason: HOSPADM

## 2021-08-24 RX ORDER — DEXAMETHASONE SODIUM PHOSPHATE 4 MG/ML
VIAL (ML) INJECTION AS NEEDED
Status: DISCONTINUED | OUTPATIENT
Start: 2021-08-24 | End: 2021-08-24 | Stop reason: SURG

## 2021-08-24 RX ORDER — DIPHENHYDRAMINE HYDROCHLORIDE 50 MG/ML
12.5 INJECTION INTRAMUSCULAR; INTRAVENOUS EVERY 4 HOURS PRN
Status: DISCONTINUED | OUTPATIENT
Start: 2021-08-24 | End: 2021-09-01

## 2021-08-24 RX ORDER — GABAPENTIN 300 MG/1
300 CAPSULE ORAL 3 TIMES DAILY
Status: DISCONTINUED | OUTPATIENT
Start: 2021-08-25 | End: 2021-08-26

## 2021-08-24 RX ORDER — SCOLOPAMINE TRANSDERMAL SYSTEM 1 MG/1
1 PATCH, EXTENDED RELEASE TRANSDERMAL ONCE
Status: COMPLETED | OUTPATIENT
Start: 2021-08-24 | End: 2021-08-27

## 2021-08-24 RX ORDER — MORPHINE SULFATE 4 MG/ML
2 INJECTION, SOLUTION INTRAMUSCULAR; INTRAVENOUS EVERY 10 MIN PRN
Status: DISCONTINUED | OUTPATIENT
Start: 2021-08-24 | End: 2021-08-24 | Stop reason: HOSPADM

## 2021-08-24 RX ORDER — TRANEXAMIC ACID 10 MG/ML
INJECTION, SOLUTION INTRAVENOUS AS NEEDED
Status: DISCONTINUED | OUTPATIENT
Start: 2021-08-24 | End: 2021-08-24 | Stop reason: SURG

## 2021-08-24 RX ORDER — OXYCODONE HYDROCHLORIDE 5 MG/1
10 TABLET ORAL EVERY 4 HOURS PRN
Status: DISCONTINUED | OUTPATIENT
Start: 2021-08-24 | End: 2021-08-27

## 2021-08-24 RX ORDER — NEOSTIGMINE METHYLSULFATE 1 MG/ML
INJECTION INTRAVENOUS AS NEEDED
Status: DISCONTINUED | OUTPATIENT
Start: 2021-08-24 | End: 2021-08-24 | Stop reason: SURG

## 2021-08-24 RX ORDER — ONDANSETRON 2 MG/ML
4 INJECTION INTRAMUSCULAR; INTRAVENOUS ONCE
Status: COMPLETED | OUTPATIENT
Start: 2021-08-24 | End: 2021-08-24

## 2021-08-24 RX ORDER — DOCUSATE SODIUM 100 MG/1
100 CAPSULE, LIQUID FILLED ORAL 2 TIMES DAILY
Status: DISCONTINUED | OUTPATIENT
Start: 2021-08-24 | End: 2021-09-01

## 2021-08-24 RX ORDER — DOCUSATE SODIUM 100 MG/1
100 CAPSULE, LIQUID FILLED ORAL 2 TIMES DAILY
Qty: 60 CAPSULE | Refills: 1 | Status: SHIPPED | OUTPATIENT
Start: 2021-08-24 | End: 2021-11-29

## 2021-08-24 RX ORDER — FAMOTIDINE 20 MG/1
20 TABLET ORAL 2 TIMES DAILY
Status: DISCONTINUED | OUTPATIENT
Start: 2021-08-24 | End: 2021-08-24 | Stop reason: ALTCHOICE

## 2021-08-24 RX ORDER — ONDANSETRON 2 MG/ML
INJECTION INTRAMUSCULAR; INTRAVENOUS AS NEEDED
Status: DISCONTINUED | OUTPATIENT
Start: 2021-08-24 | End: 2021-08-24 | Stop reason: SURG

## 2021-08-24 RX ORDER — ONDANSETRON 4 MG/1
4 TABLET, FILM COATED ORAL EVERY 8 HOURS PRN
Status: DISCONTINUED | OUTPATIENT
Start: 2021-08-24 | End: 2021-08-31

## 2021-08-24 RX ORDER — DIPHENHYDRAMINE HCL 25 MG
25 CAPSULE ORAL EVERY 4 HOURS PRN
Status: DISCONTINUED | OUTPATIENT
Start: 2021-08-24 | End: 2021-09-01

## 2021-08-24 RX ORDER — PANTOPRAZOLE SODIUM 40 MG/1
40 TABLET, DELAYED RELEASE ORAL
Status: DISCONTINUED | OUTPATIENT
Start: 2021-08-25 | End: 2021-09-01

## 2021-08-24 RX ORDER — SODIUM CHLORIDE, SODIUM LACTATE, POTASSIUM CHLORIDE, CALCIUM CHLORIDE 600; 310; 30; 20 MG/100ML; MG/100ML; MG/100ML; MG/100ML
INJECTION, SOLUTION INTRAVENOUS CONTINUOUS
Status: DISCONTINUED | OUTPATIENT
Start: 2021-08-24 | End: 2021-08-29 | Stop reason: ALTCHOICE

## 2021-08-24 RX ORDER — MORPHINE SULFATE 10 MG/ML
6 INJECTION, SOLUTION INTRAMUSCULAR; INTRAVENOUS EVERY 10 MIN PRN
Status: DISCONTINUED | OUTPATIENT
Start: 2021-08-24 | End: 2021-08-24 | Stop reason: HOSPADM

## 2021-08-24 RX ORDER — ONDANSETRON 2 MG/ML
4 INJECTION INTRAMUSCULAR; INTRAVENOUS EVERY 4 HOURS PRN
Status: DISPENSED | OUTPATIENT
Start: 2021-08-24 | End: 2021-08-26

## 2021-08-24 RX ORDER — OXYCODONE HYDROCHLORIDE 5 MG/1
TABLET ORAL
Qty: 60 TABLET | Refills: 0 | Status: SHIPPED | OUTPATIENT
Start: 2021-08-24 | End: 2021-11-29

## 2021-08-24 RX ORDER — SODIUM PHOSPHATE, DIBASIC AND SODIUM PHOSPHATE, MONOBASIC 7; 19 G/133ML; G/133ML
1 ENEMA RECTAL ONCE AS NEEDED
Status: DISCONTINUED | OUTPATIENT
Start: 2021-08-24 | End: 2021-09-01

## 2021-08-24 RX ORDER — ROCURONIUM BROMIDE 10 MG/ML
INJECTION, SOLUTION INTRAVENOUS AS NEEDED
Status: DISCONTINUED | OUTPATIENT
Start: 2021-08-24 | End: 2021-08-24 | Stop reason: SURG

## 2021-08-24 RX ORDER — ONDANSETRON 2 MG/ML
4 INJECTION INTRAMUSCULAR; INTRAVENOUS ONCE AS NEEDED
Status: COMPLETED | OUTPATIENT
Start: 2021-08-24 | End: 2021-08-24

## 2021-08-24 RX ORDER — VALACYCLOVIR HYDROCHLORIDE 500 MG/1
500 TABLET, FILM COATED ORAL 2 TIMES DAILY
Status: DISCONTINUED | OUTPATIENT
Start: 2021-08-25 | End: 2021-09-01

## 2021-08-24 RX ORDER — POLYETHYLENE GLYCOL 3350 17 G/17G
17 POWDER, FOR SOLUTION ORAL DAILY PRN
Status: DISCONTINUED | OUTPATIENT
Start: 2021-08-24 | End: 2021-09-01

## 2021-08-24 RX ORDER — OXYCODONE HCL 10 MG/1
10 TABLET, FILM COATED, EXTENDED RELEASE ORAL ONCE
Status: COMPLETED | OUTPATIENT
Start: 2021-08-24 | End: 2021-08-24

## 2021-08-24 RX ORDER — SODIUM CHLORIDE, SODIUM LACTATE, POTASSIUM CHLORIDE, CALCIUM CHLORIDE 600; 310; 30; 20 MG/100ML; MG/100ML; MG/100ML; MG/100ML
INJECTION, SOLUTION INTRAVENOUS CONTINUOUS
Status: DISCONTINUED | OUTPATIENT
Start: 2021-08-24 | End: 2021-09-01

## 2021-08-24 RX ORDER — OXYCODONE HYDROCHLORIDE 5 MG/1
5 TABLET ORAL EVERY 4 HOURS PRN
Status: DISCONTINUED | OUTPATIENT
Start: 2021-08-24 | End: 2021-08-27

## 2021-08-24 RX ADMIN — ONDANSETRON 4 MG: 2 INJECTION INTRAMUSCULAR; INTRAVENOUS at 12:04:00

## 2021-08-24 RX ADMIN — SODIUM CHLORIDE, SODIUM LACTATE, POTASSIUM CHLORIDE, CALCIUM CHLORIDE: 600; 310; 30; 20 INJECTION, SOLUTION INTRAVENOUS at 11:32:00

## 2021-08-24 RX ADMIN — GLYCOPYRROLATE 0.5 MG: 0.2 INJECTION, SOLUTION INTRAMUSCULAR; INTRAVENOUS at 14:47:00

## 2021-08-24 RX ADMIN — MIDAZOLAM HYDROCHLORIDE 2 MG: 1 INJECTION INTRAMUSCULAR; INTRAVENOUS at 11:32:00

## 2021-08-24 RX ADMIN — SODIUM CHLORIDE, SODIUM LACTATE, POTASSIUM CHLORIDE, CALCIUM CHLORIDE: 600; 310; 30; 20 INJECTION, SOLUTION INTRAVENOUS at 15:22:00

## 2021-08-24 RX ADMIN — TRANEXAMIC ACID 1000 MG: 10 INJECTION, SOLUTION INTRAVENOUS at 12:00:00

## 2021-08-24 RX ADMIN — SODIUM CHLORIDE, SODIUM LACTATE, POTASSIUM CHLORIDE, CALCIUM CHLORIDE: 600; 310; 30; 20 INJECTION, SOLUTION INTRAVENOUS at 14:46:00

## 2021-08-24 RX ADMIN — DEXAMETHASONE SODIUM PHOSPHATE 4 MG: 4 MG/ML VIAL (ML) INJECTION at 11:37:00

## 2021-08-24 RX ADMIN — NEOSTIGMINE METHYLSULFATE 2.8 MG: 1 INJECTION INTRAVENOUS at 14:47:00

## 2021-08-24 RX ADMIN — ROCURONIUM BROMIDE 50 MG: 10 INJECTION, SOLUTION INTRAVENOUS at 11:37:00

## 2021-08-24 NOTE — ANESTHESIA POSTPROCEDURE EVALUATION
Patient: Marbella Both    Procedure Summary     Date: 08/24/21 Room / Location: [facility] OR  / [facility] OR    Anesthesia Start: 36 Anesthesia Stop:     Procedure: left total hip arthroplasty (Left ) Diagnosis: (left hip degenerative joint diesease)

## 2021-08-24 NOTE — INTERVAL H&P NOTE
Pre-op Diagnosis: left hip degenerative joint diesease    The above referenced H&P was reviewed by Ghassan Harrison MD on 8/24/2021, the patient was examined and no significant changes have occurred in the patient's condition since the H&P was perfo

## 2021-08-24 NOTE — OR PREOP
Pt c/o nausea, noted retching, pt given cool cloth and peppermint oil to soothe. Also c/o head itching. Vanco rate decreased to 100 ml/hr at this time.     Call out to Dr. Ailyn Torres from Anesthesia,

## 2021-08-24 NOTE — ANESTHESIA PREPROCEDURE EVALUATION
Anesthesia PreOp Note    HPI:     Paz Reynoso is a 48year old female who presents for preoperative consultation requested by: Marcus Kennedy MD    Date of Surgery: 8/24/2021    Procedure(s):  left total hip arthroplasty  Indication: left hip dege first carpometacarpal joint of left hand         Date Noted: 10/19/2015      Other unilateral secondary osteoarthritis of first carpometacarpal joint, left hand         Date Noted: 10/08/2015      Osteoarthritis of carpometacarpal joint of left thumb, unsp lights,wine,sulfites,cheeses,sweeteners,barometeric pressure   • Muscle weakness     arm weakness   • Neuropathy     Boths legs   • OBESITY    • Osteoarthritis    • Pleurisy    • Pneumonia due to organism 05/2017   • PONV (postoperative nausea and vomiting time  rOPINIRole HCl 0.25 MG Oral Tab, Take 0.25-0.5 mg by mouth nightly. Take 0.25 mg by mouth nightly. May take additional 0.25 mg as needed. , Disp: , Rfl: , 8/23/2021 at 2230  rOPINIRole HCl ER 2 MG Oral Tablet 24 Hr, Take 1 tablet (2 mg total) by mouth needed.  Take as prescribed ), Disp: 90 capsule, Rfl: 2, More than a month at Unknown time  Estradiol 2 MG Vaginal Ring, Place 2 mg vaginally every 3 (three) months., Disp: 1 each, Rfl: 4, More than a month at Unknown time      lactated ringers infusion, , Father    • Hypertension Mother      Social History    Socioeconomic History      Marital status:       Spouse name: Not on file      Number of children: Not on file      Years of education: Not on file      Highest education level: Not on file    O Component Value Date     08/10/2021    K 3.7 08/10/2021     08/10/2021    CO2 30.0 08/10/2021    BUN 26 (H) 08/10/2021    CREATSERUM 1.18 (H) 08/10/2021    GLU 94 08/10/2021    CA 8.2 (L) 08/10/2021          Vital Signs:   Body mass index is 2 scopolamine patch, low-dose propofol + standard dual anti-emetic therapy.     Informed Consent Plan and Risks Discussed With:  Patient  Discussed plan with:  CRNA      I have informed Yanna Davila and/or legal guardian or family member of the nature of t

## 2021-08-24 NOTE — ANESTHESIA PROCEDURE NOTES
Airway  Date/Time: 8/24/2021 11:40 AM  Urgency: Elective    Airway not difficult    General Information and Staff    Patient location during procedure: OR  Anesthesiologist: Ramiro Rinaldi MD  Performed: anesthesiologist     Indications and Patient Con

## 2021-08-24 NOTE — OPERATIVE REPORT
OPERATIVE REPORT     PROCEDURE DATE: 8/24/2021     SURGEON: Reyes Bachelor, MD      ASSISTANT SURGEON: Wai Siddiqi MD  (No qualified resident was available to assist with this case; we will thus bill for fellow)       PREOPERATIVE DIAGNOSIS: le informed consent was verbally confirmed. The patient had previously signed a consent in clinic.  Once again, all risks and benefits as well as alternatives to surgical intervention were discussed with the patient in detail and all the questions were answere was tacked using #1 Ethibond suture. Prior to hip dislocation, the intellijoint button was secured to the greater trochanter with 1 screw and the center of hip rotation was determined from the intellijoint system.   Next,  the hip was dislocated without co was impacted into the proximal femur. Care was taken to avoid any calcar fractures. Trialing was once again commenced and excellent stability was achieved. Accordingly, the head was exchanged for the Biolox ceramic head specifically documented above.  [facility]  8/24/2021  3:19 PM

## 2021-08-25 LAB
ANION GAP SERPL CALC-SCNC: 4 MMOL/L (ref 0–18)
BUN BLD-MCNC: 17 MG/DL (ref 7–18)
BUN/CREAT SERPL: 16.2 (ref 10–20)
CALCIUM BLD-MCNC: 8.5 MG/DL (ref 8.5–10.1)
CHLORIDE SERPL-SCNC: 107 MMOL/L (ref 98–112)
CO2 SERPL-SCNC: 29 MMOL/L (ref 21–32)
CREAT BLD-MCNC: 1.05 MG/DL
GLUCOSE BLD-MCNC: 100 MG/DL (ref 70–99)
HCT VFR BLD AUTO: 30.1 %
HGB BLD-MCNC: 10.3 G/DL
OSMOLALITY SERPL CALC.SUM OF ELEC: 292 MOSM/KG (ref 275–295)
POTASSIUM SERPL-SCNC: 3.7 MMOL/L (ref 3.5–5.1)
SODIUM SERPL-SCNC: 140 MMOL/L (ref 136–145)

## 2021-08-25 PROCEDURE — 97162 PT EVAL MOD COMPLEX 30 MIN: CPT

## 2021-08-25 PROCEDURE — 97165 OT EVAL LOW COMPLEX 30 MIN: CPT

## 2021-08-25 PROCEDURE — 97530 THERAPEUTIC ACTIVITIES: CPT

## 2021-08-25 PROCEDURE — 97110 THERAPEUTIC EXERCISES: CPT

## 2021-08-25 PROCEDURE — 85014 HEMATOCRIT: CPT | Performed by: ORTHOPAEDIC SURGERY

## 2021-08-25 PROCEDURE — 85018 HEMOGLOBIN: CPT | Performed by: ORTHOPAEDIC SURGERY

## 2021-08-25 PROCEDURE — 97116 GAIT TRAINING THERAPY: CPT

## 2021-08-25 PROCEDURE — 80048 BASIC METABOLIC PNL TOTAL CA: CPT | Performed by: ORTHOPAEDIC SURGERY

## 2021-08-25 NOTE — PROGRESS NOTES
Face to Face Encounter    I (or a non-physician practitioner working in collaboration with me) had a face to face encounter with this patient during which a medical condition was addressed which is the primary reason for home health care on : 8/25/2021

## 2021-08-25 NOTE — DISCHARGE SUMMARY
Ortho Discharge Summary     Patient ID:  Aubree Kan  [id number]  48 year old  12/5/1965      Admit Date: 8/24/2021    Discharge Date: 9/1/2021    Attending Physician: Chloe Gaspar MD     Reason for admission: left hip DJD    Discharge Diagnoses: le they performed well. The patient had adequate pain control with oral medication. They were given 24 hours of perioperative antibiotic prophylaxis. The patient was managed in conjunction with the medical team for general medical issues.  The patient struggle Quantity: 60 tablet  Refills: 1        CHANGE how you take these medications      Instructions Prescription details   gabapentin 400 MG Caps  Commonly known as: NEURONTIN  What changed:   · medication strength  · how much to take  · when to take this  · ad by mouth 2 (two) times daily. Refills: 0     NON FORMULARY      Take 100 mg by mouth 2 (two) times daily as needed. Refills: 0     ondansetron 4 mg tablet  Commonly known as: ZOFRAN      Take 4 mg by mouth every 8 (eight) hours as needed for Nausea. Tim Carr MD in 2 weeks.     RAMESH Conner  Nurse Practitioner for Dr. Caro Irizarry: [phone]

## 2021-08-25 NOTE — PROGRESS NOTES
8/25/2021  Admission date 8/24/2021      S: Patient doing well. Denies any numbness/tingling, F/C/S, N/V/D, SOB, Chest Pain, Abdominal Pain. She is in pain this morning, states more so than she expected. Has not yet been icing the hip.  Also has an eye patc Planning- pending clearance from Physical Therapy/Social work services. Plan for discharge to home with home health, likely tomorrow. RAMESH Dillon  Nurse Practitioner for Dr. Lenny Barahona: [phone]  C: [phone]  F: 51-49-31-02. 5

## 2021-08-25 NOTE — CM/SW NOTE
SW received MDO for Surgery     SW received email from [facility] stating pt is pre ortho arranged with TABBY Hi prior to surgery by the surgeon's office.      PT/OT to do [facility]NA[facility] will follow up post op to discuss [facility]      Clinicals/ C/ F2F sent in aid

## 2021-08-25 NOTE — PROGRESS NOTES
[facility]    Progress Note    Marnie Ellirasheed Patient Status:  Inpatient    1965 MRN [id number]   Location [facility] 4W/SW/SE Attending Brennen Quick MD   Hosp Day # 1 PCP Kimberley MAZA       Subjective:   [place] arthroplasty. Dictated by (CST): Matt Moore MD on 8/24/2021 at 3:57 PM     Finalized by (CST): Matt Moore MD on 8/24/2021 at 3:57 PM                Assessment and Plan:     Arthritis of left hip  Now replaced      S/p left hip replacement.  Rianna

## 2021-08-25 NOTE — DIETARY NOTE
ADULT NUTRITION INITIAL ASSESSMENT    Pt is at high nutrition risk. Pt meets severe malnutrition criteria.       CRITERIA FOR MALNUTRITION DIAGNOSIS:  Criteria for severe malnutrition diagnosis: chronic illness related to wt loss greater than 20% in 1 year interest in oral nutritional supplementation(ONS).     NUTRITION DIAGNOSIS/PROBLEM:  Malnutrition related to physiological causes resulting in anorexia or diminished intake as evidenced by 60#(32%) weight loss over 1 year, <75% of usual oral intake > 1 mary lb)  12/31/20 : 63 kg (139 lb)    GASTROINTESTINAL: +BM 8/24/21. Patient currently denies any GI issues    FOOD/NUTRITION RELATED HISTORY:  Appetite: Fair  Intake: grazing on food.  Plans to order lunch now  Intake Meeting Needs: No, but oral nutrition supp

## 2021-08-25 NOTE — PLAN OF CARE
Patient is axo4. Patient denies shortness of breath and denies chest pain. Reports nausea, denies vomiting or diarrhea. Reports 9/10 pain, providing oxy prn for pain. Providing zofran for nausea.  Patient is up 2 assist stand pivot to chair, not tolerating request assistance  - Assess pain using appropriate pain scale  - Administer analgesics based on type and severity of pain and evaluate response  - Implement non-pharmacological measures as appropriate and evaluate response  - Consider cultural and social of patient/family/discharge partner  - Complete POLST form as appropriate  - Assess patient's ability to be responsible for managing their own health  - Refer to Case Management Department for coordinating discharge planning if the patient needs post-hospi

## 2021-08-25 NOTE — PROGRESS NOTES
[facility] note: Duplicate Proton Pump Inhibitor with Histamine 2 blocker. Arnaud Morton is a 48year old patient who has been prescribed both famotidine (Pepcid)  And pantoprazole (Protonix).   Pepcid was discontinued per P&T approved protocol for d

## 2021-08-25 NOTE — PLAN OF CARE
Postop day 0 to 1. Posterior hip precautions in place. Patient declined getting up to bathroom citing pain. Purewick placed per patient request.  Dr Ajith Hurst contacted for admission orders. Home meds reordered.   Tobrex 1x dose and eye patch ordered for R e Goal  Description: Patient's Short Term Goal: My goal is to have my pain be controlled to a 5 or less. Interventions:   - PT, OT, pain meds, non-pharmacologic pain control, education on pain meds.   - See additional Care Plan goals for specific interven PLANNING  Goal: Discharge to home or other facility with appropriate resources  Description: INTERVENTIONS:  - Identify barriers to discharge w/pt and caregiver  - Include patient/family/discharge partner in discharge planning  - Arrange for needed dischar

## 2021-08-25 NOTE — OCCUPATIONAL THERAPY NOTE
OCCUPATIONAL THERAPY EVALUATION - INPATIENT      Room Number: 417/417-A  Evaluation Date: 8/25/2021  Type of Evaluation: Initial  Presenting Problem: Pt s/p L SYEDA (posterior)    Physician Order: IP Consult to Occupational Therapy  Reason for Therapy: ADL/I Short Form. Research supports that patients with this level of impairment may benefit from OLIVIER, hopeful pts functional IND will improve as pain resides & can DC home.      DISCHARGE RECOMMENDATIONS  OT Discharge Recommendations: Sub-acute rehabilitation  O kidney atropy and does not work   • Renal disorder     LEFT functioning  stage 3   • Rheumatoid arthritis ([facility]    • Seizure disorder ([facility]     had x 1 year and went away/unknown etiology/ age 56   • Sjogren's disease ([facility]    • Visual impairment     wears Extremity: Weight Bearing as Tolerated    PAIN ASSESSMENT  Ratin  Location: L hip       ACTIVITY TOLERANCE   BP: 130/80 (supine, 103/72 EOB, 109/79 seated in chair)       COGNITION  Alert, oriented  Requiring repetition/reminders re; post op restrictio Patient will independently recall posterior hip precautions  Comment:         Goals  on: 21  Frequency: 3-5x/wk

## 2021-08-26 PROCEDURE — 97110 THERAPEUTIC EXERCISES: CPT

## 2021-08-26 PROCEDURE — 97530 THERAPEUTIC ACTIVITIES: CPT

## 2021-08-26 RX ORDER — GABAPENTIN 400 MG/1
400 CAPSULE ORAL 2 TIMES DAILY
Qty: 60 CAPSULE | Refills: 0 | Status: SHIPPED | OUTPATIENT
Start: 2021-08-26

## 2021-08-26 RX ORDER — GABAPENTIN 400 MG/1
400 CAPSULE ORAL
Status: DISCONTINUED | OUTPATIENT
Start: 2021-08-26 | End: 2021-08-26

## 2021-08-26 RX ORDER — GABAPENTIN 400 MG/1
400 CAPSULE ORAL
Status: DISCONTINUED | OUTPATIENT
Start: 2021-08-26 | End: 2021-09-01

## 2021-08-26 RX ORDER — GABAPENTIN 300 MG/1
600 CAPSULE ORAL NIGHTLY
Status: DISCONTINUED | OUTPATIENT
Start: 2021-08-26 | End: 2021-09-01

## 2021-08-26 RX ORDER — CEVIMELINE HYDROCHLORIDE 30 MG/1
30 CAPSULE ORAL 3 TIMES DAILY
COMMUNITY

## 2021-08-26 RX ORDER — OXYCODONE HYDROCHLORIDE 5 MG/1
20 TABLET ORAL ONCE AS NEEDED
Status: COMPLETED | OUTPATIENT
Start: 2021-08-26 | End: 2021-08-26

## 2021-08-26 NOTE — PHYSICAL THERAPY NOTE
TANNER Romero approves participation in PM session. Patient sat up in chair for AM and has been resting in bed now. She was medicated prior to session. She presents in bed and is agreeable to therapy, reports she is feeling a bit better.  She needs mod A for bed

## 2021-08-26 NOTE — PROGRESS NOTES
[facility]    Ortho Medical Progress Note     Gideon Maurer Patient Status:  Inpatient    1965 MRN [id number]   Location [facility] 4W/SW/SE Attending Yony Quinones MD   Hosp Day # 2 PCP Cristobal Renee BILT 0.3 08/10/2021    TP 6.2 (L) 08/10/2021    AST 37 08/10/2021    ALT 38 08/10/2021    PTT 29.5 09/11/2020    INR 1.08 09/11/2020    T4F 1.0 11/27/2020    TSH 0.993 11/27/2020    ESRML 5 04/15/2021    CRP 1.47 (H) 04/15/2021    BNP 9 05/07/2013    MG

## 2021-08-26 NOTE — PHYSICAL THERAPY NOTE
PHYSICAL THERAPY HIP TREATMENT NOTE - INPATIENT    Room Number: 166/113-A            Presenting Problem: elective left SYEDA, posterior approach, WBAT    Problem List  Principal Problem:    Arthritis of left hip  Active Problems:    Inflammatory polyarthriti progress to [facility] PT with 24 hr assist pending pain tolerance and BP. DISCHARGE RECOMMENDATIONS  PT Discharge Recommendations: Sub-acute rehabilitation (vs 24 hr assist and  PT)    PLAN  PT Treatment Plan: Bed mobility; Endurance; Family education;Patient Assessment   Gait Assistance: Not tested           Stoop/Curb Assistance: Not tested  Comment : Pt hypotensive and c/o dizziness with bed > chair transfer.       Exercises AM Session   Ankle Pumps     20 reps   Quad Sets 10 reps   Glut Sets  reps   Hip Abd/

## 2021-08-26 NOTE — PLAN OF CARE
Postop day 1 to 2. Up with 1-2 assist and rolling walker, WBAT. Purewick in place overnight per patient preference. Saline locked. PRN Flexeril and oxy-IR for pain control. Plan for home with home health upon discharge.     Problem: Patient Centered Ca on pain and pain management  - Manage/alleviate anxiety  - Utilize distraction and/or relaxation techniques  - Monitor for opioid side effects  - Notify MD/LIP if interventions unsuccessful or patient reports new pain  - Anticipate increased pain with acti services based on physician/LIP order or complex needs related to functional status, cognitive ability or social support system  Outcome: Progressing

## 2021-08-26 NOTE — PAYOR COMM NOTE
--------------  CONTINUED STAY REVIEW    Payor: [employer] P[employer]  Subscriber #:  [id number]  Authorization Number: [id number]    Admit date: 8/24/21  Admit time:  8:13 AM    Admitting Physician: Shaila Reza MD  Attending Physician:  Juan David Mahoney RN    8/25/2021 1652 Given 300 mg Oral Soraida Butcher, RN      gabapentin (NEURONTIN) cap 400 mg     Date Action Dose Route User    8/26/2021 1439 Given 400 mg Oral Aaron Palomares RN      ondansetron (ZOFRAN) injection 4 mg     Date Action Dose Route User

## 2021-08-26 NOTE — PROGRESS NOTES
8/26/2021  Admission date 8/24/2021      S: Patient did better with PT yesterday pm, still having pain complaints this am.     O:     08/26/21  0445   BP: 110/63   Pulse: 98   Resp: 16   Temp: 99.3 °F (37.4 °C)       Data Review: {  No results found for th

## 2021-08-26 NOTE — PHYSICAL THERAPY NOTE
PHYSICAL THERAPY HIP EVALUATION - INPATIENT     Room Number: 417/417-A  Evaluation Date: 8/25/2021  Type of Evaluation: Initial  Physician Order: PT Eval and Treat    Presenting Problem: elective left SYEDA, posterior approach, WBAT  Reason for Therapy: Mobi positioned to comfort in chair and encouraged to use leg rest to help with changing positions as well as to perform LE exs to help with pain. She is up in chair with all needs in reach, RN and PCT aware.     Patient will benefit from continued IP PT service lights,wine,sulfites,cheeses,sweeteners,barometeric pressure   • Muscle weakness     arm weakness   • Neuropathy     Boths legs   • OBESITY    • Osteoarthritis    • Pleurisy    • Pneumonia due to organism 05/2017   • PONV (postoperative nausea and vomiting or walker as needed. She drives and manages the household. Lives with  who works.  Has a RTS, walk in shower w/chair    SUBJECTIVE  \"I don't think I can get up\"    PHYSICAL THERAPY EXAMINATION     OBJECTIVE  Precautions: Hip abduction pillow;SYEDA - Approx Degree of Impairment: 57.7%   Standardized Score (AM-PAC Scale): 39.45   CMS Modifier (G-Code): CK    FUNCTIONAL ABILITY STATUS  Gait Assessment   Gait Assistance: Not tested           Stoop/Curb Assistance: Not tested  Comment : Patient able to s

## 2021-08-27 PROCEDURE — 97535 SELF CARE MNGMENT TRAINING: CPT

## 2021-08-27 PROCEDURE — 97116 GAIT TRAINING THERAPY: CPT

## 2021-08-27 PROCEDURE — 97530 THERAPEUTIC ACTIVITIES: CPT

## 2021-08-27 RX ORDER — OXYCODONE HYDROCHLORIDE 5 MG/1
5 TABLET ORAL EVERY 4 HOURS PRN
Status: DISCONTINUED | OUTPATIENT
Start: 2021-08-27 | End: 2021-09-01

## 2021-08-27 RX ORDER — OXYCODONE HYDROCHLORIDE 5 MG/1
15 TABLET ORAL EVERY 6 HOURS PRN
Status: DISCONTINUED | OUTPATIENT
Start: 2021-08-27 | End: 2021-09-01

## 2021-08-27 NOTE — CDS QUERY
How to answer this Query:  1.) Click \"Edit button\" on the toolbar.  2.) Type an \"X\" in the bracket for the diagnosis that applies. (You may also add additional clinical details as you feel necessary to substantiate your response).    3.) Finally click \ body fat orbital region and triceps region and mild depletion muscle mass temple region, clavicle region, shoulder region, dorsal johnson region and calf region per visual exam.  - Fluid Accumulation: none per RN documentation  - Skin Integrity: surgical wo

## 2021-08-27 NOTE — PHYSICAL THERAPY NOTE
PHYSICAL THERAPY TREATMENT NOTE - INPATIENT     Room Number: 417/417-A       Presenting Problem: elective left SYEDA, posterior approach, WBAT    Problem List  Principal Problem:    Arthritis of left hip  Active Problems:    Inflammatory polyarthritis ([address] recommendation- preference for home with home PT and 24 hour supervision/care from spouse. DISCHARGE RECOMMENDATIONS  PT Discharge Recommendations: Sub-acute rehabilitation (vs 24 hr assist and HH PT)     PLAN  PT Treatment Plan: Bed mobility; Endurance; CK    FUNCTIONAL ABILITY STATUS  Gait Assessment   Gait Assistance: Minimum assistance  Distance (ft): 25ft  Assistive Device: Rolling walker  Pattern: L Decreased stance time  Stoop/Curb Assistance: Not tested  Comment : -    THERAPEUTIC EXERCISES  Lower

## 2021-08-27 NOTE — CM/SW NOTE
CARLINE followed up on DC planning. CARLINE confirmed with RN that pts plan is for home with [facility] as confirmed with Ortho NP. Pt pre ortho with [facility].  CARLINE updated [facility] on pt's discharge     [address] [phone]    PLAN: [address],

## 2021-08-27 NOTE — PROGRESS NOTES
[facility]    Progress Note    Aubree Kan Patient Status:  Inpatient    1965 MRN [id number]   Location [facility] 4W/SW/SE Attending French Baker MD   Hosp Day # 3 PCP Ravi MAZA       Subjective:   Sullivan Richardson 29.5 09/11/2020    INR 1.08 09/11/2020    T4F 1.0 11/27/2020    TSH 0.993 11/27/2020    ESRML 5 04/15/2021    CRP 1.47 (H) 04/15/2021    BNP 9 05/07/2013    MG 1.9 08/10/2021    TROP <0.045 08/07/2021    CK 2,092 (H) 09/07/2016    CKMB 12.3 (H) 09/07/2016

## 2021-08-27 NOTE — PLAN OF CARE
Pt alert. Reported continued pain, but additional dose of oxycodone given as ordered which reduced pain to a tolerable level. Pt up with 2 person assist.  Dressings to left hip dry and in tact. Call light within easy reach. VSS.   No acute distress note

## 2021-08-27 NOTE — OCCUPATIONAL THERAPY NOTE
OCCUPATIONAL THERAPY TREATMENT NOTE - INPATIENT    Room Number: 417/417-A         Presenting Problem: Pt s/p L SYEDA (posterior)     Problem List  Principal Problem:    Arthritis of left hip  Active Problems:    Inflammatory polyarthritis (HCC)    Rheumatoid 92/63  BP Location: Right arm  BP Method: Automatic  Patient Position: Sitting    O2 SATURATIONS       ACTIVITIES OF DAILY LIVING ASSESSMENT  AM-PAC ‘6-Clicks’ Inpatient Daily Activity Short Form  How much help from another person does the patient currentl

## 2021-08-27 NOTE — PROGRESS NOTES
8/27/2021  Admission date 8/24/2021      S: Patient doing well. Reports improved pain relief with additional oxy last night. Improving with therapy and plans to go home today once  is off work.  Endorses tingling to the dorsal surface of her left jayme

## 2021-08-28 PROBLEM — Z96.642 S/P HIP REPLACEMENT, LEFT: Status: ACTIVE | Noted: 2021-08-28

## 2021-08-28 PROBLEM — I95.81 HYPOTENSION AFTER PROCEDURE: Status: ACTIVE | Noted: 2021-08-28

## 2021-08-28 PROCEDURE — 97110 THERAPEUTIC EXERCISES: CPT

## 2021-08-28 PROCEDURE — 97530 THERAPEUTIC ACTIVITIES: CPT

## 2021-08-28 RX ORDER — SODIUM CHLORIDE 9 MG/ML
INJECTION, SOLUTION INTRAVENOUS CONTINUOUS
Status: ACTIVE | OUTPATIENT
Start: 2021-08-28 | End: 2021-08-28

## 2021-08-28 NOTE — PHYSICAL THERAPY NOTE
PHYSICAL THERAPY HIP TREATMENT NOTE - INPATIENT    Room Number: 711/999-P            Presenting Problem: elective left SYEDA, posterior approach, WBAT    Problem List  Principal Problem:    Arthritis of left hip  Active Problems:    Inflammatory polyarthriti +  ACTIVITY TOLERANCE                         O2 WALK       AM-PAC '6-Clicks' INPATIENT SHORT FORM - BASIC MOBILITY  How much difficulty does the patient currently have. ..  -   Turning over in bed (including adjusting bedclothes, sheets and blankets)?: A L device at assistance level: modified independent    Goal #3   Current Status Min with RW 25ft   Goal #4 Patient will negotiate 16 stairs/one curb w/ assistive device and supervision   Goal #4   Current Status  NT- deferred 2* low BP   Goal #5 Patient azra

## 2021-08-28 NOTE — PROGRESS NOTES
[facility]    Progress Note    Alix Rasheed Patient Status:  Inpatient    1965 MRN [id number]   Location [facility] 4W/SW/SE Attending Chelsea Iverson MD   Hosp Day # 4 PCP Sierra MAZA       Subjective:   [place] Arthritis of left hip  Now replaced      S/p left hip replacement. She is having pain management issues and hypotension. 1 liter NS over four hours today via port.  xarelto for anticoagulation      Hypercoagulable state ([address]        History of DVT (deep vei

## 2021-08-28 NOTE — PLAN OF CARE
Patient is alert and oriented. RA. SCDs on for DVT prophylaxis. Voiding freely, using purewick. Patient is up x2 with gait belt and walker. PRN Oxy given for pain management. L port-a-cath patent and intact. Aquacel dressing to left hip clean, dry, intact. physical needs  - Identify cognitive and physical deficits and behaviors that affect risk of falls.   - [place] fall precautions as indicated by assessment.  - Educate pt/family on patient safety including physical limitations  - Instruct pt to call for a

## 2021-08-28 NOTE — CM/SW NOTE
SW spoke w/ pt via phone to clarify discharge plans. Pt lives at home w/ spouse, who works - but will be taking off 1 week of work to care for pt at home. Therapy recommending [facility], but pt stated she prefers to return home and does not want to go to rehab.

## 2021-08-29 ENCOUNTER — APPOINTMENT (OUTPATIENT)
Dept: ULTRASOUND IMAGING | Facility: HOSPITAL | Age: 56
DRG: 470 | End: 2021-08-29
Attending: INTERNAL MEDICINE
Payer: COMMERCIAL

## 2021-08-29 PROCEDURE — 97110 THERAPEUTIC EXERCISES: CPT

## 2021-08-29 PROCEDURE — 93971 EXTREMITY STUDY: CPT | Performed by: INTERNAL MEDICINE

## 2021-08-29 PROCEDURE — 97116 GAIT TRAINING THERAPY: CPT

## 2021-08-29 RX ORDER — DROXIDOPA 100 MG/1
100 CAPSULE ORAL 2 TIMES DAILY
Status: DISCONTINUED | OUTPATIENT
Start: 2021-08-30 | End: 2021-08-30

## 2021-08-29 NOTE — PLAN OF CARE
POD#5. A&O x4. Left hip aquacel intact. Using ice pack prn. Medicated with oxy ir prn. Abduction pillow in place. Fall precautions maintained. Call light in reach & bed alarm on. Discharge planning in progress.   Problem: Patient Centered Care  Goal: Billie Problem: RISK FOR INFECTION - ADULT  Goal: Absence of fever/infection during anticipated neutropenic period  Description: INTERVENTIONS  - Monitor WBC  - Administer growth factors as ordered  - Implement neutropenic guidelines  Outcome: Progressing     P

## 2021-08-29 NOTE — PLAN OF CARE
Patient is alert and oriented X3. Voiding freely. Up with one assist with RW. VS WNL, on room air. CMS intact except chronic neuropathy to lower extremities. Dressing is CDI. Pain managed with prn oxy. Portacath to upper chest. Purwick in place.  Abductor w and/or relaxation techniques  - Monitor for opioid side effects  - Notify MD/LIP if interventions unsuccessful or patient reports new pain  - Anticipate increased pain with activity and pre-medicate as appropriate  Outcome: Progressing     Problem: RISK FO cognitive ability or social support system  Outcome: Progressing

## 2021-08-30 PROCEDURE — 97110 THERAPEUTIC EXERCISES: CPT

## 2021-08-30 PROCEDURE — 97116 GAIT TRAINING THERAPY: CPT

## 2021-08-30 RX ORDER — DROXIDOPA 100 MG/1
100 CAPSULE ORAL 3 TIMES DAILY
Status: DISCONTINUED | OUTPATIENT
Start: 2021-08-30 | End: 2021-09-01

## 2021-08-30 RX ORDER — DROXIDOPA 100 MG/1
100 CAPSULE ORAL
Qty: 60 CAPSULE | Refills: 0 | Status: SHIPPED | OUTPATIENT
Start: 2021-08-30 | End: 2021-08-30

## 2021-08-30 RX ORDER — DROXIDOPA 100 MG/1
100 CAPSULE ORAL 3 TIMES DAILY
Qty: 60 CAPSULE | Refills: 0 | Status: SHIPPED | COMMUNITY
Start: 2021-08-30 | End: 2021-11-29

## 2021-08-30 NOTE — PROGRESS NOTES
Orthopedic Adult Reconstruction Progress Note    Subjective:  Rhina Head. Still having orthostatic symptoms with PT yesterday.  Pain controlled    Objective:  Gen:  A&O x 3, VSS, Afebrile    Lower Extremity:   -  Dressing clean, dry, intact  -  2+ Pedal pulses B

## 2021-08-30 NOTE — PLAN OF CARE
POD#6. A&O x4. Medicated with oxy ir prn for pain. Numbness & tingling in legs unchanged. Right hip dressing intact. Ice pack prn. Fall precautions in place. Call light in reach & bed alarm on. Left portacath in place.  Possible discharge home today with H as appropriate  Outcome: Progressing     Problem: RISK FOR INFECTION - ADULT  Goal: Absence of fever/infection during anticipated neutropenic period  Description: INTERVENTIONS  - Monitor WBC  - Administer growth factors as ordered  - Implement neutropenic

## 2021-08-30 NOTE — PHYSICAL THERAPY NOTE
PHYSICAL THERAPY HIP TREATMENT NOTE - INPATIENT    Room Number: 552/002-C            Presenting Problem: elective left SYEDA, posterior approach, WBAT    Problem List  Principal Problem:    Arthritis of left hip  Active Problems:    Inflammatory polyarthriti BP: 111/70  BP Location: Right arm  BP Method: Automatic  Patient Position: Lying    O2 WALK       AM-PAC '6-Clicks' INPATIENT SHORT FORM - BASIC MOBILITY  How much difficulty does the patient currently have. ..  -   Turning over in bed (including a Goal #4 Patient will negotiate 16 stairs/one curb w/ assistive device and supervision   Goal #4   Current Status  NT due to c/o dizziness   Goal #5 Patient verbalizes and/or demonstrates all precautions and safety concerns independently   Goal #5   Pollo

## 2021-08-30 NOTE — PLAN OF CARE
Problem: Patient Centered Care  Goal: Patient preferences are identified and integrated in the patient's plan of care  Description: Interventions:  - What would you like us to know as we care for you?   - Provide timely, complete, and accurate informatio guidelines  Outcome: Progressing     Problem: SAFETY ADULT - FALL  Goal: Free from fall injury  Description: INTERVENTIONS:  - Assess pt frequently for physical needs  - Identify cognitive and physical deficits and behaviors that affect risk of falls.   - I Management Department for coordinating discharge planning if the patient needs post-hospital services based on physician/LIP order or complex needs related to functional status, cognitive ability or social support system  Outcome: Progressing   Patient is

## 2021-08-30 NOTE — PAYOR COMM NOTE
--------------  CONTINUED STAY REVIEW    Payor: [employer] PPO  Subscriber #:  [id number]  Authorization Number: [id number]    Admit date: 8/24/21  Admit time:  8:13 AM    Admitting Physician: Otilia Hargrove MD  Attending Physician:  Vijay Gordon 3.  HEENT: Moist mucous membranes. EOM-I. PERRL  Neck: No lymphadenopathy. No JVD. No carotid bruits. Respiratory: Clear to auscultation bilaterally. No wheezes. No rhonchi. Cardiovascular: S1, S2.  Regular rate and rhythm. No murmurs.   Abdomen: Soft,

## 2021-08-30 NOTE — DIETARY NOTE
ADULT NUTRITION REASSESSMENT    Pt is at high nutrition risk. Pt meets severe malnutrition criteria.       CRITERIA FOR MALNUTRITION DIAGNOSIS:  Criteria for severe malnutrition diagnosis: chronic illness related to wt loss greater than 20% in 1 year and e conversation w/ RN. Patient stated orange magic cup too sweet. She will continue chocolate ensure compact and spouse brought boost from home.   Oral diet intake appears adequate    NUTRITION DIAGNOSIS/PROBLEM:  Malnutrition related to physiological causes HISTORY:  Appetite: Fair to good and Improved  Intake: average 97%  Intake Meeting Needs: Yes, and oral nutrition supplements (ONS) to maximize  Percent Meals Eaten (last 3 days)     Date/Time Percent Meals Eaten (%)    08/28/21 0900  100 %    08/29/21 105

## 2021-08-30 NOTE — PROGRESS NOTES
[facility]    Progress Note    Rosie Raza Patient Status:  Inpatient    1965 MRN [id number]   Location [facility] 4W/SW/SE Attending Ashwini Solorio MD   Hosp Day # 5 PCP Aleksandr MAZA       Subjective:   Eduard Palomares 08/10/2021    HGB 10.3 (L) 08/25/2021    HCT 30.1 (L) 08/25/2021    .0 08/10/2021    CREATSERUM 1.05 (H) 08/25/2021    BUN 17 08/25/2021     08/25/2021    K 3.7 08/25/2021     08/25/2021    CO2 29.0 08/25/2021     (H) 08/25/2021

## 2021-08-30 NOTE — PAYOR COMM NOTE
--------------  CONTINUED STAY REVIEW    Payor: [employer] PPO  Subscriber #:  [id number]  Authorization Number: [id number]    Admit date: 8/24/21  Admit time:  8:13 AM    Admitting Physician: Frederic Voss MD  Attending Physician:  Gill Rivera better. Limited in activity tolerance by low BP and symptoms of dizziness. Needs increased time to complete mobility tasks due to pain.  Pt able to progress ambulation distance to 35 ft with CGA and RW however needs seated break in recliner due to incr services and improvement in orthostatic hypotension.  Plan for discharge to home with home health    FUNCTIONAL ABILITY STATUS  Gait Assessment   Gait Assistance: Contact guard assist  Distance (ft): 2 x 35  Assistive Device: Rolling walker  Pattern: L Decr (VALTREX) tab 500 mg     Date Action Dose Route User    8/30/2021 0834 Given 500 mg Oral Berta Kaminski RN    8/29/2021 2123 Given 500 mg Oral Toshia Jeronimo RN

## 2021-08-30 NOTE — PROGRESS NOTES
[facility]    Ortho Medical Progress Note     Luberta Safe Patient Status:  Inpatient    1965 MRN [id number]   Location [facility] 4W/SW/SE Attending Anisha Lopez MD   Hosp Day # 6 PCP Neri Chew Date    WBC 8.6 08/10/2021    HGB 10.3 (L) 08/25/2021    HCT 30.1 (L) 08/25/2021    .0 08/10/2021    CREATSERUM 1.05 (H) 08/25/2021    BUN 17 08/25/2021     08/25/2021    K 3.7 08/25/2021     08/25/2021    CO2 29.0 08/25/2021

## 2021-08-31 PROCEDURE — 97530 THERAPEUTIC ACTIVITIES: CPT

## 2021-08-31 PROCEDURE — 97116 GAIT TRAINING THERAPY: CPT

## 2021-08-31 RX ORDER — ONDANSETRON 4 MG/1
4 TABLET, ORALLY DISINTEGRATING ORAL EVERY 8 HOURS PRN
Status: DISCONTINUED | OUTPATIENT
Start: 2021-08-31 | End: 2021-09-01

## 2021-08-31 NOTE — PLAN OF CARE
Problem: Patient Centered Care  Goal: Patient preferences are identified and integrated in the patient's plan of care  Description: Interventions:  - What would you like us to know as we care for you? From home with spouse who is her support system.   Had and request assistance  - Assess pain using appropriate pain scale  - Administer analgesics based on type and severity of pain and evaluate response  - Implement non-pharmacological measures as appropriate and evaluate response  - Consider cultural and soc preferences of patient/family/discharge partner  - Complete POLST form as appropriate  - Assess patient's ability to be responsible for managing their own health  - Refer to Case Management Department for coordinating discharge planning if the patient need

## 2021-08-31 NOTE — PAYOR COMM NOTE
--------------  CONTINUED STAY REVIEW    Payor: [employer] PPO  Subscriber #:  [id number]  Authorization Number: [id number]    Admit date: 8/24/21  Admit time:  8:13 AM    Admitting Physician: Irma Mendes MD  Attending Physician:  Tayo Herron Assistance: Minimum assistance  Distance (ft): 2 x 50  Assistive Device: Rolling walker  Pattern: L Decreased stance time  Stoop/Curb Assistance: Not tested          MEDICATIONS ADMINISTERED IN LAST 1 DAY:  allopurinol (ZYLOPRIM) tab 100 mg     Date Action Route User    8/30/2021 1759 Given 50 mg Oral Rachel Wheatley RN

## 2021-08-31 NOTE — PROGRESS NOTES
[facility]    Progress Note    Tuan Tirado Patient Status:  Inpatient    1965 MRN [id number]   Location [facility] 4W/SW/SE Attending Lori Lloyd MD   Hosp Day # 7 PCP Phan MAZA       Subjective:   Mar Lin 29.0 08/25/2021     (H) 08/25/2021    CA 8.5 08/25/2021    ALB 3.5 08/10/2021    ALKPHO 53 08/10/2021    BILT 0.3 08/10/2021    TP 6.2 (L) 08/10/2021    AST 37 08/10/2021    ALT 38 08/10/2021    PTT 29.5 09/11/2020    INR 1.08 09/11/2020    T4F 1.0

## 2021-08-31 NOTE — PHYSICAL THERAPY NOTE
PHYSICAL THERAPY HIP TREATMENT NOTE - INPATIENT    Room Number: 089/476-N            Presenting Problem: elective left SYEDA, posterior approach, WBAT    Problem List  Principal Problem:    Arthritis of left hip  Active Problems:    Inflammatory polyarthriti Good  Dynamic Sitting: Fair +  Static Standing: Fair -  Dynamic Standing: Fair -  ACTIVITY TOLERANCE           BP: 111/71  BP Location: Right arm  BP Method: Automatic  Patient Position: Standing    O2 WALK       AM-PAC '6-Clicks' INPATIENT SHORT FORM - BA device at assistance level: modified independent    Goal #3   Current Status Min  A   with RW    2 x  50ft   Goal #4 Patient will negotiate 16 stairs/one curb w/ assistive device and supervision   Goal #4   Current Status  NT due to c/o dizziness   Goal #5

## 2021-08-31 NOTE — PROGRESS NOTES
8/31/2021  Admission date 8/24/2021      S: Called into patient room to discuss progress and discharge disposition. Per notes and RN report, pt continues to use purewick system. Continues to report tingling to L shin and top of foot.  H/o left ankle sprain

## 2021-09-01 VITALS
TEMPERATURE: 98 F | RESPIRATION RATE: 18 BRPM | BODY MASS INDEX: 21.33 KG/M2 | SYSTOLIC BLOOD PRESSURE: 100 MMHG | WEIGHT: 128 LBS | OXYGEN SATURATION: 100 % | DIASTOLIC BLOOD PRESSURE: 65 MMHG | HEART RATE: 96 BPM | HEIGHT: 65 IN

## 2021-09-01 PROCEDURE — 97116 GAIT TRAINING THERAPY: CPT

## 2021-09-01 PROCEDURE — 97530 THERAPEUTIC ACTIVITIES: CPT

## 2021-09-01 PROCEDURE — 97110 THERAPEUTIC EXERCISES: CPT

## 2021-09-01 RX ORDER — CYCLOBENZAPRINE HCL 5 MG
5 TABLET ORAL EVERY 8 HOURS PRN
Qty: 50 TABLET | Refills: 0 | Status: SHIPPED | OUTPATIENT
Start: 2021-09-01

## 2021-09-01 NOTE — PLAN OF CARE
Problem: PAIN - ADULT  Goal: Verbalizes/displays adequate comfort level or patient's stated pain goal  Description: INTERVENTIONS:  - Encourage pt to monitor pain and request assistance  - Assess pain using appropriate pain scale  - Administer analgesics wound care, etc)  - Arrange for interpreters to assist at discharge as needed  - Consider post-discharge preferences of patient/family/discharge partner  - Complete POLST form as appropriate  - Assess patient's ability to be responsible for managing their

## 2021-09-01 NOTE — PLAN OF CARE
Patient has been laying in bed and sitting up in chair with call light within reach. Has left chest port that has been de-accessed today by TL. There is ABD with tape to left hip, ice packs as needed. Is up 1 assist w/walker.  Has been voiding, has not had passes PT and stable with her BP.     Interventions:   - Up as tolerated using a walker.  - Fall prevention.  - Oral anticoagulation.  - Pain management with oral medication.  - Monitor incision for any signs of infection.  - Check BP and refer to MD as Asya Edson as appropriate  - Consider OT/PT consult to assist with strengthening/mobility  - Encourage toileting schedule  Outcome: Adequate for Discharge     Problem: DISCHARGE PLANNING  Goal: Discharge to home or other facility with appropriate resources  Descripti

## 2021-09-01 NOTE — PROGRESS NOTES
[facility]    Progress Note    Tim Morse Patient Status:  Inpatient    1965 MRN [id number]   Location [facility] 4W/SW/SE Attending Edd Obrien MD   Hosp Day # 8 PCP Micaela MAZA       Subjective:   [place] left  Stable for discharge.  xarelto for anticoagulation      Hypercoagulable state ([address]        History of DVT (deep vein thrombosis)        Hypotension after procedure        Inflammatory polyarthritis ([address]        Rheumatoid arthritis ([address]        Renal

## 2021-09-01 NOTE — PHYSICAL THERAPY NOTE
PHYSICAL THERAPY TREATMENT NOTE - INPATIENT     Room Number: 417/417-A       Presenting Problem: elective left SYEDA, posterior approach, WBAT    Problem List  Principal Problem:    Arthritis of left hip  Active Problems:    Inflammatory polyarthritis ([address] ASSESSMENT   Ratin  Location: L hip  Management Techniques: Activity promotion; Body mechanics; Relaxation;Breathing techniques;Repositioning    BALANCE ambulate 300 feet with assistive device at assistance level: modified independent    Goal #3   Current Status CGA   with RW    2 x  100ft   Goal #4 Patient will negotiate 16 stairs/one curb w/ assistive device and supervision   Goal #4   Current Status  Na

## 2021-09-01 NOTE — CM/SW NOTE
09/01/21 0800   Discharge disposition   Expected discharge disposition Home-Health   Post Acute Care Provider [facility]   Discharge transportation Private car     MD dc order entered. [facility] notified of dc today via [facility].      Plan: DC home today with [facility] home

## 2021-09-09 ENCOUNTER — NURSE ONLY (OUTPATIENT)
Dept: HEMATOLOGY/ONCOLOGY | Facility: HOSPITAL | Age: 56
End: 2021-09-09
Attending: INTERNAL MEDICINE
Payer: COMMERCIAL

## 2021-09-09 DIAGNOSIS — M32.19 SYSTEMIC LUPUS ERYTHEMATOSUS WITH OTHER ORGAN INVOLVEMENT, UNSPECIFIED SLE TYPE (HCC): ICD-10-CM

## 2021-09-09 DIAGNOSIS — Z79.899 ENCOUNTER FOR LONG-TERM (CURRENT) USE OF HIGH-RISK MEDICATION: ICD-10-CM

## 2021-09-09 DIAGNOSIS — M06.00 SERONEGATIVE RHEUMATOID ARTHRITIS (HCC): ICD-10-CM

## 2021-09-09 DIAGNOSIS — Z23 NEED FOR VACCINATION WITH 13-POLYVALENT PNEUMOCOCCAL CONJUGATE VACCINE: ICD-10-CM

## 2021-09-09 DIAGNOSIS — Z91.89 AT HIGH RISK FOR PNEUMONIA: ICD-10-CM

## 2021-09-09 DIAGNOSIS — D50.0 IRON DEFICIENCY ANEMIA DUE TO CHRONIC BLOOD LOSS: ICD-10-CM

## 2021-09-09 LAB
ALBUMIN SERPL-MCNC: 3.2 G/DL (ref 3.4–5)
ALP LIVER SERPL-CCNC: 122 U/L
ALT SERPL-CCNC: 23 U/L
AST SERPL-CCNC: 22 U/L (ref 15–37)
BASOPHILS # BLD AUTO: 0.07 X10(3) UL (ref 0–0.2)
BASOPHILS NFR BLD AUTO: 1.1 %
BILIRUB DIRECT SERPL-MCNC: 0.2 MG/DL (ref 0–0.2)
BILIRUB SERPL-MCNC: 0.3 MG/DL (ref 0.1–2)
BUN BLD-MCNC: 24 MG/DL (ref 7–18)
C3 SERPL-MCNC: 175 MG/DL (ref 90–180)
C4 SERPL-MCNC: 31.5 MG/DL (ref 10–40)
CREAT BLD-MCNC: 0.98 MG/DL
CRP SERPL-MCNC: 0.56 MG/DL (ref ?–0.3)
DEPRECATED HBV CORE AB SER IA-ACNC: 131.3 NG/ML
EOSINOPHIL # BLD AUTO: 0.26 X10(3) UL (ref 0–0.7)
EOSINOPHIL NFR BLD AUTO: 4.2 %
ERYTHROCYTE [DISTWIDTH] IN BLOOD BY AUTOMATED COUNT: 13.6 %
HCT VFR BLD AUTO: 31.1 %
HGB BLD-MCNC: 10 G/DL
IMM GRANULOCYTES # BLD AUTO: 0.04 X10(3) UL (ref 0–1)
IMM GRANULOCYTES NFR BLD: 0.7 %
IRON SATURATION: 11 %
IRON SERPL-MCNC: 48 UG/DL
LYMPHOCYTES # BLD AUTO: 1.47 X10(3) UL (ref 1–4)
LYMPHOCYTES NFR BLD AUTO: 23.9 %
M PROTEIN MFR SERPL ELPH: 7.2 G/DL (ref 6.4–8.2)
MCH RBC QN AUTO: 29.2 PG (ref 26–34)
MCHC RBC AUTO-ENTMCNC: 32.2 G/DL (ref 31–37)
MCV RBC AUTO: 90.9 FL
MONOCYTES # BLD AUTO: 0.49 X10(3) UL (ref 0.1–1)
MONOCYTES NFR BLD AUTO: 8 %
NEUTROPHILS # BLD AUTO: 3.82 X10 (3) UL (ref 1.5–7.7)
NEUTROPHILS # BLD AUTO: 3.82 X10(3) UL (ref 1.5–7.7)
NEUTROPHILS NFR BLD AUTO: 62.1 %
PLATELET # BLD AUTO: 423 10(3)UL (ref 150–450)
RBC # BLD AUTO: 3.42 X10(6)UL
SED RATE-ML: 50 MM/HR
TOTAL IRON BINDING CAPACITY: 444 UG/DL (ref 240–450)
TRANSFERRIN SERPL-MCNC: 298 MG/DL (ref 200–360)
WBC # BLD AUTO: 6.2 X10(3) UL (ref 4–11)

## 2021-09-09 PROCEDURE — 80076 HEPATIC FUNCTION PANEL: CPT

## 2021-09-09 PROCEDURE — 86160 COMPLEMENT ANTIGEN: CPT

## 2021-09-09 PROCEDURE — 83550 IRON BINDING TEST: CPT

## 2021-09-09 PROCEDURE — 84520 ASSAY OF UREA NITROGEN: CPT

## 2021-09-09 PROCEDURE — 82728 ASSAY OF FERRITIN: CPT

## 2021-09-09 PROCEDURE — 36591 DRAW BLOOD OFF VENOUS DEVICE: CPT

## 2021-09-09 PROCEDURE — 83540 ASSAY OF IRON: CPT

## 2021-09-09 PROCEDURE — 86140 C-REACTIVE PROTEIN: CPT

## 2021-09-09 PROCEDURE — 85652 RBC SED RATE AUTOMATED: CPT

## 2021-09-09 PROCEDURE — 85025 COMPLETE CBC W/AUTO DIFF WBC: CPT

## 2021-09-09 PROCEDURE — 82565 ASSAY OF CREATININE: CPT

## 2021-09-10 ENCOUNTER — APPOINTMENT (OUTPATIENT)
Dept: HEMATOLOGY/ONCOLOGY | Facility: HOSPITAL | Age: 56
End: 2021-09-10
Attending: INTERNAL MEDICINE
Payer: COMMERCIAL

## 2021-09-10 NOTE — PROGRESS NOTES
Audree Gaucher show that esr and alk phophatase are elevated - this is like due to your recent hip replacement surgery. Few lab tests are still pending; will send you the results when they return.     Dr. Toniann Dakin

## 2021-10-19 ENCOUNTER — NURSE ONLY (OUTPATIENT)
Dept: HEMATOLOGY/ONCOLOGY | Facility: HOSPITAL | Age: 56
End: 2021-10-19
Attending: INTERNAL MEDICINE
Payer: COMMERCIAL

## 2021-10-19 VITALS
BODY MASS INDEX: 21.33 KG/M2 | OXYGEN SATURATION: 98 % | WEIGHT: 128 LBS | DIASTOLIC BLOOD PRESSURE: 80 MMHG | RESPIRATION RATE: 16 BRPM | TEMPERATURE: 97 F | SYSTOLIC BLOOD PRESSURE: 129 MMHG | HEIGHT: 65 IN | HEART RATE: 110 BPM

## 2021-10-19 DIAGNOSIS — D68.59 HYPERCOAGULABLE STATE (HCC): ICD-10-CM

## 2021-10-19 DIAGNOSIS — D50.0 IRON DEFICIENCY ANEMIA DUE TO CHRONIC BLOOD LOSS: ICD-10-CM

## 2021-10-19 DIAGNOSIS — D50.0 IRON DEFICIENCY ANEMIA DUE TO CHRONIC BLOOD LOSS: Primary | ICD-10-CM

## 2021-10-19 PROCEDURE — 99214 OFFICE O/P EST MOD 30 MIN: CPT | Performed by: INTERNAL MEDICINE

## 2021-10-19 NOTE — PROGRESS NOTES
Patient is here for MD f/u for Anemia. Patient had a cardiac ablation due to SVT in August. Patient has been following up with the cardiologist and was advised to come in to see Dr Urszula Paz due to low iron levels. Patient had a Feraheme infusion in May 2019.

## 2021-10-20 ENCOUNTER — TELEPHONE (OUTPATIENT)
Dept: HEMATOLOGY/ONCOLOGY | Facility: HOSPITAL | Age: 56
End: 2021-10-20

## 2021-10-20 NOTE — TELEPHONE ENCOUNTER
Ana Cristina Goldberg seen her cardiologist today and they want her to change her Ana Cristina Heller

## 2021-10-20 NOTE — TELEPHONE ENCOUNTER
Returned call. Estrellita Atkins said her cardiologist has recommended she take lower dose, 1/2 tablet of xarelto for awhile, to see if it helps her anemia, if she is losing blood in her bowel.   Nervous about lowering her dose, and getting a blood clot, wanted Dr Zoltan Aguilar

## 2021-10-20 NOTE — TELEPHONE ENCOUNTER
Returned the call. Let her know our pharmacist has ordered the iron, and we are waiting for an update if it will be in by Monday. Will be called with any update. Pt verbalized understanding.

## 2021-10-21 NOTE — TELEPHONE ENCOUNTER
Per Dr Karon Nesbitt regarding Xarelto \"Her bleeding is not severe enough to warrant a dose reduction.  She hasn't needed very much iron to keep up.  I would keep it the same\"      Patient notified of this and verbalized understanding.

## 2021-10-25 ENCOUNTER — OFFICE VISIT (OUTPATIENT)
Dept: HEMATOLOGY/ONCOLOGY | Facility: HOSPITAL | Age: 56
End: 2021-10-25
Attending: INTERNAL MEDICINE
Payer: COMMERCIAL

## 2021-10-25 ENCOUNTER — HOSPITAL ENCOUNTER (OUTPATIENT)
Dept: CV DIAGNOSTICS | Facility: HOSPITAL | Age: 56
Discharge: HOME | End: 2021-10-25
Attending: PEDIATRICS
Payer: COMMERCIAL

## 2021-10-25 VITALS
HEART RATE: 97 BPM | DIASTOLIC BLOOD PRESSURE: 78 MMHG | RESPIRATION RATE: 18 BRPM | TEMPERATURE: 97 F | SYSTOLIC BLOOD PRESSURE: 124 MMHG | OXYGEN SATURATION: 97 %

## 2021-10-25 DIAGNOSIS — E83.19 IRON OVERLOAD: ICD-10-CM

## 2021-10-25 DIAGNOSIS — D50.0 IRON DEFICIENCY ANEMIA DUE TO CHRONIC BLOOD LOSS: Primary | ICD-10-CM

## 2021-10-25 DIAGNOSIS — N18.31 CHRONIC KIDNEY DISEASE, STAGE 3A (HCC): ICD-10-CM

## 2021-10-25 DIAGNOSIS — M32.19 SYSTEMIC LUPUS ERYTHEMATOSUS WITH OTHER ORGAN INVOLVEMENT, UNSPECIFIED SLE TYPE (HCC): ICD-10-CM

## 2021-10-25 DIAGNOSIS — L93.2 CUTANEOUS LUPUS ERYTHEMATOSUS: ICD-10-CM

## 2021-10-25 DIAGNOSIS — L29.9 ITCHING: ICD-10-CM

## 2021-10-25 DIAGNOSIS — I47.1 SUSTAINED SVT (HCC): ICD-10-CM

## 2021-10-25 PROCEDURE — S0028 INJECTION, FAMOTIDINE, 20 MG: HCPCS | Performed by: CLINICAL NURSE SPECIALIST

## 2021-10-25 PROCEDURE — 93248 EXT ECG>7D<15D REV&INTERPJ: CPT | Performed by: PEDIATRICS

## 2021-10-25 PROCEDURE — 93247 EXT ECG>7D<15D SCAN A/R: CPT | Performed by: PEDIATRICS

## 2021-10-25 PROCEDURE — 85025 COMPLETE CBC W/AUTO DIFF WBC: CPT

## 2021-10-25 PROCEDURE — 82728 ASSAY OF FERRITIN: CPT

## 2021-10-25 PROCEDURE — 80053 COMPREHEN METABOLIC PANEL: CPT

## 2021-10-25 PROCEDURE — 83550 IRON BINDING TEST: CPT

## 2021-10-25 PROCEDURE — 93246 EXT ECG>7D<15D RECORDING: CPT | Performed by: PEDIATRICS

## 2021-10-25 PROCEDURE — 96375 TX/PRO/DX INJ NEW DRUG ADDON: CPT

## 2021-10-25 PROCEDURE — 83540 ASSAY OF IRON: CPT

## 2021-10-25 PROCEDURE — 96365 THER/PROPH/DIAG IV INF INIT: CPT

## 2021-10-25 RX ORDER — FAMOTIDINE 10 MG/ML
20 INJECTION, SOLUTION INTRAVENOUS ONCE
Status: CANCELLED
Start: 2021-10-25 | End: 2021-10-25

## 2021-10-25 RX ORDER — DIPHENHYDRAMINE HCL 25 MG
25 CAPSULE ORAL ONCE
Status: COMPLETED | OUTPATIENT
Start: 2021-10-25 | End: 2021-10-25

## 2021-10-25 RX ORDER — DIPHENHYDRAMINE HYDROCHLORIDE 50 MG/ML
25 INJECTION INTRAMUSCULAR; INTRAVENOUS ONCE
Status: CANCELLED
Start: 2021-10-25 | End: 2021-10-25

## 2021-10-25 RX ORDER — DIPHENHYDRAMINE HYDROCHLORIDE 50 MG/ML
25 INJECTION INTRAMUSCULAR; INTRAVENOUS ONCE
Status: COMPLETED | OUTPATIENT
Start: 2021-10-25 | End: 2021-10-25

## 2021-10-25 RX ORDER — FAMOTIDINE 10 MG/ML
20 INJECTION, SOLUTION INTRAVENOUS ONCE
Status: COMPLETED | OUTPATIENT
Start: 2021-10-25 | End: 2021-10-25

## 2021-10-25 RX ADMIN — FAMOTIDINE 20 MG: 10 INJECTION, SOLUTION INTRAVENOUS at 11:51:00

## 2021-10-25 RX ADMIN — DIPHENHYDRAMINE HYDROCHLORIDE 25 MG: 50 INJECTION INTRAMUSCULAR; INTRAVENOUS at 11:54:00

## 2021-10-25 RX ADMIN — DIPHENHYDRAMINE HCL 25 MG: 25 MG CAPSULE ORAL at 13:34:00

## 2021-10-25 NOTE — PROGRESS NOTES
Pt notes itching to left side of face and left back. No hives noted, not red other than where scratched. Feels slightly heavier chest since Iron stopped but denies it being significant. Will leave port accessed and observe for another 20 mins.

## 2021-10-25 NOTE — PROGRESS NOTES
Education Record    Learner:  Patient    Disease / Diagnosis: iron def    Barriers / Limitations:  None   Comments:    Method:  Discussion   Comments:    General Topics:  Medication, Side effects and symptom management, Plan of care reviewed and Fall risk

## 2021-10-25 NOTE — PATIENT INSTRUCTIONS
INDICATIONS  Monoferric is indicated for the treatment of iron deficiency anemia (ANASTASIYA) in adult patients:  who have intolerance to oral iron or have had unsatisfactory response to oral iron   who have non-hemodialysis dependent chronic kidney disease (NDD- Do not administer Monoferric to patients with iron overload. ADVERSE REACTIONS  Adverse reactions were reported in 8.6% ([id number]) of patients treated with Monoferric.  Adverse reactions related to treatment and reported by ?1% of the treated patients were

## 2021-10-25 NOTE — PROGRESS NOTES
Pt decided to depart- itching and chest discomfort unchanged. Pt scheduled for heart monitor placement. No different than previous post iron infusions. She states \" I feel comfortable leaving and monitoring at home. \" Reviewed when to call or seek immedia

## 2021-11-05 ENCOUNTER — HOSPITAL ENCOUNTER (OUTPATIENT)
Age: 56
Discharge: HOME | End: 2021-11-05
Payer: COMMERCIAL

## 2021-11-05 VITALS
OXYGEN SATURATION: 99 % | SYSTOLIC BLOOD PRESSURE: 122 MMHG | RESPIRATION RATE: 20 BRPM | TEMPERATURE: 98 F | HEART RATE: 86 BPM | DIASTOLIC BLOOD PRESSURE: 76 MMHG

## 2021-11-05 DIAGNOSIS — Z11.52 ENCOUNTER FOR SCREENING FOR COVID-19: ICD-10-CM

## 2021-11-05 DIAGNOSIS — B34.9 VIRAL SYNDROME: Primary | ICD-10-CM

## 2021-11-05 PROCEDURE — U0002 COVID-19 LAB TEST NON-CDC: HCPCS | Performed by: PHYSICIAN ASSISTANT

## 2021-11-05 PROCEDURE — 99213 OFFICE O/P EST LOW 20 MIN: CPT | Performed by: PHYSICIAN ASSISTANT

## 2021-11-05 NOTE — ED PROVIDER NOTES
Patient Seen in: [facility]      History   Patient presents with:  Covid-19 Test: Feel chills, nose running, headache, stomach upset, nauseous, body aches, sore throat, fatigue and low grade fever.  - Entered by patient    Stated Complaint: Cov had x 1 year and went away/unknown etiology/ age 56   • Sjogren's disease ([facility]    • Visual impairment     wears glasses              Past Surgical History:   Procedure Laterality Date   • APPENDECTOMY     • BACK SURGERY      3/16 lumbar discectomy  9/2 Temp 98.4 °F (36.9 °C) (Temporal)   Resp 20   SpO2 99%         Physical Exam  Vitals and nursing note reviewed. Constitutional:       Appearance: She is well-developed. HENT:      Head: Atraumatic.       Right Ear: External ear normal.      Left Ear: E Medication List    START taking these medications    nystatin [id number] UNIT/ML Mouth/Throat Suspension  Take 5 mL (500,000 Units total) by mouth 4 (four) times daily.   Qty: 100 mL Refills: 0

## 2021-11-08 ENCOUNTER — NURSE ONLY (OUTPATIENT)
Dept: HEMATOLOGY/ONCOLOGY | Facility: HOSPITAL | Age: 56
End: 2021-11-08
Attending: INTERNAL MEDICINE
Payer: COMMERCIAL

## 2021-11-08 DIAGNOSIS — D50.0 IRON DEFICIENCY ANEMIA DUE TO CHRONIC BLOOD LOSS: ICD-10-CM

## 2021-11-08 PROCEDURE — 83550 IRON BINDING TEST: CPT

## 2021-11-08 PROCEDURE — 82728 ASSAY OF FERRITIN: CPT

## 2021-11-08 PROCEDURE — 85025 COMPLETE CBC W/AUTO DIFF WBC: CPT

## 2021-11-08 PROCEDURE — 83540 ASSAY OF IRON: CPT

## 2021-11-08 PROCEDURE — 36591 DRAW BLOOD OFF VENOUS DEVICE: CPT

## 2021-12-01 NOTE — PROGRESS NOTES
[facility]    PATIENT'S NAME: Lesa Zhu   ATTENDING PHYSICIAN: Gisele Casanova M.D.    PATIENT ACCOUNT #: [de-identified] LOCATION: [address] [place] [address] RECORD #: [id number] YOB: 1965   DATE OF SERVICE: 10/19/2021       CANCER CENT actually feels fairly well at the present time. She is recovering from her hip surgery and her elbow feels reasonably well. MEDICATIONS:  Her current medications include allopurinol 100 mg nightly;  Benlysta 200 mg every 7 days; cevimeline 30 mg 3 times treat her with Monoferric and she will get a dose within the next week or so. She hopefully will tolerate it well since there is very low rate of infusion-related reactions with this agent and she has not gotten preparation before.   I have asked her to do

## 2021-12-03 ENCOUNTER — LAB ENCOUNTER (OUTPATIENT)
Dept: LAB | Age: 56
End: 2021-12-03
Attending: INTERNAL MEDICINE
Payer: COMMERCIAL

## 2021-12-03 DIAGNOSIS — D50.9 IRON DEFICIENCY ANEMIA: ICD-10-CM

## 2021-12-06 ENCOUNTER — ANESTHESIA (OUTPATIENT)
Dept: ENDOSCOPY | Facility: HOSPITAL | Age: 56
End: 2021-12-06
Payer: COMMERCIAL

## 2021-12-06 ENCOUNTER — HOSPITAL ENCOUNTER (OUTPATIENT)
Facility: HOSPITAL | Age: 56
Setting detail: HOSPITAL OUTPATIENT SURGERY
Discharge: HOME | End: 2021-12-06
Attending: INTERNAL MEDICINE | Admitting: INTERNAL MEDICINE
Payer: COMMERCIAL

## 2021-12-06 ENCOUNTER — APPOINTMENT (OUTPATIENT)
Dept: GENERAL RADIOLOGY | Facility: HOSPITAL | Age: 56
End: 2021-12-06
Attending: INTERNAL MEDICINE
Payer: COMMERCIAL

## 2021-12-06 ENCOUNTER — ANESTHESIA EVENT (OUTPATIENT)
Dept: ENDOSCOPY | Facility: HOSPITAL | Age: 56
End: 2021-12-06
Payer: COMMERCIAL

## 2021-12-06 VITALS
HEART RATE: 67 BPM | WEIGHT: 128 LBS | SYSTOLIC BLOOD PRESSURE: 112 MMHG | BODY MASS INDEX: 21.33 KG/M2 | DIASTOLIC BLOOD PRESSURE: 70 MMHG | TEMPERATURE: 98 F | HEIGHT: 65 IN | OXYGEN SATURATION: 100 % | RESPIRATION RATE: 18 BRPM

## 2021-12-06 DIAGNOSIS — R93.3 ABNORMAL FINDING ON GI TRACT IMAGING: ICD-10-CM

## 2021-12-06 DIAGNOSIS — D50.9 IRON DEFICIENCY ANEMIA, UNSPECIFIED IRON DEFICIENCY ANEMIA TYPE: ICD-10-CM

## 2021-12-06 DIAGNOSIS — D50.9 IRON DEFICIENCY ANEMIA: Primary | ICD-10-CM

## 2021-12-06 PROCEDURE — 0DB98ZX EXCISION OF DUODENUM, VIA NATURAL OR ARTIFICIAL OPENING ENDOSCOPIC, DIAGNOSTIC: ICD-10-PCS | Performed by: INTERNAL MEDICINE

## 2021-12-06 PROCEDURE — 0F798ZZ DILATION OF COMMON BILE DUCT, VIA NATURAL OR ARTIFICIAL OPENING ENDOSCOPIC: ICD-10-PCS | Performed by: INTERNAL MEDICINE

## 2021-12-06 PROCEDURE — 0DJ08ZZ INSPECTION OF UPPER INTESTINAL TRACT, VIA NATURAL OR ARTIFICIAL OPENING ENDOSCOPIC: ICD-10-PCS | Performed by: INTERNAL MEDICINE

## 2021-12-06 PROCEDURE — 88305 TISSUE EXAM BY PATHOLOGIST: CPT | Performed by: INTERNAL MEDICINE

## 2021-12-06 PROCEDURE — 0DB78ZX EXCISION OF STOMACH, PYLORUS, VIA NATURAL OR ARTIFICIAL OPENING ENDOSCOPIC, DIAGNOSTIC: ICD-10-PCS | Performed by: INTERNAL MEDICINE

## 2021-12-06 PROCEDURE — S0073 INJECTION, AZTREONAM, 500 MG: HCPCS | Performed by: INTERNAL MEDICINE

## 2021-12-06 PROCEDURE — 74328 X-RAY BILE DUCT ENDOSCOPY: CPT | Performed by: INTERNAL MEDICINE

## 2021-12-06 PROCEDURE — 0FC98ZZ EXTIRPATION OF MATTER FROM COMMON BILE DUCT, VIA NATURAL OR ARTIFICIAL OPENING ENDOSCOPIC: ICD-10-PCS | Performed by: INTERNAL MEDICINE

## 2021-12-06 RX ORDER — CLINDAMYCIN PHOSPHATE 900 MG/50ML
INJECTION INTRAVENOUS AS NEEDED
Status: DISCONTINUED | OUTPATIENT
Start: 2021-12-06 | End: 2021-12-06 | Stop reason: SURG

## 2021-12-06 RX ORDER — HYDROCODONE BITARTRATE AND ACETAMINOPHEN 10; 325 MG/1; MG/1
2 TABLET ORAL AS NEEDED
Status: DISCONTINUED | OUTPATIENT
Start: 2021-12-06 | End: 2021-12-06

## 2021-12-06 RX ORDER — LIDOCAINE HYDROCHLORIDE 10 MG/ML
INJECTION, SOLUTION EPIDURAL; INFILTRATION; INTRACAUDAL; PERINEURAL AS NEEDED
Status: DISCONTINUED | OUTPATIENT
Start: 2021-12-06 | End: 2021-12-06 | Stop reason: SURG

## 2021-12-06 RX ORDER — METOCLOPRAMIDE HYDROCHLORIDE 5 MG/ML
10 INJECTION INTRAMUSCULAR; INTRAVENOUS AS NEEDED
Status: DISCONTINUED | OUTPATIENT
Start: 2021-12-06 | End: 2021-12-06

## 2021-12-06 RX ORDER — AZTREONAM 2 G/1
1 INJECTION, POWDER, LYOPHILIZED, FOR SOLUTION INTRAMUSCULAR; INTRAVENOUS ONCE
Status: DISCONTINUED | OUTPATIENT
Start: 2021-12-06 | End: 2021-12-06 | Stop reason: SDUPTHER

## 2021-12-06 RX ORDER — SODIUM CHLORIDE, SODIUM LACTATE, POTASSIUM CHLORIDE, CALCIUM CHLORIDE 600; 310; 30; 20 MG/100ML; MG/100ML; MG/100ML; MG/100ML
INJECTION, SOLUTION INTRAVENOUS CONTINUOUS
Status: DISCONTINUED | OUTPATIENT
Start: 2021-12-06 | End: 2021-12-06

## 2021-12-06 RX ORDER — HYDROMORPHONE HYDROCHLORIDE 1 MG/ML
0.4 INJECTION, SOLUTION INTRAMUSCULAR; INTRAVENOUS; SUBCUTANEOUS EVERY 5 MIN PRN
Status: DISCONTINUED | OUTPATIENT
Start: 2021-12-06 | End: 2021-12-06

## 2021-12-06 RX ORDER — NALOXONE HYDROCHLORIDE 0.4 MG/ML
80 INJECTION, SOLUTION INTRAMUSCULAR; INTRAVENOUS; SUBCUTANEOUS AS NEEDED
Status: DISCONTINUED | OUTPATIENT
Start: 2021-12-06 | End: 2021-12-06

## 2021-12-06 RX ORDER — ONDANSETRON 2 MG/ML
INJECTION INTRAMUSCULAR; INTRAVENOUS AS NEEDED
Status: DISCONTINUED | OUTPATIENT
Start: 2021-12-06 | End: 2021-12-06 | Stop reason: SURG

## 2021-12-06 RX ORDER — FLUCONAZOLE 100 MG/1
100 TABLET ORAL 2 TIMES DAILY
COMMUNITY

## 2021-12-06 RX ORDER — HYDROCODONE BITARTRATE AND ACETAMINOPHEN 10; 325 MG/1; MG/1
1 TABLET ORAL AS NEEDED
Status: DISCONTINUED | OUTPATIENT
Start: 2021-12-06 | End: 2021-12-06

## 2021-12-06 RX ORDER — ONDANSETRON 2 MG/ML
4 INJECTION INTRAMUSCULAR; INTRAVENOUS AS NEEDED
Status: DISCONTINUED | OUTPATIENT
Start: 2021-12-06 | End: 2021-12-06

## 2021-12-06 RX ADMIN — SODIUM CHLORIDE, SODIUM LACTATE, POTASSIUM CHLORIDE, CALCIUM CHLORIDE: 600; 310; 30; 20 INJECTION, SOLUTION INTRAVENOUS at 08:08:00

## 2021-12-06 RX ADMIN — LIDOCAINE HYDROCHLORIDE 25 MG: 10 INJECTION, SOLUTION EPIDURAL; INFILTRATION; INTRACAUDAL; PERINEURAL at 08:09:00

## 2021-12-06 RX ADMIN — ONDANSETRON 4 MG: 2 INJECTION INTRAMUSCULAR; INTRAVENOUS at 08:18:00

## 2021-12-06 RX ADMIN — CLINDAMYCIN PHOSPHATE 900 MG: 900 INJECTION INTRAVENOUS at 08:32:00

## 2021-12-06 RX ADMIN — SODIUM CHLORIDE, SODIUM LACTATE, POTASSIUM CHLORIDE, CALCIUM CHLORIDE: 600; 310; 30; 20 INJECTION, SOLUTION INTRAVENOUS at 08:56:00

## 2021-12-06 NOTE — ANESTHESIA POSTPROCEDURE EVALUATION
[facility] Patient Status:  Hospital Outpatient Surgery   Age/Gender 55year old female MRN [id number]   Location [id number] [place] [address] Attending Lennox Riggers, MD   [facility] Day # 0 [address]

## 2021-12-06 NOTE — H&P
[facility] Department of  Gastroenterology  Update Health History :       Jayme Abler  female   Gilberto Skiff, MD     [id number]  12/5/1965 Primary Care Physician  Michael Colin        HPI :  Patient recently hospitalized at [facility] for for history of DVT.    Previous endoscopy reports from Dr. Ashlyn Johnson obtained and reviewed.     ERCP  4/2011  Ashlyn Johnson  Indication: biliary colic. Noted history of symptomatic cholelithiasis requiring ccy and subsequent endoscopic sphincterectomy.    Post op dx Rheumatoid arthritis ([facility]    • Seizure disorder ([facility]     had x 1 year and went away/unknown etiology/ age 56   • Sjogren's disease ([facility]    • Visual impairment     wears glasses      Past Surgical History:   Procedure Laterality Date   • APPENDECTOMY (SEE COMMENTS)    Comment:HIT-Heparin \"eats up red blood cells\"  Lidocaine               SWELLING    Comment:Lidocaine patch  Radiology Contrast *    RASH, OTHER (SEE COMMENTS)    Comment:History of iodine allergy and chronic renal             failure  T capsule (100 mg total) by mouth daily. Take as prescribed (Patient taking differently: Take 100 mg by mouth as needed. Take as prescribed), Disp: 90 capsule, Rfl: 2  ValACYclovir HCl 500 MG Oral Tab, Take 500 mg by mouth 2 (two) times daily.   , Disp: , Rfl stenosis  Per report ERCP with extension of sphincterotomy performed. balloon sweep x 3.  No evidence of choledocholithiasis.      EGD/Colonoscopy  10/2019  Therese Myers   Indication: Fe deficiency anemia, noted long standing history of IBS with diarrhea predominan

## 2021-12-06 NOTE — OPERATIVE REPORT
OPERATIVE REPORT   PATIENT NAME: Court Santamaria  MRN: [id number]  DATE OF OPERATION: 12/6/2021  PREOPERATIVE DIAGNOSIS:   1. Abdominal pain, history of sphincter of Oddi dysfunction as per report with prior ERCPs.   2. Abnormal MRI MRCP possible distal commo esophagus was intubated under direct visualization. A complete examination of the esophagus, stomach and duodenum was performed including retroflexion in the stomach to view the cardia and fundus.  The endoscope was straightened and removed, and the procedu changes. Next, the patient was placed in prone position in preparation for ERCP.   The Olympus therapeutic duodenal scope was introduced under direct visualization in the esophagus passed into the stomach and second portion of the duodenum where the ma José Panchal MD

## 2021-12-06 NOTE — ANESTHESIA PREPROCEDURE EVALUATION
PRE-OP EVALUATION    Patient Name: Paulina Del Toro    Admit Diagnosis: Abnormal finding on GI tract imaging [R93.3]  Iron deficiency anemia, unspecified iron deficiency anemia type [D50.9]    Pre-op Diagnosis: Abnormal finding on GI tract imaging [R93.3] (XARELTO) tab 10 mg, 10 mg, Oral, Daily with food  [COMPLETED] Tobramycin Sulfate (TOBREX) 0.3 % ophthalmic solution 1 drop, 1 drop, Right Eye, Once        Outpatient Medications:   fluconazole 100 MG Oral Tab, Take 100 mg by mouth 2 (two) times daily. Fauzia Eldridge Delayed Release, Take 40 mg by mouth 2 (two) times daily. , Disp: , Rfl: , 12/5/2021 at Unknown time  Sertraline HCl (ZOLOFT) 100 MG Oral Tab, Take 150 mg by mouth daily.   , Disp: , Rfl: , 12/5/2021 at Unknown time  BENLYSTA 200 MG/ML Subcutaneous Solutio (+) arthritis       Pulmonary      (+) asthma                     Neuro/Psych      (+) depression           (+) neuromuscular disease                   Past Surgical History:   Procedure Laterality Date   • APPENDECTOMY     • BACK SURGERY II  Mouth opening: 3 FB  TM distance: 4 - 6 cm  Neck ROM: full Cardiovascular    Cardiovascular exam normal.  Rhythm: regular  Rate: normal     Dental             Pulmonary    Pulmonary exam normal.  Breath sounds clear to auscultation bilaterally.

## 2021-12-07 NOTE — PROGRESS NOTES
12/7/2021  [address] [place] [id number]    Dear Meagan Michaels,     The biopsy/pathology findings from your upper endoscopy showed:    1. Duodenum: a normal biopsy of the small intestine. Negative for celiac disease (gluten allergy).     2

## 2021-12-13 ENCOUNTER — NURSE ONLY (OUTPATIENT)
Dept: HEMATOLOGY/ONCOLOGY | Facility: HOSPITAL | Age: 56
End: 2021-12-13
Attending: INTERNAL MEDICINE
Payer: COMMERCIAL

## 2021-12-13 DIAGNOSIS — Z79.899 ENCOUNTER FOR LONG-TERM (CURRENT) USE OF HIGH-RISK MEDICATION: ICD-10-CM

## 2021-12-13 DIAGNOSIS — Z78.0 ASYMPTOMATIC POSTMENOPAUSAL STATE: ICD-10-CM

## 2021-12-13 DIAGNOSIS — M79.641 BILATERAL HAND PAIN: ICD-10-CM

## 2021-12-13 DIAGNOSIS — M32.19 SYSTEMIC LUPUS ERYTHEMATOSUS WITH OTHER ORGAN INVOLVEMENT, UNSPECIFIED SLE TYPE (HCC): ICD-10-CM

## 2021-12-13 DIAGNOSIS — M79.642 BILATERAL HAND PAIN: ICD-10-CM

## 2021-12-13 DIAGNOSIS — M06.9 FLARE OF RHEUMATOID ARTHRITIS (HCC): ICD-10-CM

## 2021-12-13 DIAGNOSIS — M85.80 OSTEOPENIA, UNSPECIFIED LOCATION: ICD-10-CM

## 2021-12-13 DIAGNOSIS — E55.9 VITAMIN D DEFICIENCY: ICD-10-CM

## 2021-12-13 DIAGNOSIS — M06.00 SERONEGATIVE RHEUMATOID ARTHRITIS (HCC): ICD-10-CM

## 2021-12-13 DIAGNOSIS — R20.0 BILATERAL HAND NUMBNESS: ICD-10-CM

## 2021-12-13 PROCEDURE — 82657 ENZYME CELL ACTIVITY: CPT

## 2021-12-13 PROCEDURE — 36591 DRAW BLOOD OFF VENOUS DEVICE: CPT

## 2021-12-13 PROCEDURE — 80076 HEPATIC FUNCTION PANEL: CPT

## 2021-12-13 PROCEDURE — 86480 TB TEST CELL IMMUN MEASURE: CPT

## 2021-12-13 PROCEDURE — 86160 COMPLEMENT ANTIGEN: CPT

## 2021-12-13 PROCEDURE — 84520 ASSAY OF UREA NITROGEN: CPT

## 2021-12-13 PROCEDURE — 85652 RBC SED RATE AUTOMATED: CPT

## 2021-12-13 PROCEDURE — 86140 C-REACTIVE PROTEIN: CPT

## 2021-12-13 PROCEDURE — 82565 ASSAY OF CREATININE: CPT

## 2021-12-13 PROCEDURE — 86256 FLUORESCENT ANTIBODY TITER: CPT

## 2021-12-13 PROCEDURE — 85025 COMPLETE CBC W/AUTO DIFF WBC: CPT

## 2021-12-13 PROCEDURE — 82306 VITAMIN D 25 HYDROXY: CPT

## 2021-12-14 NOTE — PROGRESS NOTES
Drea,   Labs are overall stable. white blood cell counts are slightly low, but within your typical range. Inflammation markers are normal.Kidney function is stable. Vitamin D is low.   Please continue taking vitamin D prescription pill but increase to 2 ti

## 2021-12-17 NOTE — PROGRESS NOTES
TPMT enzyme is normal.   Start imuran 1 pill every other day. I will send prescription to your pharmacy. Repeat labs (just cbc and hepatic panel) 1-2 weeks after starting.

## 2021-12-20 ENCOUNTER — TELEPHONE (OUTPATIENT)
Dept: HEMATOLOGY/ONCOLOGY | Facility: HOSPITAL | Age: 56
End: 2021-12-20

## 2021-12-20 NOTE — TELEPHONE ENCOUNTER
Patient calling, and is asking if Dr Laurie Chavira can put an order I for a glucose test.    Plz advise

## 2021-12-20 NOTE — TELEPHONE ENCOUNTER
Spoke with patient. She had her PCP send a lab order to our office. Reassured patient we will check for orders tomorrow and have  reach out to her to make an appointment.

## 2021-12-21 ENCOUNTER — TELEPHONE (OUTPATIENT)
Dept: HEMATOLOGY/ONCOLOGY | Facility: HOSPITAL | Age: 56
End: 2021-12-21

## 2021-12-21 DIAGNOSIS — R73.09 ELEVATED RANDOM BLOOD GLUCOSE LEVEL: Primary | ICD-10-CM

## 2021-12-22 ENCOUNTER — APPOINTMENT (OUTPATIENT)
Dept: CARDIOLOGY | Age: 56
End: 2021-12-22

## 2021-12-27 ENCOUNTER — NURSE ONLY (OUTPATIENT)
Dept: HEMATOLOGY/ONCOLOGY | Facility: HOSPITAL | Age: 56
End: 2021-12-27
Attending: INTERNAL MEDICINE
Payer: COMMERCIAL

## 2021-12-27 DIAGNOSIS — R73.09 ELEVATED RANDOM BLOOD GLUCOSE LEVEL: ICD-10-CM

## 2021-12-27 PROCEDURE — 83036 HEMOGLOBIN GLYCOSYLATED A1C: CPT

## 2021-12-27 PROCEDURE — 36591 DRAW BLOOD OFF VENOUS DEVICE: CPT

## 2022-02-06 DIAGNOSIS — D68.59 HYPERCOAGULABLE STATE (HCC): ICD-10-CM

## 2022-02-06 DIAGNOSIS — D50.0 IRON DEFICIENCY ANEMIA DUE TO CHRONIC BLOOD LOSS: ICD-10-CM

## 2022-03-21 ENCOUNTER — EKG ENCOUNTER (OUTPATIENT)
Dept: LAB | Facility: HOSPITAL | Age: 57
End: 2022-03-21
Attending: PEDIATRICS
Payer: COMMERCIAL

## 2022-03-21 ENCOUNTER — NURSE ONLY (OUTPATIENT)
Dept: HEMATOLOGY/ONCOLOGY | Facility: HOSPITAL | Age: 57
End: 2022-03-21
Attending: INTERNAL MEDICINE
Payer: COMMERCIAL

## 2022-03-21 DIAGNOSIS — E55.9 VITAMIN D DEFICIENCY: ICD-10-CM

## 2022-03-21 DIAGNOSIS — M32.19 SYSTEMIC LUPUS ERYTHEMATOSUS WITH OTHER ORGAN INVOLVEMENT, UNSPECIFIED SLE TYPE (HCC): ICD-10-CM

## 2022-03-21 DIAGNOSIS — I47.1 PAROXYSMAL SUPRAVENTRICULAR TACHYCARDIA (HCC): ICD-10-CM

## 2022-03-21 DIAGNOSIS — Z79.899 ENCOUNTER FOR LONG-TERM (CURRENT) USE OF HIGH-RISK MEDICATION: ICD-10-CM

## 2022-03-21 DIAGNOSIS — M06.00 SERONEGATIVE RHEUMATOID ARTHRITIS (HCC): ICD-10-CM

## 2022-03-21 LAB
ALBUMIN SERPL-MCNC: 3.9 G/DL (ref 3.4–5)
ALP LIVER SERPL-CCNC: 65 U/L
ALT SERPL-CCNC: 28 U/L
AST SERPL-CCNC: 30 U/L (ref 15–37)
ATRIAL RATE: 66 BPM
BASOPHILS NFR BLD AUTO: 1.1 %
BILIRUB DIRECT SERPL-MCNC: 0.2 MG/DL (ref 0–0.2)
BILIRUB UR QL STRIP.AUTO: NEGATIVE
BUN BLD-MCNC: 24 MG/DL (ref 7–18)
CLARITY UR REFRACT.AUTO: CLEAR
COLOR UR AUTO: YELLOW
CREAT BLD-MCNC: 1.02 MG/DL
CREAT UR-SCNC: 166 MG/DL
CRP SERPL-MCNC: <0.29 MG/DL (ref ?–0.3)
EOSINOPHIL # BLD AUTO: 0.08 X10(3) UL (ref 0–0.7)
EOSINOPHIL NFR BLD AUTO: 3 %
ERYTHROCYTE [DISTWIDTH] IN BLOOD BY AUTOMATED COUNT: 13.3 %
ERYTHROCYTE [SEDIMENTATION RATE] IN BLOOD: 9 MM/HR
GLUCOSE UR STRIP.AUTO-MCNC: NEGATIVE MG/DL
HCT VFR BLD AUTO: 37.7 %
HGB BLD-MCNC: 12.6 G/DL
IMM GRANULOCYTES # BLD AUTO: 0 X10(3) UL (ref 0–1)
IMM GRANULOCYTES NFR BLD: 0 %
KETONES UR STRIP.AUTO-MCNC: NEGATIVE MG/DL
LEUKOCYTE ESTERASE UR QL STRIP.AUTO: NEGATIVE
LYMPHOCYTES # BLD AUTO: 0.96 X10(3) UL (ref 1–4)
LYMPHOCYTES NFR BLD AUTO: 36 %
MCH RBC QN AUTO: 30.4 PG (ref 26–34)
MCHC RBC AUTO-ENTMCNC: 33.4 G/DL (ref 31–37)
MCV RBC AUTO: 90.8 FL
MONOCYTES # BLD AUTO: 0.25 X10(3) UL (ref 0.1–1)
MONOCYTES NFR BLD AUTO: 9.4 %
NEUTROPHILS # BLD AUTO: 1.35 X10 (3) UL (ref 1.5–7.7)
NEUTROPHILS # BLD AUTO: 1.35 X10(3) UL (ref 1.5–7.7)
NEUTROPHILS NFR BLD AUTO: 50.5 %
NITRITE UR QL STRIP.AUTO: NEGATIVE
P AXIS: 58 DEGREES
P-R INTERVAL: 144 MS
PH UR STRIP.AUTO: 5 [PH] (ref 5–8)
PLATELET # BLD AUTO: 142 10(3)UL (ref 150–450)
PROT SERPL-MCNC: 6.9 G/DL (ref 6.4–8.2)
PROT UR STRIP.AUTO-MCNC: NEGATIVE MG/DL
PROT UR-MCNC: 19.3 MG/DL
PROT/CREAT UR-RTO: 0.12
Q-T INTERVAL: 424 MS
QRS DURATION: 92 MS
QTC CALCULATION (BEZET): 444 MS
R AXIS: 23 DEGREES
RBC # BLD AUTO: 4.15 X10(6)UL
RBC UR QL AUTO: NEGATIVE
SP GR UR STRIP.AUTO: 1.02 (ref 1–1.03)
T AXIS: -82 DEGREES
UROBILINOGEN UR STRIP.AUTO-MCNC: <2 MG/DL
VENTRICULAR RATE: 66 BPM
VIT D+METAB SERPL-MCNC: 31.6 NG/ML (ref 30–100)
WBC # BLD AUTO: 2.7 X10(3) UL (ref 4–11)

## 2022-03-21 PROCEDURE — 85652 RBC SED RATE AUTOMATED: CPT

## 2022-03-21 PROCEDURE — 93010 ELECTROCARDIOGRAM REPORT: CPT | Performed by: INTERNAL MEDICINE

## 2022-03-21 PROCEDURE — 84156 ASSAY OF PROTEIN URINE: CPT

## 2022-03-21 PROCEDURE — 82565 ASSAY OF CREATININE: CPT

## 2022-03-21 PROCEDURE — 93005 ELECTROCARDIOGRAM TRACING: CPT

## 2022-03-21 PROCEDURE — 82570 ASSAY OF URINE CREATININE: CPT

## 2022-03-21 PROCEDURE — 85025 COMPLETE CBC W/AUTO DIFF WBC: CPT

## 2022-03-21 PROCEDURE — 80076 HEPATIC FUNCTION PANEL: CPT

## 2022-03-21 PROCEDURE — 82306 VITAMIN D 25 HYDROXY: CPT

## 2022-03-21 PROCEDURE — 86140 C-REACTIVE PROTEIN: CPT

## 2022-03-21 PROCEDURE — 81003 URINALYSIS AUTO W/O SCOPE: CPT

## 2022-03-21 PROCEDURE — 36591 DRAW BLOOD OFF VENOUS DEVICE: CPT

## 2022-03-21 PROCEDURE — 84520 ASSAY OF UREA NITROGEN: CPT

## 2022-03-22 NOTE — PROGRESS NOTES
Drea,  Labs look good, EXCEPT that your white blood cell counts and platelets are lower. This may be due to starting arava. Have you had any viral infections in the past few weeks? Have you recovered from the COVID infection? Viral infections can also cause lower white blood cell counts. Lupus can also cause both, but since you are on benlysta, I do not think lupus is the culprit. Let's repeat the cbc test in 2 weeks and see if it is better or same/worse. Then will decide on continuing/stopping arava.    Dr. Howard Powers

## 2022-04-02 ENCOUNTER — APPOINTMENT (OUTPATIENT)
Dept: GENERAL RADIOLOGY | Facility: HOSPITAL | Age: 57
End: 2022-04-02
Attending: EMERGENCY MEDICINE
Payer: COMMERCIAL

## 2022-04-02 ENCOUNTER — HOSPITAL ENCOUNTER (INPATIENT)
Facility: HOSPITAL | Age: 57
LOS: 7 days | Discharge: HOME HEALTH | End: 2022-04-14
Attending: EMERGENCY MEDICINE | Admitting: HOSPITALIST
Payer: COMMERCIAL

## 2022-04-02 DIAGNOSIS — R07.9 ACUTE CHEST PAIN: Primary | ICD-10-CM

## 2022-04-02 LAB
ALBUMIN SERPL-MCNC: 3.9 G/DL (ref 3.4–5)
ALBUMIN/GLOB SERPL: 1.4 {RATIO} (ref 1–2)
ALP LIVER SERPL-CCNC: 63 U/L
ALT SERPL-CCNC: 24 U/L
ANION GAP SERPL CALC-SCNC: 5 MMOL/L (ref 0–18)
AST SERPL-CCNC: 46 U/L (ref 15–37)
BASOPHILS # BLD AUTO: 0.04 X10(3) UL (ref 0–0.2)
BASOPHILS NFR BLD AUTO: 1.4 %
BILIRUB SERPL-MCNC: 0.4 MG/DL (ref 0.1–2)
BUN BLD-MCNC: 20 MG/DL (ref 7–18)
CALCIUM BLD-MCNC: 8.2 MG/DL (ref 8.5–10.1)
CHLORIDE SERPL-SCNC: 111 MMOL/L (ref 98–112)
CO2 SERPL-SCNC: 27 MMOL/L (ref 21–32)
CREAT BLD-MCNC: 1 MG/DL
D DIMER PPP FEU-MCNC: <0.27 UG/ML FEU (ref ?–0.55)
EOSINOPHIL # BLD AUTO: 0.06 X10(3) UL (ref 0–0.7)
EOSINOPHIL NFR BLD AUTO: 2.1 %
ERYTHROCYTE [DISTWIDTH] IN BLOOD BY AUTOMATED COUNT: 13.5 %
GLOBULIN PLAS-MCNC: 2.7 G/DL (ref 2.8–4.4)
GLUCOSE BLD-MCNC: 99 MG/DL (ref 70–99)
HCT VFR BLD AUTO: 38 %
HGB BLD-MCNC: 12.7 G/DL
IMM GRANULOCYTES # BLD AUTO: 0.01 X10(3) UL (ref 0–1)
IMM GRANULOCYTES NFR BLD: 0.4 %
LYMPHOCYTES # BLD AUTO: 0.8 X10(3) UL (ref 1–4)
LYMPHOCYTES NFR BLD AUTO: 28.3 %
MCH RBC QN AUTO: 30.2 PG (ref 26–34)
MCHC RBC AUTO-ENTMCNC: 33.4 G/DL (ref 31–37)
MCV RBC AUTO: 90.5 FL
MONOCYTES # BLD AUTO: 0.28 X10(3) UL (ref 0.1–1)
MONOCYTES NFR BLD AUTO: 9.9 %
NEUTROPHILS # BLD AUTO: 1.64 X10 (3) UL (ref 1.5–7.7)
NEUTROPHILS # BLD AUTO: 1.64 X10(3) UL (ref 1.5–7.7)
NEUTROPHILS NFR BLD AUTO: 57.9 %
OSMOLALITY SERPL CALC.SUM OF ELEC: 299 MOSM/KG (ref 275–295)
PLATELET # BLD AUTO: 146 10(3)UL (ref 150–450)
POTASSIUM SERPL-SCNC: 3.6 MMOL/L (ref 3.5–5.1)
PROT SERPL-MCNC: 6.6 G/DL (ref 6.4–8.2)
RBC # BLD AUTO: 4.2 X10(6)UL
SARS-COV-2 RNA RESP QL NAA+PROBE: NOT DETECTED
SODIUM SERPL-SCNC: 143 MMOL/L (ref 136–145)
TROPONIN I HIGH SENSITIVITY: 14 NG/L
TROPONIN I HIGH SENSITIVITY: 16 NG/L
TROPONIN I HIGH SENSITIVITY: 20 NG/L
WBC # BLD AUTO: 2.8 X10(3) UL (ref 4–11)

## 2022-04-02 PROCEDURE — 96375 TX/PRO/DX INJ NEW DRUG ADDON: CPT

## 2022-04-02 PROCEDURE — 71045 X-RAY EXAM CHEST 1 VIEW: CPT | Performed by: EMERGENCY MEDICINE

## 2022-04-02 PROCEDURE — 93010 ELECTROCARDIOGRAM REPORT: CPT

## 2022-04-02 PROCEDURE — 93005 ELECTROCARDIOGRAM TRACING: CPT

## 2022-04-02 PROCEDURE — 85025 COMPLETE CBC W/AUTO DIFF WBC: CPT | Performed by: EMERGENCY MEDICINE

## 2022-04-02 PROCEDURE — 84484 ASSAY OF TROPONIN QUANT: CPT | Performed by: EMERGENCY MEDICINE

## 2022-04-02 PROCEDURE — 99285 EMERGENCY DEPT VISIT HI MDM: CPT

## 2022-04-02 PROCEDURE — 84484 ASSAY OF TROPONIN QUANT: CPT | Performed by: INTERNAL MEDICINE

## 2022-04-02 PROCEDURE — 96374 THER/PROPH/DIAG INJ IV PUSH: CPT

## 2022-04-02 PROCEDURE — 96361 HYDRATE IV INFUSION ADD-ON: CPT

## 2022-04-02 PROCEDURE — 80053 COMPREHEN METABOLIC PANEL: CPT | Performed by: EMERGENCY MEDICINE

## 2022-04-02 PROCEDURE — 96376 TX/PRO/DX INJ SAME DRUG ADON: CPT

## 2022-04-02 PROCEDURE — 85379 FIBRIN DEGRADATION QUANT: CPT | Performed by: EMERGENCY MEDICINE

## 2022-04-02 RX ORDER — MIDODRINE HYDROCHLORIDE 5 MG/1
5 TABLET ORAL DAILY
Status: DISCONTINUED | OUTPATIENT
Start: 2022-04-03 | End: 2022-04-05

## 2022-04-02 RX ORDER — MORPHINE SULFATE 4 MG/ML
4 INJECTION, SOLUTION INTRAMUSCULAR; INTRAVENOUS EVERY 30 MIN PRN
Status: DISPENSED | OUTPATIENT
Start: 2022-04-02 | End: 2022-04-02

## 2022-04-02 RX ORDER — ONDANSETRON 2 MG/ML
4 INJECTION INTRAMUSCULAR; INTRAVENOUS EVERY 6 HOURS PRN
Status: DISCONTINUED | OUTPATIENT
Start: 2022-04-02 | End: 2022-04-14

## 2022-04-02 RX ORDER — MIDODRINE HYDROCHLORIDE 2.5 MG/1
2.5 TABLET ORAL
Status: DISCONTINUED | OUTPATIENT
Start: 2022-04-02 | End: 2022-04-05

## 2022-04-02 RX ORDER — ACETAMINOPHEN 325 MG/1
650 TABLET ORAL EVERY 6 HOURS PRN
Status: DISCONTINUED | OUTPATIENT
Start: 2022-04-02 | End: 2022-04-14

## 2022-04-02 RX ORDER — SODIUM PHOSPHATE, DIBASIC AND SODIUM PHOSPHATE, MONOBASIC 7; 19 G/133ML; G/133ML
1 ENEMA RECTAL ONCE AS NEEDED
Status: DISCONTINUED | OUTPATIENT
Start: 2022-04-02 | End: 2022-04-14

## 2022-04-02 RX ORDER — ASPIRIN 81 MG/1
324 TABLET, CHEWABLE ORAL ONCE
Status: COMPLETED | OUTPATIENT
Start: 2022-04-02 | End: 2022-04-02

## 2022-04-02 RX ORDER — LEFLUNOMIDE 20 MG/1
20 TABLET ORAL NIGHTLY
Status: DISCONTINUED | OUTPATIENT
Start: 2022-04-03 | End: 2022-04-11

## 2022-04-02 RX ORDER — MORPHINE SULFATE 2 MG/ML
1 INJECTION, SOLUTION INTRAMUSCULAR; INTRAVENOUS EVERY 2 HOUR PRN
Status: DISCONTINUED | OUTPATIENT
Start: 2022-04-02 | End: 2022-04-04

## 2022-04-02 RX ORDER — ALLOPURINOL 100 MG/1
100 TABLET ORAL NIGHTLY
Status: DISCONTINUED | OUTPATIENT
Start: 2022-04-02 | End: 2022-04-14

## 2022-04-02 RX ORDER — SENNOSIDES 8.6 MG
17.2 TABLET ORAL NIGHTLY PRN
Status: DISCONTINUED | OUTPATIENT
Start: 2022-04-02 | End: 2022-04-14

## 2022-04-02 RX ORDER — MORPHINE SULFATE 2 MG/ML
0.5 INJECTION, SOLUTION INTRAMUSCULAR; INTRAVENOUS EVERY 2 HOUR PRN
Status: DISCONTINUED | OUTPATIENT
Start: 2022-04-02 | End: 2022-04-04

## 2022-04-02 RX ORDER — MORPHINE SULFATE 2 MG/ML
2 INJECTION, SOLUTION INTRAMUSCULAR; INTRAVENOUS EVERY 2 HOUR PRN
Status: DISCONTINUED | OUTPATIENT
Start: 2022-04-02 | End: 2022-04-04

## 2022-04-02 RX ORDER — MORPHINE SULFATE 4 MG/ML
4 INJECTION, SOLUTION INTRAMUSCULAR; INTRAVENOUS ONCE
Status: COMPLETED | OUTPATIENT
Start: 2022-04-02 | End: 2022-04-02

## 2022-04-02 RX ORDER — GABAPENTIN 400 MG/1
400 CAPSULE ORAL 2 TIMES DAILY
Status: DISCONTINUED | OUTPATIENT
Start: 2022-04-02 | End: 2022-04-14

## 2022-04-02 RX ORDER — POLYETHYLENE GLYCOL 3350 17 G/17G
17 POWDER, FOR SOLUTION ORAL DAILY PRN
Status: DISCONTINUED | OUTPATIENT
Start: 2022-04-02 | End: 2022-04-14

## 2022-04-02 RX ORDER — MIDODRINE HYDROCHLORIDE 2.5 MG/1
2.5 TABLET ORAL
COMMUNITY
End: 2022-04-14

## 2022-04-02 RX ORDER — FLECAINIDE ACETATE 50 MG/1
50 TABLET ORAL NIGHTLY
COMMUNITY

## 2022-04-02 RX ORDER — MIDODRINE HYDROCHLORIDE 2.5 MG/1
5 TABLET ORAL DAILY
COMMUNITY
End: 2022-04-14

## 2022-04-02 RX ORDER — PANTOPRAZOLE SODIUM 40 MG/1
40 TABLET, DELAYED RELEASE ORAL
Status: DISCONTINUED | OUTPATIENT
Start: 2022-04-02 | End: 2022-04-14

## 2022-04-02 RX ORDER — ENOXAPARIN SODIUM 100 MG/ML
40 INJECTION SUBCUTANEOUS DAILY
Status: CANCELLED | OUTPATIENT
Start: 2022-04-02

## 2022-04-02 RX ORDER — CEVIMELINE HYDROCHLORIDE 30 MG/1
30 CAPSULE ORAL 3 TIMES DAILY
Status: DISCONTINUED | OUTPATIENT
Start: 2022-04-02 | End: 2022-04-14

## 2022-04-02 RX ORDER — SODIUM CHLORIDE 9 MG/ML
125 INJECTION, SOLUTION INTRAVENOUS CONTINUOUS
Status: DISCONTINUED | OUTPATIENT
Start: 2022-04-02 | End: 2022-04-05

## 2022-04-02 RX ORDER — MELATONIN
3 NIGHTLY PRN
Status: DISCONTINUED | OUTPATIENT
Start: 2022-04-02 | End: 2022-04-14

## 2022-04-02 RX ORDER — FLECAINIDE ACETATE 100 MG/1
100 TABLET ORAL 2 TIMES DAILY
Status: DISCONTINUED | OUTPATIENT
Start: 2022-04-02 | End: 2022-04-14

## 2022-04-02 RX ORDER — ONDANSETRON 2 MG/ML
4 INJECTION INTRAMUSCULAR; INTRAVENOUS ONCE
Status: COMPLETED | OUTPATIENT
Start: 2022-04-02 | End: 2022-04-02

## 2022-04-02 RX ORDER — ASPIRIN 81 MG/1
81 TABLET, CHEWABLE ORAL DAILY
Status: DISCONTINUED | OUTPATIENT
Start: 2022-04-03 | End: 2022-04-14

## 2022-04-02 RX ORDER — BISACODYL 10 MG
10 SUPPOSITORY, RECTAL RECTAL
Status: DISCONTINUED | OUTPATIENT
Start: 2022-04-02 | End: 2022-04-14

## 2022-04-02 NOTE — ED INITIAL ASSESSMENT (HPI)
12 hour chart check     Patient states since waking up this am having chest pressure radiating to back with dizziness and dyspnea.

## 2022-04-03 ENCOUNTER — APPOINTMENT (OUTPATIENT)
Dept: CV DIAGNOSTICS | Facility: HOSPITAL | Age: 57
End: 2022-04-03
Attending: HOSPITALIST
Payer: COMMERCIAL

## 2022-04-03 LAB
ANION GAP SERPL CALC-SCNC: 5 MMOL/L (ref 0–18)
BASOPHILS # BLD AUTO: 0.03 X10(3) UL (ref 0–0.2)
BASOPHILS NFR BLD AUTO: 0.9 %
BUN BLD-MCNC: 16 MG/DL (ref 7–18)
CALCIUM BLD-MCNC: 8.1 MG/DL (ref 8.5–10.1)
CHLORIDE SERPL-SCNC: 113 MMOL/L (ref 98–112)
CO2 SERPL-SCNC: 28 MMOL/L (ref 21–32)
CREAT BLD-MCNC: 0.98 MG/DL
EOSINOPHIL # BLD AUTO: 0.09 X10(3) UL (ref 0–0.7)
EOSINOPHIL NFR BLD AUTO: 2.6 %
ERYTHROCYTE [DISTWIDTH] IN BLOOD BY AUTOMATED COUNT: 13.6 %
GLUCOSE BLD-MCNC: 141 MG/DL (ref 70–99)
HCT VFR BLD AUTO: 35.3 %
HGB BLD-MCNC: 11.6 G/DL
IMM GRANULOCYTES # BLD AUTO: 0.01 X10(3) UL (ref 0–1)
IMM GRANULOCYTES NFR BLD: 0.3 %
LYMPHOCYTES # BLD AUTO: 1.17 X10(3) UL (ref 1–4)
LYMPHOCYTES NFR BLD AUTO: 33.5 %
MCH RBC QN AUTO: 30.2 PG (ref 26–34)
MCHC RBC AUTO-ENTMCNC: 32.9 G/DL (ref 31–37)
MCV RBC AUTO: 91.9 FL
MONOCYTES # BLD AUTO: 0.38 X10(3) UL (ref 0.1–1)
MONOCYTES NFR BLD AUTO: 10.9 %
NEUTROPHILS # BLD AUTO: 1.81 X10 (3) UL (ref 1.5–7.7)
NEUTROPHILS # BLD AUTO: 1.81 X10(3) UL (ref 1.5–7.7)
NEUTROPHILS NFR BLD AUTO: 51.8 %
OSMOLALITY SERPL CALC.SUM OF ELEC: 306 MOSM/KG (ref 275–295)
PLATELET # BLD AUTO: 150 10(3)UL (ref 150–450)
POTASSIUM SERPL-SCNC: 3.4 MMOL/L (ref 3.5–5.1)
RBC # BLD AUTO: 3.84 X10(6)UL
SODIUM SERPL-SCNC: 146 MMOL/L (ref 136–145)
WBC # BLD AUTO: 3.5 X10(3) UL (ref 4–11)

## 2022-04-03 PROCEDURE — 80048 BASIC METABOLIC PNL TOTAL CA: CPT | Performed by: INTERNAL MEDICINE

## 2022-04-03 PROCEDURE — 85025 COMPLETE CBC W/AUTO DIFF WBC: CPT | Performed by: INTERNAL MEDICINE

## 2022-04-03 PROCEDURE — 93306 TTE W/DOPPLER COMPLETE: CPT | Performed by: HOSPITALIST

## 2022-04-03 RX ORDER — DIPHENHYDRAMINE HCL 50 MG
50 CAPSULE ORAL ONCE
Status: COMPLETED | OUTPATIENT
Start: 2022-04-04 | End: 2022-04-04

## 2022-04-03 RX ORDER — POTASSIUM CHLORIDE 20 MEQ/1
40 TABLET, EXTENDED RELEASE ORAL ONCE
Status: COMPLETED | OUTPATIENT
Start: 2022-04-03 | End: 2022-04-03

## 2022-04-03 RX ORDER — PREDNISONE 50 MG/1
50 TABLET ORAL EVERY 6 HOURS
Status: COMPLETED | OUTPATIENT
Start: 2022-04-03 | End: 2022-04-04

## 2022-04-03 RX ORDER — SODIUM CHLORIDE 9 MG/ML
INJECTION, SOLUTION INTRAVENOUS
Status: ACTIVE | OUTPATIENT
Start: 2022-04-04 | End: 2022-04-04

## 2022-04-03 NOTE — PLAN OF CARE
Pt complains of \"chest pressure\", declining PRN pain meds at this time. A&Ox4, lungs clear with equal expansion, on rm air. Pt in NSR with symptomatic PVC's frequently on monitor. Abdomen soft and non-tender with active bowel sounds in all 4 quadrants, continent of B&B. Left leonora-cath clean, dry and intact. Patient updated with plan of care. Problem: Patient/Family Goals  Goal: Patient/Family Long Term Goal  Description: Patient's Long Term Goal: \"go home\"    Interventions:  -Cards to see  -Labs  -Med compliance  - See additional Care Plan goals for specific interventions  Outcome: Progressing  Goal: Patient/Family Short Term Goal  Description: Patient's Short Term Goal: \"figure out what's causing chest pain\"    Interventions:   -Cards to see  -Labs   -Echo?   - See additional Care Plan goals for specific interventions  Outcome: Progressing     Problem: CARDIOVASCULAR - ADULT  Goal: Maintains optimal cardiac output and hemodynamic stability  Description: INTERVENTIONS:  - Monitor vital signs, rhythm, and trends  - Monitor for bleeding, hypotension and signs of decreased cardiac output  - Evaluate effectiveness of vasoactive medications to optimize hemodynamic stability  - Monitor arterial and/or venous puncture sites for bleeding and/or hematoma  - Assess quality of pulses, skin color and temperature  - Assess for signs of decreased coronary artery perfusion - ex.  Angina  - Evaluate fluid balance, assess for edema, trend weights  Outcome: Progressing  Goal: Absence of cardiac arrhythmias or at baseline  Description: INTERVENTIONS:  - Continuous cardiac monitoring, monitor vital signs, obtain 12 lead EKG if indicated  - Evaluate effectiveness of antiarrhythmic and heart rate control medications as ordered  - Initiate emergency measures for life threatening arrhythmias  - Monitor electrolytes and administer replacement therapy as ordered  Outcome: Progressing     Problem: PAIN - ADULT  Goal: Verbalizes/displays adequate comfort level or patient's stated pain goal  Description: INTERVENTIONS:  - Encourage pt to monitor pain and request assistance  - Assess pain using appropriate pain scale  - Administer analgesics based on type and severity of pain and evaluate response  - Implement non-pharmacological measures as appropriate and evaluate response  - Consider cultural and social influences on pain and pain management  - Manage/alleviate anxiety  - Utilize distraction and/or relaxation techniques  - Monitor for opioid side effects  - Notify MD/LIP if interventions unsuccessful or patient reports new pain  - Anticipate increased pain with activity and pre-medicate as appropriate  Outcome: Progressing

## 2022-04-03 NOTE — PROGRESS NOTES
56 y/o female with hx VT and SVT ablation, HTN, DVT, RA,Sjogren's, cholecystectomy with week of on and off CP radiating to back and an lat T wave change. Currently pain free. Trop negative. Echo 8/21 normal. Going into the room I thought that I would lean to nuclear stress or possible CTA. With her EKG changes, week kof pain and myriad of medical issues, I feel that sooner or later we just need to know if any CAD.  Cath tomorrow

## 2022-04-03 NOTE — PLAN OF CARE
Problem: Patient/Family Goals  Goal: Patient/Family Long Term Goal  Description: Patient's Long Term Goal: will be abl to go home without complication    Interventions:    - See additional Care Plan goals for specific interventions  Outcome: Progressing  Goal: Patient/Family Short Term Goal  Description: Patient's Short Term Goal: will have no more chest pain    Interventions:   Tele monitoring, vital signs, troponin monitoring, O2 prn, pain meds as ordered  - See additional Care Plan goals for specific interventions  Outcome: Progressing     Problem: CARDIOVASCULAR - ADULT  Goal: Maintains optimal cardiac output and hemodynamic stability  Description: INTERVENTIONS:  - Monitor vital signs, rhythm, and trends  - Monitor for bleeding, hypotension and signs of decreased cardiac output  - Evaluate effectiveness of vasoactive medications to optimize hemodynamic stability  - Monitor arterial and/or venous puncture sites for bleeding and/or hematoma  - Assess quality of pulses, skin color and temperature  - Assess for signs of decreased coronary artery perfusion - ex.  Angina  - Evaluate fluid balance, assess for edema, trend weights  Outcome: Progressing  Goal: Absence of cardiac arrhythmias or at baseline  Description: INTERVENTIONS:  - Continuous cardiac monitoring, monitor vital signs, obtain 12 lead EKG if indicated  - Evaluate effectiveness of antiarrhythmic and heart rate control medications as ordered  - Initiate emergency measures for life threatening arrhythmias  - Monitor electrolytes and administer replacement therapy as ordered  Outcome: Progressing     Problem: PAIN - ADULT  Goal: Verbalizes/displays adequate comfort level or patient's stated pain goal  Description: INTERVENTIONS:  - Encourage pt to monitor pain and request assistance  - Assess pain using appropriate pain scale  - Administer analgesics based on type and severity of pain and evaluate response  - Implement non-pharmacological measures as appropriate and evaluate response  - Consider cultural and social influences on pain and pain management  - Manage/alleviate anxiety  - Utilize distraction and/or relaxation techniques  - Monitor for opioid side effects  - Notify MD/LIP if interventions unsuccessful or patient reports new pain  - Anticipate increased pain with activity and pre-medicate as appropriate  Outcome: Progressing

## 2022-04-03 NOTE — HISTORICAL OFFICE NOTE
[address] [facility] [place]  [address], 4th floor, [place], [address]  [phone]    Cristin Gamez  Progress Note  Demographics:  Name: Cristin Gamez YOB: 1965  Age: 55, Female Medical Record No: [id number]  Visited Date/Time: 03/30/2022 02:30 PM    Chief Complaints  Seen 10/29/21 for mild PAH    Pt c/o SOB, CP, & fatigue  History of Present Illness  The patient does not feel well. When I asked her to tell me which symptom was most bothersome to her she immediately stated that the palpitations constitute the most disturbing symptom. Since my last visit she had 2 additional syncopal events and the event monitor which was placed last October revealed mostly sinus rhythm with one episode of nonsustained ventricular tachycardia which was 6 beats in duration and with a heart rate of 148 bpm.  There were also 15 runs of supraventricular tachycardia the fastest of which was 7 beats with a heart rate of 197 and the longest was 20 beats in duration with a heart rate of 129 bpm.  When the patient reported symptoms these were associated with the supraventricular tachycardia. She had an ablation done at [facility] by Dr. Larisa Chowdhury but I have not been able to reach him and therefore I do not know what was ablated and what the plans to address the patient's complaints are. Certainly the patient's reported episodes of a fast fast heart rate are much more frequent and more bothersome than they appear on the event monitor. She tells me that at times she is awakened at night with a simultaneous sensation of a racing heart and shortness of breath. She is currently being treated for lupus/RA with leflunomide but the last 2 laboratory tests have shown worsening leukopenia and thrombocytopenia. She is scheduled to have another test on Monday. She was previously on Humira which was stopped due to lack of efficacy.   She responds to methotrexate but the patient cannot tolerate the medication due to violent side effects including intractable nausea and vomiting. The patient has left ventricular hypertrophy and she may have microvascular myocardial disease and therefore I am not sure that flecainide is the best choice for her. I am also not sure why she was taken off the beta-blocker or what led to the initiation of midodrine. Currently has NYHA Class 2 symptoms. Currently has CCS Class 1 symptoms. Cardiac risk factors Hypertension, Dyslipidemia and Never smoked  Past Medical History  1. Elevated blood sugar level  2. ALVAREZ (dyspnea on exertion)  3. Fatigue  4. Vitamin D deficiency  5. Lupus  6. H/O Sjogren's disease  7. Rheumatoid arthritis  8. H/O Raynaud's syndrome  9. Atrial tachycardia  10. Palpitations  11. Mixed hyperlipidemia  12. History of DVT (deep vein thrombosis)  13. Essential hypertension  14. Dyslipidemia  15. Chronic kidney disease, stage III (moderate)  16. Abnormal electrocardiogram (ECG) (EKG)    Past Surgical History  1. Ablation  Family History  1. Father - HTN (hypertension); Paroxysmal a-fib; FH: hyperlipidemia  2. Mother - HTN (hypertension);  Paroxysmal a-fib; FH: hyperlipidemia  Social History  Smoking status Never smoked  Tobacco usage - No (Non-smoker for personal reasons (finding))  Drug abuse - Yes (medical marijuana qhs)  Review of systems  Cardiovascular Chest pain, ALVAREZ and Palpitations  No history of Syncope, PND, Orthopnea, Edema and Claudication  Physical Examination  Vitals Left Arm Sitting  / 64 mmHg, Pulse rate 90 bpm, Height in 5' 5\", BMI: 21.6, Weight in 130 lbs (or) 59 kgs and BSA : 1.65 cc/m²  General Appearance No Acute Distress, Well groomed, Normal Body habitus and Appropriate  Head/Eyes/Ears/Nose/Mouth/Throat Conjunctiva pink and PERRLA  Neck Normal carotid pulsations, No carotid bruits, Normal thyroid palpation without goiter and No JVD  Respiratory Unlabored, Lungs clear with normal breath sounds and Equal bilaterally  Cardiovascular Intact distal pulses and Regular rhythm. Tachycardia present. Normal S1 and S2  S4 is present. Gastrointestinal Abdomen soft, Non-tender, Normoactive bowel sounds, No organomegaly, No masses and No pulsations  Gait Normal gait  Strength and tone Abnormal muscle strength  Upper Extremities No clubbing, No cyanosis and No edema  Lower Extremities Pulses 2+ and equal bilaterally and No edema  Skin Poor turgor  Neurologic / Psychiatric Alert and Oriented and Non-focal  Speech Normal speech    Allergies  1. Amoxicillin(Reaction:, Severity:Mild)  2. Ciprofloxacin(Reaction:, Severity:Mild)  3. Codeine(Reaction:, Severity:Mild)  4. Dronedarone(Reaction:, Severity:Mild)  5.Epinephrine(Reaction:, Severity:Mild)  6. Ketorolac Tromethamine(Reaction:, Severity:Mild)  7. Lidocaine(Reaction:, Severity:Mild)  8. Midazolam(Reaction:, Severity:Mild)  9. Prochlorperazine(Reaction:, Severity:Mild)  10. Promethazine(Reaction:, Severity:Mild)  11. Topiramate(Reaction:, Severity:Mild)    Medications (Info obtained by: Verbal)  1. AIMOVIG 140 MG/ML Solution Auto-injector, every 30 days. 2.allopurinol (ZYLOPRIM) 100 MG tablet, TAKE 1 TABLET DAILY. 3.Belimumab (Benlysta) 200 MG/ML Solution Auto-injector, Inject 200 mg into the skin 1 day a week. 4.cevimeline (EVOXAC) 30 MG capsule, Take 30 mg by mouth as needed. 5.Corlanor 5 mg tablet, Take one-half tablet orally 4 times a day. 6.cyanocobalamin 1000 MCG/ML injection, Inject 1,000 mcg into the muscle every 30 days. 7.esomeprazole (NEXIUM) 40 MG capsule, Take 40 mg by mouth 2 times daily. 8.ESTRING 2 MG vaginal ring, every 3 months. 9.fenofibrate (TRICOR) 145 MG tablet, Take 145 mg by mouth every other day. 10.flecainide 100 mg tablet, Take 1 tablet orally 3 times a day. 11.gabapentin 300 mg capsule, Take 1 capsule orally once a day. 12.hydrOXYzine (ATARAX) 25 MG tablet, 1-2 tabs po tid prn ; **This will make you drowsy  13. midodrine 2.5 mg tablet, Take 1 tablet orally 3 times a day.  14.nitrofurantoin (MACRODANTIN) 100 MG capsule, Take 100 mg by mouth as needed. 15.rivaroxaban (XARELTO) 20 MG Tab, TAKE 1 TABLET BY MOUTH EVERY EVENING WITH DINNER  16. ropinirole (REQUIP XL) 2 MG 24 hr tablet, Take 2 mg by mouth daily. 17.rOPINIRole (REQUIP) 0.25 MG tablet, 1 tab nightly but can use an extra tab if needed  18. sertraline (ZOLOFT) 100 MG tablet, Take 150 mg by mouth daily. 19.sertraline (ZOLOFT) 50 MG tablet, TK 1 T PO D COMBINE WITH 100MG T TO TOTAL 150MG D  20. Ubrelvy 50 MG tablet, as needed. 21.valACYclovir (VALTREX) 500 MG tablet, TAKE 1 TABLET BY MOUTH TWICE DAILY. FOLLOW UP WITH PRIMARY MD FOR FURTHER REFILLS GENERIC EQUIVALENT FOR VALTREX    Impression  1. Lupus  2. H/O Sjogren's disease  3. Rheumatoid arthritis  4. H/O Raynaud's syndrome  5. Palpitations  6. Chronic kidney disease, stage III (moderate)  7. Abnormal electrocardiogram (ECG) (EKG)    Assessment & Plan  I have asked the patient to see Dr. Jameson Lezama as soon as possible and I will also ask him to see the patient at his earliest possible appointment. I have known the patient for many years and I cannot attest to the fact that her symptoms are truly debilitating plus I remain very concerned with her episode which appear to be true syncope. I have also ordered a complete thyroid profile that she can obtain with next labs which are scheduled to be drawn on Monday. I will not change her medications for now until I have discussed her case with Dr. Jameson Lezama. Labs and Diagnostics ordered  1. EKG Complete (Verbal Order) (Today)  2. TSH (Thyroid Stimulating Hormone) Panel (Schedule next available)  3. Free T4 (Schedule next available)  4. T3 Free (Schedule next available)  5. CBC w/ Differential (6 Months)  6. CMP (6 Months)  7. Hemoglobin A1c (6 Months)  8. Lipid panel, Fasting (6 Months)  9. NT - ProBNP (6 Months)  10. TSH (Thyroid Stimulating Hormone) Panel (6 Months)  11. Vitamin D, Total (6 Months)  12. Echocardiography, Complete (6 Months)    Future appointments  1. Follow up visit - Satnam Zuniga (Schedule next available)  2. Follow up visit - Reyna Lyons (6 Months)  Miscellaneous  1. Reviewed trans thoracic echocardiogram with the patient. Nurses documentation  -Refills: Patient declined needing refills for cardiac medications at this time. -ECG: YES  -Upcoming procedures: no  -Assistive devices: no    OA, CMA   Patient instructions  Medications:  Continue with current cardiac medications. Diagnostic: Echocardiogram in  6 Months. Echocardiogram: This is an ultrasound of your heart to further assess its function and valves. You may require an IV. You will be required to remove your shirt & BRA. This test takes approximately 45 to 60 minutes. Labs:  Schedule a TSH and T4 and T3 with your other labs next week. Fasting labs Lipid panel, CBC, CMP, TSH, Vitamin D and NT- ProBNP  in 6 months. Provider Follow up:   Schedule next available appointment with Dr. Jose Villar. Follow up with Dr. Emiliano Ambrose in 6 months. EKG: Shows LVH with Repolarization  changes. Please bring in your medication bottles or updated medication list to your next appointment. Call [facility] if you have any problems or Concerns at [phone]. Per Verbal orders by Dr. Emiliano Ambrose. MA, RN    Diagnostics Details  Trans Thoracic Echocardiogram 10/26/2021  1. 1. The study quality is good. 2.The left ventricle is decreased in size. The left ventricular ejection fraction is 65%. Global left ventricular systolic function is normal. Left ventricular diastolic function is normal. Left ventricular wall thickness is at upper limits of normal.    3.The pulmonary artery systolic pressure is 25 mmHg.         CPOE Orders carried out by: Santos Cantrell Providers: Reyna Lyons MD, Bud Morel [facility] and [address] [place] [address]  Electronically Authenticated by  Reyna Lyons MD  03/30/2022 03:49:31 PM  Disclaimer: Components of this note were documented using voice recognition system and are subject to errors not corrected at proofreading. Contact the author of this note for any clarifications.

## 2022-04-03 NOTE — PLAN OF CARE
Patient received to room 2612 from ER. She is alert and oriented times four. Lungs clear on auscultation. She is asking for pain medication as soon as she arrives (she has to wait until pharmacy acknowledges this to give. Pt. Is sinus rhythm on monitor.

## 2022-04-04 ENCOUNTER — APPOINTMENT (OUTPATIENT)
Dept: INTERVENTIONAL RADIOLOGY/VASCULAR | Facility: HOSPITAL | Age: 57
End: 2022-04-04
Attending: NURSE PRACTITIONER
Payer: COMMERCIAL

## 2022-04-04 ENCOUNTER — APPOINTMENT (OUTPATIENT)
Dept: HEMATOLOGY/ONCOLOGY | Facility: HOSPITAL | Age: 57
End: 2022-04-04
Attending: INTERNAL MEDICINE
Payer: COMMERCIAL

## 2022-04-04 LAB
ANION GAP SERPL CALC-SCNC: 6 MMOL/L (ref 0–18)
ATRIAL RATE: 58 BPM
BILIRUB UR QL STRIP.AUTO: NEGATIVE
BUN BLD-MCNC: 14 MG/DL (ref 7–18)
CALCIUM BLD-MCNC: 8 MG/DL (ref 8.5–10.1)
CHLORIDE SERPL-SCNC: 113 MMOL/L (ref 98–112)
CLARITY UR REFRACT.AUTO: CLEAR
CO2 SERPL-SCNC: 24 MMOL/L (ref 21–32)
COLOR UR AUTO: YELLOW
CREAT BLD-MCNC: 0.86 MG/DL
GLUCOSE BLD-MCNC: 116 MG/DL (ref 70–99)
GLUCOSE UR STRIP.AUTO-MCNC: NEGATIVE MG/DL
KETONES UR STRIP.AUTO-MCNC: NEGATIVE MG/DL
LEUKOCYTE ESTERASE UR QL STRIP.AUTO: NEGATIVE
NITRITE UR QL STRIP.AUTO: NEGATIVE
OSMOLALITY SERPL CALC.SUM OF ELEC: 297 MOSM/KG (ref 275–295)
P AXIS: 66 DEGREES
P-R INTERVAL: 152 MS
PH UR STRIP.AUTO: 5 [PH] (ref 5–8)
POTASSIUM SERPL-SCNC: 4 MMOL/L (ref 3.5–5.1)
POTASSIUM SERPL-SCNC: 4.2 MMOL/L (ref 3.5–5.1)
PROT UR STRIP.AUTO-MCNC: NEGATIVE MG/DL
Q-T INTERVAL: 464 MS
QRS DURATION: 96 MS
QTC CALCULATION (BEZET): 455 MS
R AXIS: -1 DEGREES
RBC UR QL AUTO: NEGATIVE
SODIUM SERPL-SCNC: 143 MMOL/L (ref 136–145)
SP GR UR STRIP.AUTO: 1.02 (ref 1–1.03)
T AXIS: -8 DEGREES
T4 FREE SERPL-MCNC: 0.9 NG/DL (ref 0.8–1.7)
TROPONIN I HIGH SENSITIVITY: 13 NG/L
TSI SER-ACNC: 0.26 MIU/ML (ref 0.36–3.74)
UROBILINOGEN UR STRIP.AUTO-MCNC: <2 MG/DL
VENTRICULAR RATE: 58 BPM

## 2022-04-04 PROCEDURE — 4A023N7 MEASUREMENT OF CARDIAC SAMPLING AND PRESSURE, LEFT HEART, PERCUTANEOUS APPROACH: ICD-10-PCS | Performed by: INTERNAL MEDICINE

## 2022-04-04 PROCEDURE — 99152 MOD SED SAME PHYS/QHP 5/>YRS: CPT

## 2022-04-04 PROCEDURE — 84484 ASSAY OF TROPONIN QUANT: CPT

## 2022-04-04 PROCEDURE — 84439 ASSAY OF FREE THYROXINE: CPT | Performed by: HOSPITALIST

## 2022-04-04 PROCEDURE — 93005 ELECTROCARDIOGRAM TRACING: CPT

## 2022-04-04 PROCEDURE — 93010 ELECTROCARDIOGRAM REPORT: CPT | Performed by: INTERNAL MEDICINE

## 2022-04-04 PROCEDURE — 93454 CORONARY ARTERY ANGIO S&I: CPT

## 2022-04-04 PROCEDURE — 80048 BASIC METABOLIC PNL TOTAL CA: CPT

## 2022-04-04 PROCEDURE — 84443 ASSAY THYROID STIM HORMONE: CPT | Performed by: HOSPITALIST

## 2022-04-04 PROCEDURE — B2111ZZ FLUOROSCOPY OF MULTIPLE CORONARY ARTERIES USING LOW OSMOLAR CONTRAST: ICD-10-PCS | Performed by: INTERNAL MEDICINE

## 2022-04-04 PROCEDURE — 81003 URINALYSIS AUTO W/O SCOPE: CPT | Performed by: HOSPITALIST

## 2022-04-04 PROCEDURE — 84132 ASSAY OF SERUM POTASSIUM: CPT | Performed by: HOSPITALIST

## 2022-04-04 RX ORDER — ISOSORBIDE MONONITRATE 30 MG/1
30 TABLET, EXTENDED RELEASE ORAL DAILY
Status: DISCONTINUED | OUTPATIENT
Start: 2022-04-04 | End: 2022-04-14

## 2022-04-04 RX ORDER — BUPIVACAINE HYDROCHLORIDE 5 MG/ML
INJECTION, SOLUTION EPIDURAL; INTRACAUDAL
Status: COMPLETED
Start: 2022-04-04 | End: 2022-04-04

## 2022-04-04 RX ORDER — BIVALIRUDIN 250 MG/5ML
INJECTION, POWDER, LYOPHILIZED, FOR SOLUTION INTRAVENOUS
Status: DISCONTINUED
Start: 2022-04-04 | End: 2022-04-04 | Stop reason: WASHOUT

## 2022-04-04 RX ORDER — MORPHINE SULFATE 2 MG/ML
1 INJECTION, SOLUTION INTRAMUSCULAR; INTRAVENOUS EVERY 2 HOUR PRN
Status: COMPLETED | OUTPATIENT
Start: 2022-04-04 | End: 2022-04-05

## 2022-04-04 RX ORDER — ISOSORBIDE MONONITRATE 30 MG/1
30 TABLET, EXTENDED RELEASE ORAL DAILY
Qty: 30 TABLET | Refills: 1 | Status: SHIPPED | OUTPATIENT
Start: 2022-04-04 | End: 2022-04-14

## 2022-04-04 RX ORDER — MORPHINE SULFATE 2 MG/ML
2 INJECTION, SOLUTION INTRAMUSCULAR; INTRAVENOUS EVERY 2 HOUR PRN
Status: COMPLETED | OUTPATIENT
Start: 2022-04-04 | End: 2022-04-05

## 2022-04-04 RX ORDER — MIDAZOLAM HYDROCHLORIDE 1 MG/ML
INJECTION INTRAMUSCULAR; INTRAVENOUS
Status: COMPLETED
Start: 2022-04-04 | End: 2022-04-04

## 2022-04-04 RX ORDER — HEPARIN SODIUM 5000 [USP'U]/ML
INJECTION, SOLUTION INTRAVENOUS; SUBCUTANEOUS
Status: DISCONTINUED
Start: 2022-04-04 | End: 2022-04-04 | Stop reason: WASHOUT

## 2022-04-04 RX ORDER — MORPHINE SULFATE 2 MG/ML
0.5 INJECTION, SOLUTION INTRAMUSCULAR; INTRAVENOUS EVERY 2 HOUR PRN
Status: COMPLETED | OUTPATIENT
Start: 2022-04-04 | End: 2022-04-05

## 2022-04-04 NOTE — PROCEDURES
[facility]    PATIENT'S NAME: Noe Paul   ATTENDING PHYSICIAN: Marisela Bartlett M.D. OPERATING PHYSICIAN: Paul Baltazar MD   PATIENT ACCOUNT#:   [de-identified]    LOCATION:  [address] [place] [address]  MEDICAL RECORD #:   [id number]       YOB: 1965  ADMISSION DATE:       04/02/2022      OPERATION DATE:  04/04/2022    CARDIAC PROCEDURE TRANSCRIPTION      CARDIAC CATHETERIZATION     PREOPERATIVE DIAGNOSIS:  Chest pain. POSTOPERATIVE DIAGNOSIS:  Chest pain. PROCEDURE PERFORMED:  Coronary angiography. PROCEDURAL DETAILS:  After obtaining informed consent, we obtained access in the left femoral artery using micropuncture technique and placed a 6-Lithuanian sheath. Adine Sake catheter was advanced over a wire and used to engage the left main. Angiography of the left system showed angiographically normal LAD and circumflex. JL4 catheter was removed over a wire, replaced with a JR4. JR4 catheter was used to engage the right coronary artery. Angiography of the right coronary artery showed angiographically normal right coronary artery that was dominant. JR4 catheter was removed over a wire. Perclose device was deployed in the left femoral artery for hemostasis, and the patient was transferred to Recovery in stable condition. COMPLICATIONS:  None. ESTIMATED BLOOD LOSS:  1 mL. CONCLUSIONS:  Angiographically normal coronary arteries with a right dominant system.     Dictated By Paul Baltazar MD  d: 04/04/2022 12:25:50  t: 04/04/2022 17:12:09  Job [id number]  /

## 2022-04-04 NOTE — PLAN OF CARE
Assumed patient care at 0730. AOx4. Calm, cooperative. Independent. Up with SBA. NSR with occasional PVCs on cardiac monitor. Adequate saturation on RA. Lung sounds clear. Denies SOB, N/V. Reports 7/10 chest pressure that has been present since admission. PRN Morphine administered per MAR. Voiding without difficulty. Patient reports burning with urination. Dr. Prema Harmon notified. Orders for UA received. Results currently pending. LBM 4/2/22. Bowel sounds active. Plan is for an angiogram with Dr. Nate Donnelly today. Pre-medicated with Prednisone and Benadryl due to dye allergy. Hourly and PRN rounding in place. Call light within reach. All needs met at this time. Will continue with the current plan of care. Approx. 1035: Patient with 9/10 chest pain radiating to her back. /93 (114). EKG completed and placed in chart. No new changes when compared to EKG on admission. 2 mg IV Morphine administered per MAR. Cardiology notified. SHERYL Long at bedside. Repeat troponin ordered. Results pending. 1230: Received patient from IVS post-procedure. Patient access is L groin. Dressing is C/D/I. Site is soft with no hematoma. Pulses are 2+ bilaterally. Skin: Appropriate for ethnicity. Neuro: AOx4. Patient laying in bed flat. IV fluids infusing as ordered/fluids encouraged. Vitals and site checks per order. Reinforced post-recovery precautions.      Problem: Patient/Family Goals  Goal: Patient/Family Long Term Goal  Description: Patient's Long Term Goal: \"go home\"    Interventions:  -Cards to see  -Labs  -Med compliance  - See additional Care Plan goals for specific interventions  Outcome: Progressing  Goal: Patient/Family Short Term Goal  Description: Patient's Short Term Goal: \"figure out what's causing chest pain\"    Interventions:   -Cards to see  -Labs   -Echo  - See additional Care Plan goals for specific interventions  Outcome: Progressing     Problem: CARDIOVASCULAR - ADULT  Goal: Maintains optimal cardiac output and hemodynamic stability  Description: INTERVENTIONS:  - Monitor vital signs, rhythm, and trends  - Monitor for bleeding, hypotension and signs of decreased cardiac output  - Evaluate effectiveness of vasoactive medications to optimize hemodynamic stability  - Monitor arterial and/or venous puncture sites for bleeding and/or hematoma  - Assess quality of pulses, skin color and temperature  - Assess for signs of decreased coronary artery perfusion - ex.  Angina  - Evaluate fluid balance, assess for edema, trend weights  Outcome: Progressing  Goal: Absence of cardiac arrhythmias or at baseline  Description: INTERVENTIONS:  - Continuous cardiac monitoring, monitor vital signs, obtain 12 lead EKG if indicated  - Evaluate effectiveness of antiarrhythmic and heart rate control medications as ordered  - Initiate emergency measures for life threatening arrhythmias  - Monitor electrolytes and administer replacement therapy as ordered  Outcome: Progressing     Problem: PAIN - ADULT  Goal: Verbalizes/displays adequate comfort level or patient's stated pain goal  Description: INTERVENTIONS:  - Encourage pt to monitor pain and request assistance  - Assess pain using appropriate pain scale  - Administer analgesics based on type and severity of pain and evaluate response  - Implement non-pharmacological measures as appropriate and evaluate response  - Consider cultural and social influences on pain and pain management  - Manage/alleviate anxiety  - Utilize distraction and/or relaxation techniques  - Monitor for opioid side effects  - Notify MD/LIP if interventions unsuccessful or patient reports new pain  - Anticipate increased pain with activity and pre-medicate as appropriate  Outcome: Progressing

## 2022-04-04 NOTE — PLAN OF CARE
Assumed care of patient at 1. Pt A/Ox 4. O2 sats maintained on room air. NSR with occasional PVC's on tele. Last BM 4/2. Voiding without difficulty. Pt reports moderate chest \"pressure\" and back pain that has been unchanged all day, managed with PRN Morphine. Pt up standby assist. Pt updated on plan of care for angiogram tomorrow. Skin prepped, consent obtained. This RN talked to NELLI Miranda who said OK to hold Xarelto for angiogram. Care needs met. Bed in lowest position, Call light within reach. Bed alarm on. IVF @ 125 mL/hr per MD orders. Benadryl and Prednisone for contrast allergy. Echo results pending. Problem: Patient/Family Goals  Goal: Patient/Family Long Term Goal  Description: Patient's Long Term Goal: \"go home\"    Interventions:  -Cards to see  -Labs  -Med compliance  - See additional Care Plan goals for specific interventions  Outcome: Progressing  Goal: Patient/Family Short Term Goal  Description: Patient's Short Term Goal: \"figure out what's causing chest pain\"    Interventions:   -Cards to see  -Labs   -Echo  - See additional Care Plan goals for specific interventions  Outcome: Progressing     Problem: CARDIOVASCULAR - ADULT  Goal: Maintains optimal cardiac output and hemodynamic stability  Description: INTERVENTIONS:  - Monitor vital signs, rhythm, and trends  - Monitor for bleeding, hypotension and signs of decreased cardiac output  - Evaluate effectiveness of vasoactive medications to optimize hemodynamic stability  - Monitor arterial and/or venous puncture sites for bleeding and/or hematoma  - Assess quality of pulses, skin color and temperature  - Assess for signs of decreased coronary artery perfusion - ex.  Angina  - Evaluate fluid balance, assess for edema, trend weights  Outcome: Progressing  Goal: Absence of cardiac arrhythmias or at baseline  Description: INTERVENTIONS:  - Continuous cardiac monitoring, monitor vital signs, obtain 12 lead EKG if indicated  - Evaluate effectiveness of antiarrhythmic and heart rate control medications as ordered  - Initiate emergency measures for life threatening arrhythmias  - Monitor electrolytes and administer replacement therapy as ordered  Outcome: Progressing     Problem: PAIN - ADULT  Goal: Verbalizes/displays adequate comfort level or patient's stated pain goal  Description: INTERVENTIONS:  - Encourage pt to monitor pain and request assistance  - Assess pain using appropriate pain scale  - Administer analgesics based on type and severity of pain and evaluate response  - Implement non-pharmacological measures as appropriate and evaluate response  - Consider cultural and social influences on pain and pain management  - Manage/alleviate anxiety  - Utilize distraction and/or relaxation techniques  - Monitor for opioid side effects  - Notify MD/LIP if interventions unsuccessful or patient reports new pain  - Anticipate increased pain with activity and pre-medicate as appropriate  Outcome: Progressing Vaginal delivery, meeting all postpartum milestones.  Follow up in 6 weeks. feel well :) Good blood pressure control Monitor blood pressure twice daily.  Document blood pressures to review with obstetrician at follow-up appointment.  Call doctor for persistent systolic blood pressure (top number) equal or greater to 150 AND/OR diastolic blood pressure (bottom number) equal or above 100.      Return to hospital for systolic blood pressure (top number) equal to or greater than 160 AND/OR diastolic blood pressure (bottom number) equal to or greater than 110 OR any of the following symptoms: changes in vision, headache not relieved with Tylenol, severe abdominal pain, vomiting, increased vaginal bleeding, chest pain, or shortness of breath.

## 2022-04-05 RX ORDER — LORAZEPAM 1 MG/1
1 TABLET ORAL SEE ADMIN INSTRUCTIONS
Status: COMPLETED | OUTPATIENT
Start: 2022-04-05 | End: 2022-04-05

## 2022-04-05 RX ORDER — SODIUM CHLORIDE 9 MG/ML
INJECTION, SOLUTION INTRAVENOUS CONTINUOUS
Status: DISCONTINUED | OUTPATIENT
Start: 2022-04-05 | End: 2022-04-14

## 2022-04-05 RX ORDER — DEXAMETHASONE SODIUM PHOSPHATE 4 MG/ML
10 VIAL (ML) INJECTION ONCE
Status: COMPLETED | OUTPATIENT
Start: 2022-04-05 | End: 2022-04-05

## 2022-04-05 RX ORDER — BUTALBITAL, ACETAMINOPHEN AND CAFFEINE 50; 325; 40 MG/1; MG/1; MG/1
1 TABLET ORAL EVERY 4 HOURS PRN
Status: DISCONTINUED | OUTPATIENT
Start: 2022-04-05 | End: 2022-04-14

## 2022-04-05 NOTE — PROGRESS NOTES
Dr Prem Montgomery ( EP cardiologist at [facility]) reached out to me to discuss this case. Apparently patient's family had reached out to him in regards to her chest pain and further need for pain control. He reviewed case with me and suggested to perhaps give a dose of morphine tonight. Also he recommended to hold Midodrine and have cardiologist review her case for further need to continue this med. Otherwise may dc all together upon dc. Dr Prem Montgomery ([phone]).     Violeta Dillon MD  [place] [facility]

## 2022-04-05 NOTE — PLAN OF CARE
Assumed care of patient at [facility]. Pt A/Ox 4. O2 sats maintained on room air. NSR with frequent PVCs on tele. Last BM 4/2. Voiding without difficulty. Pt reports moderate chest \"pressure\" with pain to neck and back, ice packs and prn morphine. Pt up standby. Pt updated on plan of care. Care needs met. Bed in lowest position, Call light within reach. IVF infusing at 125 mL per MD orders. Left groin site is soft with no hematoma.  at bedside. IV Morphine was discontinued today. Pt and  requesting [facility] speak with her Cardiologist, Dr. Buddy Schwartz from [facility]. This RN spoke with Dr. Buddy Schwartz who said he would like to talk to on-call hospitalist. He has done an ablation on pt in the past.This RN text paged Dr. Georgana Angelucci to call Dr. Buddy Schwartz about pain management. After speaking with him, Dr. Georgana Angelucci reordered prn morphine. Problem: Patient/Family Goals  Goal: Patient/Family Long Term Goal  Description: Patient's Long Term Goal: \"go home\"    Interventions:  -Cards to see  -Labs  -Med compliance  - See additional Care Plan goals for specific interventions  Outcome: Progressing  Goal: Patient/Family Short Term Goal  Description: Patient's Short Term Goal: \"figure out what's causing chest pain\"    Interventions:   -Cards to see  -Labs   -Echo  - See additional Care Plan goals for specific interventions  Outcome: Progressing     Problem: CARDIOVASCULAR - ADULT  Goal: Maintains optimal cardiac output and hemodynamic stability  Description: INTERVENTIONS:  - Monitor vital signs, rhythm, and trends  - Monitor for bleeding, hypotension and signs of decreased cardiac output  - Evaluate effectiveness of vasoactive medications to optimize hemodynamic stability  - Monitor arterial and/or venous puncture sites for bleeding and/or hematoma  - Assess quality of pulses, skin color and temperature  - Assess for signs of decreased coronary artery perfusion - ex.  Angina  - Evaluate fluid balance, assess for edema, trend weights  Outcome: Progressing  Goal: Absence of cardiac arrhythmias or at baseline  Description: INTERVENTIONS:  - Continuous cardiac monitoring, monitor vital signs, obtain 12 lead EKG if indicated  - Evaluate effectiveness of antiarrhythmic and heart rate control medications as ordered  - Initiate emergency measures for life threatening arrhythmias  - Monitor electrolytes and administer replacement therapy as ordered  Outcome: Progressing     Problem: PAIN - ADULT  Goal: Verbalizes/displays adequate comfort level or patient's stated pain goal  Description: INTERVENTIONS:  - Encourage pt to monitor pain and request assistance  - Assess pain using appropriate pain scale  - Administer analgesics based on type and severity of pain and evaluate response  - Implement non-pharmacological measures as appropriate and evaluate response  - Consider cultural and social influences on pain and pain management  - Manage/alleviate anxiety  - Utilize distraction and/or relaxation techniques  - Monitor for opioid side effects  - Notify MD/LIP if interventions unsuccessful or patient reports new pain  - Anticipate increased pain with activity and pre-medicate as appropriate  Outcome: Progressing

## 2022-04-05 NOTE — PLAN OF CARE
Assumed patient care at 0730. AOx4. Calm, cooperative. Independent. Up with SBA. NSR on cardiac monitor. Adequate saturation on RA. Lung sounds clear. Denies SOB. Reports some chest discomfort that has been present since admission. Reports 9/10 headache this morning. Dr. Rylie Dupont aware. Neurology consult placed per orders. 1 episode of emesis this morning. PRN Zofran administered per MAR. Voiding without difficulty. LBM 4/2/22. Bowel sounds active. Plan is for pain control and neurology to see. Hourly and PRN rounding in place. Call light within reach. All needs met at this time. Will continue with the current plan of care. Problem: Patient/Family Goals  Goal: Patient/Family Long Term Goal  Description: Patient's Long Term Goal: \"go home\"    Interventions:  -Cards to see  -Labs  -Med compliance  - See additional Care Plan goals for specific interventions  Outcome: Progressing  Goal: Patient/Family Short Term Goal  Description: Patient's Short Term Goal: \"figure out what's causing chest pain\"    Interventions:   -Cards to see  -Labs   -Echo  - See additional Care Plan goals for specific interventions  Outcome: Progressing     Problem: CARDIOVASCULAR - ADULT  Goal: Maintains optimal cardiac output and hemodynamic stability  Description: INTERVENTIONS:  - Monitor vital signs, rhythm, and trends  - Monitor for bleeding, hypotension and signs of decreased cardiac output  - Evaluate effectiveness of vasoactive medications to optimize hemodynamic stability  - Monitor arterial and/or venous puncture sites for bleeding and/or hematoma  - Assess quality of pulses, skin color and temperature  - Assess for signs of decreased coronary artery perfusion - ex.  Angina  - Evaluate fluid balance, assess for edema, trend weights  Outcome: Progressing  Goal: Absence of cardiac arrhythmias or at baseline  Description: INTERVENTIONS:  - Continuous cardiac monitoring, monitor vital signs, obtain 12 lead EKG if indicated  - Evaluate effectiveness of antiarrhythmic and heart rate control medications as ordered  - Initiate emergency measures for life threatening arrhythmias  - Monitor electrolytes and administer replacement therapy as ordered  Outcome: Progressing     Problem: PAIN - ADULT  Goal: Verbalizes/displays adequate comfort level or patient's stated pain goal  Description: INTERVENTIONS:  - Encourage pt to monitor pain and request assistance  - Assess pain using appropriate pain scale  - Administer analgesics based on type and severity of pain and evaluate response  - Implement non-pharmacological measures as appropriate and evaluate response  - Consider cultural and social influences on pain and pain management  - Manage/alleviate anxiety  - Utilize distraction and/or relaxation techniques  - Monitor for opioid side effects  - Notify MD/LIP if interventions unsuccessful or patient reports new pain  - Anticipate increased pain with activity and pre-medicate as appropriate  Outcome: Progressing

## 2022-04-06 ENCOUNTER — APPOINTMENT (OUTPATIENT)
Dept: CT IMAGING | Facility: HOSPITAL | Age: 57
End: 2022-04-06
Attending: Other
Payer: COMMERCIAL

## 2022-04-06 LAB
ATRIAL RATE: 66 BPM
ATRIAL RATE: 80 BPM
P AXIS: 71 DEGREES
P AXIS: 74 DEGREES
P-R INTERVAL: 158 MS
P-R INTERVAL: 162 MS
Q-T INTERVAL: 398 MS
Q-T INTERVAL: 444 MS
QRS DURATION: 92 MS
QRS DURATION: 98 MS
QTC CALCULATION (BEZET): 459 MS
QTC CALCULATION (BEZET): 465 MS
R AXIS: -2 DEGREES
R AXIS: 2 DEGREES
T AXIS: 36 DEGREES
T AXIS: 83 DEGREES
VENTRICULAR RATE: 66 BPM
VENTRICULAR RATE: 80 BPM

## 2022-04-06 PROCEDURE — 70450 CT HEAD/BRAIN W/O DYE: CPT | Performed by: OTHER

## 2022-04-06 RX ORDER — ENALAPRILAT 2.5 MG/2ML
1.25 INJECTION INTRAVENOUS EVERY 6 HOURS PRN
Status: DISCONTINUED | OUTPATIENT
Start: 2022-04-06 | End: 2022-04-14

## 2022-04-06 RX ORDER — DEXAMETHASONE SODIUM PHOSPHATE 4 MG/ML
4 VIAL (ML) INJECTION EVERY 6 HOURS
Status: DISCONTINUED | OUTPATIENT
Start: 2022-04-06 | End: 2022-04-07

## 2022-04-06 RX ORDER — LORAZEPAM 1 MG/1
2 TABLET ORAL NIGHTLY
Status: DISCONTINUED | OUTPATIENT
Start: 2022-04-06 | End: 2022-04-09

## 2022-04-06 RX ORDER — DEXAMETHASONE SODIUM PHOSPHATE 4 MG/ML
6 VIAL (ML) INJECTION ONCE
Status: COMPLETED | OUTPATIENT
Start: 2022-04-06 | End: 2022-04-06

## 2022-04-06 RX ORDER — LORAZEPAM 1 MG/1
2 TABLET ORAL
Status: COMPLETED | OUTPATIENT
Start: 2022-04-06 | End: 2022-04-06

## 2022-04-06 NOTE — PLAN OF CARE
Assumed care of pt at [address] [facility]. A/ox4, rm air, SR on tele. Reported pain 10/10 in head - no pain medication administered at this time. Pt did receive steroid and lorazepam this evening in hopes of this stopping her headache and allowing her to sleep. Pt declining midnight vitals, pt and  stated neurology said to \"minimize interruptions\" overnight. Pt continues to report palpitations. .. per pt \"the same as what I came in with\". Left groin site soft upon palpation w/ no s/s of hematoma. Pt also has dime-sized blister in mid-lower abdomen (just above private area). She stated she had one \"that burst earlier\" in her left groin, a band aid covering site to \"stop the oozing\". One mepilex applied over each site. Will notify day shift RN so MD can observe sites. IVF infusing @ 75ml/hr. Impaired vision - wears glasses. Discussed POC w/ pt. Will continue to monitor.     2200: per admin instructions, depacon IVPB not administered tonight (admin instructions state to, \"hold during night time sleep hours, to not disrupt sleep\")  0553: paged & spoke w/ dr Patito Gregory regarding pt's persistent migraine & lack of sleep overnight - orders received for scheduled decadron (first dose within 0600 hour & q6 following that dose) & 1x dose of ativan w/ breakfast     Problem: Patient/Family Goals  Goal: Patient/Family Long Term Goal  Description: Patient's Long Term Goal: \"go home\"    Interventions:  -Cards to see  -Labs  -Med compliance  - See additional Care Plan goals for specific interventions  Outcome: Progressing  Goal: Patient/Family Short Term Goal  Description: Patient's Short Term Goal: \"figure out what's causing chest pain\"    Interventions:   -Cards to see  -Labs   -Echo  - See additional Care Plan goals for specific interventions  Outcome: Progressing     Problem: CARDIOVASCULAR - ADULT  Goal: Maintains optimal cardiac output and hemodynamic stability  Description: INTERVENTIONS:  - Monitor vital signs, rhythm, and trends  - Monitor for bleeding, hypotension and signs of decreased cardiac output  - Evaluate effectiveness of vasoactive medications to optimize hemodynamic stability  - Monitor arterial and/or venous puncture sites for bleeding and/or hematoma  - Assess quality of pulses, skin color and temperature  - Assess for signs of decreased coronary artery perfusion - ex.  Angina  - Evaluate fluid balance, assess for edema, trend weights  Outcome: Progressing  Goal: Absence of cardiac arrhythmias or at baseline  Description: INTERVENTIONS:  - Continuous cardiac monitoring, monitor vital signs, obtain 12 lead EKG if indicated  - Evaluate effectiveness of antiarrhythmic and heart rate control medications as ordered  - Initiate emergency measures for life threatening arrhythmias  - Monitor electrolytes and administer replacement therapy as ordered  Outcome: Progressing     Problem: PAIN - ADULT  Goal: Verbalizes/displays adequate comfort level or patient's stated pain goal  Description: INTERVENTIONS:  - Encourage pt to monitor pain and request assistance  - Assess pain using appropriate pain scale  - Administer analgesics based on type and severity of pain and evaluate response  - Implement non-pharmacological measures as appropriate and evaluate response  - Consider cultural and social influences on pain and pain management  - Manage/alleviate anxiety  - Utilize distraction and/or relaxation techniques  - Monitor for opioid side effects  - Notify MD/LIP if interventions unsuccessful or patient reports new pain  - Anticipate increased pain with activity and pre-medicate as appropriate  Outcome: Progressing

## 2022-04-06 NOTE — PLAN OF CARE
Assumed pt care at 0730. A&Ox4. Room air, no complaints of shortness of breath, lung sounds clear. /85 this AM. MD aware, IV Vasotec ordered PRN. Normal sinus rhythm on tele. Pt rates pain 7/10 migraine pain, cold compress given and low stimulation in room per neuro. Continent of bowel and bladder. Up with standby assist. Pt has L chest port and 0.9 NS running at 75ml/hr. Neuro inquiring about medication DHE for migraines and if clear to give from cardiology perspective, cards said \"unaware of any interactions of DHE and SVT\". Plan of care: migraine pain control, rehydration, IV decadron,CT of head to r/o structural triggers of migraine     Plan of care updated with patient and spouse. Questions answered, pt verbalized understanding, will continue to monitor. Problem: Patient/Family Goals  Goal: Patient/Family Long Term Goal  Description: Patient's Long Term Goal: \"go home\"    Interventions:  -Cards to see  -Labs  -Med compliance  - See additional Care Plan goals for specific interventions  Outcome: Progressing  Goal: Patient/Family Short Term Goal  Description: Patient's Short Term Goal: \"figure out what's causing chest pain\"    Interventions:   -Cards to see  -Labs   -Echo  - See additional Care Plan goals for specific interventions  Outcome: Progressing     Problem: CARDIOVASCULAR - ADULT  Goal: Maintains optimal cardiac output and hemodynamic stability  Description: INTERVENTIONS:  - Monitor vital signs, rhythm, and trends  - Monitor for bleeding, hypotension and signs of decreased cardiac output  - Evaluate effectiveness of vasoactive medications to optimize hemodynamic stability  - Monitor arterial and/or venous puncture sites for bleeding and/or hematoma  - Assess quality of pulses, skin color and temperature  - Assess for signs of decreased coronary artery perfusion - ex.  Angina  - Evaluate fluid balance, assess for edema, trend weights  Outcome: Progressing  Goal: Absence of cardiac arrhythmias or at baseline  Description: INTERVENTIONS:  - Continuous cardiac monitoring, monitor vital signs, obtain 12 lead EKG if indicated  - Evaluate effectiveness of antiarrhythmic and heart rate control medications as ordered  - Initiate emergency measures for life threatening arrhythmias  - Monitor electrolytes and administer replacement therapy as ordered  Outcome: Progressing     Problem: PAIN - ADULT  Goal: Verbalizes/displays adequate comfort level or patient's stated pain goal  Description: INTERVENTIONS:  - Encourage pt to monitor pain and request assistance  - Assess pain using appropriate pain scale  - Administer analgesics based on type and severity of pain and evaluate response  - Implement non-pharmacological measures as appropriate and evaluate response  - Consider cultural and social influences on pain and pain management  - Manage/alleviate anxiety  - Utilize distraction and/or relaxation techniques  - Monitor for opioid side effects  - Notify MD/LIP if interventions unsuccessful or patient reports new pain  - Anticipate increased pain with activity and pre-medicate as appropriate  Outcome: Progressing

## 2022-04-07 ENCOUNTER — APPOINTMENT (OUTPATIENT)
Dept: MRI IMAGING | Facility: HOSPITAL | Age: 57
End: 2022-04-07
Attending: INTERNAL MEDICINE
Payer: COMMERCIAL

## 2022-04-07 LAB
ADENOVIRUS PCR:: NOT DETECTED
ALBUMIN SERPL-MCNC: 3 G/DL (ref 3.4–5)
ALBUMIN/GLOB SERPL: 1.3 {RATIO} (ref 1–2)
ALP LIVER SERPL-CCNC: 60 U/L
ALT SERPL-CCNC: 84 U/L
ANION GAP SERPL CALC-SCNC: 5 MMOL/L (ref 0–18)
AST SERPL-CCNC: 31 U/L (ref 15–37)
B PARAPERT DNA SPEC QL NAA+PROBE: NOT DETECTED
B PERT DNA SPEC QL NAA+PROBE: NOT DETECTED
BASOPHILS # BLD AUTO: 0 X10(3) UL (ref 0–0.2)
BASOPHILS NFR BLD AUTO: 0 %
BILIRUB SERPL-MCNC: 0.5 MG/DL (ref 0.1–2)
BUN BLD-MCNC: 20 MG/DL (ref 7–18)
C PNEUM DNA SPEC QL NAA+PROBE: NOT DETECTED
CALCIUM BLD-MCNC: 7.2 MG/DL (ref 8.5–10.1)
CHLORIDE SERPL-SCNC: 110 MMOL/L (ref 98–112)
CO2 SERPL-SCNC: 27 MMOL/L (ref 21–32)
CORONAVIRUS 229E PCR:: NOT DETECTED
CORONAVIRUS HKU1 PCR:: NOT DETECTED
CORONAVIRUS NL63 PCR:: NOT DETECTED
CORONAVIRUS OC43 PCR:: NOT DETECTED
CREAT BLD-MCNC: 1.01 MG/DL
EOSINOPHIL # BLD AUTO: 0 X10(3) UL (ref 0–0.7)
EOSINOPHIL NFR BLD AUTO: 0 %
ERYTHROCYTE [DISTWIDTH] IN BLOOD BY AUTOMATED COUNT: 12.7 %
FLUAV RNA SPEC QL NAA+PROBE: NOT DETECTED
FLUBV RNA SPEC QL NAA+PROBE: NOT DETECTED
GLOBULIN PLAS-MCNC: 2.4 G/DL (ref 2.8–4.4)
GLUCOSE BLD-MCNC: 152 MG/DL (ref 70–99)
HCT VFR BLD AUTO: 31.7 %
HGB BLD-MCNC: 10.8 G/DL
IMM GRANULOCYTES # BLD AUTO: 0.02 X10(3) UL (ref 0–1)
IMM GRANULOCYTES NFR BLD: 0.4 %
LYMPHOCYTES # BLD AUTO: 0.43 X10(3) UL (ref 1–4)
LYMPHOCYTES NFR BLD AUTO: 9.5 %
MAGNESIUM SERPL-MCNC: 1.9 MG/DL (ref 1.6–2.6)
MCH RBC QN AUTO: 29.9 PG (ref 26–34)
MCHC RBC AUTO-ENTMCNC: 34.1 G/DL (ref 31–37)
MCV RBC AUTO: 87.8 FL
METAPNEUMOVIRUS PCR:: NOT DETECTED
MONOCYTES # BLD AUTO: 0.26 X10(3) UL (ref 0.1–1)
MONOCYTES NFR BLD AUTO: 5.7 %
MYCOPLASMA PNEUMONIA PCR:: NOT DETECTED
NEUTROPHILS # BLD AUTO: 3.84 X10 (3) UL (ref 1.5–7.7)
NEUTROPHILS # BLD AUTO: 3.84 X10(3) UL (ref 1.5–7.7)
NEUTROPHILS NFR BLD AUTO: 84.4 %
OSMOLALITY SERPL CALC.SUM OF ELEC: 300 MOSM/KG (ref 275–295)
PARAINFLUENZA 1 PCR:: NOT DETECTED
PARAINFLUENZA 2 PCR:: NOT DETECTED
PARAINFLUENZA 3 PCR:: NOT DETECTED
PARAINFLUENZA 4 PCR:: NOT DETECTED
PLATELET # BLD AUTO: 133 10(3)UL (ref 150–450)
POTASSIUM SERPL-SCNC: 3.4 MMOL/L (ref 3.5–5.1)
PROT SERPL-MCNC: 5.4 G/DL (ref 6.4–8.2)
RBC # BLD AUTO: 3.61 X10(6)UL
RHINOVIRUS/ENTERO PCR:: NOT DETECTED
RSV RNA SPEC QL NAA+PROBE: NOT DETECTED
SARS-COV-2 RNA NPH QL NAA+NON-PROBE: NOT DETECTED
SODIUM SERPL-SCNC: 142 MMOL/L (ref 136–145)
WBC # BLD AUTO: 4.6 X10(3) UL (ref 4–11)

## 2022-04-07 PROCEDURE — 80053 COMPREHEN METABOLIC PANEL: CPT | Performed by: INTERNAL MEDICINE

## 2022-04-07 PROCEDURE — 70553 MRI BRAIN STEM W/O & W/DYE: CPT | Performed by: INTERNAL MEDICINE

## 2022-04-07 PROCEDURE — 83735 ASSAY OF MAGNESIUM: CPT | Performed by: NURSE PRACTITIONER

## 2022-04-07 PROCEDURE — 85025 COMPLETE CBC W/AUTO DIFF WBC: CPT | Performed by: INTERNAL MEDICINE

## 2022-04-07 PROCEDURE — A9575 INJ GADOTERATE MEGLUMI 0.1ML: HCPCS

## 2022-04-07 PROCEDURE — 87040 BLOOD CULTURE FOR BACTERIA: CPT | Performed by: INTERNAL MEDICINE

## 2022-04-07 PROCEDURE — 0202U NFCT DS 22 TRGT SARS-COV-2: CPT | Performed by: INTERNAL MEDICINE

## 2022-04-07 RX ORDER — POTASSIUM CHLORIDE 20 MEQ/1
40 TABLET, EXTENDED RELEASE ORAL ONCE
Status: COMPLETED | OUTPATIENT
Start: 2022-04-07 | End: 2022-04-07

## 2022-04-07 RX ORDER — ONDANSETRON 2 MG/ML
4 INJECTION INTRAMUSCULAR; INTRAVENOUS EVERY 8 HOURS
Status: DISCONTINUED | OUTPATIENT
Start: 2022-04-07 | End: 2022-04-07

## 2022-04-07 RX ORDER — ALPRAZOLAM 1 MG/1
1 TABLET ORAL ONCE
Status: COMPLETED | OUTPATIENT
Start: 2022-04-07 | End: 2022-04-07

## 2022-04-07 RX ORDER — MAGNESIUM SULFATE 1 G/100ML
1 INJECTION INTRAVENOUS ONCE
Status: COMPLETED | OUTPATIENT
Start: 2022-04-07 | End: 2022-04-07

## 2022-04-07 RX ORDER — DIPHENHYDRAMINE HYDROCHLORIDE 50 MG/ML
25 INJECTION INTRAMUSCULAR; INTRAVENOUS ONCE
Status: COMPLETED | OUTPATIENT
Start: 2022-04-07 | End: 2022-04-07

## 2022-04-07 RX ORDER — ONDANSETRON 2 MG/ML
4 INJECTION INTRAMUSCULAR; INTRAVENOUS ONCE
Status: COMPLETED | OUTPATIENT
Start: 2022-04-07 | End: 2022-04-07

## 2022-04-07 RX ORDER — ALPRAZOLAM 1 MG/1
1 TABLET ORAL NIGHTLY PRN
Status: ACTIVE | OUTPATIENT
Start: 2022-04-07 | End: 2022-04-08

## 2022-04-07 RX ORDER — LORAZEPAM 1 MG/1
2 TABLET ORAL NIGHTLY PRN
Status: COMPLETED | OUTPATIENT
Start: 2022-04-07 | End: 2022-04-08

## 2022-04-07 RX ORDER — DEXAMETHASONE SODIUM PHOSPHATE 4 MG/ML
4 VIAL (ML) INJECTION EVERY 6 HOURS PRN
Status: DISCONTINUED | OUTPATIENT
Start: 2022-04-07 | End: 2022-04-10

## 2022-04-07 RX ORDER — TRAMADOL HYDROCHLORIDE 50 MG/1
50 TABLET ORAL EVERY 6 HOURS PRN
Status: DISCONTINUED | OUTPATIENT
Start: 2022-04-07 | End: 2022-04-14

## 2022-04-07 NOTE — PROGRESS NOTES
I was paged by floor nurse in regards to patient's complaint of left earache, headache and \" enlarged neck glands\". I have evaluated patient by bedside. General: NAD  CVS: RRR  HEENT: No evidence of cervical lymphadenopathy, no evidence of erythema or exudates within oropharyngeal, left ear examined without any evidence of erythema, fluid, tympanic membrane visualized clearly    Labs and imaging have been reviewed. Patient and her  believe that there is an underlying infectious process that may not have been diagnosed or missed. I have discussed plan of care with patient and her  by bedside. Patient trying to get a hold of her oncologist and would like infectious disease to evaluate her. We will repeat labs in the morning. Monitor suprapubic blisters which do not look infected at this time.  ID consult in AM.     Finn Galicia MD  [facility]

## 2022-04-07 NOTE — PLAN OF CARE
Assumed care of pateint   Problem: Patient/Family Goals  Goal: Patient/Family Long Term Goal  Description: Patient's Long Term Goal: \"go home\"    Interventions:  -Cards to see  -Labs  -Med compliance  - See additional Care Plan goals for specific interventions  Outcome: Progressing  Goal: Patient/Family Short Term Goal  Description: Patient's Short Term Goal: \"figure out what's causing chest pain\"    Interventions:   -Cards to see  -Labs   -Echo  - See additional Care Plan goals for specific interventions  Outcome: Progressing     Problem: CARDIOVASCULAR - ADULT  Goal: Maintains optimal cardiac output and hemodynamic stability  Description: INTERVENTIONS:  - Monitor vital signs, rhythm, and trends  - Monitor for bleeding, hypotension and signs of decreased cardiac output  - Evaluate effectiveness of vasoactive medications to optimize hemodynamic stability  - Monitor arterial and/or venous puncture sites for bleeding and/or hematoma  - Assess quality of pulses, skin color and temperature  - Assess for signs of decreased coronary artery perfusion - ex.  Angina  - Evaluate fluid balance, assess for edema, trend weights  Outcome: Progressing  Goal: Absence of cardiac arrhythmias or at baseline  Description: INTERVENTIONS:  - Continuous cardiac monitoring, monitor vital signs, obtain 12 lead EKG if indicated  - Evaluate effectiveness of antiarrhythmic and heart rate control medications as ordered  - Initiate emergency measures for life threatening arrhythmias  - Monitor electrolytes and administer replacement therapy as ordered  Outcome: Progressing     Problem: PAIN - ADULT  Goal: Verbalizes/displays adequate comfort level or patient's stated pain goal  Description: INTERVENTIONS:  - Encourage pt to monitor pain and request assistance  - Assess pain using appropriate pain scale  - Administer analgesics based on type and severity of pain and evaluate response  - Implement non-pharmacological measures as appropriate and evaluate response  - Consider cultural and social influences on pain and pain management  - Manage/alleviate anxiety  - Utilize distraction and/or relaxation techniques  - Monitor for opioid side effects  - Notify MD/LIP if interventions unsuccessful or patient reports new pain  - Anticipate increased pain with activity and pre-medicate as appropriate  Outcome: Progressing

## 2022-04-07 NOTE — PLAN OF CARE
Assumed care of patient at 0730. Asleep. Oriented by 4.  at bedside. Sinus rhythm on telemetry. Patient with c/o migraine headache, neck glands hurting, and restless leg \"activation\" following benadryl administration. Neuro notified. Orders rec'd. Xanax administered. ID consult. IVF maintained per order. DHE protocol initiated per Neuro. Following test dose patient c/o nausea, palpitations. and 10/10 migraine pain. No arrhythmia nor PVCs noted on tele. Neurology notified. Orders rec'd. Pain medication administered. Patient reports some improvement. MRI brain to be done. Plan of care updated with patient and . Will cont to monitor. Problem: Patient/Family Goals  Goal: Patient/Family Long Term Goal  Description: Patient's Long Term Goal: \"go home\"    Interventions:  -Cards to see  -Labs  -Med compliance  - See additional Care Plan goals for specific interventions  4/7/2022 1745 by Sasha Oviedo RN  Outcome: Progressing  4/7/2022 1621 by Sasha Oviedo RN  Outcome: Progressing  Goal: Patient/Family Short Term Goal  Description: Patient's Short Term Goal: \"figure out what's causing chest pain\"    Interventions:   -Cards to see  -Labs   -Echo  - See additional Care Plan goals for specific interventions  4/7/2022 1745 by Sasha Oviedo RN  Outcome: Progressing  4/7/2022 1621 by Sasha Oviedo RN  Outcome: Progressing     Problem: CARDIOVASCULAR - ADULT  Goal: Maintains optimal cardiac output and hemodynamic stability  Description: INTERVENTIONS:  - Monitor vital signs, rhythm, and trends  - Monitor for bleeding, hypotension and signs of decreased cardiac output  - Evaluate effectiveness of vasoactive medications to optimize hemodynamic stability  - Monitor arterial and/or venous puncture sites for bleeding and/or hematoma  - Assess quality of pulses, skin color and temperature  - Assess for signs of decreased coronary artery perfusion - ex.  Angina  - Evaluate fluid balance, assess for edema, trend weights  4/7/2022 1745 by Thom Quiroz RN  Outcome: Progressing  4/7/2022 1621 by Thom Quiroz RN  Outcome: Progressing  Goal: Absence of cardiac arrhythmias or at baseline  Description: INTERVENTIONS:  - Continuous cardiac monitoring, monitor vital signs, obtain 12 lead EKG if indicated  - Evaluate effectiveness of antiarrhythmic and heart rate control medications as ordered  - Initiate emergency measures for life threatening arrhythmias  - Monitor electrolytes and administer replacement therapy as ordered  4/7/2022 1745 by Thom Quiroz RN  Outcome: Progressing  4/7/2022 1621 by Thom Quiroz RN  Outcome: Progressing     Problem: PAIN - ADULT  Goal: Verbalizes/displays adequate comfort level or patient's stated pain goal  Description: INTERVENTIONS:  - Encourage pt to monitor pain and request assistance  - Assess pain using appropriate pain scale  - Administer analgesics based on type and severity of pain and evaluate response  - Implement non-pharmacological measures as appropriate and evaluate response  - Consider cultural and social influences on pain and pain management  - Manage/alleviate anxiety  - Utilize distraction and/or relaxation techniques  - Monitor for opioid side effects  - Notify MD/LIP if interventions unsuccessful or patient reports new pain  - Anticipate increased pain with activity and pre-medicate as appropriate  4/7/2022 1745 by Thom Quiroz RN  Outcome: Progressing  4/7/2022 1621 by Thom Quiroz RN  Outcome: Progressing

## 2022-04-07 NOTE — PROGRESS NOTES
Neurology Update:    Notified by RN that after the first test dose of DHE, the patient developed heart palpitations. Instructed to stop infusion and DHE order was discontinued. Headache 10/10. Please use Fioricet PRN for migraines. Added Ultram 50mg PRN. OK to alternate Fioricet and Ultram.  Dr Martinez Fess aware.     Cami Centeno, APRN

## 2022-04-07 NOTE — PLAN OF CARE
Upon walking rounds pt c/o pain to right side of neck requesting hot pack which was given by off going Rn. Hs med given at 2009 and pt c/o pain to bilateral neck glands and left ear stating her left ear felt like it was about to explode. Pt given scheduled decadron. Pt offered tylenol for pain but refused. The pt was given another hot pack. Jaylen Carrier notified and came down to see pt. Otoscope not working. Awaiting another from another floor. Md will be back. Received a call from the  that the pt has been calling down to them demanding that they call her oncologist. The  stated she told the pt that they do not call doctors for pts and asked the pt if she asked her nurse to do so. The pt stated that she has requested that her nurse page her oncologist multiple times but her nurse has refused. The pt has not requested this Rn page her oncologist during this shift. The pt does not have oncology on consult. The pt and family however were told that this Rn was going to page the hospitalist to notify of her neck pain. Will continue to monitor. 6092- Pt requesting md be paged for Xanax 2mg,  paged but declined. Pt and  updated and they requested that neurology be paged for sleeping pill.  paged and order received for Benadryl iv, may repeat x1. ID consult called and  to see pt later today. Oncoming Rn updated on pt.

## 2022-04-08 ENCOUNTER — APPOINTMENT (OUTPATIENT)
Dept: GENERAL RADIOLOGY | Facility: HOSPITAL | Age: 57
End: 2022-04-08
Attending: INTERNAL MEDICINE
Payer: COMMERCIAL

## 2022-04-08 PROBLEM — M06.9 RHEUMATOID ARTHRITIS OF BOTH WRISTS (HCC): Status: RESOLVED | Noted: 2017-12-18 | Resolved: 2018-05-15

## 2022-04-08 LAB
ALBUMIN SERPL-MCNC: 3 G/DL (ref 3.4–5)
ALBUMIN/GLOB SERPL: 1.3 {RATIO} (ref 1–2)
ALP LIVER SERPL-CCNC: 58 U/L
ALT SERPL-CCNC: 62 U/L
ANION GAP SERPL CALC-SCNC: 3 MMOL/L (ref 0–18)
AST SERPL-CCNC: 22 U/L (ref 15–37)
BASOPHILS # BLD AUTO: 0.02 X10(3) UL (ref 0–0.2)
BASOPHILS NFR BLD AUTO: 0.4 %
BILIRUB SERPL-MCNC: 0.3 MG/DL (ref 0.1–2)
BUN BLD-MCNC: 28 MG/DL (ref 7–18)
CALCIUM BLD-MCNC: 7.3 MG/DL (ref 8.5–10.1)
CHLORIDE SERPL-SCNC: 110 MMOL/L (ref 98–112)
CLARITY CSF: CLEAR
CO2 SERPL-SCNC: 29 MMOL/L (ref 21–32)
COLOR CSF: COLORLESS
COUNT PERFORMED ON TUBE: 4
CREAT BLD-MCNC: 1.41 MG/DL
EOSINOPHIL # BLD AUTO: 0.05 X10(3) UL (ref 0–0.7)
EOSINOPHIL NFR BLD AUTO: 1 %
ERYTHROCYTE [DISTWIDTH] IN BLOOD BY AUTOMATED COUNT: 13 %
GLOBULIN PLAS-MCNC: 2.4 G/DL (ref 2.8–4.4)
GLUCOSE BLD-MCNC: 83 MG/DL (ref 70–99)
GLUCOSE CSF-MCNC: 50 MG/DL (ref 40–70)
HCT VFR BLD AUTO: 31.4 %
HGB BLD-MCNC: 10.8 G/DL
IMM GRANULOCYTES # BLD AUTO: 0.02 X10(3) UL (ref 0–1)
IMM GRANULOCYTES NFR BLD: 0.4 %
INR BLD: 1.1 (ref 0.8–1.2)
LYMPHOCYTES # BLD AUTO: 1.29 X10(3) UL (ref 1–4)
LYMPHOCYTES NFR BLD AUTO: 24.6 %
MCH RBC QN AUTO: 30.3 PG (ref 26–34)
MCHC RBC AUTO-ENTMCNC: 34.4 G/DL (ref 31–37)
MCV RBC AUTO: 88 FL
MONOCYTES # BLD AUTO: 0.5 X10(3) UL (ref 0.1–1)
MONOCYTES NFR BLD AUTO: 9.5 %
NEUTROPHILS # BLD AUTO: 3.36 X10 (3) UL (ref 1.5–7.7)
NEUTROPHILS # BLD AUTO: 3.36 X10(3) UL (ref 1.5–7.7)
NEUTROPHILS NFR BLD AUTO: 64.1 %
OSMOLALITY SERPL CALC.SUM OF ELEC: 299 MOSM/KG (ref 275–295)
PLATELET # BLD AUTO: 112 10(3)UL (ref 150–450)
POTASSIUM SERPL-SCNC: 3.6 MMOL/L (ref 3.5–5.1)
PROT PATTERN CSF ELPH-IMP: 33.9 MG/DL (ref 15–45)
PROT SERPL-MCNC: 5.4 G/DL (ref 6.4–8.2)
PROTHROMBIN TIME: 14.3 SECONDS (ref 11.6–14.8)
RBC # BLD AUTO: 3.57 X10(6)UL
RBC # CSF: 7 /MM3 (ref ?–1)
SODIUM SERPL-SCNC: 142 MMOL/L (ref 136–145)
TOTAL VOLUME CSF: 13 ML
WBC # BLD AUTO: 5.2 X10(3) UL (ref 4–11)
WBC # FLD MANUAL: 3 /MM3 (ref 0–5)

## 2022-04-08 PROCEDURE — 85025 COMPLETE CBC W/AUTO DIFF WBC: CPT | Performed by: INTERNAL MEDICINE

## 2022-04-08 PROCEDURE — 85610 PROTHROMBIN TIME: CPT | Performed by: INTERNAL MEDICINE

## 2022-04-08 PROCEDURE — 84157 ASSAY OF PROTEIN OTHER: CPT | Performed by: INTERNAL MEDICINE

## 2022-04-08 PROCEDURE — 62328 DX LMBR SPI PNXR W/FLUOR/CT: CPT | Performed by: INTERNAL MEDICINE

## 2022-04-08 PROCEDURE — 86403 PARTICLE AGGLUT ANTBDY SCRN: CPT | Performed by: INTERNAL MEDICINE

## 2022-04-08 PROCEDURE — 87205 SMEAR GRAM STAIN: CPT | Performed by: INTERNAL MEDICINE

## 2022-04-08 PROCEDURE — 87070 CULTURE OTHR SPECIMN AEROBIC: CPT | Performed by: INTERNAL MEDICINE

## 2022-04-08 PROCEDURE — 87483 CNS DNA AMP PROBE TYPE 12-25: CPT | Performed by: INTERNAL MEDICINE

## 2022-04-08 PROCEDURE — 87798 DETECT AGENT NOS DNA AMP: CPT | Performed by: HOSPITALIST

## 2022-04-08 PROCEDURE — 86592 SYPHILIS TEST NON-TREP QUAL: CPT | Performed by: INTERNAL MEDICINE

## 2022-04-08 PROCEDURE — 84132 ASSAY OF SERUM POTASSIUM: CPT | Performed by: INTERNAL MEDICINE

## 2022-04-08 PROCEDURE — 80053 COMPREHEN METABOLIC PANEL: CPT | Performed by: INTERNAL MEDICINE

## 2022-04-08 PROCEDURE — 009U3ZX DRAINAGE OF SPINAL CANAL, PERCUTANEOUS APPROACH, DIAGNOSTIC: ICD-10-PCS | Performed by: HOSPITALIST

## 2022-04-08 PROCEDURE — 89050 BODY FLUID CELL COUNT: CPT | Performed by: INTERNAL MEDICINE

## 2022-04-08 PROCEDURE — 82945 GLUCOSE OTHER FLUID: CPT | Performed by: INTERNAL MEDICINE

## 2022-04-08 RX ORDER — AMLODIPINE BESYLATE 5 MG/1
5 TABLET ORAL DAILY
Status: DISCONTINUED | OUTPATIENT
Start: 2022-04-08 | End: 2022-04-14

## 2022-04-08 RX ORDER — MAGNESIUM SULFATE HEPTAHYDRATE 40 MG/ML
2 INJECTION, SOLUTION INTRAVENOUS ONCE
Status: COMPLETED | OUTPATIENT
Start: 2022-04-08 | End: 2022-04-08

## 2022-04-08 RX ORDER — LORAZEPAM 2 MG/ML
1 INJECTION INTRAMUSCULAR ONCE
Status: DISCONTINUED | OUTPATIENT
Start: 2022-04-08 | End: 2022-04-09

## 2022-04-08 RX ORDER — RANOLAZINE 500 MG/1
500 TABLET, EXTENDED RELEASE ORAL 2 TIMES DAILY
Status: DISCONTINUED | OUTPATIENT
Start: 2022-04-08 | End: 2022-04-10

## 2022-04-08 NOTE — PLAN OF CARE
Assumed pt care at 0730. A&Ox4. Room air, complaints of migraine pain, no complaints of shortness of breath, lung sounds clear. VS stable, NSR on tele. Pt continent of [place], insists on using [place]. Pt states she \"needs to go to the bathroom every 30 minutes\". Up with standby assist. Pt had lumbar puncture performed this morning and asked for fentanyl before going down. Pt was informed that fentanyl was not necessary for this procedure. In procedure area, staff called to inform that pt stated that she was told by the RN that she would get fentanyl pre-procedure and was refusing to have the puncture without it, RN informed x-ray staff that this was not the case and pt was informed she would not be getting fentanyl. Staff was able to calm patient down and finish procedure. Plan of care: MRA of brain/carotids, IV fluids, pain control, lumbar puncture      Plan of care updated with patient and family at bedside. Questions answered, verbalized understanding, will continue to monitor.          Problem: Patient/Family Goals  Goal: Patient/Family Long Term Goal  Description: Patient's Long Term Goal: \"go home\"    Interventions:  -Cards to see  -Labs  -Med compliance  - See additional Care Plan goals for specific interventions  Outcome: Progressing  Goal: Patient/Family Short Term Goal  Description: Patient's Short Term Goal: \"figure out what's causing chest pain\"    Interventions:   -Cards to see  -Labs   -Echo  - See additional Care Plan goals for specific interventions  Outcome: Progressing     Problem: CARDIOVASCULAR - ADULT  Goal: Maintains optimal cardiac output and hemodynamic stability  Description: INTERVENTIONS:  - Monitor vital signs, rhythm, and trends  - Monitor for bleeding, hypotension and signs of decreased cardiac output  - Evaluate effectiveness of vasoactive medications to optimize hemodynamic stability  - Monitor arterial and/or venous puncture sites for bleeding and/or hematoma  - Assess quality of pulses, skin color and temperature  - Assess for signs of decreased coronary artery perfusion - ex.  Angina  - Evaluate fluid balance, assess for edema, trend weights  Outcome: Progressing  Goal: Absence of cardiac arrhythmias or at baseline  Description: INTERVENTIONS:  - Continuous cardiac monitoring, monitor vital signs, obtain 12 lead EKG if indicated  - Evaluate effectiveness of antiarrhythmic and heart rate control medications as ordered  - Initiate emergency measures for life threatening arrhythmias  - Monitor electrolytes and administer replacement therapy as ordered  Outcome: Progressing     Problem: PAIN - ADULT  Goal: Verbalizes/displays adequate comfort level or patient's stated pain goal  Description: INTERVENTIONS:  - Encourage pt to monitor pain and request assistance  - Assess pain using appropriate pain scale  - Administer analgesics based on type and severity of pain and evaluate response  - Implement non-pharmacological measures as appropriate and evaluate response  - Consider cultural and social influences on pain and pain management  - Manage/alleviate anxiety  - Utilize distraction and/or relaxation techniques  - Monitor for opioid side effects  - Notify MD/LIP if interventions unsuccessful or patient reports new pain  - Anticipate increased pain with activity and pre-medicate as appropriate  Outcome: Progressing

## 2022-04-08 NOTE — PHYSICAL THERAPY NOTE
PT order received, chart reviewed. D/w RN and MD, will hold on PT this date. Will f/u at a later date if appropriate.

## 2022-04-08 NOTE — PLAN OF CARE
Assumed care of pt at [address]. Transport on unit to take pt down to MRI. Pt back to floor at about 2125 and when this Rn went in to check on pt and update pt on poc pt c/o nausea. Pt was given Zofran then told that hs meds would be given at 2200 once nausea had resolved. Pt and pts  requesting  be paged to request Xanax. Per pt that was the [place] med which made the pain go away completely\".  paged at 2128 and again at [phone]. Md updated and stated since pt had a scheduled dose of ativan this hs she could not have Xanax as well because they are both the same class of drug. She stated the pt could choose which one she would like and Rn should let the pt know about both drugs so pt may choose. Orders received and pt updated on mds orders. Pt decided to choose to take the ativan. Per md orders care is to be clustered and pt be allowed to sleep and  be disturbed as little as possible through out the night. Pt informed of this and VS taken when med was given. PCT updated. Pt later called twice once to see if the kitchen could be reached since they had sent the wrong [place]. Pt was scrolling on her phone at the time on TATE'S LIST. Unfortunately the kitchen was closed and pt was informed. Pt then requested butter for her muffin. Later pt needed to have her brief changed which was done. At 0330 pt called the iv pump was beeping, pt then requested pain meds which was given with a second dose of ativan. Per pt she \"felt like her head was going to blow off\". Labs were drawn and sent and VS taken. Will continue to limit interruptions to allow for sleep and monitor closely. Pt remains SB HR in the 50's.  at [facility]. Pt requesting that Rollyally Rosalinda not Round on her today.  Will inform on call md.

## 2022-04-09 ENCOUNTER — APPOINTMENT (OUTPATIENT)
Dept: MRI IMAGING | Facility: HOSPITAL | Age: 57
End: 2022-04-09
Attending: Other
Payer: COMMERCIAL

## 2022-04-09 PROBLEM — R90.89 ABNORMAL BRAIN MRI: Status: ACTIVE | Noted: 2022-04-09

## 2022-04-09 PROBLEM — I67.83 PRES (POSTERIOR REVERSIBLE ENCEPHALOPATHY SYNDROME): Status: ACTIVE | Noted: 2022-04-09

## 2022-04-09 LAB
ANION GAP SERPL CALC-SCNC: 5 MMOL/L (ref 0–18)
BUN BLD-MCNC: 20 MG/DL (ref 7–18)
CALCIUM BLD-MCNC: 7.8 MG/DL (ref 8.5–10.1)
CHLORIDE SERPL-SCNC: 109 MMOL/L (ref 98–112)
CO2 SERPL-SCNC: 29 MMOL/L (ref 21–32)
CREAT BLD-MCNC: 0.98 MG/DL
GLUCOSE BLD-MCNC: 86 MG/DL (ref 70–99)
OSMOLALITY SERPL CALC.SUM OF ELEC: 298 MOSM/KG (ref 275–295)
POTASSIUM SERPL-SCNC: 4 MMOL/L (ref 3.5–5.1)
SODIUM SERPL-SCNC: 143 MMOL/L (ref 136–145)

## 2022-04-09 PROCEDURE — 86140 C-REACTIVE PROTEIN: CPT | Performed by: HOSPITALIST

## 2022-04-09 PROCEDURE — 97161 PT EVAL LOW COMPLEX 20 MIN: CPT

## 2022-04-09 PROCEDURE — 70547 MR ANGIOGRAPHY NECK W/O DYE: CPT | Performed by: OTHER

## 2022-04-09 PROCEDURE — 97110 THERAPEUTIC EXERCISES: CPT

## 2022-04-09 PROCEDURE — 97116 GAIT TRAINING THERAPY: CPT

## 2022-04-09 PROCEDURE — 70544 MR ANGIOGRAPHY HEAD W/O DYE: CPT | Performed by: OTHER

## 2022-04-09 PROCEDURE — 80048 BASIC METABOLIC PNL TOTAL CA: CPT | Performed by: HOSPITALIST

## 2022-04-09 RX ORDER — DEXAMETHASONE SODIUM PHOSPHATE 4 MG/ML
4 VIAL (ML) INJECTION EVERY 6 HOURS
Status: DISCONTINUED | OUTPATIENT
Start: 2022-04-09 | End: 2022-04-10

## 2022-04-09 RX ORDER — DIAZEPAM 5 MG/ML
5 INJECTION, SOLUTION INTRAMUSCULAR; INTRAVENOUS ONCE
Status: COMPLETED | OUTPATIENT
Start: 2022-04-09 | End: 2022-04-09

## 2022-04-09 RX ORDER — DIAZEPAM 5 MG/ML
5 INJECTION, SOLUTION INTRAMUSCULAR; INTRAVENOUS 2 TIMES DAILY
Status: DISCONTINUED | OUTPATIENT
Start: 2022-04-09 | End: 2022-04-10

## 2022-04-09 NOTE — PLAN OF CARE
Assumed care of pt at 1930 a/o x4 in no apparent distress with IVF infusing to [facility] on Left chest without difficulty. Monitor shows sinus rhythm/jmaia with much improved BP. Headache still present regardless of med combo. She has a purewick so as to not have to get oob. Currently taking Tramadol and Fioricet for the headaches. Waiting for lumbar puncture results from 4/8. Safety precautions in place with needs attended to and  bedside. Problem: Patient/Family Goals  Goal: Patient/Family Long Term Goal  Description: Patient's Long Term Goal: \"go home\"    Interventions:  - medication compliance  - attend MD f/u appts  - See additional Care Plan goals for specific interventions  Outcome: Progressing  Goal: Patient/Family Short Term Goal  Description: Patient's Short Term Goal:     Interventions:   - monitor tele/vitals  - line care  - mobilize  - pain control  - MRA brain/carotids  - IVF  - See additional Care Plan goals for specific interventions  Outcome: Progressing     Problem: CARDIOVASCULAR - ADULT  Goal: Maintains optimal cardiac output and hemodynamic stability  Description: INTERVENTIONS:  - Monitor vital signs, rhythm, and trends  - Monitor for bleeding, hypotension and signs of decreased cardiac output  - Evaluate effectiveness of vasoactive medications to optimize hemodynamic stability  - Monitor arterial and/or venous puncture sites for bleeding and/or hematoma  - Assess quality of pulses, skin color and temperature  - Assess for signs of decreased coronary artery perfusion - ex.  Angina  - Evaluate fluid balance, assess for edema, trend weights  Outcome: Progressing  Goal: Absence of cardiac arrhythmias or at baseline  Description: INTERVENTIONS:  - Continuous cardiac monitoring, monitor vital signs, obtain 12 lead EKG if indicated  - Evaluate effectiveness of antiarrhythmic and heart rate control medications as ordered  - Initiate emergency measures for life threatening arrhythmias  - Monitor electrolytes and administer replacement therapy as ordered  Outcome: Progressing     Problem: PAIN - ADULT  Goal: Verbalizes/displays adequate comfort level or patient's stated pain goal  Description: INTERVENTIONS:  - Encourage pt to monitor pain and request assistance  - Assess pain using appropriate pain scale  - Administer analgesics based on type and severity of pain and evaluate response  - Implement non-pharmacological measures as appropriate and evaluate response  - Consider cultural and social influences on pain and pain management  - Manage/alleviate anxiety  - Utilize distraction and/or relaxation techniques  - Monitor for opioid side effects  - Notify MD/LIP if interventions unsuccessful or patient reports new pain  - Anticipate increased pain with activity and pre-medicate as appropriate  Outcome: Progressing     Problem: METABOLIC/FLUID AND ELECTROLYTES - ADULT  Goal: Hemodynamic stability and optimal renal function maintained  Description: INTERVENTIONS:  - Monitor labs and assess for signs and symptoms of volume excess or deficit  - Monitor intake, output and patient weight  - Monitor urine specific gravity, serum osmolarity and serum sodium as indicated or ordered  - Monitor response to interventions for patient's volume status, including labs, urine output, blood pressure (other measures as available)  - Encourage oral intake as appropriate  - Instruct patient on fluid and nutrition restrictions as appropriate  Outcome: Progressing

## 2022-04-09 NOTE — PLAN OF CARE
Pt denies c/o cardiac symptoms. A&Ox4. Lungs clear bilaterally with equal expansion, on room air. Pt NSR on monitor with regular rate. Abdomen soft and non-tender with active bowel sounds in all four quadrants. Semi-continent of B&B, advised pt that purewick would be removed during day and encouraged walking to either commode or bathroom. Left port with 0.9% NS at 75cc/hr. Pt c/o head pain 7/10, see MAR. Pt and family member updated with plan of care. Problem: Patient/Family Goals  Goal: Patient/Family Long Term Goal  Description: Patient's Long Term Goal: \"go home\"    Interventions:  -Cards to see  -Labs  -Med compliance  - LP  - MRI brain/carotids  - See additional Care Plan goals for specific interventions  Outcome: Progressing  Goal: Patient/Family Short Term Goal  Description: Patient's Short Term Goal: \"figure out what's causing chest pain\"    Interventions:   -Cards to see  -Labs   -Echo  - See additional Care Plan goals for specific interventions  Outcome: Progressing     Problem: CARDIOVASCULAR - ADULT  Goal: Maintains optimal cardiac output and hemodynamic stability  Description: INTERVENTIONS:  - Monitor vital signs, rhythm, and trends  - Monitor for bleeding, hypotension and signs of decreased cardiac output  - Evaluate effectiveness of vasoactive medications to optimize hemodynamic stability  - Monitor arterial and/or venous puncture sites for bleeding and/or hematoma  - Assess quality of pulses, skin color and temperature  - Assess for signs of decreased coronary artery perfusion - ex.  Angina  - Evaluate fluid balance, assess for edema, trend weights  Outcome: Progressing  Goal: Absence of cardiac arrhythmias or at baseline  Description: INTERVENTIONS:  - Continuous cardiac monitoring, monitor vital signs, obtain 12 lead EKG if indicated  - Evaluate effectiveness of antiarrhythmic and heart rate control medications as ordered  - Initiate emergency measures for life threatening arrhythmias  - Monitor electrolytes and administer replacement therapy as ordered  Outcome: Progressing     Problem: PAIN - ADULT  Goal: Verbalizes/displays adequate comfort level or patient's stated pain goal  Description: INTERVENTIONS:  - Encourage pt to monitor pain and request assistance  - Assess pain using appropriate pain scale  - Administer analgesics based on type and severity of pain and evaluate response  - Implement non-pharmacological measures as appropriate and evaluate response  - Consider cultural and social influences on pain and pain management  - Manage/alleviate anxiety  - Utilize distraction and/or relaxation techniques  - Monitor for opioid side effects  - Notify MD/LIP if interventions unsuccessful or patient reports new pain  - Anticipate increased pain with activity and pre-medicate as appropriate  Outcome: Progressing     Problem: METABOLIC/FLUID AND ELECTROLYTES - ADULT  Goal: Hemodynamic stability and optimal renal function maintained  Description: INTERVENTIONS:  - Monitor labs and assess for signs and symptoms of volume excess or deficit  - Monitor intake, output and patient weight  - Monitor urine specific gravity, serum osmolarity and serum sodium as indicated or ordered  - Monitor response to interventions for patient's volume status, including labs, urine output, blood pressure (other measures as available)  - Encourage oral intake as appropriate  - Instruct patient on fluid and nutrition restrictions as appropriate  Outcome: Progressing

## 2022-04-10 PROBLEM — M54.2 CERVICALGIA: Status: ACTIVE | Noted: 2022-04-10

## 2022-04-10 LAB
ATRIAL RATE: 50 BPM
CRYPTOCOCCUS NEOFORMANS GATTII BY PCR: NOT DETECTED
CYTOMEGALVIRUS BY PCR: NOT DETECTED
ENTEROVIRUS BY PCR: NOT DETECTED
ESCHERICHIA COLI K1 BY PCR: NOT DETECTED
HAEMOPHILUS INFLUENZAE BY PCR: NOT DETECTED
HERPES SIMPLEX VIRUS 1 BY PCR: NOT DETECTED
HERPES SIMPLEX VIRUS 2 BY PCR: NOT DETECTED
HUMAN HERPESVIRUS 6 BY PCR: NOT DETECTED
HUMAN PARECHOVIRUS BY PCR: NOT DETECTED
LISTERIA MONOCYTOGENES BY PCR: NOT DETECTED
NEISSERIA MENINGITIDIS BY PCR: NOT DETECTED
P AXIS: 66 DEGREES
P-R INTERVAL: 120 MS
Q-T INTERVAL: 516 MS
QRS DURATION: 88 MS
QTC CALCULATION (BEZET): 470 MS
R AXIS: 2 DEGREES
STREPTOCOCCUS AGALACTIAE BY PCR: NOT DETECTED
STREPTOCOCCUS PNEUMONIAE BY PCR: NOT DETECTED
T AXIS: 262 DEGREES
VARICELLA ZOSTER VIRUS BY PCR: NOT DETECTED
VENTRICULAR RATE: 50 BPM

## 2022-04-10 PROCEDURE — 93005 ELECTROCARDIOGRAM TRACING: CPT

## 2022-04-10 PROCEDURE — 93010 ELECTROCARDIOGRAM REPORT: CPT | Performed by: INTERNAL MEDICINE

## 2022-04-10 RX ORDER — TIZANIDINE 2 MG/1
2 TABLET ORAL 3 TIMES DAILY
Status: DISCONTINUED | OUTPATIENT
Start: 2022-04-10 | End: 2022-04-13

## 2022-04-10 RX ORDER — DIAZEPAM 2 MG/1
7.5 TABLET ORAL ONCE
Status: COMPLETED | OUTPATIENT
Start: 2022-04-10 | End: 2022-04-11

## 2022-04-10 RX ORDER — DIAZEPAM 2 MG/1
5 TABLET ORAL EVERY 12 HOURS PRN
Status: DISCONTINUED | OUTPATIENT
Start: 2022-04-10 | End: 2022-04-14

## 2022-04-10 NOTE — PLAN OF CARE
Pt received at 0730 resting in bed. A&Ox4. On room air saturating 90's. Sinus rhythm on the monitor. IV fluids infusing as ordered. Continent of bowel and bladder, requesting purewick due to difficulty ambulating related to headache pain. Encouraged to ambulate to bathroom as able during the day. Scheduled and PRN pain medications given for migraine headache. Photosensivity and audiosensitivity, room kept dark and quiet as able. Up with 1 assist and walker. Bed alarm on for safety.  at bedside. Oriented to room and call light. Updated on plan of care.       Problem: PAIN - ADULT  Goal: Verbalizes/displays adequate comfort level or patient's stated pain goal  Description: INTERVENTIONS:  - Encourage pt to monitor pain and request assistance  - Assess pain using appropriate pain scale  - Administer analgesics based on type and severity of pain and evaluate response  - Implement non-pharmacological measures as appropriate and evaluate response  - Consider cultural and social influences on pain and pain management  - Manage/alleviate anxiety  - Utilize distraction and/or relaxation techniques  - Monitor for opioid side effects  - Notify MD/LIP if interventions unsuccessful or patient reports new pain  - Anticipate increased pain with activity and pre-medicate as appropriate  Outcome: Progressing     Problem: Patient/Family Goals  Goal: Patient/Family Long Term Goal  Description: Patient's Long Term Goal: \"go home\"    Interventions:  -Cards to see  -Labs  -Med compliance  - LP  - MRI brain/carotids  - See additional Care Plan goals for specific interventions  Outcome: Progressing  Goal: Patient/Family Short Term Goal  Description: Patient's Short Term Goal: \"figure out what's causing chest pain\"    Interventions:   -Cards to see  -Labs   -Echo  - See additional Care Plan goals for specific interventions  Outcome: Progressing

## 2022-04-10 NOTE — PLAN OF CARE
Assumed care of pt at 1930 a/o x4 with spouse at bedside. Monitor shows sinus rhythm with stable BP. She was started on decadron IVP and Valium. She stated the valium is whats helping her pain. She is currently eating a large snack stating she's been increasingly hungry today - as decadron has been given. Pt denies nausea but spouse asked for her to receive Zofran to avoid nausea with \"all the meds in her system\". No vomiting and has had BM today. Port-a-cath drsg c/d/I and has IVF infusing through it. Blood return noted. Spouse stated he thinks she needs several more days as an inpt. Safety precautions in place and needs attended to. Problem: Patient/Family Goals  Goal: Patient/Family Long Term Goal  Description: Patient's Long Term Goal: \"go home\"    Interventions:  - follow up with MD's at [facility]  - takes meds as prescribed  - See additional Care Plan goals for specific interventions  Outcome: Progressing  Goal: Patient/Family Short Term Goal  Description: Patient's Short Term Goal: control headache    Interventions:   - monitor tele/vitals  - pain control  - IVF  - Port (line) care  - mobilize  - See additional Care Plan goals for specific interventions  Outcome: Progressing     Problem: CARDIOVASCULAR - ADULT  Goal: Maintains optimal cardiac output and hemodynamic stability  Description: INTERVENTIONS:  - Monitor vital signs, rhythm, and trends  - Monitor for bleeding, hypotension and signs of decreased cardiac output  - Evaluate effectiveness of vasoactive medications to optimize hemodynamic stability  - Monitor arterial and/or venous puncture sites for bleeding and/or hematoma  - Assess quality of pulses, skin color and temperature  - Assess for signs of decreased coronary artery perfusion - ex.  Angina  - Evaluate fluid balance, assess for edema, trend weights  Outcome: Progressing  Goal: Absence of cardiac arrhythmias or at baseline  Description: INTERVENTIONS:  - Continuous cardiac monitoring, monitor vital signs, obtain 12 lead EKG if indicated  - Evaluate effectiveness of antiarrhythmic and heart rate control medications as ordered  - Initiate emergency measures for life threatening arrhythmias  - Monitor electrolytes and administer replacement therapy as ordered  Outcome: Progressing     Problem: PAIN - ADULT  Goal: Verbalizes/displays adequate comfort level or patient's stated pain goal  Description: INTERVENTIONS:  - Encourage pt to monitor pain and request assistance  - Assess pain using appropriate pain scale  - Administer analgesics based on type and severity of pain and evaluate response  - Implement non-pharmacological measures as appropriate and evaluate response  - Consider cultural and social influences on pain and pain management  - Manage/alleviate anxiety  - Utilize distraction and/or relaxation techniques  - Monitor for opioid side effects  - Notify MD/LIP if interventions unsuccessful or patient reports new pain  - Anticipate increased pain with activity and pre-medicate as appropriate  Outcome: Progressing     Problem: METABOLIC/FLUID AND ELECTROLYTES - ADULT  Goal: Hemodynamic stability and optimal renal function maintained  Description: INTERVENTIONS:  - Monitor labs and assess for signs and symptoms of volume excess or deficit  - Monitor intake, output and patient weight  - Monitor urine specific gravity, serum osmolarity and serum sodium as indicated or ordered  - Monitor response to interventions for patient's volume status, including labs, urine output, blood pressure (other measures as available)  - Encourage oral intake as appropriate  - Instruct patient on fluid and nutrition restrictions as appropriate  Outcome: Progressing

## 2022-04-11 ENCOUNTER — APPOINTMENT (OUTPATIENT)
Dept: MRI IMAGING | Facility: HOSPITAL | Age: 57
End: 2022-04-11
Attending: HOSPITALIST
Payer: COMMERCIAL

## 2022-04-11 ENCOUNTER — APPOINTMENT (OUTPATIENT)
Dept: MRI IMAGING | Facility: HOSPITAL | Age: 57
End: 2022-04-11
Attending: Other
Payer: COMMERCIAL

## 2022-04-11 LAB
CMV QUANT BY PCR, CPY/ML: <390 CPY/ML
CMV QUANT BY PCR, INTERP: NOT DETECTED
CMV QUANT BY PCR, IU/ML: <227 IU/ML
CMV QUANT BY PCR, LOG CPY/ML: <2.6 LOG CPY/ML
CMV QUANT BY PCR, LOG IU/ML: <2.4 LOG IU/ML
CRP SERPL-MCNC: 1.65 MG/DL (ref ?–0.3)
ERYTHROCYTE [SEDIMENTATION RATE] IN BLOOD: 10 MM/HR
T.PALLIDUM (VDRL) CSF REFLEX: NON REACTIVE

## 2022-04-11 PROCEDURE — 85652 RBC SED RATE AUTOMATED: CPT | Performed by: HOSPITALIST

## 2022-04-11 PROCEDURE — 72141 MRI NECK SPINE W/O DYE: CPT | Performed by: OTHER

## 2022-04-11 PROCEDURE — 72146 MRI CHEST SPINE W/O DYE: CPT | Performed by: HOSPITALIST

## 2022-04-11 RX ORDER — TIZANIDINE 2 MG/1
2 TABLET ORAL ONCE
Status: COMPLETED | OUTPATIENT
Start: 2022-04-11 | End: 2022-04-12

## 2022-04-11 NOTE — HOME CARE LIAISON
Received referral via [employer] for Home Health services. Spoke w/ patient's  Patricia Reed and provided with list of [facility] providers from [address]. Patient has a history with Garry Delgado and another [facility] agency.  wants to discuss it further with patient and will contact this Home care Liaison or SW with patient choice. Agency contact information given to Patricia Reed. Financial interest disclosure provided to patient.  Notified Severiano Moder

## 2022-04-11 NOTE — PLAN OF CARE
Pt received at 0730 resting in bed. A&Ox4. On room air saturating 90's. Sinus rhythm on the monitor. Reports headache rated 8/10. Ice packs given per request. MRI neck/spine today. Declines fioricet, tramadol as they do not help her pain. Valium per PRN order for neck pain. Oriented to room and call light.  at bedside. Updated on plan of care.       Problem: Patient/Family Goals  Goal: Patient/Family Long Term Goal  Description: Patient's Long Term Goal: \"go home\"    Interventions:  -Cards to see  -Labs  -Med compliance  - LP  - MRI brain/carotids  - See additional Care Plan goals for specific interventions  Outcome: Progressing  Goal: Patient/Family Short Term Goal  Description: Patient's Short Term Goal: \"figure out what's causing chest pain\"    Interventions:   -Cards to see  -Labs   -Echo  - See additional Care Plan goals for specific interventions  Outcome: Progressing

## 2022-04-11 NOTE — CM/SW NOTE
Received order for PHQ4 and PT recommending home with home health. Patient out of her room for MRI when SW attempted to met with her. Will try again later today. Patient lives in a house with her . Referrals for [facility] sent in [address]. Patient has hx with Erica Berry in 2021. Awaiting accepting HH. SW will remain available.

## 2022-04-11 NOTE — PLAN OF CARE
Assumed care of pt at [address] [facility]. A/ox4, rm air, SR on tele. Continues to report headache pain - will give PRN valium when due tonight. Pt reports photosensitivity and audiosensitivity. Purewick in place - pt reports pain when ambulating. IVF infusing through left chest port. Up x 1 assist and walker while ambulating.  at bedside. Pt also requested protonix dose to be changed to 1000. Discussed POC w/ pt and  at bedside. Will continue to monitor. 2315: paged on call neurologist regarding pt requesting 7.5mg valium instead of 5mg that is ordered PRN - 1x dose added of 7.5mg valium    Problem: Patient/Family Goals  Goal: Patient/Family Long Term Goal  Description: Patient's Long Term Goal: \"go home\"    Interventions:  -Cards to see  -Labs  -Med compliance  - LP  - MRI brain/carotids  - See additional Care Plan goals for specific interventions  4/10/2022 2231 by Olivia Freitas RN  Outcome: Progressing  4/10/2022 2231 by Olivia Freitas RN  Outcome: Progressing  Goal: Patient/Family Short Term Goal  Description: Patient's Short Term Goal: \"figure out what's causing chest pain\"    Interventions:   -Cards to see  -Labs   -Echo  - See additional Care Plan goals for specific interventions  4/10/2022 2231 by Olivia Freitas RN  Outcome: Progressing  4/10/2022 2231 by Olivia Freitas RN  Outcome: Progressing     Problem: CARDIOVASCULAR - ADULT  Goal: Maintains optimal cardiac output and hemodynamic stability  Description: INTERVENTIONS:  - Monitor vital signs, rhythm, and trends  - Monitor for bleeding, hypotension and signs of decreased cardiac output  - Evaluate effectiveness of vasoactive medications to optimize hemodynamic stability  - Monitor arterial and/or venous puncture sites for bleeding and/or hematoma  - Assess quality of pulses, skin color and temperature  - Assess for signs of decreased coronary artery perfusion - ex.  Angina  - Evaluate fluid balance, assess for edema, trend weights  4/10/2022 2231 by Darwin Cook RN  Outcome: Progressing  4/10/2022 2231 by Darwin Cook RN  Outcome: Progressing  Goal: Absence of cardiac arrhythmias or at baseline  Description: INTERVENTIONS:  - Continuous cardiac monitoring, monitor vital signs, obtain 12 lead EKG if indicated  - Evaluate effectiveness of antiarrhythmic and heart rate control medications as ordered  - Initiate emergency measures for life threatening arrhythmias  - Monitor electrolytes and administer replacement therapy as ordered  4/10/2022 2231 by Darwin Cook RN  Outcome: Progressing  4/10/2022 2231 by Darwin Cook RN  Outcome: Progressing     Problem: PAIN - ADULT  Goal: Verbalizes/displays adequate comfort level or patient's stated pain goal  Description: INTERVENTIONS:  - Encourage pt to monitor pain and request assistance  - Assess pain using appropriate pain scale  - Administer analgesics based on type and severity of pain and evaluate response  - Implement non-pharmacological measures as appropriate and evaluate response  - Consider cultural and social influences on pain and pain management  - Manage/alleviate anxiety  - Utilize distraction and/or relaxation techniques  - Monitor for opioid side effects  - Notify MD/LIP if interventions unsuccessful or patient reports new pain  - Anticipate increased pain with activity and pre-medicate as appropriate  4/10/2022 2231 by Darwin Cook RN  Outcome: Progressing  4/10/2022 2231 by Darwin Cook RN  Outcome: Progressing     Problem: METABOLIC/FLUID AND ELECTROLYTES - ADULT  Goal: Hemodynamic stability and optimal renal function maintained  Description: INTERVENTIONS:  - Monitor labs and assess for signs and symptoms of volume excess or deficit  - Monitor intake, output and patient weight  - Monitor urine specific gravity, serum osmolarity and serum sodium as indicated or ordered  - Monitor response to interventions for patient's volume status, including labs, urine output, blood pressure (other measures as available)  - Encourage oral intake as appropriate  - Instruct patient on fluid and nutrition restrictions as appropriate  4/10/2022 2231 by Jose G Alvarez RN  Outcome: Progressing  4/10/2022 2231 by Jose G Alvarez RN  Outcome: Progressing

## 2022-04-11 NOTE — PHYSICAL THERAPY NOTE
Attempted to see pt for PT, pt just returned from MRI and feeling nauseated per RN. Re-attempted in PM. Pt continues to feel nauseated,  requested to let her sleep. Will follow up at a later date.

## 2022-04-12 LAB
ALBUMIN SERPL-MCNC: 2.7 G/DL (ref 3.4–5)
ALBUMIN/GLOB SERPL: 1.1 {RATIO} (ref 1–2)
ALP LIVER SERPL-CCNC: 53 U/L
ALT SERPL-CCNC: 27 U/L
ANION GAP SERPL CALC-SCNC: 5 MMOL/L (ref 0–18)
AST SERPL-CCNC: 13 U/L (ref 15–37)
BASOPHILS # BLD AUTO: 0.03 X10(3) UL (ref 0–0.2)
BASOPHILS NFR BLD AUTO: 0.8 %
BILIRUB SERPL-MCNC: 0.3 MG/DL (ref 0.1–2)
BUN BLD-MCNC: 21 MG/DL (ref 7–18)
C3 SERPL-MCNC: 107 MG/DL (ref 90–180)
C4 SERPL-MCNC: 18.8 MG/DL (ref 10–40)
CALCIUM BLD-MCNC: 7.6 MG/DL (ref 8.5–10.1)
CHLORIDE SERPL-SCNC: 110 MMOL/L (ref 98–112)
CO2 SERPL-SCNC: 28 MMOL/L (ref 21–32)
CREAT BLD-MCNC: 0.83 MG/DL
DEPRECATED HBV CORE AB SER IA-ACNC: 234.6 NG/ML
DEPRECATED HBV CORE AB SER IA-ACNC: 235.7 NG/ML
EOSINOPHIL # BLD AUTO: 0.09 X10(3) UL (ref 0–0.7)
EOSINOPHIL NFR BLD AUTO: 2.3 %
ERYTHROCYTE [DISTWIDTH] IN BLOOD BY AUTOMATED COUNT: 13.5 %
GLOBULIN PLAS-MCNC: 2.4 G/DL (ref 2.8–4.4)
GLUCOSE BLD-MCNC: 93 MG/DL (ref 70–99)
HCT VFR BLD AUTO: 30.6 %
HGB BLD-MCNC: 10.1 G/DL
IMM GRANULOCYTES # BLD AUTO: 0.02 X10(3) UL (ref 0–1)
IMM GRANULOCYTES NFR BLD: 0.5 %
IRON SATN MFR SERPL: 24 %
IRON SERPL-MCNC: 71 UG/DL
LYMPHOCYTES # BLD AUTO: 1.37 X10(3) UL (ref 1–4)
LYMPHOCYTES NFR BLD AUTO: 35.2 %
MCH RBC QN AUTO: 30.3 PG (ref 26–34)
MCHC RBC AUTO-ENTMCNC: 33 G/DL (ref 31–37)
MCV RBC AUTO: 91.9 FL
MONOCYTES # BLD AUTO: 0.35 X10(3) UL (ref 0.1–1)
MONOCYTES NFR BLD AUTO: 9 %
NEUTROPHILS # BLD AUTO: 2.03 X10 (3) UL (ref 1.5–7.7)
NEUTROPHILS # BLD AUTO: 2.03 X10(3) UL (ref 1.5–7.7)
NEUTROPHILS NFR BLD AUTO: 52.2 %
OSMOLALITY SERPL CALC.SUM OF ELEC: 299 MOSM/KG (ref 275–295)
PLATELET # BLD AUTO: 127 10(3)UL (ref 150–450)
POTASSIUM SERPL-SCNC: 3.6 MMOL/L (ref 3.5–5.1)
PROT SERPL-MCNC: 5.1 G/DL (ref 6.4–8.2)
PTH-INTACT SERPL-MCNC: 99.7 PG/ML (ref 18.5–88)
RBC # BLD AUTO: 3.33 X10(6)UL
RHEUMATOID FACT SERPL-ACNC: <10 IU/ML (ref ?–15)
SODIUM SERPL-SCNC: 143 MMOL/L (ref 136–145)
TIBC SERPL-MCNC: 301 UG/DL (ref 240–450)
TRANSFERRIN SERPL-MCNC: 202 MG/DL (ref 200–360)
URATE SERPL-MCNC: 4.5 MG/DL
VIT B12 SERPL-MCNC: 968 PG/ML (ref 193–986)
VIT D+METAB SERPL-MCNC: 26.4 NG/ML (ref 30–100)
WBC # BLD AUTO: 3.9 X10(3) UL (ref 4–11)

## 2022-04-12 PROCEDURE — 86146 BETA-2 GLYCOPROTEIN ANTIBODY: CPT | Performed by: INTERNAL MEDICINE

## 2022-04-12 PROCEDURE — 86147 CARDIOLIPIN ANTIBODY EA IG: CPT | Performed by: INTERNAL MEDICINE

## 2022-04-12 PROCEDURE — 84550 ASSAY OF BLOOD/URIC ACID: CPT | Performed by: INTERNAL MEDICINE

## 2022-04-12 PROCEDURE — 82306 VITAMIN D 25 HYDROXY: CPT | Performed by: INTERNAL MEDICINE

## 2022-04-12 PROCEDURE — 86038 ANTINUCLEAR ANTIBODIES: CPT | Performed by: INTERNAL MEDICINE

## 2022-04-12 PROCEDURE — 80053 COMPREHEN METABOLIC PANEL: CPT | Performed by: INTERNAL MEDICINE

## 2022-04-12 PROCEDURE — 82728 ASSAY OF FERRITIN: CPT | Performed by: INTERNAL MEDICINE

## 2022-04-12 PROCEDURE — 86431 RHEUMATOID FACTOR QUANT: CPT | Performed by: INTERNAL MEDICINE

## 2022-04-12 PROCEDURE — 86256 FLUORESCENT ANTIBODY TITER: CPT | Performed by: INTERNAL MEDICINE

## 2022-04-12 PROCEDURE — 83540 ASSAY OF IRON: CPT | Performed by: HOSPITALIST

## 2022-04-12 PROCEDURE — 86200 CCP ANTIBODY: CPT | Performed by: INTERNAL MEDICINE

## 2022-04-12 PROCEDURE — 83550 IRON BINDING TEST: CPT | Performed by: HOSPITALIST

## 2022-04-12 PROCEDURE — 82607 VITAMIN B-12: CPT | Performed by: INTERNAL MEDICINE

## 2022-04-12 PROCEDURE — 86160 COMPLEMENT ANTIGEN: CPT | Performed by: INTERNAL MEDICINE

## 2022-04-12 PROCEDURE — 83970 ASSAY OF PARATHORMONE: CPT | Performed by: INTERNAL MEDICINE

## 2022-04-12 PROCEDURE — 85025 COMPLETE CBC W/AUTO DIFF WBC: CPT | Performed by: INTERNAL MEDICINE

## 2022-04-12 PROCEDURE — 82728 ASSAY OF FERRITIN: CPT | Performed by: HOSPITALIST

## 2022-04-12 RX ORDER — TIZANIDINE 4 MG/1
4 TABLET ORAL ONCE
Status: COMPLETED | OUTPATIENT
Start: 2022-04-12 | End: 2022-04-12

## 2022-04-12 RX ORDER — TIZANIDINE 2 MG/1
2 TABLET ORAL ONCE AS NEEDED
Status: COMPLETED | OUTPATIENT
Start: 2022-04-12 | End: 2022-04-12

## 2022-04-12 NOTE — PLAN OF CARE
Pt denies c/o cardiac symptoms. A&Ox4. Lungs clear bilaterally with equal expansion, on room air. Pt NSR/SB on monitor with regular rate. Abdomen soft and non-tender with active bowel sounds in all four quadrants. Continent of B&B. Left port with 0.9% NS at 75cc/hr. Pt asking to shower and walk to bathroom. Pt and family member updated with plan of care. Problem: Patient/Family Goals  Goal: Patient/Family Long Term Goal  Description: Patient's Long Term Goal: \"go home\"  \"Go home\" 4/12/2022    Interventions:  -Cards to see  -Labs  -Med compliance  - LP  - MRI brain/carotids  - See additional Care Plan goals for specific interventions  Outcome: Progressing  Goal: Patient/Family Short Term Goal  Description: Patient's Short Term Goal: \"figure out what's causing chest pain\"  \"no more headache\" 4/12/2022    Interventions:   -Cards to see  -Labs   -Echo  - See additional Care Plan goals for specific interventions  Outcome: Progressing     Problem: CARDIOVASCULAR - ADULT  Goal: Maintains optimal cardiac output and hemodynamic stability  Description: INTERVENTIONS:  - Monitor vital signs, rhythm, and trends  - Monitor for bleeding, hypotension and signs of decreased cardiac output  - Evaluate effectiveness of vasoactive medications to optimize hemodynamic stability  - Monitor arterial and/or venous puncture sites for bleeding and/or hematoma  - Assess quality of pulses, skin color and temperature  - Assess for signs of decreased coronary artery perfusion - ex.  Angina  - Evaluate fluid balance, assess for edema, trend weights  Outcome: Progressing  Goal: Absence of cardiac arrhythmias or at baseline  Description: INTERVENTIONS:  - Continuous cardiac monitoring, monitor vital signs, obtain 12 lead EKG if indicated  - Evaluate effectiveness of antiarrhythmic and heart rate control medications as ordered  - Initiate emergency measures for life threatening arrhythmias  - Monitor electrolytes and administer replacement therapy as ordered  Outcome: Progressing     Problem: PAIN - ADULT  Goal: Verbalizes/displays adequate comfort level or patient's stated pain goal  Description: INTERVENTIONS:  - Encourage pt to monitor pain and request assistance  - Assess pain using appropriate pain scale  - Administer analgesics based on type and severity of pain and evaluate response  - Implement non-pharmacological measures as appropriate and evaluate response  - Consider cultural and social influences on pain and pain management  - Manage/alleviate anxiety  - Utilize distraction and/or relaxation techniques  - Monitor for opioid side effects  - Notify MD/LIP if interventions unsuccessful or patient reports new pain  - Anticipate increased pain with activity and pre-medicate as appropriate  Outcome: Progressing     Problem: METABOLIC/FLUID AND ELECTROLYTES - ADULT  Goal: Hemodynamic stability and optimal renal function maintained  Description: INTERVENTIONS:  - Monitor labs and assess for signs and symptoms of volume excess or deficit  - Monitor intake, output and patient weight  - Monitor urine specific gravity, serum osmolarity and serum sodium as indicated or ordered  - Monitor response to interventions for patient's volume status, including labs, urine output, blood pressure (other measures as available)  - Encourage oral intake as appropriate  - Instruct patient on fluid and nutrition restrictions as appropriate  Outcome: Progressing

## 2022-04-12 NOTE — BH PROGRESS NOTE
Went to see the patient for the agueda assessment. The patient was explained the reason for the assessment: possible anxiety. Patient and  in the room. The patient declined the assessment stating, she is anxious due to the head pain/headache. She said, anyone would have the pain if they are not getting the right medication to decrease it. The  stated the patient has been here for 11 days and nothing seems to be working for her. He said, he is aware she has medical issues and certain drugs she cant take. The  said he felt yesterday that the doctors are finally listening to them because they are doing things today that they asked for.     The patients nurse is aware

## 2022-04-12 NOTE — PLAN OF CARE
Assumed care of pt at [facility]. A/ox4, rm air, SR on tele. Continues to report headache pain - PRN valium administered tonight. Pt reports photosensitivity and audiosensitivity. Purewick in place - pt reports pain when ambulating. IVF infusing through left chest port. Up x 1 assist and walker while ambulating.  at bedside. Pt also requested protonix dose to be changed to 1000. Discussed POC w/ pt and  at bedside. Will continue to monitor. Problem: Patient/Family Goals  Goal: Patient/Family Long Term Goal  Description: Patient's Long Term Goal: \"go home\"    Interventions:  -Cards to see  -Labs  -Med compliance  - LP  - MRI brain/carotids  - See additional Care Plan goals for specific interventions  Outcome: Progressing  Goal: Patient/Family Short Term Goal  Description: Patient's Short Term Goal: \"figure out what's causing chest pain\"    Interventions:   -Cards to see  -Labs   -Echo  - See additional Care Plan goals for specific interventions  Outcome: Progressing     Problem: CARDIOVASCULAR - ADULT  Goal: Maintains optimal cardiac output and hemodynamic stability  Description: INTERVENTIONS:  - Monitor vital signs, rhythm, and trends  - Monitor for bleeding, hypotension and signs of decreased cardiac output  - Evaluate effectiveness of vasoactive medications to optimize hemodynamic stability  - Monitor arterial and/or venous puncture sites for bleeding and/or hematoma  - Assess quality of pulses, skin color and temperature  - Assess for signs of decreased coronary artery perfusion - ex.  Angina  - Evaluate fluid balance, assess for edema, trend weights  Outcome: Progressing  Goal: Absence of cardiac arrhythmias or at baseline  Description: INTERVENTIONS:  - Continuous cardiac monitoring, monitor vital signs, obtain 12 lead EKG if indicated  - Evaluate effectiveness of antiarrhythmic and heart rate control medications as ordered  - Initiate emergency measures for life threatening arrhythmias  - Monitor electrolytes and administer replacement therapy as ordered  Outcome: Progressing     Problem: PAIN - ADULT  Goal: Verbalizes/displays adequate comfort level or patient's stated pain goal  Description: INTERVENTIONS:  - Encourage pt to monitor pain and request assistance  - Assess pain using appropriate pain scale  - Administer analgesics based on type and severity of pain and evaluate response  - Implement non-pharmacological measures as appropriate and evaluate response  - Consider cultural and social influences on pain and pain management  - Manage/alleviate anxiety  - Utilize distraction and/or relaxation techniques  - Monitor for opioid side effects  - Notify MD/LIP if interventions unsuccessful or patient reports new pain  - Anticipate increased pain with activity and pre-medicate as appropriate  Outcome: Progressing     Problem: METABOLIC/FLUID AND ELECTROLYTES - ADULT  Goal: Hemodynamic stability and optimal renal function maintained  Description: INTERVENTIONS:  - Monitor labs and assess for signs and symptoms of volume excess or deficit  - Monitor intake, output and patient weight  - Monitor urine specific gravity, serum osmolarity and serum sodium as indicated or ordered  - Monitor response to interventions for patient's volume status, including labs, urine output, blood pressure (other measures as available)  - Encourage oral intake as appropriate  - Instruct patient on fluid and nutrition restrictions as appropriate  Outcome: Progressing

## 2022-04-13 ENCOUNTER — APPOINTMENT (OUTPATIENT)
Dept: MRI IMAGING | Facility: HOSPITAL | Age: 57
End: 2022-04-13
Attending: Other
Payer: COMMERCIAL

## 2022-04-13 PROCEDURE — A9575 INJ GADOTERATE MEGLUMI 0.1ML: HCPCS | Performed by: HOSPITALIST

## 2022-04-13 PROCEDURE — 70553 MRI BRAIN STEM W/O & W/DYE: CPT | Performed by: OTHER

## 2022-04-13 RX ORDER — TIZANIDINE 4 MG/1
4 TABLET ORAL 3 TIMES DAILY
Status: DISCONTINUED | OUTPATIENT
Start: 2022-04-13 | End: 2022-04-14

## 2022-04-13 RX ORDER — TIZANIDINE 2 MG/1
2 TABLET ORAL ONCE
Status: COMPLETED | OUTPATIENT
Start: 2022-04-13 | End: 2022-04-13

## 2022-04-13 NOTE — PLAN OF CARE
Pt is A&Ox4.  at bedside. Increased tizanidine dose tonight per rheumatology to 6mg, and additional 2mg given at 2330 - patient wide awake and c/o increased headache. RA, lungs clear, c/o ALVAREZ. NSR on tele HR 70s. C/o dizziness 8/10 while ambulating. Photophobia continues. Purewick in place, also ambulating to restroom x1 walker. L PORT Unit draw, no complications. CHG bath completed. 0130: patient had not slept despite extra dose of tizanidine (total of 8mg tonight). Prn valium given. 7274: vitals taken - patient sleeping     Hospitalist paged regarding patient request for hemorrhoid cream.     POC updated with patient and , they verbalized understanding. Problem: Patient/Family Goals  Goal: Patient/Family Long Term Goal  Description: Patient's Long Term Goal: \"go home\"  \"Go home\" 4/12/2022    Interventions:  -Cards to see  -Labs  -Med compliance  - LP  - MRI brain/carotids  - See additional Care Plan goals for specific interventions  Outcome: Progressing  Goal: Patient/Family Short Term Goal  Description: Patient's Short Term Goal: \"figure out what's causing chest pain\"  \"no more headache\" 4/12/2022    Interventions:   -Cards to see  -Labs   -Echo  - See additional Care Plan goals for specific interventions  Outcome: Progressing     Problem: CARDIOVASCULAR - ADULT  Goal: Maintains optimal cardiac output and hemodynamic stability  Description: INTERVENTIONS:  - Monitor vital signs, rhythm, and trends  - Monitor for bleeding, hypotension and signs of decreased cardiac output  - Evaluate effectiveness of vasoactive medications to optimize hemodynamic stability  - Monitor arterial and/or venous puncture sites for bleeding and/or hematoma  - Assess quality of pulses, skin color and temperature  - Assess for signs of decreased coronary artery perfusion - ex.  Angina  - Evaluate fluid balance, assess for edema, trend weights  Outcome: Progressing  Goal: Absence of cardiac arrhythmias or at baseline  Description: INTERVENTIONS:  - Continuous cardiac monitoring, monitor vital signs, obtain 12 lead EKG if indicated  - Evaluate effectiveness of antiarrhythmic and heart rate control medications as ordered  - Initiate emergency measures for life threatening arrhythmias  - Monitor electrolytes and administer replacement therapy as ordered  Outcome: Progressing     Problem: METABOLIC/FLUID AND ELECTROLYTES - ADULT  Goal: Hemodynamic stability and optimal renal function maintained  Description: INTERVENTIONS:  - Monitor labs and assess for signs and symptoms of volume excess or deficit  - Monitor intake, output and patient weight  - Monitor urine specific gravity, serum osmolarity and serum sodium as indicated or ordered  - Monitor response to interventions for patient's volume status, including labs, urine output, blood pressure (other measures as available)  - Encourage oral intake as appropriate  - Instruct patient on fluid and nutrition restrictions as appropriate  Outcome: Progressing     Problem: PAIN - ADULT  Goal: Verbalizes/displays adequate comfort level or patient's stated pain goal  Description: INTERVENTIONS:  - Encourage pt to monitor pain and request assistance  - Assess pain using appropriate pain scale  - Administer analgesics based on type and severity of pain and evaluate response  - Implement non-pharmacological measures as appropriate and evaluate response  - Consider cultural and social influences on pain and pain management  - Manage/alleviate anxiety  - Utilize distraction and/or relaxation techniques  - Monitor for opioid side effects  - Notify MD/LIP if interventions unsuccessful or patient reports new pain  - Anticipate increased pain with activity and pre-medicate as appropriate  Outcome: Progressing

## 2022-04-13 NOTE — PLAN OF CARE
Pt denies c/o cardiac symptoms. A&Ox4. Lungs clear bilaterally with equal expansion, on room air. Pt NSR/SB on monitor with regular rate. Abdomen soft and non-tender with active bowel sounds in all four quadrants. Continent of B&B. Blisters to left groin and lower abdomen, mepilex applied. Left Port with 0.9% NS at 75cc/hr. Pt and family member updated with plan of care. Problem: Patient/Family Goals  Goal: Patient/Family Long Term Goal  Description: Patient's Long Term Goal: \"go home\"  \"Go home\" 4/12/2022    Interventions:  -Cards to see  -Labs  -Med compliance  - LP  - MRI brain/carotids  - See additional Care Plan goals for specific interventions  Outcome: Progressing  Goal: Patient/Family Short Term Goal  Description: Patient's Short Term Goal: \"figure out what's causing chest pain\"  \"no more headache\" 4/12/2022    Interventions:   -Cards to see  -Labs   -Echo  - See additional Care Plan goals for specific interventions  Outcome: Progressing     Problem: CARDIOVASCULAR - ADULT  Goal: Maintains optimal cardiac output and hemodynamic stability  Description: INTERVENTIONS:  - Monitor vital signs, rhythm, and trends  - Monitor for bleeding, hypotension and signs of decreased cardiac output  - Evaluate effectiveness of vasoactive medications to optimize hemodynamic stability  - Monitor arterial and/or venous puncture sites for bleeding and/or hematoma  - Assess quality of pulses, skin color and temperature  - Assess for signs of decreased coronary artery perfusion - ex.  Angina  - Evaluate fluid balance, assess for edema, trend weights  Outcome: Progressing  Goal: Absence of cardiac arrhythmias or at baseline  Description: INTERVENTIONS:  - Continuous cardiac monitoring, monitor vital signs, obtain 12 lead EKG if indicated  - Evaluate effectiveness of antiarrhythmic and heart rate control medications as ordered  - Initiate emergency measures for life threatening arrhythmias  - Monitor electrolytes and administer replacement therapy as ordered  Outcome: Progressing     Problem: PAIN - ADULT  Goal: Verbalizes/displays adequate comfort level or patient's stated pain goal  Description: INTERVENTIONS:  - Encourage pt to monitor pain and request assistance  - Assess pain using appropriate pain scale  - Administer analgesics based on type and severity of pain and evaluate response  - Implement non-pharmacological measures as appropriate and evaluate response  - Consider cultural and social influences on pain and pain management  - Manage/alleviate anxiety  - Utilize distraction and/or relaxation techniques  - Monitor for opioid side effects  - Notify MD/LIP if interventions unsuccessful or patient reports new pain  - Anticipate increased pain with activity and pre-medicate as appropriate  Outcome: Progressing     Problem: METABOLIC/FLUID AND ELECTROLYTES - ADULT  Goal: Hemodynamic stability and optimal renal function maintained  Description: INTERVENTIONS:  - Monitor labs and assess for signs and symptoms of volume excess or deficit  - Monitor intake, output and patient weight  - Monitor urine specific gravity, serum osmolarity and serum sodium as indicated or ordered  - Monitor response to interventions for patient's volume status, including labs, urine output, blood pressure (other measures as available)  - Encourage oral intake as appropriate  - Instruct patient on fluid and nutrition restrictions as appropriate  Outcome: Progressing

## 2022-04-13 NOTE — PHYSICAL THERAPY NOTE
Following pt for PT services. Pt with abnormal eye movements this date. D/w RN, will f/u at a later date.

## 2022-04-14 VITALS
DIASTOLIC BLOOD PRESSURE: 70 MMHG | TEMPERATURE: 98 F | RESPIRATION RATE: 18 BRPM | SYSTOLIC BLOOD PRESSURE: 127 MMHG | WEIGHT: 130 LBS | OXYGEN SATURATION: 95 % | HEIGHT: 65 IN | HEART RATE: 65 BPM | BODY MASS INDEX: 21.66 KG/M2

## 2022-04-14 PROCEDURE — 97116 GAIT TRAINING THERAPY: CPT

## 2022-04-14 PROCEDURE — 97110 THERAPEUTIC EXERCISES: CPT

## 2022-04-14 RX ORDER — TIZANIDINE 4 MG/1
TABLET ORAL
Qty: 49 TABLET | Refills: 0 | Status: SHIPPED | OUTPATIENT
Start: 2022-04-14 | End: 2022-04-14

## 2022-04-14 RX ORDER — AMLODIPINE BESYLATE 5 MG/1
5 TABLET ORAL DAILY
Qty: 30 TABLET | Refills: 0 | Status: SHIPPED | OUTPATIENT
Start: 2022-04-15 | End: 2022-04-14

## 2022-04-14 RX ORDER — ERGOCALCIFEROL 1.25 MG/1
50000 CAPSULE ORAL WEEKLY
Qty: 12 CAPSULE | Refills: 0 | Status: SHIPPED | OUTPATIENT
Start: 2022-04-14 | End: 2022-07-01

## 2022-04-14 RX ORDER — HYDROCORTISONE 25 MG/G
CREAM TOPICAL 2 TIMES DAILY
Status: DISCONTINUED | OUTPATIENT
Start: 2022-04-14 | End: 2022-04-14

## 2022-04-14 RX ORDER — TIZANIDINE 4 MG/1
TABLET ORAL
Qty: 49 TABLET | Refills: 0 | Status: SHIPPED | OUTPATIENT
Start: 2022-04-14

## 2022-04-14 RX ORDER — ERGOCALCIFEROL 1.25 MG/1
50000 CAPSULE ORAL WEEKLY
Qty: 12 CAPSULE | Refills: 0 | Status: SHIPPED | OUTPATIENT
Start: 2022-04-14 | End: 2022-04-14

## 2022-04-14 RX ORDER — AMLODIPINE BESYLATE 5 MG/1
5 TABLET ORAL DAILY
Qty: 30 TABLET | Refills: 0 | Status: SHIPPED | OUTPATIENT
Start: 2022-04-15

## 2022-04-14 NOTE — PLAN OF CARE
A&Ox4. Pt c/o of 10/10 migraine and vision issues. Pt's eyes are very sensitive to light. Pt wearing mask over eyes to block light. MD aware. O2 sat WNL on RA  NSR on tele. Denies chest pain   Continent of bladder and bowel. Fioricet given for migraine. Zanaflex dose adjusted per neuro. Pt states that is the only medication helping her headache. Pt asking for medication to be given more frequently. Encouraged pt to discuss this with the MD in the morning. Up with SBA and walker. Problem: Patient/Family Goals  Goal: Patient/Family Long Term Goal  Description: Patient's Long Term Goal: \"go home\"  \"Go home\" 4/12/2022    Interventions:  -Cards to see  -Labs  -Med compliance  - LP  - MRI brain/carotids  - See additional Care Plan goals for specific interventions  4/14/2022 0057 by Ezequiel Pennington RN  Outcome: Progressing  4/14/2022 0047 by Ezequiel Pennington RN  Outcome: Progressing  Goal: Patient/Family Short Term Goal  Description: Patient's Short Term Goal: \"figure out what's causing chest pain\"  \"no more headache\" 4/12/2022    Interventions:   -Cards to see  -Labs   -Echo  - See additional Care Plan goals for specific interventions  4/14/2022 0057 by Ezequiel Pennington RN  Outcome: Progressing  4/14/2022 0047 by Ezequiel Pennington RN  Outcome: Progressing     Problem: CARDIOVASCULAR - ADULT  Goal: Maintains optimal cardiac output and hemodynamic stability  Description: INTERVENTIONS:  - Monitor vital signs, rhythm, and trends  - Monitor for bleeding, hypotension and signs of decreased cardiac output  - Evaluate effectiveness of vasoactive medications to optimize hemodynamic stability  - Monitor arterial and/or venous puncture sites for bleeding and/or hematoma  - Assess quality of pulses, skin color and temperature  - Assess for signs of decreased coronary artery perfusion - ex.  Angina  - Evaluate fluid balance, assess for edema, trend weights  4/14/2022 0057 by Ezequiel Pennington RN  Outcome: Progressing  4/14/2022 5418 by Ezequiel Pennington RN  Outcome: Progressing  Goal: Absence of cardiac arrhythmias or at baseline  Description: INTERVENTIONS:  - Continuous cardiac monitoring, monitor vital signs, obtain 12 lead EKG if indicated  - Evaluate effectiveness of antiarrhythmic and heart rate control medications as ordered  - Initiate emergency measures for life threatening arrhythmias  - Monitor electrolytes and administer replacement therapy as ordered  4/14/2022 0057 by Ezequiel Pennington RN  Outcome: Progressing  4/14/2022 0047 by Ezequiel Pennington RN  Outcome: Progressing     Problem: PAIN - ADULT  Goal: Verbalizes/displays adequate comfort level or patient's stated pain goal  Description: INTERVENTIONS:  - Encourage pt to monitor pain and request assistance  - Assess pain using appropriate pain scale  - Administer analgesics based on type and severity of pain and evaluate response  - Implement non-pharmacological measures as appropriate and evaluate response  - Consider cultural and social influences on pain and pain management  - Manage/alleviate anxiety  - Utilize distraction and/or relaxation techniques  - Monitor for opioid side effects  - Notify MD/LIP if interventions unsuccessful or patient reports new pain  - Anticipate increased pain with activity and pre-medicate as appropriate  4/14/2022 0057 by Ezequiel Pennington RN  Outcome: Progressing  4/14/2022 0047 by Ezequiel Pennington RN  Outcome: Progressing     Problem: METABOLIC/FLUID AND ELECTROLYTES - ADULT  Goal: Hemodynamic stability and optimal renal function maintained  Description: INTERVENTIONS:  - Monitor labs and assess for signs and symptoms of volume excess or deficit  - Monitor intake, output and patient weight  - Monitor urine specific gravity, serum osmolarity and serum sodium as indicated or ordered  - Monitor response to interventions for patient's volume status, including labs, urine output, blood pressure (other measures as available)  - Encourage oral intake as appropriate  - Instruct patient on fluid and nutrition restrictions as appropriate  4/14/2022 0057 by Tay Webber RN  Outcome: Progressing  4/14/2022 0047 by Tay Webber RN  Outcome: Progressing

## 2022-04-14 NOTE — CONSULTS
Neuro-ophthalmology    I discussed the patient at length with Dr Jeison Mcneill. We decided it would be most advantageous to evaluate her in the office setting for starbismus, eye pain/photophobia- slitlamp exam, Visual field testing/ automated, dilated exam and OCT if needed.     I will see her next week

## 2022-04-14 NOTE — PROGRESS NOTES
Rounded on patient during normal Leadership Rounds. Patient had no complaints with nursing staff/care but voiced frustration with physicians on her case r/t pain management for her migraine. Pt stated she has had a migraine for 11 days without relief and physicians have declined to increase the doses or frequencies of her prescribed medications. She follows with physicians outpatient d/t chronic migraines. Offered emotional support. Spoke w/pt's RN: Deandre Patel; she was aware of all the pt's complaints and had already spoken with the neurology PA today. Dr. Jason Winn rounded and this writer endorsed the pt's frustration to him at the nurses station.

## 2022-04-14 NOTE — PROGRESS NOTES
Assumed pt care at 1800. Zofran given for nausea. Ambulated to bathroom. Port flushed and capped. Pt sent to MRI at 1900.

## 2022-04-14 NOTE — PROGRESS NOTES
Received a telephone from the pt's spouse as I left my business card with them earlier when I completed Leadership Rounds. The pt's spouse expressed frustration that they have not seen any specialist MD today as of [phone] (neither Dr. Carmella Dutton or Dr. Leeanna Carlin), only Dr. Milena Mckeon the hospitalist. After hanging up the phone with the pt's spouse, this writer spoke with the pt's nurse, Darrion Faulkner, to determine what, if any, are the delays in physicians rounding as Darrion Faulkner personally spoke with Dr. Johan MCBRIDE this morning (with this writer standing next to her) and Darrion Faulkner communicated the pt and spouse's frustration and need to escalate their concerns to management. Darrion Faulkner endorsed that she would page the specialists again to determine when the patient would be seen today.

## 2022-04-14 NOTE — PLAN OF CARE
Problem: Patient/Family Goals  Goal: Patient/Family Long Term Goal  Description: Patient's Long Term Goal: \"go home\"  \"Go home\" 4/12/2022    Interventions:  -Cards to see  -Labs  -Med compliance  - LP  - MRI brain/carotids  - See additional Care Plan goals for specific interventions  Outcome: Progressing  Goal: Patient/Family Short Term Goal  Description: Patient's Short Term Goal: \"figure out what's causing chest pain\"  \"no more headache\" 4/12/2022    Interventions:   -Cards to see  -Labs   -Echo  - See additional Care Plan goals for specific interventions  Outcome: Progressing     Problem: CARDIOVASCULAR - ADULT  Goal: Maintains optimal cardiac output and hemodynamic stability  Description: INTERVENTIONS:  - Monitor vital signs, rhythm, and trends  - Monitor for bleeding, hypotension and signs of decreased cardiac output  - Evaluate effectiveness of vasoactive medications to optimize hemodynamic stability  - Monitor arterial and/or venous puncture sites for bleeding and/or hematoma  - Assess quality of pulses, skin color and temperature  - Assess for signs of decreased coronary artery perfusion - ex.  Angina  - Evaluate fluid balance, assess for edema, trend weights  Outcome: Progressing  Goal: Absence of cardiac arrhythmias or at baseline  Description: INTERVENTIONS:  - Continuous cardiac monitoring, monitor vital signs, obtain 12 lead EKG if indicated  - Evaluate effectiveness of antiarrhythmic and heart rate control medications as ordered  - Initiate emergency measures for life threatening arrhythmias  - Monitor electrolytes and administer replacement therapy as ordered  Outcome: Progressing     Problem: PAIN - ADULT  Goal: Verbalizes/displays adequate comfort level or patient's stated pain goal  Description: INTERVENTIONS:  - Encourage pt to monitor pain and request assistance  - Assess pain using appropriate pain scale  - Administer analgesics based on type and severity of pain and evaluate response  - Implement non-pharmacological measures as appropriate and evaluate response  - Consider cultural and social influences on pain and pain management  - Manage/alleviate anxiety  - Utilize distraction and/or relaxation techniques  - Monitor for opioid side effects  - Notify MD/LIP if interventions unsuccessful or patient reports new pain  - Anticipate increased pain with activity and pre-medicate as appropriate  Outcome: Progressing     Problem: METABOLIC/FLUID AND ELECTROLYTES - ADULT  Goal: Hemodynamic stability and optimal renal function maintained  Description: INTERVENTIONS:  - Monitor labs and assess for signs and symptoms of volume excess or deficit  - Monitor intake, output and patient weight  - Monitor urine specific gravity, serum osmolarity and serum sodium as indicated or ordered  - Monitor response to interventions for patient's volume status, including labs, urine output, blood pressure (other measures as available)  - Encourage oral intake as appropriate  - Instruct patient on fluid and nutrition restrictions as appropriate  Outcome: Progressing

## 2022-04-15 LAB
ANA SER QL: NEGATIVE
B2 GLYCOPROT1 IGG SERPL IA-ACNC: <0.6 U/ML (ref ?–7)
B2 GLYCOPROT1 IGM SERPL IA-ACNC: <2.9 U/ML (ref ?–7)
CARDIOLIPIN IGG SERPL-ACNC: <0.5 GPL (ref ?–10)
CARDIOLIPIN IGM SERPL-ACNC: 1.3 MPL (ref ?–10)
CCP IGG SERPL-ACNC: 0.6 U/ML (ref 0–6.9)

## 2022-04-15 RX ORDER — CYANOCOBALAMIN 1000 UG/ML
INJECTION, SOLUTION INTRAMUSCULAR; SUBCUTANEOUS
Qty: 3 ML | Refills: 3 | Status: SHIPPED | OUTPATIENT
Start: 2022-04-15 | End: 2023-05-22

## 2022-04-15 NOTE — PLAN OF CARE
Pt discharged. Left port de-accessed. Tele d/c'd. Pt received discharge instructions and follow-up information. Prescriptions reviewed and sent to pharmacy of choice. All questions answered and education given. Pt escorted via [employer] to lobby by transport. Problem: Patient/Family Goals  Goal: Patient/Family Long Term Goal  Description: Patient's Long Term Goal: \"go home\"  \"Go home\" 4/12/2022    Interventions:  -Cards to see  -Labs  -Med compliance  - LP  - MRI brain/carotids  - See additional Care Plan goals for specific interventions  Outcome: Completed  Goal: Patient/Family Short Term Goal  Description: Patient's Short Term Goal: \"figure out what's causing chest pain\"  \"no more headache\" 4/12/2022    Interventions:   -Cards to see  -Labs   -Echo  - See additional Care Plan goals for specific interventions  Outcome: Completed     Problem: CARDIOVASCULAR - ADULT  Goal: Maintains optimal cardiac output and hemodynamic stability  Description: INTERVENTIONS:  - Monitor vital signs, rhythm, and trends  - Monitor for bleeding, hypotension and signs of decreased cardiac output  - Evaluate effectiveness of vasoactive medications to optimize hemodynamic stability  - Monitor arterial and/or venous puncture sites for bleeding and/or hematoma  - Assess quality of pulses, skin color and temperature  - Assess for signs of decreased coronary artery perfusion - ex.  Angina  - Evaluate fluid balance, assess for edema, trend weights  Outcome: Completed  Goal: Absence of cardiac arrhythmias or at baseline  Description: INTERVENTIONS:  - Continuous cardiac monitoring, monitor vital signs, obtain 12 lead EKG if indicated  - Evaluate effectiveness of antiarrhythmic and heart rate control medications as ordered  - Initiate emergency measures for life threatening arrhythmias  - Monitor electrolytes and administer replacement therapy as ordered  Outcome: Completed     Problem: PAIN - ADULT  Goal: Verbalizes/displays adequate comfort level or patient's stated pain goal  Description: INTERVENTIONS:  - Encourage pt to monitor pain and request assistance  - Assess pain using appropriate pain scale  - Administer analgesics based on type and severity of pain and evaluate response  - Implement non-pharmacological measures as appropriate and evaluate response  - Consider cultural and social influences on pain and pain management  - Manage/alleviate anxiety  - Utilize distraction and/or relaxation techniques  - Monitor for opioid side effects  - Notify MD/LIP if interventions unsuccessful or patient reports new pain  - Anticipate increased pain with activity and pre-medicate as appropriate  Outcome: Completed     Problem: METABOLIC/FLUID AND ELECTROLYTES - ADULT  Goal: Hemodynamic stability and optimal renal function maintained  Description: INTERVENTIONS:  - Monitor labs and assess for signs and symptoms of volume excess or deficit  - Monitor intake, output and patient weight  - Monitor urine specific gravity, serum osmolarity and serum sodium as indicated or ordered  - Monitor response to interventions for patient's volume status, including labs, urine output, blood pressure (other measures as available)  - Encourage oral intake as appropriate  - Instruct patient on fluid and nutrition restrictions as appropriate  Outcome: Completed

## 2022-04-16 LAB — JC VIRUS BY PCR: NOT DETECTED

## 2022-04-18 ENCOUNTER — TELEPHONE (OUTPATIENT)
Dept: RHEUMATOLOGY | Facility: CLINIC | Age: 57
End: 2022-04-18

## 2022-04-18 NOTE — TELEPHONE ENCOUNTER
Pt spouse phoned office, states pt was hospitalized and spoke with Dr. Warner Swenson previously regarding making changes to pt medication tizanidine.

## 2022-04-18 NOTE — TELEPHONE ENCOUNTER
Called pt and pt's spouse,  Migraine is a bit better. Has a pain appointment tomorrow morning  BP in the 130s/80s   Ok to increase tizanidine 4 mg at 6 am/12 pm/6 pm and then 6 mg in the evening.   Will need refill, but finish this current Rx first.

## 2022-04-19 ENCOUNTER — TELEPHONE (OUTPATIENT)
Dept: NEUROLOGY | Facility: CLINIC | Age: 57
End: 2022-04-19

## 2022-04-19 NOTE — TELEPHONE ENCOUNTER
Dr Cristy Burdick from [address] calling to inform Dr Joshua Driscoll that pt did not show up for her scheduled appt today

## 2022-04-25 ENCOUNTER — TELEPHONE (OUTPATIENT)
Dept: RHEUMATOLOGY | Facility: CLINIC | Age: 57
End: 2022-04-25

## 2022-04-25 RX ORDER — TIZANIDINE 4 MG/1
TABLET ORAL
Qty: 135 TABLET | Refills: 0 | Status: SHIPPED | OUTPATIENT
Start: 2022-04-25

## 2022-04-25 NOTE — TELEPHONE ENCOUNTER
Pt spouse phoned office regarding refill of Tizanidine 4mg tablet. States Dr. Nemesio Morales aware to increase dosage 4mg to 1tab every 6hrs, and increase to 6mg at bedtime.

## 2022-04-25 NOTE — TELEPHONE ENCOUNTER
Please call patient and let her know that I refilled the zanaflex x 1 month, but further refills need to come from her usual neuro and/or rheum    4 mg @ 6 am/12 pm/6 pm and then 6 mg in the evening.

## 2022-06-07 ENCOUNTER — NURSE ONLY (OUTPATIENT)
Dept: HEMATOLOGY/ONCOLOGY | Facility: HOSPITAL | Age: 57
End: 2022-06-07
Attending: INTERNAL MEDICINE
Payer: COMMERCIAL

## 2022-06-07 DIAGNOSIS — M06.9 RHEUMATOID ARTHRITIS INVOLVING RIGHT WRIST, UNSPECIFIED WHETHER RHEUMATOID FACTOR PRESENT (HCC): Primary | ICD-10-CM

## 2022-06-07 LAB
ALBUMIN SERPL-MCNC: 4 G/DL (ref 3.4–5)
ALBUMIN/GLOB SERPL: 1.3 {RATIO} (ref 1–2)
ALP LIVER SERPL-CCNC: 65 U/L
ALT SERPL-CCNC: 21 U/L
ANION GAP SERPL CALC-SCNC: 6 MMOL/L (ref 0–18)
AST SERPL-CCNC: 23 U/L (ref 15–37)
BASOPHILS # BLD AUTO: 0.04 X10(3) UL (ref 0–0.2)
BASOPHILS NFR BLD AUTO: 1.1 %
BILIRUB SERPL-MCNC: 0.4 MG/DL (ref 0.1–2)
BUN BLD-MCNC: 21 MG/DL (ref 7–18)
CALCIUM BLD-MCNC: 8.8 MG/DL (ref 8.5–10.1)
CHLORIDE SERPL-SCNC: 111 MMOL/L (ref 98–112)
CO2 SERPL-SCNC: 24 MMOL/L (ref 21–32)
CREAT BLD-MCNC: 1.01 MG/DL
EOSINOPHIL # BLD AUTO: 0.11 X10(3) UL (ref 0–0.7)
EOSINOPHIL NFR BLD AUTO: 3 %
ERYTHROCYTE [DISTWIDTH] IN BLOOD BY AUTOMATED COUNT: 14.8 %
FASTING STATUS PATIENT QL REPORTED: NO
GLOBULIN PLAS-MCNC: 3.2 G/DL (ref 2.8–4.4)
GLUCOSE BLD-MCNC: 96 MG/DL (ref 70–99)
HCT VFR BLD AUTO: 37.7 %
HGB BLD-MCNC: 12.4 G/DL
IMM GRANULOCYTES # BLD AUTO: 0.01 X10(3) UL (ref 0–1)
IMM GRANULOCYTES NFR BLD: 0.3 %
LYMPHOCYTES # BLD AUTO: 0.98 X10(3) UL (ref 1–4)
LYMPHOCYTES NFR BLD AUTO: 26.8 %
MCH RBC QN AUTO: 30.7 PG (ref 26–34)
MCHC RBC AUTO-ENTMCNC: 32.9 G/DL (ref 31–37)
MCV RBC AUTO: 93.3 FL
MONOCYTES # BLD AUTO: 0.3 X10(3) UL (ref 0.1–1)
MONOCYTES NFR BLD AUTO: 8.2 %
NEUTROPHILS # BLD AUTO: 2.21 X10 (3) UL (ref 1.5–7.7)
NEUTROPHILS # BLD AUTO: 2.21 X10(3) UL (ref 1.5–7.7)
NEUTROPHILS NFR BLD AUTO: 60.6 %
OSMOLALITY SERPL CALC.SUM OF ELEC: 295 MOSM/KG (ref 275–295)
PLATELET # BLD AUTO: 227 10(3)UL (ref 150–450)
POTASSIUM SERPL-SCNC: 4 MMOL/L (ref 3.5–5.1)
PROT SERPL-MCNC: 7.2 G/DL (ref 6.4–8.2)
RBC # BLD AUTO: 4.04 X10(6)UL
SODIUM SERPL-SCNC: 141 MMOL/L (ref 136–145)
WBC # BLD AUTO: 3.7 X10(3) UL (ref 4–11)

## 2022-06-07 PROCEDURE — 80053 COMPREHEN METABOLIC PANEL: CPT

## 2022-06-07 PROCEDURE — 36591 DRAW BLOOD OFF VENOUS DEVICE: CPT

## 2022-06-07 PROCEDURE — 85025 COMPLETE CBC W/AUTO DIFF WBC: CPT

## 2022-06-07 NOTE — PROGRESS NOTES
Patient unable to give a urine sample as she had just urinated prior to appt.       Education Record    Learner:  Patient    Disease / Diagnosis: RA    Barriers / Limitations:  None   Comments:    Method:  Brief focused   Comments:    General Topics:  Plan of care reviewed   Comments:    Outcome:  Shows understanding   Comments:

## 2022-07-08 ENCOUNTER — TELEPHONE (OUTPATIENT)
Dept: HEMATOLOGY/ONCOLOGY | Facility: HOSPITAL | Age: 57
End: 2022-07-08

## 2022-07-08 DIAGNOSIS — E03.9 HYPOTHYROIDISM: Primary | ICD-10-CM

## 2022-07-08 DIAGNOSIS — D35.1 PARATHYROID ADENOMA: ICD-10-CM

## 2022-07-08 NOTE — TELEPHONE ENCOUNTER
Lab orders received from [facility] Dr. Ruddy Almazan. Per Dr. Vital Gave - OK to order.

## 2022-07-08 NOTE — TELEPHONE ENCOUNTER
Patient calling, stated she spoke with Chong Angelucci this morning.    Patient has faxed the blood order over-   Wanted to make sure it was received

## 2022-07-11 ENCOUNTER — NURSE ONLY (OUTPATIENT)
Dept: HEMATOLOGY/ONCOLOGY | Facility: HOSPITAL | Age: 57
End: 2022-07-11
Attending: INTERNAL MEDICINE
Payer: COMMERCIAL

## 2022-07-11 DIAGNOSIS — D35.1 PARATHYROID ADENOMA: ICD-10-CM

## 2022-07-11 DIAGNOSIS — D69.6 THROMBOCYTOPENIA (HCC): ICD-10-CM

## 2022-07-11 DIAGNOSIS — E03.9 HYPOTHYROIDISM: ICD-10-CM

## 2022-07-11 DIAGNOSIS — D72.819 LEUKOPENIA, UNSPECIFIED TYPE: ICD-10-CM

## 2022-07-11 LAB
ALBUMIN SERPL-MCNC: 4.1 G/DL (ref 3.4–5)
ALBUMIN/GLOB SERPL: 1.6 {RATIO} (ref 1–2)
ALP LIVER SERPL-CCNC: 70 U/L
ALT SERPL-CCNC: 21 U/L
ANION GAP SERPL CALC-SCNC: 7 MMOL/L (ref 0–18)
AST SERPL-CCNC: 34 U/L (ref 15–37)
BASOPHILS # BLD AUTO: 0.02 X10(3) UL (ref 0–0.2)
BASOPHILS NFR BLD AUTO: 0.4 %
BILIRUB SERPL-MCNC: 0.3 MG/DL (ref 0.1–2)
BUN BLD-MCNC: 22 MG/DL (ref 7–18)
CALCIUM BLD-MCNC: 8.6 MG/DL (ref 8.5–10.1)
CHLORIDE SERPL-SCNC: 110 MMOL/L (ref 98–112)
CO2 SERPL-SCNC: 25 MMOL/L (ref 21–32)
CREAT BLD-MCNC: 1.23 MG/DL
EOSINOPHIL # BLD AUTO: 0.09 X10(3) UL (ref 0–0.7)
EOSINOPHIL NFR BLD AUTO: 1.9 %
ERYTHROCYTE [DISTWIDTH] IN BLOOD BY AUTOMATED COUNT: 13.1 %
GLOBULIN PLAS-MCNC: 2.6 G/DL (ref 2.8–4.4)
GLUCOSE BLD-MCNC: 116 MG/DL (ref 70–99)
HCT VFR BLD AUTO: 37 %
HGB BLD-MCNC: 12.6 G/DL
IMM GRANULOCYTES # BLD AUTO: 0.01 X10(3) UL (ref 0–1)
IMM GRANULOCYTES NFR BLD: 0.2 %
LYMPHOCYTES # BLD AUTO: 1.04 X10(3) UL (ref 1–4)
LYMPHOCYTES NFR BLD AUTO: 22.5 %
MCH RBC QN AUTO: 31.5 PG (ref 26–34)
MCHC RBC AUTO-ENTMCNC: 34.1 G/DL (ref 31–37)
MCV RBC AUTO: 92.5 FL
MONOCYTES # BLD AUTO: 0.36 X10(3) UL (ref 0.1–1)
MONOCYTES NFR BLD AUTO: 7.8 %
NEUTROPHILS # BLD AUTO: 3.1 X10 (3) UL (ref 1.5–7.7)
NEUTROPHILS # BLD AUTO: 3.1 X10(3) UL (ref 1.5–7.7)
NEUTROPHILS NFR BLD AUTO: 67.2 %
OSMOLALITY SERPL CALC.SUM OF ELEC: 298 MOSM/KG (ref 275–295)
PLATELET # BLD AUTO: 206 10(3)UL (ref 150–450)
POTASSIUM SERPL-SCNC: 4 MMOL/L (ref 3.5–5.1)
PROT SERPL-MCNC: 6.7 G/DL (ref 6.4–8.2)
PTH-INTACT SERPL-MCNC: 65.9 PG/ML (ref 18.5–88)
RBC # BLD AUTO: 4 X10(6)UL
SODIUM SERPL-SCNC: 142 MMOL/L (ref 136–145)
T4 FREE SERPL-MCNC: 1 NG/DL (ref 0.8–1.7)
TSI SER-ACNC: 0.58 MIU/ML (ref 0.36–3.74)
VIT D+METAB SERPL-MCNC: 37.3 NG/ML (ref 30–100)
WBC # BLD AUTO: 4.6 X10(3) UL (ref 4–11)

## 2022-07-11 PROCEDURE — 85025 COMPLETE CBC W/AUTO DIFF WBC: CPT

## 2022-07-11 PROCEDURE — 83970 ASSAY OF PARATHORMONE: CPT

## 2022-07-11 PROCEDURE — 84443 ASSAY THYROID STIM HORMONE: CPT

## 2022-07-11 PROCEDURE — 80053 COMPREHEN METABOLIC PANEL: CPT

## 2022-07-11 PROCEDURE — 82306 VITAMIN D 25 HYDROXY: CPT

## 2022-07-11 PROCEDURE — 36591 DRAW BLOOD OFF VENOUS DEVICE: CPT

## 2022-07-11 PROCEDURE — 84439 ASSAY OF FREE THYROXINE: CPT

## 2022-08-02 RX ORDER — FLECAINIDE ACETATE 100 MG/1
TABLET ORAL
Qty: 60 TABLET | Refills: 0 | Status: SHIPPED | OUTPATIENT
Start: 2022-08-02

## 2022-09-19 RX ORDER — FENOFIBRATE 145 MG/1
145 TABLET, COATED ORAL EVERY OTHER DAY
Qty: 90 TABLET | Refills: 0 | Status: SHIPPED | OUTPATIENT
Start: 2022-09-19

## 2022-11-01 ENCOUNTER — NURSE ONLY (OUTPATIENT)
Dept: HEMATOLOGY/ONCOLOGY | Facility: HOSPITAL | Age: 57
End: 2022-11-01
Attending: INTERNAL MEDICINE
Payer: COMMERCIAL

## 2022-11-01 DIAGNOSIS — D50.0 IRON DEFICIENCY ANEMIA DUE TO CHRONIC BLOOD LOSS: Primary | ICD-10-CM

## 2022-11-01 DIAGNOSIS — D50.0 IRON DEFICIENCY ANEMIA DUE TO CHRONIC BLOOD LOSS: ICD-10-CM

## 2022-11-01 DIAGNOSIS — N18.31 CHRONIC KIDNEY DISEASE (CKD) STAGE G3A/A1, MODERATELY DECREASED GLOMERULAR FILTRATION RATE (GFR) BETWEEN 45-59 ML/MIN/1.73 SQUARE METER AND ALBUMINURIA CREATININE RATIO LESS THAN 30 MG/G (HCC): Primary | ICD-10-CM

## 2022-11-01 LAB
BASOPHILS # BLD AUTO: 0.03 X10(3) UL (ref 0–0.2)
BASOPHILS NFR BLD AUTO: 0.7 %
DEPRECATED HBV CORE AB SER IA-ACNC: 173.4 NG/ML
EOSINOPHIL # BLD AUTO: 0.09 X10(3) UL (ref 0–0.7)
EOSINOPHIL NFR BLD AUTO: 2.2 %
ERYTHROCYTE [DISTWIDTH] IN BLOOD BY AUTOMATED COUNT: 13 %
HCT VFR BLD AUTO: 36.9 %
HGB BLD-MCNC: 12.5 G/DL
IMM GRANULOCYTES # BLD AUTO: 0.01 X10(3) UL (ref 0–1)
IMM GRANULOCYTES NFR BLD: 0.2 %
IRON SATN MFR SERPL: 17 %
IRON SERPL-MCNC: 80 UG/DL
LYMPHOCYTES # BLD AUTO: 1.06 X10(3) UL (ref 1–4)
LYMPHOCYTES NFR BLD AUTO: 25.6 %
MCH RBC QN AUTO: 31.3 PG (ref 26–34)
MCHC RBC AUTO-ENTMCNC: 33.9 G/DL (ref 31–37)
MCV RBC AUTO: 92.3 FL
MONOCYTES # BLD AUTO: 0.27 X10(3) UL (ref 0.1–1)
MONOCYTES NFR BLD AUTO: 6.5 %
NEUTROPHILS # BLD AUTO: 2.68 X10 (3) UL (ref 1.5–7.7)
NEUTROPHILS # BLD AUTO: 2.68 X10(3) UL (ref 1.5–7.7)
NEUTROPHILS NFR BLD AUTO: 64.8 %
PLATELET # BLD AUTO: 169 10(3)UL (ref 150–450)
RBC # BLD AUTO: 4 X10(6)UL
TIBC SERPL-MCNC: 465 UG/DL (ref 240–450)
TRANSFERRIN SERPL-MCNC: 312 MG/DL (ref 200–360)
WBC # BLD AUTO: 4.1 X10(3) UL (ref 4–11)

## 2022-11-01 PROCEDURE — 82728 ASSAY OF FERRITIN: CPT

## 2022-11-01 PROCEDURE — 36591 DRAW BLOOD OFF VENOUS DEVICE: CPT

## 2022-11-01 PROCEDURE — 83540 ASSAY OF IRON: CPT

## 2022-11-01 PROCEDURE — 85025 COMPLETE CBC W/AUTO DIFF WBC: CPT

## 2022-11-01 PROCEDURE — 83550 IRON BINDING TEST: CPT

## 2022-11-01 NOTE — PROGRESS NOTES
[facility] [facility]. Instructed patient that dr campos's q 3 month labs cannot be drawn via [facility] as he is not on staff here. Instructed her that they can be drawn from her arm at the [place] [address] outpatient lab. Patient frustrated over this, but states understanding. States she will speak with md's for possibilities for lab draws from [facility] in the future. Dr Lisa Oneil labs orders drawn via [facility] today and patient will follow up with him on 11-4-22.     Education Record    Learner:  Patient    Disease / Diagnosis: Anemia    Barriers / Limitations:  None   Comments:    Method:  Brief focused   Comments:    General Topics:  Plan of care reviewed   Comments:    Outcome:  Shows understanding   Comments:

## 2022-11-04 ENCOUNTER — OFFICE VISIT (OUTPATIENT)
Dept: HEMATOLOGY/ONCOLOGY | Facility: HOSPITAL | Age: 57
End: 2022-11-04
Attending: INTERNAL MEDICINE
Payer: COMMERCIAL

## 2022-11-04 VITALS
OXYGEN SATURATION: 96 % | WEIGHT: 142 LBS | TEMPERATURE: 99 F | HEIGHT: 65 IN | BODY MASS INDEX: 23.66 KG/M2 | DIASTOLIC BLOOD PRESSURE: 69 MMHG | SYSTOLIC BLOOD PRESSURE: 104 MMHG | RESPIRATION RATE: 18 BRPM | HEART RATE: 65 BPM

## 2022-11-04 DIAGNOSIS — Z86.39 HISTORY OF IRON DEFICIENCY: ICD-10-CM

## 2022-11-04 DIAGNOSIS — Z79.899 ENCOUNTER FOR LONG-TERM (CURRENT) USE OF HIGH-RISK MEDICATION: ICD-10-CM

## 2022-11-04 DIAGNOSIS — M06.00 SERONEGATIVE RHEUMATOID ARTHRITIS (HCC): Primary | ICD-10-CM

## 2022-11-04 DIAGNOSIS — M06.00 SERONEGATIVE RHEUMATOID ARTHRITIS (HCC): ICD-10-CM

## 2022-11-04 DIAGNOSIS — E55.9 VITAMIN D DEFICIENCY: ICD-10-CM

## 2022-11-04 DIAGNOSIS — M32.19 SYSTEMIC LUPUS ERYTHEMATOSUS WITH OTHER ORGAN INVOLVEMENT, UNSPECIFIED SLE TYPE (HCC): ICD-10-CM

## 2022-11-04 LAB
ALBUMIN SERPL-MCNC: 4.2 G/DL (ref 3.4–5)
ALP LIVER SERPL-CCNC: 69 U/L
ALT SERPL-CCNC: 20 U/L
AST SERPL-CCNC: 20 U/L (ref 15–37)
BILIRUB DIRECT SERPL-MCNC: 0.2 MG/DL (ref 0–0.2)
BILIRUB SERPL-MCNC: 0.4 MG/DL (ref 0.1–2)
BUN BLD-MCNC: 30 MG/DL (ref 7–18)
CREAT BLD-MCNC: 1.18 MG/DL
CRP SERPL-MCNC: <0.29 MG/DL (ref ?–0.3)
ERYTHROCYTE [SEDIMENTATION RATE] IN BLOOD: 1 MM/HR
GFR SERPLBLD BASED ON 1.73 SQ M-ARVRAT: 55 ML/MIN/1.73M2 (ref 60–?)
PROT SERPL-MCNC: 7.1 G/DL (ref 6.4–8.2)
VIT D+METAB SERPL-MCNC: 25 NG/ML (ref 30–100)

## 2022-11-04 PROCEDURE — 85652 RBC SED RATE AUTOMATED: CPT

## 2022-11-04 PROCEDURE — 82565 ASSAY OF CREATININE: CPT

## 2022-11-04 PROCEDURE — 86140 C-REACTIVE PROTEIN: CPT

## 2022-11-04 PROCEDURE — 80076 HEPATIC FUNCTION PANEL: CPT

## 2022-11-04 PROCEDURE — 36591 DRAW BLOOD OFF VENOUS DEVICE: CPT

## 2022-11-04 PROCEDURE — 84520 ASSAY OF UREA NITROGEN: CPT

## 2022-11-04 PROCEDURE — 99214 OFFICE O/P EST MOD 30 MIN: CPT | Performed by: INTERNAL MEDICINE

## 2022-11-04 PROCEDURE — 82306 VITAMIN D 25 HYDROXY: CPT

## 2022-11-04 RX ORDER — DIPHENHYDRAMINE HYDROCHLORIDE 50 MG/ML
INJECTION INTRAMUSCULAR; INTRAVENOUS
COMMUNITY
Start: 2022-05-17

## 2022-11-04 RX ORDER — FOLIC ACID 1 MG/1
TABLET ORAL
COMMUNITY
Start: 2022-11-03

## 2022-11-04 RX ORDER — METOPROLOL SUCCINATE 25 MG/1
25 TABLET, EXTENDED RELEASE ORAL DAILY
COMMUNITY
Start: 2022-10-27

## 2022-11-04 RX ORDER — OLANZAPINE 2.5 MG/1
TABLET ORAL
COMMUNITY
Start: 2022-08-08

## 2022-11-04 RX ORDER — BACLOFEN 10 MG/1
TABLET ORAL
COMMUNITY
Start: 2022-05-30

## 2022-11-04 RX ORDER — HALOPERIDOL 2 MG/1
TABLET ORAL
COMMUNITY
Start: 2022-07-30

## 2022-11-04 RX ORDER — LEVETIRACETAM 500 MG/1
500 TABLET, EXTENDED RELEASE ORAL DAILY
COMMUNITY
Start: 2022-09-29

## 2022-11-04 RX ORDER — HYOSCYAMINE SULFATE 0.125 MG
TABLET,DISINTEGRATING ORAL
COMMUNITY
Start: 2022-08-26

## 2022-11-04 RX ORDER — METHOTREXATE 25 MG/ML
INJECTION, SOLUTION INTRA-ARTERIAL; INTRAMUSCULAR; INTRAVENOUS
COMMUNITY
Start: 2022-11-03

## 2022-11-04 RX ORDER — GABAPENTIN 300 MG/1
CAPSULE ORAL
COMMUNITY
Start: 2022-11-03

## 2022-11-04 RX ORDER — ORPHENADRINE CITRATE 100 MG/1
TABLET, EXTENDED RELEASE ORAL
COMMUNITY

## 2022-11-04 RX ORDER — LEUCOVORIN CALCIUM 5 MG/1
5 TABLET ORAL WEEKLY
COMMUNITY
Start: 2022-11-03

## 2022-11-04 RX ORDER — CLONAZEPAM 0.12 MG/1
1 TABLET, ORALLY DISINTEGRATING ORAL NIGHTLY PRN
COMMUNITY
Start: 2022-09-15

## 2022-11-04 RX ORDER — ISOSORBIDE MONONITRATE 30 MG/1
TABLET, EXTENDED RELEASE ORAL
COMMUNITY

## 2022-11-04 RX ORDER — HYDROMORPHONE HYDROCHLORIDE 2 MG/1
TABLET ORAL
COMMUNITY

## 2022-11-04 RX ORDER — ROPINIROLE 2 MG/1
2 TABLET, FILM COATED ORAL EVERY MORNING
COMMUNITY

## 2022-11-04 RX ORDER — BENZTROPINE MESYLATE 2 MG/1
TABLET ORAL
COMMUNITY
Start: 2022-07-30

## 2022-11-04 NOTE — PROGRESS NOTES
Patient is here for MD f/u for Anemia. Patient has chronic issues with Lupus and RA. Ongoing joint aches and chronic fatigue. Patient has lab orders with her today from her rheumatologist, Dr Osmani Francisco.        Education Record    Learner:  Patient    Disease / Diagnosis:  Anemia     Barriers / Limitations:  None   Comments:    Method:  Discussion   Comments:    General Topics:  Plan of care reviewed   Comments:    Outcome:  Shows understanding   Comments:

## 2022-11-13 NOTE — PHYSICAL THERAPY NOTE
MS RN CLOSING NOTES

PATIENT IS SLEEPING IN BED. PATIENT IS ON RA, TOLERATES WELL. NO S/S OF SOB OR DISTRESS. NO 
S/S OF HAVING PAIN. IV ACCESS IS AT LEFT FA, #22G,  PATENT AND INTACT. D5NS RUNNING @ 75 
ML/HR. SAFETY MEASURE IN PLACE: BED IN LOWEST POSITION, LOCKED; BED ALARM ACTIVATED, CALL 
BELL IN REACH, BED SIDE TABLE AND WATER ARE IN REACH. WILL ENDORSE NEXT SHIFT NURSE FOR 
CONTINUING PATIENT CARE. PHYSICAL THERAPY HIP TREATMENT NOTE - INPATIENT    Room Number: 784/121-N            Presenting Problem: elective left SYEDA, posterior approach, WBAT    Problem List  Principal Problem:    Arthritis of left hip  Active Problems:    Inflammatory polyarthriti care/supervision    PLAN  PT Treatment Plan: Bed mobility; Energy conservation;Patient education; Family education;Gait training;Strengthening    SUBJECTIVE  \"I'm going home\"    OBJECTIVE  Precautions: SYEDA - posterior    WEIGHT BEARING RESTRICTION  Weight 10 reps    Saq  reps    Sitting Knee Extension  reps    Standing heel/toe raises  reps    Hamstring Curls  reps    Forward, back steps  reps    Short Squats  reps      Patient End of Session: Up in chair;Needs met;Call light within reach;RN aware of sara

## 2022-12-14 ENCOUNTER — LAB ENCOUNTER (OUTPATIENT)
Dept: LAB | Age: 57
End: 2022-12-14
Attending: ORTHOPAEDIC SURGERY
Payer: COMMERCIAL

## 2022-12-14 DIAGNOSIS — Z01.818 PREOP TESTING: ICD-10-CM

## 2022-12-15 LAB — SARS-COV-2 RNA RESP QL NAA+PROBE: NOT DETECTED

## 2022-12-16 ENCOUNTER — ANESTHESIA EVENT (OUTPATIENT)
Dept: SURGERY | Facility: HOSPITAL | Age: 57
End: 2022-12-16
Payer: COMMERCIAL

## 2022-12-16 ENCOUNTER — HOSPITAL ENCOUNTER (OUTPATIENT)
Facility: HOSPITAL | Age: 57
Setting detail: HOSPITAL OUTPATIENT SURGERY
Discharge: HOME | End: 2022-12-16
Attending: ORTHOPAEDIC SURGERY | Admitting: ORTHOPAEDIC SURGERY
Payer: COMMERCIAL

## 2022-12-16 ENCOUNTER — ANESTHESIA (OUTPATIENT)
Dept: SURGERY | Facility: HOSPITAL | Age: 57
End: 2022-12-16
Payer: COMMERCIAL

## 2022-12-16 VITALS
WEIGHT: 140 LBS | RESPIRATION RATE: 18 BRPM | SYSTOLIC BLOOD PRESSURE: 132 MMHG | HEIGHT: 65 IN | TEMPERATURE: 98 F | BODY MASS INDEX: 23.32 KG/M2 | OXYGEN SATURATION: 98 % | HEART RATE: 66 BPM | DIASTOLIC BLOOD PRESSURE: 87 MMHG

## 2022-12-16 DIAGNOSIS — Z01.818 PREOP TESTING: Primary | ICD-10-CM

## 2022-12-16 PROBLEM — G56.03 CARPAL TUNNEL SYNDROME, BILATERAL: Status: ACTIVE | Noted: 2022-12-16

## 2022-12-16 PROCEDURE — 01N50ZZ RELEASE MEDIAN NERVE, OPEN APPROACH: ICD-10-PCS | Performed by: ORTHOPAEDIC SURGERY

## 2022-12-16 RX ORDER — NALOXONE HYDROCHLORIDE 0.4 MG/ML
80 INJECTION, SOLUTION INTRAMUSCULAR; INTRAVENOUS; SUBCUTANEOUS AS NEEDED
Status: DISCONTINUED | OUTPATIENT
Start: 2022-12-16 | End: 2022-12-16

## 2022-12-16 RX ORDER — MORPHINE SULFATE 4 MG/ML
2 INJECTION, SOLUTION INTRAMUSCULAR; INTRAVENOUS EVERY 10 MIN PRN
Status: DISCONTINUED | OUTPATIENT
Start: 2022-12-16 | End: 2022-12-16

## 2022-12-16 RX ORDER — LIDOCAINE HYDROCHLORIDE 10 MG/ML
INJECTION, SOLUTION EPIDURAL; INFILTRATION; INTRACAUDAL; PERINEURAL AS NEEDED
Status: DISCONTINUED | OUTPATIENT
Start: 2022-12-16 | End: 2022-12-16 | Stop reason: SURG

## 2022-12-16 RX ORDER — MORPHINE SULFATE 4 MG/ML
4 INJECTION, SOLUTION INTRAMUSCULAR; INTRAVENOUS EVERY 10 MIN PRN
Status: DISCONTINUED | OUTPATIENT
Start: 2022-12-16 | End: 2022-12-16

## 2022-12-16 RX ORDER — MORPHINE SULFATE 10 MG/ML
6 INJECTION, SOLUTION INTRAMUSCULAR; INTRAVENOUS EVERY 10 MIN PRN
Status: DISCONTINUED | OUTPATIENT
Start: 2022-12-16 | End: 2022-12-16

## 2022-12-16 RX ORDER — HYDROCODONE BITARTRATE AND ACETAMINOPHEN 5; 325 MG/1; MG/1
1 TABLET ORAL EVERY 6 HOURS PRN
Status: DISCONTINUED | OUTPATIENT
Start: 2022-12-16 | End: 2022-12-16

## 2022-12-16 RX ORDER — ACETAMINOPHEN 500 MG
1000 TABLET ORAL ONCE
Status: COMPLETED | OUTPATIENT
Start: 2022-12-16 | End: 2022-12-16

## 2022-12-16 RX ORDER — SODIUM CHLORIDE, SODIUM LACTATE, POTASSIUM CHLORIDE, CALCIUM CHLORIDE 600; 310; 30; 20 MG/100ML; MG/100ML; MG/100ML; MG/100ML
INJECTION, SOLUTION INTRAVENOUS CONTINUOUS
Status: DISCONTINUED | OUTPATIENT
Start: 2022-12-16 | End: 2022-12-16

## 2022-12-16 RX ORDER — MIDAZOLAM HYDROCHLORIDE 1 MG/ML
INJECTION INTRAMUSCULAR; INTRAVENOUS AS NEEDED
Status: DISCONTINUED | OUTPATIENT
Start: 2022-12-16 | End: 2022-12-16 | Stop reason: SURG

## 2022-12-16 RX ORDER — LIDOCAINE HYDROCHLORIDE 10 MG/ML
INJECTION, SOLUTION EPIDURAL; INFILTRATION; INTRACAUDAL; PERINEURAL AS NEEDED
Status: DISCONTINUED | OUTPATIENT
Start: 2022-12-16 | End: 2022-12-16 | Stop reason: HOSPADM

## 2022-12-16 RX ORDER — HYDROCODONE BITARTRATE AND ACETAMINOPHEN 5; 325 MG/1; MG/1
1 TABLET ORAL EVERY 6 HOURS PRN
Qty: 10 TABLET | Refills: 0 | Status: SHIPPED | OUTPATIENT
Start: 2022-12-16

## 2022-12-16 RX ORDER — ONDANSETRON 2 MG/ML
4 INJECTION INTRAMUSCULAR; INTRAVENOUS EVERY 6 HOURS PRN
Status: DISCONTINUED | OUTPATIENT
Start: 2022-12-16 | End: 2022-12-16

## 2022-12-16 RX ORDER — BUPIVACAINE HYDROCHLORIDE 5 MG/ML
INJECTION, SOLUTION EPIDURAL; INTRACAUDAL AS NEEDED
Status: DISCONTINUED | OUTPATIENT
Start: 2022-12-16 | End: 2022-12-16 | Stop reason: HOSPADM

## 2022-12-16 RX ADMIN — SODIUM CHLORIDE, SODIUM LACTATE, POTASSIUM CHLORIDE, CALCIUM CHLORIDE: 600; 310; 30; 20 INJECTION, SOLUTION INTRAVENOUS at 09:31:00

## 2022-12-16 RX ADMIN — LIDOCAINE HYDROCHLORIDE 50 MG: 10 INJECTION, SOLUTION EPIDURAL; INFILTRATION; INTRACAUDAL; PERINEURAL at 09:11:00

## 2022-12-16 RX ADMIN — MIDAZOLAM HYDROCHLORIDE 2 MG: 1 INJECTION INTRAMUSCULAR; INTRAVENOUS at 09:11:00

## 2022-12-16 RX ADMIN — SODIUM CHLORIDE, SODIUM LACTATE, POTASSIUM CHLORIDE, CALCIUM CHLORIDE: 600; 310; 30; 20 INJECTION, SOLUTION INTRAVENOUS at 09:06:00

## 2022-12-16 NOTE — BRIEF OP NOTE
Pre-Operative Diagnosis: Right carpal tunnel syndrome     Post-Operative Diagnosis: Right carpal tunnel syndrome      Procedure Performed:   right carpal tunnel release    Surgeon(s) and Role:     Mookie Huang MD - Primary    Assistant(s):        Surgical Findings: released     Specimen: none     Estimated Blood Loss: Blood Output: 2 mL (12/16/2022  9:27 AM)      Dictation Number:  #[id number]    Lynnette Garvey MD  12/16/2022  9:45 AM

## 2022-12-16 NOTE — OPERATIVE REPORT
Jalen Jordan    PATIENT'S NAME: Godwin Cordero   ATTENDING PHYSICIAN: Akshat Preston MD   OPERATING PHYSICIAN: Akshat Preston MD   PATIENT ACCOUNT#:   [de-identified]    LOCATION:  [place] [address]  MEDICAL RECORD #:   [id number]       YOB: 1965  ADMISSION DATE:       12/16/2022      OPERATION DATE:  12/16/2022    OPERATIVE REPORT      PREOPERATIVE DIAGNOSIS:  Right carpal tunnel syndrome. POSTOPERATIVE DIAGNOSIS:  Right carpal tunnel syndrome. PROCEDURE:  Right carpal tunnel release or neuroplasty of the median nerve at the wrist.     ASSISTANT:  None. ANESTHESIA:  Sedation and supplemental local anesthesia. BLOOD LOSS:  Minimal.    COMPLICATIONS:  None. CONDITION:  The patient was transferred to recovery room in stable condition. INDICATIONS:  The patient is a 60-year-old female, who based on history, physical examination, and EMG/nerve conduction studies was diagnosed as having a right carpal tunnel syndrome. The risks, indications, and benefits of procedure of both operative and nonoperative treatment were thoroughly described to the patient, and the patient desired surgery. The surgery recommended was a right carpal tunnel release and neuroplasty of the median nerve at the wrist.  Risks include bleeding, infection, neurovascular injury, chronic pain, chronic disability, failure of the procedure, stiffness, recurrence, potential need for additional surgery, as well as anesthetic complications including death. The patient consented to the procedure. All of her questions were answered, and she was in agreement with the treatment plan. OPERATIVE TECHNIQUE:  On 12/16/2022, the patient was seen and evaluated preoperatively. Her right wrist was identified as the correct operative site, my initials were placed. She was transferred to the operating room and transferred to the operating table in supine position.   Under sterile conditions, local anesthesia with 1% lidocaine and 0.5% Marcaine in a 1:1 mixture was given in the planned site of incision in the form a median nerve block. Her right upper extremity was prepped and draped in a sterile fashion. Tourniquet was inflated to 250 mmHg following Esmarch exsanguination, and a longitudinal incision was made beginning at [employer] distally and extending proximally along the radial aspect of the fourth ray with sharp longitudinal dissection through the palmar fascia. The transverse carpal ligament was identified and released from distal to proximal extent. The median nerve was identified and protected. The [facility] dilator was used then to dilate both superficial and deep to the transverse carpal ligament, and protecting the median nerve, the [facility] blade was used to divide the transverse carpal ligament from distal to proximal extent. Visual inspection and palpation revealed complete release of the transverse carpal ligament. The wound was then thoroughly and copiously irrigated, and the skin was closed with 4-0 Prolene in a horizontal mattress fashion. A large sterile bulky soft dressing was placed. Tourniquet was deflated following wound closure. Blood loss was minimal.  Complications were none. Condition, the patient was aroused from anesthesia and transferred to recovery room in stable condition. DISPOSITION:  She was discharged to home in stable condition with a pain prescription, written instructions, and 24-hour access to emergency number. She will return to clinic in 10 to 14 days for repeat clinical evaluation and suture removal.  All of her questions were answered, and she was in agreement with the treatment plan.      Dictated By Inocencia Howard MD  d: 12/16/2022 09:46:50  t: 12/16/2022 14:50:20  Job [id number]  [facility]/

## 2022-12-16 NOTE — H&P
Kelton Travis  155/1965  55year old   female  Deng Demarco MD    HPI:   Patient presents with:  Left Wrist - Pain, Numbness  Right Wrist - Pain, Numbness    The patient is right-handed. Date of injury/ onset of symptoms: almost 1 year without injury but much worse over the past month  The patient complains of pain located bilateral wrists. The pain is graded as moderate. The patient's pain occurs at night and with . The patient reports intermittent numbness and tingling in both hands. The patient denies skin laceration or breakdown. Prior treatments have included night splints and RA medication which is being changed. ALLERGIES:    Cipro [Ciprofloxaci* OTHER (SEE COMMENTS)  Comment:Drug induced hepatitis  Epinephrine SWELLING  Comment:Patient does not recall this allergy, thinks it  possibly made her restless but is unsure of the  event. Heparin OTHER (SEE COMMENTS)  Comment:HIT-Heparin \"eats up red blood cells\"  Lidocaine SWELLING  Comment:Lidocaine patch  Lyrica [Pregabalin] ANGIOEDEMA  Radiology Contrast * RASH, OTHER (SEE COMMENTS)  Comment:History of iodine allergy and chronic renal  failure  Topamax [Topiramate] ANAPHYLAXIS  Toradol OTHER (SEE COMMENTS)  Comment:Patient has stage 3 kidney failure  Betadine [Povidone * RASH  Compazine NAUSEA ONLY  Comment:Hyperactivity, restlessness, HA,  Dilaudid [Hydromorp* OTHER (SEE COMMENTS)  Comment:HYPERACTIVITY  Feraheme [Ferumoxyt* FACE FLUSHING  Latex RASH  Comment:POWDER \"CHOKES\" HER  Phenergan [Prometha* OTHER (SEE COMMENTS)  Comment:HYPERACTIVITY  Reglan [Metoclopram* OTHER (SEE COMMENTS)  Comment:Restless legs, skin crawling  Pcn [Bicillin L-A] RASH  Comment:ampicillin   Current Outpatient Medications   Medication Sig Dispense Refill   gabapentin 300 MG Oral Cap Take 2 capsules (600 mg total) by mouth 3 (three) times daily. 180 capsule 3   Methotrexate Sodium 50 MG/2ML Injection Solution Inject 15 mg as directed every 7 days.  8 mL 0   Insulin Syringe-Needle U-100 (BD INSULIN SYRINGE) 25G X 5/8\" 1 ML Does not apply Misc Use weekly for methotrexate injections 12 each 3   leucovorin 5 MG Oral Tab Take 1 tablet (5 mg total) by mouth once a week. Take the day after you take methotrexate. 12 tablet 3   ondansetron (ZOFRAN) 4 mg tablet Take 1 tablet (4 mg total) by mouth every 8 (eight) hours as needed for Nausea. 20 tablet 1   Belimumab (BENLYSTA) 200 MG/ML Subcutaneous Solution Auto-injector Inject 200 mg into the skin once a week. 12 mL 0   folic acid 1 MG Oral Tab Take 2 tablets (2 mg total) by mouth daily. 180 tablet 3   clonazePAM 0.125 MG Oral Tablet Dispersible Take 1 tablet (0.125 mg total) by mouth nightly as needed. 20 tablet 0   UBRELVY 50 MG Oral Tab TAKE 1 TABLET BY MOUTH AT ONSET OF MIGRAINE. MAY REPEAT 1 TABLET IN 2 HOURS AS NEEDED 10 tablet 0   ROPINIROLE HCL ER 2 MG Oral Tablet 24 Hr TAKE 1 TABLET DAILY 90 tablet 3   metoprolol tartrate 25 MG Oral Tab Take 25 mg by mouth daily. rOPINIRole 0.25 MG Oral Tab TAKE 1 TABLET BY MOUTH NIGHTLY BUT CAN USE AN EXTRA TABLET IF NEEDED 180 tablet 3   rivaroxaban (XARELTO) 20 MG Oral Tab Take 1 tablet (20 mg total) by mouth every evening. 90 tablet 3   AIMOVIG 140 MG/ML Subcutaneous Solution Auto-injector INJECT 1 ML (140 MG TOTAL) INTO THE SKIN EVERY 30 DAYS 3 mL 3   Cevimeline HCl 30 MG Oral Cap Take 30 mg by mouth 3 (three) times daily. cyanocobalamin 1000 MCG/ML Injection Solution Inject 1,000 mcg into the muscle every 30 (thirty) days. Nitrofurantoin Macrocrystal 100 MG Oral Cap Take 1 capsule (100 mg total) by mouth daily. Take as prescribed (Patient taking differently: Take 100 mg by mouth as needed. Take as prescribed) 90 capsule 2   Estradiol 2 MG Vaginal Ring Place 2 mg vaginally every 3 (three) months. 1 each 4   ValACYclovir HCl 500 MG Oral Tab Take 500 mg by mouth 2 (two) times daily. allopurinol 100 MG Oral Tab Take 100 mg by mouth nightly.    Fenofibrate 145 MG Oral Tab Take 145 mg by mouth every other day. Esomeprazole Magnesium 40 MG Oral Capsule Delayed Release Take 40 mg by mouth 2 (two) times daily. Sertraline HCl (ZOLOFT) 100 MG Oral Tab Take 150 mg by mouth daily.      HISTORY:  Past Medical History:   Diagnosis Date   Arrhythmia   atrial tachycardia, svt   Back problem   Calculus of kidney   Connective tissue disease, undifferentiated (HCC)   Deep vein thrombosis ([address] 2016   Multiple DVTs: 2 in leg and 2 in arm   DEPRESSION   Depression   Esophageal reflux   Fibromyalgia   Gout   Hepatitis 2011   from Medication   High blood pressure   High cholesterol   History of blood transfusion 2014   [facility]   HYPERLIPIDEMIA   HYPERTENSION   IBS (irritable bowel syndrome)   Irritable bowel syndrome   Kidney disease   RT Kidney no longer functions; LT kidney working about 30%; presently has hydronephrosi s and kidney stones   KIDNEY STONE   Lupus (systemic lupus erythematosus) (HCC)   Migraines   bright lights,wine,sulfites,cheeses,sweeteners,barometeric pressure   Muscle weakness   arm weakness   Neuropathy   Boths legs   OBESITY   Osteoarthritis   Pleurisy   Pneumonia due to organism 05/2017   PONV (postoperative nausea and vomiting)   Raynaud disease   Renal disorder   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 Elicia Bowden for endometriosis   [address] W/O BYPASS 08/2021   OTHER 11/2015   reconstructive surgery of left thumb joint   OTHER 8/2015   vaginal sling   OTHER Right   WRIST SURGERY   OTHER SURGICAL HISTORY   parathyroidectomy   OTHER SURGICAL HISTORY Right 2018   wrist surgery   OTHER SURGICAL HISTORY Left 2018   left wrist surgery   PORT, INDWELLING, IMP 2019   [facility]   REPAIR ENTEROCELE,VAG THE [facility] 2012   REPAIR OF RECTOCELE 2012   TONSILLECTOMY   TOTAL HIP REPLACEMENT Right     Family History   Problem Relation Age of Onset   Hypertension Father   Prostate Cancer Father   High Cholesterol Father   Hypertension Mother     Social History  Tobacco Use  Smoking status: Never  Smokeless tobacco: Never  Vaping Use  Vaping Use: Never used  Alcohol use: Not Currently  Alcohol/week: 0.0 standard drinks  Drug use: Yes  Frequency: 7.0 times per week  Types: Cannabis  Comment: Gummies, oils        REVIEW OF SYSTEMS:   A 12 point review of systems was performed as documented on the intake form and reviewed by me today with pertinent positives and negatives listed in the HPI. EXAM:   There were no vitals taken for this visit. The patient is awake and oriented x 3 and overall well appearing. The patient has normal mood. The patient is non-tender and atraumatic with the exception of their bilateral upper extremities. The patient's skin is intact and compartments are soft. The patient's upper extremities are warm and pink with brisk capillary refill and 2+ radial pulses. Sensation is intact to light touch in axillary, median, ulnar, and radial nerve distributions. The patient has 5/5 strength in finger flexion, finger extension, EPL, FPL, APB, and interosseous muscle function. The patient has a negative Spurling's test and Chacon's test.  The patient has a positive carpal tunnel compression test, Phalen's test, and Tinel over the median nerve at the wrist bilaterally.   The patient has a negative cubital tunnel compression test, elbow flexion test, and Tinel over the ulnar nerve at the elbow. The patient has normal two point discrimination at 4-5 mm in the median and ulnar nerve distributions. IMAGING AND TESTING:  EMG/NCV test from 12/2021 independently reviewed by me today reveals mild bilateral carpal tunnel syndrome. ASSESSMENT AND PLAN:   Bilateral carpal tunnel syndrome (primary encounter diagnosis)    The risks, indications, benefits, and procedures of both operative and non-operative treatment were discussed with the patient. The patient desired surgery. Surgery recommended was bilateral carpal tunnel release, starting with the right. Risks include bleeding, infection, neurovascular injury, failure of the procedure, stiffness, and potential need for additional surgery as well as anesthetic complications including death. The patient consented to the procedure. All of their questions were answered and they are in agreement with the treatment plan.     The patient will return to clinic in 2 weeks postop

## 2022-12-16 NOTE — H&P (VIEW-ONLY)
Manoj Huang  155/1965  55year old   female  Brain Less, MD    HPI:   Patient presents with:  Left Wrist - Pain, Numbness  Right Wrist - Pain, Numbness    The patient is right-handed. Date of injury/ onset of symptoms: almost 1 year without injury but much worse over the past month  The patient complains of pain located bilateral wrists. The pain is graded as moderate. The patient's pain occurs at night and with . The patient reports intermittent numbness and tingling in both hands. The patient denies skin laceration or breakdown. Prior treatments have included night splints and RA medication which is being changed. ALLERGIES:    Cipro [Ciprofloxaci* OTHER (SEE COMMENTS)  Comment:Drug induced hepatitis  Epinephrine SWELLING  Comment:Patient does not recall this allergy, thinks it  possibly made her restless but is unsure of the  event. Heparin OTHER (SEE COMMENTS)  Comment:HIT-Heparin \"eats up red blood cells\"  Lidocaine SWELLING  Comment:Lidocaine patch  Lyrica [Pregabalin] ANGIOEDEMA  Radiology Contrast * RASH, OTHER (SEE COMMENTS)  Comment:History of iodine allergy and chronic renal  failure  Topamax [Topiramate] ANAPHYLAXIS  Toradol OTHER (SEE COMMENTS)  Comment:Patient has stage 3 kidney failure  Betadine [Povidone * RASH  Compazine NAUSEA ONLY  Comment:Hyperactivity, restlessness, HA,  Dilaudid [Hydromorp* OTHER (SEE COMMENTS)  Comment:HYPERACTIVITY  Feraheme [Ferumoxyt* FACE FLUSHING  Latex RASH  Comment:POWDER \"CHOKES\" HER  Phenergan [Prometha* OTHER (SEE COMMENTS)  Comment:HYPERACTIVITY  Reglan [Metoclopram* OTHER (SEE COMMENTS)  Comment:Restless legs, skin crawling  Pcn [Bicillin L-A] RASH  Comment:ampicillin   Current Outpatient Medications   Medication Sig Dispense Refill   gabapentin 300 MG Oral Cap Take 2 capsules (600 mg total) by mouth 3 (three) times daily. 180 capsule 3   Methotrexate Sodium 50 MG/2ML Injection Solution Inject 15 mg as directed every 7 days.  8 mL 0   Insulin Syringe-Needle U-100 (BD INSULIN SYRINGE) 25G X 5/8\" 1 ML Does not apply Misc Use weekly for methotrexate injections 12 each 3   leucovorin 5 MG Oral Tab Take 1 tablet (5 mg total) by mouth once a week. Take the day after you take methotrexate. 12 tablet 3   ondansetron (ZOFRAN) 4 mg tablet Take 1 tablet (4 mg total) by mouth every 8 (eight) hours as needed for Nausea. 20 tablet 1   Belimumab (BENLYSTA) 200 MG/ML Subcutaneous Solution Auto-injector Inject 200 mg into the skin once a week. 12 mL 0   folic acid 1 MG Oral Tab Take 2 tablets (2 mg total) by mouth daily. 180 tablet 3   clonazePAM 0.125 MG Oral Tablet Dispersible Take 1 tablet (0.125 mg total) by mouth nightly as needed. 20 tablet 0   UBRELVY 50 MG Oral Tab TAKE 1 TABLET BY MOUTH AT ONSET OF MIGRAINE. MAY REPEAT 1 TABLET IN 2 HOURS AS NEEDED 10 tablet 0   ROPINIROLE HCL ER 2 MG Oral Tablet 24 Hr TAKE 1 TABLET DAILY 90 tablet 3   metoprolol tartrate 25 MG Oral Tab Take 25 mg by mouth daily. rOPINIRole 0.25 MG Oral Tab TAKE 1 TABLET BY MOUTH NIGHTLY BUT CAN USE AN EXTRA TABLET IF NEEDED 180 tablet 3   rivaroxaban (XARELTO) 20 MG Oral Tab Take 1 tablet (20 mg total) by mouth every evening. 90 tablet 3   AIMOVIG 140 MG/ML Subcutaneous Solution Auto-injector INJECT 1 ML (140 MG TOTAL) INTO THE SKIN EVERY 30 DAYS 3 mL 3   Cevimeline HCl 30 MG Oral Cap Take 30 mg by mouth 3 (three) times daily. cyanocobalamin 1000 MCG/ML Injection Solution Inject 1,000 mcg into the muscle every 30 (thirty) days. Nitrofurantoin Macrocrystal 100 MG Oral Cap Take 1 capsule (100 mg total) by mouth daily. Take as prescribed (Patient taking differently: Take 100 mg by mouth as needed. Take as prescribed) 90 capsule 2   Estradiol 2 MG Vaginal Ring Place 2 mg vaginally every 3 (three) months. 1 each 4   ValACYclovir HCl 500 MG Oral Tab Take 500 mg by mouth 2 (two) times daily. allopurinol 100 MG Oral Tab Take 100 mg by mouth nightly.    Fenofibrate 145 MG Oral Tab Take 145 mg by mouth every other day. Esomeprazole Magnesium 40 MG Oral Capsule Delayed Release Take 40 mg by mouth 2 (two) times daily. Sertraline HCl (ZOLOFT) 100 MG Oral Tab Take 150 mg by mouth daily.      HISTORY:  Past Medical History:   Diagnosis Date   Arrhythmia   atrial tachycardia, svt   Back problem   Calculus of kidney   Connective tissue disease, undifferentiated (HCC)   Deep vein thrombosis ([address] 2016   Multiple DVTs: 2 in leg and 2 in arm   DEPRESSION   Depression   Esophageal reflux   Fibromyalgia   Gout   Hepatitis 2011   from Medication   High blood pressure   High cholesterol   History of blood transfusion 2014   [facility]   HYPERLIPIDEMIA   HYPERTENSION   IBS (irritable bowel syndrome)   Irritable bowel syndrome   Kidney disease   RT Kidney no longer functions; LT kidney working about 30%; presently has hydronephrosi s and kidney stones   KIDNEY STONE   Lupus (systemic lupus erythematosus) (HCC)   Migraines   bright lights,wine,sulfites,cheeses,sweeteners,barometeric pressure   Muscle weakness   arm weakness   Neuropathy   Boths legs   OBESITY   Osteoarthritis   Pleurisy   Pneumonia due to organism 05/2017   PONV (postoperative nausea and vomiting)   Raynaud disease   Renal disorder   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 Aleja Zuluaga for endometriosis   [address] W/O BYPASS 08/2021   OTHER 11/2015   reconstructive surgery of left thumb joint   OTHER 8/2015   vaginal sling   OTHER Right   WRIST SURGERY   OTHER SURGICAL HISTORY   parathyroidectomy   OTHER SURGICAL HISTORY Right 2018   wrist surgery   OTHER SURGICAL HISTORY Left 2018   left wrist surgery   PORT, INDWELLING, IMP 2019   [facility]   REPAIR ENTEROCELE,VAG [facility] 2012   REPAIR OF RECTOCELE 2012   TONSILLECTOMY   TOTAL HIP REPLACEMENT Right     Family History   Problem Relation Age of Onset   Hypertension Father   Prostate Cancer Father   High Cholesterol Father   Hypertension Mother     Social History  Tobacco Use  Smoking status: Never  Smokeless tobacco: Never  Vaping Use  Vaping Use: Never used  Alcohol use: Not Currently  Alcohol/week: 0.0 standard drinks  Drug use: Yes  Frequency: 7.0 times per week  Types: Cannabis  Comment: Gummies, oils        REVIEW OF SYSTEMS:   A 12 point review of systems was performed as documented on the intake form and reviewed by me today with pertinent positives and negatives listed in the HPI. EXAM:   There were no vitals taken for this visit. The patient is awake and oriented x 3 and overall well appearing. The patient has normal mood. The patient is non-tender and atraumatic with the exception of their bilateral upper extremities. The patient's skin is intact and compartments are soft. The patient's upper extremities are warm and pink with brisk capillary refill and 2+ radial pulses. Sensation is intact to light touch in axillary, median, ulnar, and radial nerve distributions. The patient has 5/5 strength in finger flexion, finger extension, EPL, FPL, APB, and interosseous muscle function. The patient has a negative Spurling's test and Chacon's test.  The patient has a positive carpal tunnel compression test, Phalen's test, and Tinel over the median nerve at the wrist bilaterally.   The patient has a negative cubital tunnel compression test, elbow flexion test, and Tinel over the ulnar nerve at the elbow. The patient has normal two point discrimination at 4-5 mm in the median and ulnar nerve distributions. IMAGING AND TESTING:  EMG/NCV test from 12/2021 independently reviewed by me today reveals mild bilateral carpal tunnel syndrome. ASSESSMENT AND PLAN:   Bilateral carpal tunnel syndrome (primary encounter diagnosis)    The risks, indications, benefits, and procedures of both operative and non-operative treatment were discussed with the patient. The patient desired surgery. Surgery recommended was bilateral carpal tunnel release, starting with the right. Risks include bleeding, infection, neurovascular injury, failure of the procedure, stiffness, and potential need for additional surgery as well as anesthetic complications including death. The patient consented to the procedure. All of their questions were answered and they are in agreement with the treatment plan.     The patient will return to clinic in 2 weeks postop

## 2022-12-27 ENCOUNTER — LAB ENCOUNTER (OUTPATIENT)
Dept: LAB | Age: 57
End: 2022-12-27
Attending: ORTHOPAEDIC SURGERY
Payer: COMMERCIAL

## 2022-12-27 DIAGNOSIS — Z01.818 PREOP TESTING: ICD-10-CM

## 2022-12-28 LAB — SARS-COV-2 RNA RESP QL NAA+PROBE: NOT DETECTED

## 2022-12-30 ENCOUNTER — ANESTHESIA (OUTPATIENT)
Dept: SURGERY | Facility: HOSPITAL | Age: 57
End: 2022-12-30
Payer: COMMERCIAL

## 2022-12-30 ENCOUNTER — HOSPITAL ENCOUNTER (OUTPATIENT)
Facility: HOSPITAL | Age: 57
Setting detail: HOSPITAL OUTPATIENT SURGERY
Discharge: HOME | End: 2022-12-30
Attending: ORTHOPAEDIC SURGERY | Admitting: ORTHOPAEDIC SURGERY
Payer: COMMERCIAL

## 2022-12-30 ENCOUNTER — ANESTHESIA EVENT (OUTPATIENT)
Dept: SURGERY | Facility: HOSPITAL | Age: 57
End: 2022-12-30
Payer: COMMERCIAL

## 2022-12-30 VITALS
TEMPERATURE: 97 F | HEART RATE: 56 BPM | OXYGEN SATURATION: 96 % | SYSTOLIC BLOOD PRESSURE: 114 MMHG | BODY MASS INDEX: 24.16 KG/M2 | HEIGHT: 65 IN | RESPIRATION RATE: 14 BRPM | DIASTOLIC BLOOD PRESSURE: 68 MMHG | WEIGHT: 145 LBS

## 2022-12-30 DIAGNOSIS — Z01.818 PREOP TESTING: Primary | ICD-10-CM

## 2022-12-30 PROBLEM — G56.02 LEFT CARPAL TUNNEL SYNDROME: Status: ACTIVE | Noted: 2022-12-30

## 2022-12-30 PROCEDURE — 01N50ZZ RELEASE MEDIAN NERVE, OPEN APPROACH: ICD-10-PCS | Performed by: ORTHOPAEDIC SURGERY

## 2022-12-30 PROCEDURE — S0077 INJECTION, CLINDAMYCIN PHOSP: HCPCS | Performed by: STUDENT IN AN ORGANIZED HEALTH CARE EDUCATION/TRAINING PROGRAM

## 2022-12-30 RX ORDER — LIDOCAINE HYDROCHLORIDE 10 MG/ML
INJECTION, SOLUTION EPIDURAL; INFILTRATION; INTRACAUDAL; PERINEURAL AS NEEDED
Status: DISCONTINUED | OUTPATIENT
Start: 2022-12-30 | End: 2022-12-30 | Stop reason: SURG

## 2022-12-30 RX ORDER — ONDANSETRON 2 MG/ML
4 INJECTION INTRAMUSCULAR; INTRAVENOUS EVERY 6 HOURS PRN
OUTPATIENT
Start: 2022-12-30

## 2022-12-30 RX ORDER — PROCHLORPERAZINE EDISYLATE 5 MG/ML
5 INJECTION INTRAMUSCULAR; INTRAVENOUS EVERY 8 HOURS PRN
Status: DISCONTINUED | OUTPATIENT
Start: 2022-12-30 | End: 2022-12-30

## 2022-12-30 RX ORDER — SODIUM CHLORIDE, SODIUM LACTATE, POTASSIUM CHLORIDE, CALCIUM CHLORIDE 600; 310; 30; 20 MG/100ML; MG/100ML; MG/100ML; MG/100ML
INJECTION, SOLUTION INTRAVENOUS CONTINUOUS
Status: DISCONTINUED | OUTPATIENT
Start: 2022-12-30 | End: 2022-12-30

## 2022-12-30 RX ORDER — HYDROMORPHONE HYDROCHLORIDE 1 MG/ML
0.6 INJECTION, SOLUTION INTRAMUSCULAR; INTRAVENOUS; SUBCUTANEOUS EVERY 5 MIN PRN
Status: DISCONTINUED | OUTPATIENT
Start: 2022-12-30 | End: 2022-12-30

## 2022-12-30 RX ORDER — HYDROMORPHONE HYDROCHLORIDE 1 MG/ML
0.4 INJECTION, SOLUTION INTRAMUSCULAR; INTRAVENOUS; SUBCUTANEOUS EVERY 5 MIN PRN
Status: DISCONTINUED | OUTPATIENT
Start: 2022-12-30 | End: 2022-12-30

## 2022-12-30 RX ORDER — ACETAMINOPHEN 500 MG
1000 TABLET ORAL ONCE
Status: COMPLETED | OUTPATIENT
Start: 2022-12-30 | End: 2022-12-30

## 2022-12-30 RX ORDER — HYDROCODONE BITARTRATE AND ACETAMINOPHEN 5; 325 MG/1; MG/1
1 TABLET ORAL ONCE AS NEEDED
Status: DISCONTINUED | OUTPATIENT
Start: 2022-12-30 | End: 2022-12-30

## 2022-12-30 RX ORDER — CLINDAMYCIN PHOSPHATE 900 MG/50ML
900 INJECTION INTRAVENOUS ONCE
Status: DISCONTINUED | OUTPATIENT
Start: 2022-12-30 | End: 2022-12-30 | Stop reason: HOSPADM

## 2022-12-30 RX ORDER — METOPROLOL TARTRATE 5 MG/5ML
2.5 INJECTION INTRAVENOUS ONCE
Status: DISCONTINUED | OUTPATIENT
Start: 2022-12-30 | End: 2022-12-30

## 2022-12-30 RX ORDER — MORPHINE SULFATE 10 MG/ML
6 INJECTION, SOLUTION INTRAMUSCULAR; INTRAVENOUS EVERY 10 MIN PRN
Status: DISCONTINUED | OUTPATIENT
Start: 2022-12-30 | End: 2022-12-30

## 2022-12-30 RX ORDER — ONDANSETRON 2 MG/ML
4 INJECTION INTRAMUSCULAR; INTRAVENOUS EVERY 6 HOURS PRN
Status: DISCONTINUED | OUTPATIENT
Start: 2022-12-30 | End: 2022-12-30

## 2022-12-30 RX ORDER — HYDROMORPHONE HYDROCHLORIDE 1 MG/ML
0.2 INJECTION, SOLUTION INTRAMUSCULAR; INTRAVENOUS; SUBCUTANEOUS EVERY 5 MIN PRN
Status: DISCONTINUED | OUTPATIENT
Start: 2022-12-30 | End: 2022-12-30

## 2022-12-30 RX ORDER — MORPHINE SULFATE 4 MG/ML
2 INJECTION, SOLUTION INTRAMUSCULAR; INTRAVENOUS EVERY 10 MIN PRN
Status: DISCONTINUED | OUTPATIENT
Start: 2022-12-30 | End: 2022-12-30

## 2022-12-30 RX ORDER — HYDROCODONE BITARTRATE AND ACETAMINOPHEN 5; 325 MG/1; MG/1
2 TABLET ORAL ONCE AS NEEDED
Status: DISCONTINUED | OUTPATIENT
Start: 2022-12-30 | End: 2022-12-30

## 2022-12-30 RX ORDER — ONDANSETRON 2 MG/ML
INJECTION INTRAMUSCULAR; INTRAVENOUS AS NEEDED
Status: DISCONTINUED | OUTPATIENT
Start: 2022-12-30 | End: 2022-12-30 | Stop reason: SURG

## 2022-12-30 RX ORDER — NALOXONE HYDROCHLORIDE 0.4 MG/ML
80 INJECTION, SOLUTION INTRAMUSCULAR; INTRAVENOUS; SUBCUTANEOUS AS NEEDED
Status: DISCONTINUED | OUTPATIENT
Start: 2022-12-30 | End: 2022-12-30

## 2022-12-30 RX ORDER — ACETAMINOPHEN 500 MG
1000 TABLET ORAL ONCE AS NEEDED
Status: DISCONTINUED | OUTPATIENT
Start: 2022-12-30 | End: 2022-12-30

## 2022-12-30 RX ORDER — CLINDAMYCIN PHOSPHATE 150 MG/ML
INJECTION, SOLUTION INTRAVENOUS AS NEEDED
Status: DISCONTINUED | OUTPATIENT
Start: 2022-12-30 | End: 2022-12-30 | Stop reason: SURG

## 2022-12-30 RX ORDER — MORPHINE SULFATE 4 MG/ML
4 INJECTION, SOLUTION INTRAMUSCULAR; INTRAVENOUS EVERY 10 MIN PRN
Status: DISCONTINUED | OUTPATIENT
Start: 2022-12-30 | End: 2022-12-30

## 2022-12-30 RX ADMIN — LIDOCAINE HYDROCHLORIDE 50 MG: 10 INJECTION, SOLUTION EPIDURAL; INFILTRATION; INTRACAUDAL; PERINEURAL at 07:37:00

## 2022-12-30 RX ADMIN — ONDANSETRON 4 MG: 2 INJECTION INTRAMUSCULAR; INTRAVENOUS at 07:40:00

## 2022-12-30 RX ADMIN — CLINDAMYCIN PHOSPHATE 900 MG: 150 INJECTION, SOLUTION INTRAVENOUS at 07:40:00

## 2022-12-30 NOTE — BRIEF OP NOTE
Pre-Operative Diagnosis: Left carpal tunnel syndrome     Post-Operative Diagnosis: Left carpal tunnel syndrome      Procedure Performed:   Left carpal tunnel release    Surgeon(s) and Role:     Polly Whyte MD - Primary    Assistant(s):        Surgical Findings: released     Specimen: none     Estimated Blood Loss: minimal    Dictation Number:  #[id number]    Saint Romp, MD  12/30/2022  1:39 PM

## 2022-12-30 NOTE — INTERVAL H&P NOTE
Pre-op Diagnosis: Left carpal tunnel syndrome    The above referenced H&P was reviewed by Rowan Hopson MD on 12/30/2022, the patient was examined and no significant changes have occurred in the patient's condition since the H&P was performed. I discussed with the patient and/or legal representative the potential benefits, risks and side effects of this procedure; the likelihood of the patient achieving goals; and potential problems that might occur during recuperation. I discussed reasonable alternatives to the procedure, including risks, benefits and side effects related to the alternatives and risks related to not receiving this procedure. We will proceed with procedure as planned. Plan remove sutures from right and left carpal tunnel release.

## 2022-12-31 NOTE — OPERATIVE REPORT
[facility]    PATIENT'S NAME: Harshil Romero   ATTENDING PHYSICIAN: Ugo Davis MD   OPERATING PHYSICIAN: Ugo Davis MD   PATIENT ACCOUNT#:   [de-identified]    LOCATION:  [place] [address]  MEDICAL RECORD #:   [id number]       YOB: 1965  ADMISSION DATE:       12/30/2022      OPERATION DATE:  12/30/2022    OPERATIVE REPORT    PREOPERATIVE DIAGNOSIS:  Left carpal tunnel syndrome. POSTOPERATIVE DIAGNOSIS:  Left carpal tunnel syndrome. PROCEDURE:  Left carpal tunnel release or neuroplasty of the median nerve at the wrist.    ASSISTANT:  None. ANESTHESIA:  Sedation and supplemental local anesthesia. BLOOD LOSS:  Minimal.    COMPLICATIONS:  None. CONDITION:  The patient was aroused from anesthesia and transferred to recovery room in stable condition. INDICATIONS:  The patient is a 72-year-old female who based on history, physical examination, and EMG/nerve conduction studies was diagnosed as having a left carpal tunnel syndrome. The risks, indications, and benefits of procedure of both operative and nonoperative treatment were thoroughly described to the patient, and the patient desired surgery. Surgery recommended was a left carpal tunnel release or neuroplasty of the median nerve at the wrist.  Risks include bleeding, infection, neurovascular injury, chronic pain, chronic disability, failure of the procedure, stiffness, recurrence, potential need for additional surgery as well as anesthetic complications including death. The patient consented to the procedure. All of her questions were answered, and she was in agreement with the treatment plan. OPERATIVE TECHNIQUE:  On 12/30/2022, the patient was seen and evaluated preoperatively. Her left wrist was identified as the correct operative site, my initials were placed. She was transferred to the operating room and transferred to the operating table in supine position. Sedation was induced.   Under sterile conditions, local anesthesia with 1% lidocaine and 0.5% Marcaine in a 1:1 mixture was given in the planned site of incision in the form of a median nerve block. Her left upper extremity was prepped and draped in a sterile fashion. She received clindamycin as antibiotic prophylaxis within 1 hour of incision time. The patient's tourniquet was inflated to 250 mmHg. Following Esmarch exsanguination, a longitudinal incision was made beginning at [facility] line distally and extending proximally along the radial aspect of the fourth ray with sharp longitudinal dissection through the palmar fascia. The transverse carpal ligament was identified and released from distal to proximal extent. The median nerve was identified and protected. The [facility] dilator was used to dilate both superficial and deep to the transverse carpal ligament. Then the [facility] blade was used to divide the transverse carpal ligament from distal to proximal extent. Visual inspection and palpation revealed a complete release of the transverse carpal ligament. The wound was then thoroughly and copiously irrigated, and skin was closed with 4-0 Prolene in a horizontal mattress fashion. A large sterile bulky soft dressing was placed. Tourniquet was deflated following wound closure. Blood loss was minimal.  Complications were none. Condition, the patient was aroused from anesthesia and transferred to recovery room in stable condition. DISPOSITION:  She was discharged to home in stable condition with a pain prescription, written instructions, and 24-hour access to emergency number. She will return to our clinic in 10 to 14 days for repeat clinical evaluation and suture removal.  All of her questions were answered, and she was in agreement with the treatment plan.     Dictated By Grant Vickers MD  d: 12/30/2022 13:42:19  t: 12/30/2022 18:47:16  Job [id number]  [facility]/

## 2023-02-03 ENCOUNTER — NURSE ONLY (OUTPATIENT)
Dept: HEMATOLOGY/ONCOLOGY | Facility: HOSPITAL | Age: 58
End: 2023-02-03
Attending: INTERNAL MEDICINE
Payer: COMMERCIAL

## 2023-02-03 DIAGNOSIS — M06.00 SERONEGATIVE RHEUMATOID ARTHRITIS (HCC): ICD-10-CM

## 2023-02-03 DIAGNOSIS — Z79.899 ENCOUNTER FOR LONG-TERM (CURRENT) USE OF HIGH-RISK MEDICATION: ICD-10-CM

## 2023-02-03 DIAGNOSIS — Z86.39 HISTORY OF IRON DEFICIENCY: ICD-10-CM

## 2023-02-03 DIAGNOSIS — M32.19 SYSTEMIC LUPUS ERYTHEMATOSUS WITH OTHER ORGAN INVOLVEMENT, UNSPECIFIED SLE TYPE (HCC): ICD-10-CM

## 2023-02-03 LAB
ALBUMIN SERPL-MCNC: 4.3 G/DL (ref 3.4–5)
ALP LIVER SERPL-CCNC: 55 U/L
ALT SERPL-CCNC: 27 U/L
AST SERPL-CCNC: 34 U/L (ref 15–37)
BASOPHILS # BLD AUTO: 0.02 X10(3) UL (ref 0–0.2)
BASOPHILS NFR BLD AUTO: 0.5 %
BILIRUB DIRECT SERPL-MCNC: 0.1 MG/DL (ref 0–0.2)
BILIRUB SERPL-MCNC: 0.4 MG/DL (ref 0.1–2)
BUN BLD-MCNC: 34 MG/DL (ref 7–18)
CREAT BLD-MCNC: 1.35 MG/DL
CRP SERPL-MCNC: <0.29 MG/DL (ref ?–0.3)
DEPRECATED HBV CORE AB SER IA-ACNC: 147.4 NG/ML
EOSINOPHIL # BLD AUTO: 0.11 X10(3) UL (ref 0–0.7)
EOSINOPHIL NFR BLD AUTO: 2.6 %
ERYTHROCYTE [DISTWIDTH] IN BLOOD BY AUTOMATED COUNT: 13.2 %
ERYTHROCYTE [SEDIMENTATION RATE] IN BLOOD: 2 MM/HR
GFR SERPLBLD BASED ON 1.73 SQ M-ARVRAT: 46 ML/MIN/1.73M2 (ref 60–?)
HCT VFR BLD AUTO: 38.7 %
HGB BLD-MCNC: 13.4 G/DL
IMM GRANULOCYTES # BLD AUTO: 0.02 X10(3) UL (ref 0–1)
IMM GRANULOCYTES NFR BLD: 0.5 %
IRON SATN MFR SERPL: 23 %
IRON SERPL-MCNC: 127 UG/DL
LYMPHOCYTES # BLD AUTO: 1.19 X10(3) UL (ref 1–4)
LYMPHOCYTES NFR BLD AUTO: 28.5 %
MCH RBC QN AUTO: 31.3 PG (ref 26–34)
MCHC RBC AUTO-ENTMCNC: 34.6 G/DL (ref 31–37)
MCV RBC AUTO: 90.4 FL
MONOCYTES # BLD AUTO: 0.26 X10(3) UL (ref 0.1–1)
MONOCYTES NFR BLD AUTO: 6.2 %
NEUTROPHILS # BLD AUTO: 2.57 X10 (3) UL (ref 1.5–7.7)
NEUTROPHILS # BLD AUTO: 2.57 X10(3) UL (ref 1.5–7.7)
NEUTROPHILS NFR BLD AUTO: 61.7 %
PLATELET # BLD AUTO: 195 10(3)UL (ref 150–450)
PROT SERPL-MCNC: 7.1 G/DL (ref 6.4–8.2)
RBC # BLD AUTO: 4.28 X10(6)UL
TIBC SERPL-MCNC: 548 UG/DL (ref 240–450)
TRANSFERRIN SERPL-MCNC: 368 MG/DL (ref 200–360)
WBC # BLD AUTO: 4.2 X10(3) UL (ref 4–11)

## 2023-02-03 PROCEDURE — 85652 RBC SED RATE AUTOMATED: CPT

## 2023-02-03 PROCEDURE — 86140 C-REACTIVE PROTEIN: CPT

## 2023-02-03 PROCEDURE — 83550 IRON BINDING TEST: CPT

## 2023-02-03 PROCEDURE — 83540 ASSAY OF IRON: CPT

## 2023-02-03 PROCEDURE — 84520 ASSAY OF UREA NITROGEN: CPT

## 2023-02-03 PROCEDURE — 85025 COMPLETE CBC W/AUTO DIFF WBC: CPT

## 2023-02-03 PROCEDURE — 36591 DRAW BLOOD OFF VENOUS DEVICE: CPT

## 2023-02-03 PROCEDURE — 80076 HEPATIC FUNCTION PANEL: CPT

## 2023-02-03 PROCEDURE — 82728 ASSAY OF FERRITIN: CPT

## 2023-02-03 PROCEDURE — 82565 ASSAY OF CREATININE: CPT

## 2023-02-03 NOTE — PROGRESS NOTES
Education Record    Learner:  Patient    Disease / Diagnosis: CLL    Barriers / Limitations:  None   Comments:    Method:  Discussion   Comments:    General Topics:  Plan of care reviewed   Comments:    Outcome:  Shows understanding   Comments:

## 2023-05-09 ENCOUNTER — HOSPITAL ENCOUNTER (EMERGENCY)
Facility: HOSPITAL | Age: 58
Discharge: HOME | End: 2023-05-09
Attending: EMERGENCY MEDICINE
Payer: COMMERCIAL

## 2023-05-09 VITALS
WEIGHT: 152 LBS | SYSTOLIC BLOOD PRESSURE: 128 MMHG | HEIGHT: 65 IN | OXYGEN SATURATION: 100 % | DIASTOLIC BLOOD PRESSURE: 78 MMHG | RESPIRATION RATE: 14 BRPM | HEART RATE: 80 BPM | BODY MASS INDEX: 25.33 KG/M2 | TEMPERATURE: 98 F

## 2023-05-09 DIAGNOSIS — G89.29 CHRONIC NONINTRACTABLE HEADACHE, UNSPECIFIED HEADACHE TYPE: Primary | ICD-10-CM

## 2023-05-09 DIAGNOSIS — R51.9 CHRONIC NONINTRACTABLE HEADACHE, UNSPECIFIED HEADACHE TYPE: Primary | ICD-10-CM

## 2023-05-09 LAB
ANION GAP SERPL CALC-SCNC: 2 MMOL/L (ref 0–18)
BASOPHILS # BLD AUTO: 0.05 X10(3) UL (ref 0–0.2)
BASOPHILS NFR BLD AUTO: 1.1 %
BUN BLD-MCNC: 14 MG/DL (ref 7–18)
CALCIUM BLD-MCNC: 8.7 MG/DL (ref 8.5–10.1)
CHLORIDE SERPL-SCNC: 113 MMOL/L (ref 98–112)
CO2 SERPL-SCNC: 24 MMOL/L (ref 21–32)
CREAT BLD-MCNC: 0.92 MG/DL
EOSINOPHIL # BLD AUTO: 0.1 X10(3) UL (ref 0–0.7)
EOSINOPHIL NFR BLD AUTO: 2.2 %
ERYTHROCYTE [DISTWIDTH] IN BLOOD BY AUTOMATED COUNT: 13.2 %
GFR SERPLBLD BASED ON 1.73 SQ M-ARVRAT: 73 ML/MIN/1.73M2 (ref 60–?)
GLUCOSE BLD-MCNC: 88 MG/DL (ref 70–99)
HCT VFR BLD AUTO: 34.4 %
HGB BLD-MCNC: 11.7 G/DL
IMM GRANULOCYTES # BLD AUTO: 0.02 X10(3) UL (ref 0–1)
IMM GRANULOCYTES NFR BLD: 0.4 %
LYMPHOCYTES # BLD AUTO: 1.09 X10(3) UL (ref 1–4)
LYMPHOCYTES NFR BLD AUTO: 24.2 %
MCH RBC QN AUTO: 32.1 PG (ref 26–34)
MCHC RBC AUTO-ENTMCNC: 34 G/DL (ref 31–37)
MCV RBC AUTO: 94.2 FL
MONOCYTES # BLD AUTO: 0.41 X10(3) UL (ref 0.1–1)
MONOCYTES NFR BLD AUTO: 9.1 %
NEUTROPHILS # BLD AUTO: 2.83 X10 (3) UL (ref 1.5–7.7)
NEUTROPHILS # BLD AUTO: 2.83 X10(3) UL (ref 1.5–7.7)
NEUTROPHILS NFR BLD AUTO: 63 %
OSMOLALITY SERPL CALC.SUM OF ELEC: 288 MOSM/KG (ref 275–295)
PLATELET # BLD AUTO: 156 10(3)UL (ref 150–450)
POTASSIUM SERPL-SCNC: 4.1 MMOL/L (ref 3.5–5.1)
RBC # BLD AUTO: 3.65 X10(6)UL
SODIUM SERPL-SCNC: 139 MMOL/L (ref 136–145)
WBC # BLD AUTO: 4.5 X10(3) UL (ref 4–11)

## 2023-05-09 PROCEDURE — 99285 EMERGENCY DEPT VISIT HI MDM: CPT

## 2023-05-09 PROCEDURE — 96376 TX/PRO/DX INJ SAME DRUG ADON: CPT

## 2023-05-09 PROCEDURE — 96361 HYDRATE IV INFUSION ADD-ON: CPT

## 2023-05-09 PROCEDURE — 96375 TX/PRO/DX INJ NEW DRUG ADDON: CPT

## 2023-05-09 PROCEDURE — 80048 BASIC METABOLIC PNL TOTAL CA: CPT | Performed by: EMERGENCY MEDICINE

## 2023-05-09 PROCEDURE — 85025 COMPLETE CBC W/AUTO DIFF WBC: CPT | Performed by: EMERGENCY MEDICINE

## 2023-05-09 PROCEDURE — 96374 THER/PROPH/DIAG INJ IV PUSH: CPT

## 2023-05-09 PROCEDURE — 96372 THER/PROPH/DIAG INJ SC/IM: CPT

## 2023-05-09 RX ORDER — PROCHLORPERAZINE EDISYLATE 5 MG/ML
5 INJECTION INTRAMUSCULAR; INTRAVENOUS ONCE
Status: COMPLETED | OUTPATIENT
Start: 2023-05-09 | End: 2023-05-09

## 2023-05-09 RX ORDER — HALOPERIDOL 5 MG/ML
5 INJECTION INTRAMUSCULAR ONCE
Status: COMPLETED | OUTPATIENT
Start: 2023-05-09 | End: 2023-05-09

## 2023-05-09 RX ORDER — LORAZEPAM 2 MG/ML
1 INJECTION INTRAMUSCULAR ONCE
Status: COMPLETED | OUTPATIENT
Start: 2023-05-09 | End: 2023-05-09

## 2023-05-09 RX ORDER — DIPHENHYDRAMINE HYDROCHLORIDE 50 MG/ML
25 INJECTION INTRAMUSCULAR; INTRAVENOUS ONCE
Status: COMPLETED | OUTPATIENT
Start: 2023-05-09 | End: 2023-05-09

## 2023-05-09 NOTE — ED INITIAL ASSESSMENT (HPI)
Migraine x 2 weeks. Spoke to her neurologist and the headache clinic [place] and \"tried all the medicines\" so was told to go to the ER.

## 2023-05-10 NOTE — DISCHARGE INSTRUCTIONS
Please continue to follow with your neurologist for your headaches going forward. Return for any concerning symptoms.

## 2023-05-16 ENCOUNTER — NURSE ONLY (OUTPATIENT)
Dept: HEMATOLOGY/ONCOLOGY | Facility: HOSPITAL | Age: 58
End: 2023-05-16
Attending: INTERNAL MEDICINE
Payer: COMMERCIAL

## 2023-05-16 ENCOUNTER — APPOINTMENT (OUTPATIENT)
Dept: HEMATOLOGY/ONCOLOGY | Age: 58
End: 2023-05-16
Attending: INTERNAL MEDICINE
Payer: COMMERCIAL

## 2023-05-16 VITALS
TEMPERATURE: 98 F | BODY MASS INDEX: 25 KG/M2 | OXYGEN SATURATION: 97 % | HEART RATE: 75 BPM | RESPIRATION RATE: 16 BRPM | HEIGHT: 65 IN | SYSTOLIC BLOOD PRESSURE: 110 MMHG | DIASTOLIC BLOOD PRESSURE: 74 MMHG

## 2023-05-16 DIAGNOSIS — Z86.718 HISTORY OF DVT (DEEP VEIN THROMBOSIS): ICD-10-CM

## 2023-05-16 DIAGNOSIS — M06.00 SERONEGATIVE RHEUMATOID ARTHRITIS (HCC): ICD-10-CM

## 2023-05-16 DIAGNOSIS — Z79.899 ENCOUNTER FOR LONG-TERM (CURRENT) USE OF HIGH-RISK MEDICATION: ICD-10-CM

## 2023-05-16 DIAGNOSIS — M32.19 SYSTEMIC LUPUS ERYTHEMATOSUS WITH OTHER ORGAN INVOLVEMENT, UNSPECIFIED SLE TYPE (HCC): ICD-10-CM

## 2023-05-16 DIAGNOSIS — D50.0 IRON DEFICIENCY ANEMIA DUE TO CHRONIC BLOOD LOSS: ICD-10-CM

## 2023-05-16 DIAGNOSIS — D50.0 IRON DEFICIENCY ANEMIA DUE TO CHRONIC BLOOD LOSS: Primary | ICD-10-CM

## 2023-05-16 LAB
ALBUMIN SERPL-MCNC: 4.2 G/DL (ref 3.4–5)
ALP LIVER SERPL-CCNC: 76 U/L
ALT SERPL-CCNC: 31 U/L
AST SERPL-CCNC: 28 U/L (ref 15–37)
BASOPHILS # BLD AUTO: 0.03 X10(3) UL (ref 0–0.2)
BASOPHILS NFR BLD AUTO: 0.6 %
BILIRUB DIRECT SERPL-MCNC: <0.1 MG/DL (ref 0–0.2)
BILIRUB SERPL-MCNC: 0.4 MG/DL (ref 0.1–2)
BUN BLD-MCNC: 20 MG/DL (ref 7–18)
CREAT BLD-MCNC: 0.98 MG/DL
CRP SERPL-MCNC: <0.29 MG/DL (ref ?–0.3)
DEPRECATED HBV CORE AB SER IA-ACNC: 154.9 NG/ML
EOSINOPHIL # BLD AUTO: 0.12 X10(3) UL (ref 0–0.7)
EOSINOPHIL NFR BLD AUTO: 2.5 %
ERYTHROCYTE [DISTWIDTH] IN BLOOD BY AUTOMATED COUNT: 13.2 %
ERYTHROCYTE [SEDIMENTATION RATE] IN BLOOD: 14 MM/HR
GFR SERPLBLD BASED ON 1.73 SQ M-ARVRAT: 68 ML/MIN/1.73M2 (ref 60–?)
HCT VFR BLD AUTO: 39.5 %
HGB BLD-MCNC: 13.3 G/DL
IMM GRANULOCYTES # BLD AUTO: 0.02 X10(3) UL (ref 0–1)
IMM GRANULOCYTES NFR BLD: 0.4 %
IRON SATN MFR SERPL: 18 %
IRON SERPL-MCNC: 69 UG/DL
LYMPHOCYTES # BLD AUTO: 0.99 X10(3) UL (ref 1–4)
LYMPHOCYTES NFR BLD AUTO: 20.5 %
MCH RBC QN AUTO: 32.2 PG (ref 26–34)
MCHC RBC AUTO-ENTMCNC: 33.7 G/DL (ref 31–37)
MCV RBC AUTO: 95.6 FL
MONOCYTES # BLD AUTO: 0.44 X10(3) UL (ref 0.1–1)
MONOCYTES NFR BLD AUTO: 9.1 %
NEUTROPHILS # BLD AUTO: 3.24 X10 (3) UL (ref 1.5–7.7)
NEUTROPHILS # BLD AUTO: 3.24 X10(3) UL (ref 1.5–7.7)
NEUTROPHILS NFR BLD AUTO: 66.9 %
PLATELET # BLD AUTO: 184 10(3)UL (ref 150–450)
PROT SERPL-MCNC: 7.4 G/DL (ref 6.4–8.2)
RBC # BLD AUTO: 4.13 X10(6)UL
TIBC SERPL-MCNC: 380 UG/DL (ref 240–450)
TRANSFERRIN SERPL-MCNC: 255 MG/DL (ref 200–360)
VIT D+METAB SERPL-MCNC: 52.1 NG/ML (ref 30–100)
WBC # BLD AUTO: 4.8 X10(3) UL (ref 4–11)

## 2023-05-16 PROCEDURE — 99214 OFFICE O/P EST MOD 30 MIN: CPT | Performed by: INTERNAL MEDICINE

## 2023-05-16 PROCEDURE — 86480 TB TEST CELL IMMUN MEASURE: CPT

## 2023-05-16 PROCEDURE — 84520 ASSAY OF UREA NITROGEN: CPT

## 2023-05-16 PROCEDURE — 36591 DRAW BLOOD OFF VENOUS DEVICE: CPT

## 2023-05-16 PROCEDURE — 82565 ASSAY OF CREATININE: CPT

## 2023-05-16 PROCEDURE — 86140 C-REACTIVE PROTEIN: CPT

## 2023-05-16 PROCEDURE — 80076 HEPATIC FUNCTION PANEL: CPT

## 2023-05-16 PROCEDURE — 85025 COMPLETE CBC W/AUTO DIFF WBC: CPT

## 2023-05-16 PROCEDURE — 82306 VITAMIN D 25 HYDROXY: CPT

## 2023-05-16 PROCEDURE — 85652 RBC SED RATE AUTOMATED: CPT

## 2023-05-16 NOTE — PROGRESS NOTES
Education Record    Learner:  Patient and Spouse    Disease / Diagnosis:    Barriers / Limitations:  None   Comments:    Method:  Discussion   Comments:    General Topics:  Side effects and symptom management and Plan of care reviewed   Comments:    Outcome:  Shows understanding   Comments:    Here for follow up. Her restless leg syndrome has been really bad per report. Stopped taking her methotrexate on her own. Needs labs today.

## 2023-05-18 LAB
M TB IFN-G CD4+ T-CELLS BLD-ACNC: 0.11 IU/ML
M TB TUBERC IFN-G BLD QL: NEGATIVE
M TB TUBERC IGNF/MITOGEN IGNF CONTROL: >10 IU/ML
QFT TB1 AG MINUS NIL: -0.01 IU/ML
QFT TB2 AG MINUS NIL: 0 IU/ML

## 2023-06-02 ENCOUNTER — APPOINTMENT (OUTPATIENT)
Dept: HEMATOLOGY/ONCOLOGY | Facility: HOSPITAL | Age: 58
End: 2023-06-02
Attending: INTERNAL MEDICINE
Payer: COMMERCIAL

## 2023-06-28 RX ORDER — MYCOPHENOLATE MOFETIL 250 MG/1
500 CAPSULE ORAL
COMMUNITY

## 2023-07-06 RX ORDER — HYDROCODONE BITARTRATE AND ACETAMINOPHEN 5; 325 MG/1; MG/1
1 TABLET ORAL EVERY 4 HOURS PRN
Qty: 10 TABLET | Refills: 0 | Status: SHIPPED | OUTPATIENT
Start: 2023-07-06

## 2023-07-07 ENCOUNTER — HOSPITAL ENCOUNTER (OUTPATIENT)
Facility: HOSPITAL | Age: 58
Setting detail: HOSPITAL OUTPATIENT SURGERY
Discharge: HOME | End: 2023-07-07
Attending: ORTHOPAEDIC SURGERY | Admitting: ORTHOPAEDIC SURGERY
Payer: COMMERCIAL

## 2023-07-07 ENCOUNTER — ANESTHESIA (OUTPATIENT)
Dept: SURGERY | Facility: HOSPITAL | Age: 58
End: 2023-07-07
Payer: COMMERCIAL

## 2023-07-07 ENCOUNTER — ANESTHESIA EVENT (OUTPATIENT)
Dept: SURGERY | Facility: HOSPITAL | Age: 58
End: 2023-07-07
Payer: COMMERCIAL

## 2023-07-07 VITALS
BODY MASS INDEX: 24.49 KG/M2 | WEIGHT: 147 LBS | TEMPERATURE: 98 F | HEART RATE: 47 BPM | SYSTOLIC BLOOD PRESSURE: 133 MMHG | DIASTOLIC BLOOD PRESSURE: 75 MMHG | RESPIRATION RATE: 16 BRPM | OXYGEN SATURATION: 98 % | HEIGHT: 65 IN

## 2023-07-07 DIAGNOSIS — Z01.818 PRE-OP TESTING: Primary | ICD-10-CM

## 2023-07-07 PROBLEM — M65.312 TRIGGER FINGER OF LEFT THUMB: Status: ACTIVE | Noted: 2023-07-07

## 2023-07-07 PROCEDURE — 0LN80ZZ RELEASE LEFT HAND TENDON, OPEN APPROACH: ICD-10-PCS | Performed by: ORTHOPAEDIC SURGERY

## 2023-07-07 RX ORDER — HYDROMORPHONE HYDROCHLORIDE 1 MG/ML
0.4 INJECTION, SOLUTION INTRAMUSCULAR; INTRAVENOUS; SUBCUTANEOUS EVERY 5 MIN PRN
Status: DISCONTINUED | OUTPATIENT
Start: 2023-07-07 | End: 2023-07-07

## 2023-07-07 RX ORDER — MORPHINE SULFATE 4 MG/ML
4 INJECTION, SOLUTION INTRAMUSCULAR; INTRAVENOUS EVERY 10 MIN PRN
Status: DISCONTINUED | OUTPATIENT
Start: 2023-07-07 | End: 2023-07-07

## 2023-07-07 RX ORDER — HYDROCODONE BITARTRATE AND ACETAMINOPHEN 5; 325 MG/1; MG/1
1 TABLET ORAL ONCE
Status: COMPLETED | OUTPATIENT
Start: 2023-07-07 | End: 2023-07-07

## 2023-07-07 RX ORDER — MORPHINE SULFATE 4 MG/ML
2 INJECTION, SOLUTION INTRAMUSCULAR; INTRAVENOUS EVERY 10 MIN PRN
Status: DISCONTINUED | OUTPATIENT
Start: 2023-07-07 | End: 2023-07-07

## 2023-07-07 RX ORDER — SODIUM CHLORIDE, SODIUM LACTATE, POTASSIUM CHLORIDE, CALCIUM CHLORIDE 600; 310; 30; 20 MG/100ML; MG/100ML; MG/100ML; MG/100ML
INJECTION, SOLUTION INTRAVENOUS CONTINUOUS
Status: DISCONTINUED | OUTPATIENT
Start: 2023-07-07 | End: 2023-07-07

## 2023-07-07 RX ORDER — HYDROMORPHONE HYDROCHLORIDE 1 MG/ML
0.6 INJECTION, SOLUTION INTRAMUSCULAR; INTRAVENOUS; SUBCUTANEOUS EVERY 5 MIN PRN
Status: DISCONTINUED | OUTPATIENT
Start: 2023-07-07 | End: 2023-07-07

## 2023-07-07 RX ORDER — NALOXONE HYDROCHLORIDE 0.4 MG/ML
80 INJECTION, SOLUTION INTRAMUSCULAR; INTRAVENOUS; SUBCUTANEOUS AS NEEDED
Status: DISCONTINUED | OUTPATIENT
Start: 2023-07-07 | End: 2023-07-07

## 2023-07-07 RX ORDER — HYDROCODONE BITARTRATE AND ACETAMINOPHEN 5; 325 MG/1; MG/1
1 TABLET ORAL EVERY 4 HOURS PRN
Qty: 10 TABLET | Refills: 0 | Status: SHIPPED | OUTPATIENT
Start: 2023-07-07

## 2023-07-07 RX ORDER — ONDANSETRON 2 MG/ML
4 INJECTION INTRAMUSCULAR; INTRAVENOUS EVERY 6 HOURS PRN
Status: DISCONTINUED | OUTPATIENT
Start: 2023-07-07 | End: 2023-07-07

## 2023-07-07 RX ORDER — HYDROMORPHONE HYDROCHLORIDE 1 MG/ML
0.2 INJECTION, SOLUTION INTRAMUSCULAR; INTRAVENOUS; SUBCUTANEOUS EVERY 5 MIN PRN
Status: DISCONTINUED | OUTPATIENT
Start: 2023-07-07 | End: 2023-07-07

## 2023-07-07 RX ORDER — ACETAMINOPHEN 500 MG
1000 TABLET ORAL ONCE
Status: COMPLETED | OUTPATIENT
Start: 2023-07-07 | End: 2023-07-07

## 2023-07-07 RX ORDER — MORPHINE SULFATE 10 MG/ML
6 INJECTION, SOLUTION INTRAMUSCULAR; INTRAVENOUS EVERY 10 MIN PRN
Status: DISCONTINUED | OUTPATIENT
Start: 2023-07-07 | End: 2023-07-07

## 2023-07-07 RX ADMIN — SODIUM CHLORIDE, SODIUM LACTATE, POTASSIUM CHLORIDE, CALCIUM CHLORIDE: 600; 310; 30; 20 INJECTION, SOLUTION INTRAVENOUS at 17:02:00

## 2023-07-07 RX ADMIN — SODIUM CHLORIDE, SODIUM LACTATE, POTASSIUM CHLORIDE, CALCIUM CHLORIDE: 600; 310; 30; 20 INJECTION, SOLUTION INTRAVENOUS at 16:30:00

## 2023-07-07 NOTE — BRIEF OP NOTE
Pre-Operative Diagnosis: Trigger thumb of left hand     Post-Operative Diagnosis: Trigger thumb of left hand      Procedure Performed:   Left thumb trigger release    Surgeon(s) and Role:     Sulma Butts MD - Primary    Assistant(s):        Surgical Findings: released     Specimen: none     Estimated Blood Loss: minimal    Dictation Number:  #[id number]    Elaina Jenkins MD  7/7/2023  4:57 PM

## 2023-07-07 NOTE — H&P
Camron Gallo  155/1965  65year old  female  Gael Silva MD    HPI:  Patient presents with:  Left Thumb - Pain, Stiffness  Right Thumb - Pain, Stiffness  Right Middle Finger - Pain, Stiffness  Right Ring Finger - Pain, Stiffness    The patient complains of pain located left thumb as well as right thumb, middle, and ring fingers. The pain is recurrent and increasing on her left thumb and new in her right hand without injury. She has triggering and locking, Pain is worse in the morning. She has recently stopped methotrexate due to side effects. The patient denies numbness and tingling. The patient denies skin laceration or breakdown. ALLERGIES:    Cipro [Ciprofloxaci* OTHER (SEE COMMENTS)  Comment:Drug induced hepatitis  Epinephrine SWELLING  Comment:Patient does not recall this allergy, thinks it  possibly made her restless but is unsure of the  event. Heparin OTHER (SEE COMMENTS)  Comment:HIT-Heparin \"eats up red blood cells\"  Lidocaine SWELLING  Comment:Lidocaine patch  Lyrica [Pregabalin] ANGIOEDEMA  Radiology Contrast * RASH, OTHER (SEE COMMENTS)  Comment:History of iodine allergy and chronic renal  failure  Topamax [Topiramate] ANAPHYLAXIS  Toradol OTHER (SEE COMMENTS)  Comment:Patient has stage 3 kidney failure  Betadine [Povidone * RASH  Compazine NAUSEA ONLY  Comment:Hyperactivity, restlessness, HA,  Dilaudid [Hydromorp* OTHER (SEE COMMENTS)  Comment:HYPERACTIVITY  Feraheme [Ferumoxyt* FACE FLUSHING  Latex RASH  Comment:POWDER \"CHOKES\" HER  Phenergan [Prometha* OTHER (SEE COMMENTS)  Comment:HYPERACTIVITY  Reglan [Metoclopram* OTHER (SEE COMMENTS)  Comment:Restless legs, skin crawling  Pcn [Bicillin L-A] RASH  Comment:ampicillin  Current Outpatient Medications  Medication Sig Dispense Refill  clonazePAM 0.125 MG Oral Tablet Dispersible Take 1 tablet (0.125 mg total) by mouth nightly as needed.  20 tablet 0  ubrogepant (UBRELVY) 50 MG Oral Tab TAKE 1 TABLET BY MOUTH AT ONSET OF MIGRAINE. MAY REPEAT 1 TABLET IN 2 HOURS AS NEEDED 10 tablet 2  Mycophenolate Mofetil (CELLCEPT) 500 MG Oral Tab Take 1 tablet (500 mg total) by mouth 2 (two) times daily. 60 tablet 1  ergocalciferol 1.25 MG (21958 UT) Oral Cap Take 1 capsule (50,000 Units total) by mouth every 14 (fourteen) days. 6 capsule 1  folic acid 1 MG Oral Tab Take 2 tablets (2 mg total) by mouth daily. 180 tablet 3  traMADol 50 MG Oral Tab 1 po hs prn for restless leg 10 tablet 0  belimumab (BENLYSTA) 200 MG/ML Subcutaneous Solution Auto-injector Inject 200 mg into the skin once a week. 12 mL 0  Erenumab-aooe (AIMOVIG) 140 MG/ML Subcutaneous Solution Auto-injector INJECT 1 ML (140 MG TOTAL) INTO THE SKIN EVERY 30 DAYS 3 mL 1  Cevimeline HCl 30 MG Oral Cap Take 1 capsule (30 mg total) by mouth 3 (three) times daily. 270 capsule 0  gabapentin 300 MG Oral Cap Take 2 capsules (600 mg total) by mouth 3 (three) times daily. 180 capsule 3  Insulin Syringe-Needle U-100 (BD INSULIN SYRINGE) 25G X 5/8\" 1 ML Does not apply Misc Use weekly for methotrexate injections 12 each 3  ondansetron (ZOFRAN) 4 mg tablet Take 1 tablet (4 mg total) by mouth every 8 (eight) hours as needed for Nausea. 20 tablet 1  ROPINIROLE HCL ER 2 MG Oral Tablet 24 Hr TAKE 1 TABLET DAILY 90 tablet 3  metoprolol tartrate 25 MG Oral Tab Take 25 mg by mouth daily. rOPINIRole 0.25 MG Oral Tab TAKE 1 TABLET BY MOUTH NIGHTLY BUT CAN USE AN EXTRA TABLET IF NEEDED 180 tablet 3  rivaroxaban (XARELTO) 20 MG Oral Tab Take 1 tablet (20 mg total) by mouth every evening. 90 tablet 3  cyanocobalamin 1000 MCG/ML Injection Solution Inject 1,000 mcg into the muscle every 30 (thirty) days. Nitrofurantoin Macrocrystal 100 MG Oral Cap Take 1 capsule (100 mg total) by mouth daily. Take as prescribed (Patient taking differently: Take 100 mg by mouth as needed. Take as prescribed) 90 capsule 2  Estradiol 2 MG Vaginal Ring Place 2 mg vaginally every 3 (three) months.  1 each 4  ValACYclovir HCl 500 MG Oral Tab Take 500 mg by mouth 2 (two) times daily. allopurinol 100 MG Oral Tab Take 100 mg by mouth nightly. Fenofibrate 145 MG Oral Tab Take 145 mg by mouth every other day. Esomeprazole Magnesium 40 MG Oral Capsule Delayed Release Take 40 mg by mouth 2 (two) times daily. Sertraline HCl (ZOLOFT) 100 MG Oral Tab Take 150 mg by mouth daily.     HISTORY:  Past Medical History:  Diagnosis Date  Arrhythmia  atrial tachycardia, svt  Back problem  Calculus of kidney  Connective tissue disease, undifferentiated (HCC)  Deep vein thrombosis ([address] 2016  Multiple DVTs: 2 in leg and 2 in arm  DEPRESSION  Depression  Esophageal reflux  Fibromyalgia  Gout  Hepatitis 2011  from Medication  High blood pressure  High cholesterol  History of blood transfusion 2014  [facility]  HYPERLIPIDEMIA  HYPERTENSION  IBS (irritable bowel syndrome)  Irritable bowel syndrome  Kidney disease  RT Kidney no longer functions; LT kidney working about 30%; presently has hydronephrosi s and kidney stones  KIDNEY STONE  Lupus (systemic lupus erythematosus) ([facility])  Migraines  bright lights,wine,sulfites,cheeses,sweeteners,barometeric pressure  Muscle weakness  arm weakness  Neuropathy  Boths legs  OBESITY  Osteoarthritis  Pleurisy  Pneumonia due to organism 05/2017  PONV (postoperative nausea and vomiting)  Raynaud disease  Renal disorder  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 Paris Gongora for endometriosis  NDSC ABLATION & RCNSTJ ATRIA LMTD W/O BYPASS 08/2021  OTHER 11/2015  reconstructive surgery of left thumb joint  OTHER 8/2015  vaginal sling  OTHER Right  WRIST SURGERY  OTHER SURGICAL HISTORY  parathyroidectomy  OTHER SURGICAL HISTORY Right 2018  wrist surgery  OTHER SURGICAL HISTORY Left 2018  left wrist surgery  PORT, INDWELLING, IMP 2019  [facility] Port  REPAIR ENTEROCELE,VAG THE [facility] 2012  REPAIR OF RECTOCELE 2012  TONSILLECTOMY  TOTAL HIP REPLACEMENT Right    Family History  Problem Relation Age of Onset  Hypertension Father  Prostate Cancer Father  High Cholesterol Father  Hypertension Mother    Social History  Tobacco Use  Smoking status: Never  Smokeless tobacco: Never  Vaping Use  Vaping Use: Never used  Alcohol use: Not Currently  Alcohol/week: 0.0 standard drinks of alcohol  Drug use: Yes  Frequency: 7.0 times per week  Types: Cannabis  Comment: Gummies, oils        EXAM:  There were no vitals taken for this visit. The patient is awake and oriented x 3 and overall well appearing. The patient has normal mood. The patient is non-tender and atraumatic with the exception of their bilateral upper extremities. The patient's skin is intact and compartments are soft. The patient's upper extremities are warm and pink with brisk capillary refill and 2+ radial pulses. Sensation is intact to light touch in axillary, median, ulnar, and radial nerve distributions. The patient has 5/5 strength in finger flexion, finger extension, EPL, FPL, and interosseous muscle function. The patient has tenderness to palpation at the bilateral thumb, right middle, and right ring finger A1 pulleys and evidence of triggering on examination today. The patient has preserved full composite motion of their fingers.     IMAGING AND TESTING:  Glucose 88  Albumin 4.1    ASSESSMENT AND PLAN:  Trigger thumb of left hand (primary encounter diagnosis)  Trigger finger of right thumb  Trigger middle finger of right hand  Trigger ring finger of right hand  Inflammatory polyarthritis (hcc)    The risks, indications, benefits, and procedures of both operative and non-operative treatment were discussed with the patient. The patient desired surgery on her left thumb. Surgery recommended was a left thumb trigger release. Risks include bleeding, infection, neurovascular injury, failure of the procedure, stiffness, and potential need for additional surgery as well as anesthetic complications including death. The patient consented to the procedure. The patient desired injection for her right hand triggers. Risks include injection site tenderness, bleeding, infection, elevated blood glucose levels, failure of the procedure, and recurrence. Under sterile conditions and following informed consent, the patient was injected with lidocaine and celestone in their right thumb, middle, and ring finger flexor tendon sheath. The patient tolerated the procedure well and there were no complications. The patient will monitor the results of the injection. All of their questions were answered and they are in agreement with the treatment plan.     The patient will return to clinic in 2 weeks postop

## 2023-07-07 NOTE — OPERATIVE REPORT
[facility]    PATIENT'S NAME: Sinan Hernandez   ATTENDING PHYSICIAN: Marlene Feng MD   OPERATING PHYSICIAN: Marlene Feng MD   PATIENT ACCOUNT#:   [de-identified]    LOCATION:  [place] [address]  MEDICAL RECORD #:   [id number]       YOB: 1965  ADMISSION DATE:       07/07/2023      OPERATION DATE:  07/07/2023    OPERATIVE REPORT      PREOPERATIVE DIAGNOSIS:  Left thumb trigger thumb. POSTOPERATIVE DIAGNOSIS:  Left thumb trigger thumb. PROCEDURE:  Left thumb trigger release. ASSISTANT:  None. ANESTHESIA:  Sedation and supplemental local anesthesia. BLOOD LOSS:  Minimal.    COMPLICATIONS:  None. CONDITION:  The patient was aroused from anesthesia and transferred to recovery room in stable condition. INDICATIONS:  The patient is a 81-year-old female who based on history and physical examination was diagnosed as having a left thumb trigger thumb. She failed nonoperative treatment and desired surgery. The surgery recommended was a left thumb trigger release. Risks include bleeding, infection, neurovascular injury, chronic pain, chronic disability, failure of the procedure, stiffness, recurrence, potential need for additional surgery, as well as anesthetic complications including death. The patient consented to the procedure. All of her questions were answered, and she was in agreement with the treatment plan. OPERATIVE TECHNIQUE:  On 07/07/2023, the patient was seen and evaluated preoperatively. Her left thumb was identified as the correct operative site. My initials were placed. She was transferred to the operating room and transferred to the operating table in supine position. Sedation was induced, and under sterile conditions, local anesthesia with 1% lidocaine and 0.5% Marcaine in a 1:1 mixture was given in the form of a digital nerve block to her left thumb. Her left upper extremity was prepped and draped in a sterile fashion.   A tourniquet was inflated to 250 mmHg. The patient had a small transverse incision made at her left thumb proximal flexion crease with blunt dissection to the level of the A1 pulley. The radial digital and ulnar digital nerves were identified and protected and retracted. The A1 pulley was then released from proximal to distal extent. There was superficial fraying of the FDL tendon. One slip of the A1 pulley was excised. The wound was then thoroughly and copiously irrigated, and skin was closed with 4-0 Prolene in a horizontal mattress fashion. A large sterile bulky soft dressing was placed. Tourniquet was deflated following wound closure. Blood loss was minimal.  Complications were none. Condition, the patient was aroused from anesthesia and transferred to recovery room in stable condition. DISPOSITION:  She was discharged to home in stable condition with a pain prescription, written instructions, and 24-hour access to emergency number. She will return to clinic in 10 to 14 days for repeat clinical evaluation and suture removal.  All of her questions were answered, and she was in agreement with the treatment plan.      Dictated By Willis Padgett MD  d: 07/07/2023 17:02:29  t: 07/07/2023 18:05:39  Job [id number]  [facility]/

## 2023-08-16 ENCOUNTER — NURSE ONLY (OUTPATIENT)
Dept: HEMATOLOGY/ONCOLOGY | Facility: HOSPITAL | Age: 58
End: 2023-08-16
Attending: INTERNAL MEDICINE
Payer: COMMERCIAL

## 2023-08-16 DIAGNOSIS — K52.9 IBD (INFLAMMATORY BOWEL DISEASE): Primary | ICD-10-CM

## 2023-08-16 LAB
CRP SERPL-MCNC: <0.29 MG/DL (ref ?–0.3)
IGA SERPL-MCNC: 95.3 MG/DL (ref 70–312)

## 2023-08-16 PROCEDURE — 86255 FLUORESCENT ANTIBODY SCREEN: CPT | Performed by: INTERNAL MEDICINE

## 2023-08-16 PROCEDURE — 86364 TISS TRNSGLTMNASE EA IG CLAS: CPT

## 2023-08-16 PROCEDURE — 86140 C-REACTIVE PROTEIN: CPT | Performed by: INTERNAL MEDICINE

## 2023-08-16 PROCEDURE — 36591 DRAW BLOOD OFF VENOUS DEVICE: CPT

## 2023-08-16 PROCEDURE — 82784 ASSAY IGA/IGD/IGG/IGM EACH: CPT

## 2023-08-16 PROCEDURE — 80299 QUANTITATIVE ASSAY DRUG: CPT | Performed by: INTERNAL MEDICINE

## 2023-08-16 PROCEDURE — 82397 CHEMILUMINESCENT ASSAY: CPT | Performed by: INTERNAL MEDICINE

## 2023-08-16 PROCEDURE — 86671 FUNGUS NES ANTIBODY: CPT | Performed by: INTERNAL MEDICINE

## 2023-08-16 NOTE — PROGRESS NOTES
Education Record    Learner:  Patient    Disease / Diagnosis:CLL for Dr. Severiano Larsen    Barriers / Limitations:  None   Comments:    Method:  Discussion   Comments:    General Topics:  Plan of care reviewed   Comments:    Outcome:  Shows understanding   Comments:

## 2023-08-17 LAB — TTG IGA SER-ACNC: 0.2 U/ML (ref ?–7)

## 2023-08-18 LAB
S CEREVISIAE IGA: <20 UNITS
S CEREVISIAE IGG: <20 UNITS

## 2023-08-25 LAB
ADALIMUMAB LVL: <0.6 UG/ML
ANTI-ADALIMUMAB AB: <25 NG/ML

## 2023-08-29 ENCOUNTER — ANESTHESIA (OUTPATIENT)
Dept: ENDOSCOPY | Facility: HOSPITAL | Age: 58
End: 2023-08-29
Payer: COMMERCIAL

## 2023-08-29 ENCOUNTER — HOSPITAL ENCOUNTER (OUTPATIENT)
Facility: HOSPITAL | Age: 58
Setting detail: HOSPITAL OUTPATIENT SURGERY
Discharge: HOME | End: 2023-08-29
Attending: INTERNAL MEDICINE | Admitting: INTERNAL MEDICINE
Payer: COMMERCIAL

## 2023-08-29 ENCOUNTER — ANESTHESIA EVENT (OUTPATIENT)
Dept: ENDOSCOPY | Facility: HOSPITAL | Age: 58
End: 2023-08-29
Payer: COMMERCIAL

## 2023-08-29 VITALS
DIASTOLIC BLOOD PRESSURE: 58 MMHG | SYSTOLIC BLOOD PRESSURE: 103 MMHG | HEIGHT: 65 IN | BODY MASS INDEX: 23.99 KG/M2 | TEMPERATURE: 97 F | OXYGEN SATURATION: 98 % | HEART RATE: 66 BPM | WEIGHT: 144 LBS | RESPIRATION RATE: 18 BRPM

## 2023-08-29 PROCEDURE — 0DBE8ZX EXCISION OF LARGE INTESTINE, VIA NATURAL OR ARTIFICIAL OPENING ENDOSCOPIC, DIAGNOSTIC: ICD-10-PCS | Performed by: INTERNAL MEDICINE

## 2023-08-29 PROCEDURE — 88305 TISSUE EXAM BY PATHOLOGIST: CPT | Performed by: INTERNAL MEDICINE

## 2023-08-29 RX ORDER — LIDOCAINE HYDROCHLORIDE 10 MG/ML
INJECTION, SOLUTION EPIDURAL; INFILTRATION; INTRACAUDAL; PERINEURAL AS NEEDED
Status: DISCONTINUED | OUTPATIENT
Start: 2023-08-29 | End: 2023-08-29 | Stop reason: SURG

## 2023-08-29 RX ORDER — SODIUM CHLORIDE, SODIUM LACTATE, POTASSIUM CHLORIDE, CALCIUM CHLORIDE 600; 310; 30; 20 MG/100ML; MG/100ML; MG/100ML; MG/100ML
INJECTION, SOLUTION INTRAVENOUS CONTINUOUS
Status: DISCONTINUED | OUTPATIENT
Start: 2023-08-29 | End: 2023-08-29

## 2023-08-29 RX ORDER — KETAMINE HYDROCHLORIDE 50 MG/ML
INJECTION, SOLUTION, CONCENTRATE INTRAMUSCULAR; INTRAVENOUS AS NEEDED
Status: DISCONTINUED | OUTPATIENT
Start: 2023-08-29 | End: 2023-08-29 | Stop reason: SURG

## 2023-08-29 RX ORDER — NALOXONE HYDROCHLORIDE 0.4 MG/ML
80 INJECTION, SOLUTION INTRAMUSCULAR; INTRAVENOUS; SUBCUTANEOUS AS NEEDED
Status: DISCONTINUED | OUTPATIENT
Start: 2023-08-29 | End: 2023-08-29

## 2023-08-29 RX ADMIN — LIDOCAINE HYDROCHLORIDE 30 MG: 10 INJECTION, SOLUTION EPIDURAL; INFILTRATION; INTRACAUDAL; PERINEURAL at 11:35:00

## 2023-08-29 RX ADMIN — SODIUM CHLORIDE, SODIUM LACTATE, POTASSIUM CHLORIDE, CALCIUM CHLORIDE: 600; 310; 30; 20 INJECTION, SOLUTION INTRAVENOUS at 11:32:00

## 2023-08-29 RX ADMIN — KETAMINE HYDROCHLORIDE 10 MG: 50 INJECTION, SOLUTION, CONCENTRATE INTRAMUSCULAR; INTRAVENOUS at 11:35:00

## 2023-08-30 PROBLEM — K58.9 IRRITABLE BOWEL SYNDROME: Status: ACTIVE | Noted: 2023-08-30

## 2023-08-30 RX ORDER — HYDROCODONE BITARTRATE AND ACETAMINOPHEN 5; 325 MG/1; MG/1
1 TABLET ORAL EVERY 4 HOURS PRN
Qty: 10 TABLET | Refills: 0 | Status: SHIPPED | OUTPATIENT
Start: 2023-08-30

## 2023-08-31 NOTE — PAT NURSING NOTE
Per Dr. Nick Quiroz, due to many allergies, no antibiotic indicated pre-operatively for this patient.

## 2023-09-01 ENCOUNTER — ANESTHESIA EVENT (OUTPATIENT)
Dept: SURGERY | Facility: HOSPITAL | Age: 58
End: 2023-09-01
Payer: COMMERCIAL

## 2023-09-01 ENCOUNTER — ANESTHESIA (OUTPATIENT)
Dept: SURGERY | Facility: HOSPITAL | Age: 58
End: 2023-09-01
Payer: COMMERCIAL

## 2023-09-01 ENCOUNTER — HOSPITAL ENCOUNTER (OUTPATIENT)
Facility: HOSPITAL | Age: 58
Setting detail: HOSPITAL OUTPATIENT SURGERY
Discharge: HOME | End: 2023-09-01
Attending: ORTHOPAEDIC SURGERY | Admitting: ORTHOPAEDIC SURGERY
Payer: COMMERCIAL

## 2023-09-01 VITALS
WEIGHT: 146 LBS | BODY MASS INDEX: 24.32 KG/M2 | OXYGEN SATURATION: 99 % | HEART RATE: 59 BPM | HEIGHT: 65 IN | TEMPERATURE: 97 F | SYSTOLIC BLOOD PRESSURE: 118 MMHG | RESPIRATION RATE: 16 BRPM | DIASTOLIC BLOOD PRESSURE: 66 MMHG

## 2023-09-01 PROBLEM — M65.341 TRIGGER FINGER, RIGHT RING FINGER: Status: ACTIVE | Noted: 2023-09-01

## 2023-09-01 PROBLEM — M65.331 TRIGGER FINGER, RIGHT MIDDLE FINGER: Status: ACTIVE | Noted: 2023-09-01

## 2023-09-01 PROCEDURE — 0LN70ZZ RELEASE RIGHT HAND TENDON, OPEN APPROACH: ICD-10-PCS | Performed by: ORTHOPAEDIC SURGERY

## 2023-09-01 RX ORDER — LIDOCAINE HYDROCHLORIDE 10 MG/ML
INJECTION, SOLUTION EPIDURAL; INFILTRATION; INTRACAUDAL; PERINEURAL AS NEEDED
Status: DISCONTINUED | OUTPATIENT
Start: 2023-09-01 | End: 2023-09-01 | Stop reason: HOSPADM

## 2023-09-01 RX ORDER — SODIUM CHLORIDE, SODIUM LACTATE, POTASSIUM CHLORIDE, CALCIUM CHLORIDE 600; 310; 30; 20 MG/100ML; MG/100ML; MG/100ML; MG/100ML
INJECTION, SOLUTION INTRAVENOUS CONTINUOUS
Status: DISCONTINUED | OUTPATIENT
Start: 2023-09-01 | End: 2023-09-01

## 2023-09-01 RX ORDER — ACETAMINOPHEN 500 MG
1000 TABLET ORAL ONCE
Status: COMPLETED | OUTPATIENT
Start: 2023-09-01 | End: 2023-09-01

## 2023-09-01 RX ORDER — MORPHINE SULFATE 2 MG/ML
2 INJECTION, SOLUTION INTRAMUSCULAR; INTRAVENOUS EVERY 10 MIN PRN
OUTPATIENT
Start: 2023-09-01

## 2023-09-01 RX ORDER — ONDANSETRON 2 MG/ML
4 INJECTION INTRAMUSCULAR; INTRAVENOUS EVERY 6 HOURS PRN
OUTPATIENT
Start: 2023-09-01

## 2023-09-01 RX ORDER — BUPIVACAINE HYDROCHLORIDE 5 MG/ML
INJECTION, SOLUTION EPIDURAL; INTRACAUDAL AS NEEDED
Status: DISCONTINUED | OUTPATIENT
Start: 2023-09-01 | End: 2023-09-01 | Stop reason: HOSPADM

## 2023-09-01 RX ORDER — MORPHINE SULFATE 10 MG/ML
6 INJECTION, SOLUTION INTRAMUSCULAR; INTRAVENOUS EVERY 10 MIN PRN
OUTPATIENT
Start: 2023-09-01

## 2023-09-01 RX ORDER — LABETALOL HYDROCHLORIDE 5 MG/ML
5 INJECTION, SOLUTION INTRAVENOUS EVERY 5 MIN PRN
OUTPATIENT
Start: 2023-09-01 | End: 2023-09-01

## 2023-09-01 RX ORDER — SODIUM CHLORIDE, SODIUM LACTATE, POTASSIUM CHLORIDE, CALCIUM CHLORIDE 600; 310; 30; 20 MG/100ML; MG/100ML; MG/100ML; MG/100ML
INJECTION, SOLUTION INTRAVENOUS CONTINUOUS
OUTPATIENT
Start: 2023-09-01

## 2023-09-01 RX ORDER — MORPHINE SULFATE 4 MG/ML
4 INJECTION, SOLUTION INTRAMUSCULAR; INTRAVENOUS EVERY 10 MIN PRN
OUTPATIENT
Start: 2023-09-01

## 2023-09-01 RX ORDER — NALOXONE HYDROCHLORIDE 0.4 MG/ML
0.08 INJECTION, SOLUTION INTRAMUSCULAR; INTRAVENOUS; SUBCUTANEOUS AS NEEDED
OUTPATIENT
Start: 2023-09-01 | End: 2023-09-01

## 2023-09-01 RX ADMIN — SODIUM CHLORIDE, SODIUM LACTATE, POTASSIUM CHLORIDE, CALCIUM CHLORIDE: 600; 310; 30; 20 INJECTION, SOLUTION INTRAVENOUS at 13:04:00

## 2023-09-01 NOTE — BRIEF OP NOTE
Pre-Operative Diagnosis: Trigger middle finger of right hand, trigger ring finger of right hand     Post-Operative Diagnosis: Trigger middle finger of right hand, trigger ring finger of right hand      Procedure Performed:   Right middle and ring finger trigger release    Surgeon(s) and Role:     An Naylor MD - Primary    Assistant(s):        Surgical Findings: released     Specimen: none     Estimated Blood Loss: minimal    Dictation Number:  #[id number]    Claude Helms MD  9/1/2023  1:37 PM

## 2023-09-01 NOTE — H&P
Manoj Huang  155/1965  65year old   female    HPI:   No chief complaint on file. S/p left trigger thumb release 7/11    The patient complains of pain located left thumb; the pain is slowly improving. The pain is consistent with their post-operative status. The patient denies redness, swelling, or drainage from their wound. The patient denies systemic symptoms. She does note that the injections she received in the right middle and ring fingers for trigger finger on 6/12 arent helping very much any more. Pain and triggering are increased. She is thinking about moving forward with surgery for those fingers. ALLERGIES:    Cipro [Ciprofloxaci* OTHER (SEE COMMENTS)  Comment:Drug induced hepatitis  Epinephrine SWELLING  Comment:Patient does not recall this allergy, thinks it  possibly made her restless but is unsure of the  event. Heparin OTHER (SEE COMMENTS)  Comment:HIT-Heparin \"eats up red blood cells\"  Lidocaine SWELLING  Comment:Lidocaine patch  Lyrica [Pregabalin] ANGIOEDEMA  Radiology Contrast * RASH, OTHER (SEE COMMENTS)  Comment:History of iodine allergy and chronic renal  failure  Topamax [Topiramate] ANAPHYLAXIS  Toradol OTHER (SEE COMMENTS)  Comment:Patient has stage 3 kidney failure  Betadine [Povidone * RASH  Compazine NAUSEA ONLY  Comment:Hyperactivity, restlessness, HA,  Dilaudid [Hydromorp* OTHER (SEE COMMENTS)  Comment:HYPERACTIVITY  Feraheme [Ferumoxyt* FACE FLUSHING  Latex RASH  Comment:POWDER \"CHOKES\" HER  Phenergan [Prometha* OTHER (SEE COMMENTS)  Comment:HYPERACTIVITY  Reglan [Metoclopram* OTHER (SEE COMMENTS)  Comment:Restless legs, skin crawling  Pcn [Bicillin L-A] RASH  Comment:ampicillin   Current Outpatient Medications   Medication Sig Dispense Refill   adalimumab (HUMIRA PEN) 40 MG/0.4ML Subcutaneous Pen-injector Kit Inject 40 mg into the skin every 14 (fourteen) days.  6 each 0   ubrogepant (UBRELVY) 50 MG Oral Tab TAKE 1 TABLET BY MOUTH AT ONSET OF MIGRAINE. MAY REPEAT 1 TABLET IN 2 HOURS AS NEEDED 10 tablet 2   Mycophenolate Mofetil (CELLCEPT) 500 MG Oral Tab Take 1 tablet (500 mg total) by mouth 2 (two) times daily. 60 tablet 1   ergocalciferol 1.25 MG (33030 UT) Oral Cap Take 1 capsule (50,000 Units total) by mouth every 14 (fourteen) days. 6 capsule 1   clonazePAM 0.125 MG Oral Tablet Dispersible Take 1 tablet (0.125 mg total) by mouth nightly as needed. 20 tablet 0   folic acid 1 MG Oral Tab Take 2 tablets (2 mg total) by mouth daily. 180 tablet 3   traMADol 50 MG Oral Tab 1 po hs prn for restless leg 10 tablet 0   Erenumab-aooe (AIMOVIG) 140 MG/ML Subcutaneous Solution Auto-injector INJECT 1 ML (140 MG TOTAL) INTO THE SKIN EVERY 30 DAYS 3 mL 1   Cevimeline HCl 30 MG Oral Cap Take 1 capsule (30 mg total) by mouth 3 (three) times daily. 270 capsule 0   gabapentin 300 MG Oral Cap Take 2 capsules (600 mg total) by mouth 3 (three) times daily. 180 capsule 3   Insulin Syringe-Needle U-100 (BD INSULIN SYRINGE) 25G X 5/8\" 1 ML Does not apply Misc Use weekly for methotrexate injections 12 each 3   ondansetron (ZOFRAN) 4 mg tablet Take 1 tablet (4 mg total) by mouth every 8 (eight) hours as needed for Nausea. 20 tablet 1   ROPINIROLE HCL ER 2 MG Oral Tablet 24 Hr TAKE 1 TABLET DAILY 90 tablet 3   metoprolol tartrate 25 MG Oral Tab Take 25 mg by mouth daily. rOPINIRole 0.25 MG Oral Tab TAKE 1 TABLET BY MOUTH NIGHTLY BUT CAN USE AN EXTRA TABLET IF NEEDED 180 tablet 3   rivaroxaban (XARELTO) 20 MG Oral Tab Take 1 tablet (20 mg total) by mouth every evening. 90 tablet 3   cyanocobalamin 1000 MCG/ML Injection Solution Inject 1,000 mcg into the muscle every 30 (thirty) days. Nitrofurantoin Macrocrystal 100 MG Oral Cap Take 1 capsule (100 mg total) by mouth daily. Take as prescribed (Patient taking differently: Take 100 mg by mouth as needed. Take as prescribed) 90 capsule 2   Estradiol 2 MG Vaginal Ring Place 2 mg vaginally every 3 (three) months.  1 each 4   ValACYclovir HCl 500 MG Oral Tab Take 500 mg by mouth 2 (two) times daily. allopurinol 100 MG Oral Tab Take 100 mg by mouth nightly. Fenofibrate 145 MG Oral Tab Take 145 mg by mouth every other day. Esomeprazole Magnesium 40 MG Oral Capsule Delayed Release Take 40 mg by mouth 2 (two) times daily. Sertraline HCl (ZOLOFT) 100 MG Oral Tab Take 150 mg by mouth daily.      HISTORY:  Past Medical History:   Diagnosis Date   Arrhythmia   atrial tachycardia, svt   Back problem   Calculus of kidney   Connective tissue disease, undifferentiated (HCC)   Deep vein thrombosis ([address] 2016   Multiple DVTs: 2 in leg and 2 in arm   DEPRESSION   Depression   Esophageal reflux   Fibromyalgia   Gout   Hepatitis 2011   from Medication   High blood pressure   High cholesterol   History of blood transfusion 2014   [facility]   HYPERLIPIDEMIA   HYPERTENSION   IBS (irritable bowel syndrome)   Irritable bowel syndrome   Kidney disease   RT Kidney no longer functions; LT kidney working about 30%; presently has hydronephrosi s and kidney stones   KIDNEY STONE   Lupus (systemic lupus erythematosus) ([facility])   Migraines   bright lights,wine,sulfites,cheeses,sweeteners,barometeric pressure   Muscle weakness   arm weakness   Neuropathy   Boths legs   OBESITY   Osteoarthritis   Pleurisy   Pneumonia due to organism 05/2017   PONV (postoperative nausea and vomiting)   Raynaud disease   Renal disorder   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 Eric Miller for endometriosis   [address] W/O BYPASS 08/2021   OTHER 11/2015   reconstructive surgery of left thumb joint   OTHER 8/2015   vaginal sling   OTHER Right   WRIST SURGERY   OTHER SURGICAL HISTORY   parathyroidectomy   OTHER SURGICAL HISTORY Right 2018   wrist surgery   OTHER SURGICAL HISTORY Left 2018   left wrist surgery   PORT, INDWELLING, IMP 2019   [facility] Port   REPAIR ENTEROCELE,VAG THE [facility] 2012   REPAIR OF RECTOCELE 2012   TONSILLECTOMY   TOTAL HIP REPLACEMENT Right     Family History   Problem Relation Age of Onset   Hypertension Father   Prostate Cancer Father   High Cholesterol Father   Hypertension Mother     Social History  Tobacco Use  Smoking status: Never  Smokeless tobacco: Never  Vaping Use  Vaping Use: Never used  Alcohol use: Not Currently  Alcohol/week: 0.0 standard drinks of alcohol  Drug use: Yes  Frequency: 7.0 times per week  Types: Cannabis  Comment: Gummies, oils        EXAM:   There were no vitals taken for this visit. The patient is awake and oriented x 3 and overall well appearing. The patient has normal mood. The patient is non-tender and atraumatic with the exception of their left upper extremity. The patient's wound is well approximated without evidence of erythema or drainage. The patient's upper extremities are warm and pink with brisk capillary refill and 2+ radial pulses. Sensation is intact to light touch in axillary, median, ulnar, and radial nerve distributions. The patient has 5/5 strength in finger flexion, finger extension, EPL, FPL, and interosseous muscle function. The left thumb was examined. The surgical incision is clean, dry, and intact. Suture was removed without incident. ROM of the fingers and wrist is full and pain free. The patient has tenderness to palpation at the right middle and ring finger A1 pulley and evidence of triggering on examination today.  The patient has preserved full composite motion of their fingers. IMAGING AND TESTING:  Glucose 94  Albumin 4.6  INR 1    ASSESSMENT AND PLAN:   Trigger thumb of left hand (primary encounter diagnosis)  Trigger middle finger of right hand  Trigger ring finger of right hand    S/p left trigger thumb release 7/11    Wound care discussed for her left thumb  Advance activity as tolerated   The patient desired surgery for her right hand. Surgery recommended was right middle and ring finger trigger releases. Risks include bleeding, infection, neurovascular injury, failure of the procedure, stiffness, and potential need for additional surgery as well as anesthetic complications including death. The patient consented to the procedure. All of their questions were answered and they are in agreement with the treatment plan.     The patient will return to clinic in 2 weeks postop for further clinical evaluation

## 2023-09-02 NOTE — OPERATIVE REPORT
[facility]    PATIENT'S NAME: Abebe Mustafa   ATTENDING PHYSICIAN: Frederic Saeed MD   OPERATING PHYSICIAN: Frederic Saeed MD   PATIENT ACCOUNT#:   [de-identified]    LOCATION:  [place] [address]  MEDICAL RECORD #:   [id number]       YOB: 1965  ADMISSION DATE:       09/01/2023      OPERATION DATE:  09/01/2023    OPERATIVE REPORT      PREOPERATIVE DIAGNOSIS:    1. Right middle finger trigger finger. 2.   Right ring finger trigger finger. POSTOPERATIVE DIAGNOSIS:    1. Right middle finger trigger finger. 2.   Right ring finger trigger finger. PROCEDURE:    1. Right middle finger trigger release. 2.   Right ring finger trigger release. ASSISTANT:  None. ANESTHESIA:  Sedation and supplemental local anesthesia. BLOOD LOSS:  Minimal.    COMPLICATIONS:  None. CONDITION:  The patient was aroused from anesthesia and transferred to recovery room in stable condition. INDICATIONS:  The patient is a 55-year-old who based on history and physical examination was diagnosed as having a right middle and ring finger trigger fingers. The risks, indications, and benefits of procedure of both operative and nonoperative treatment were thoroughly described to the patient, and the patient desired surgery. The surgery recommended was a right middle and ring finger trigger release. Risks include bleeding, infection, neurovascular injury, chronic pain, chronic disability, failure of the procedure, stiffness, recurrence, potential need for additional surgery, as well as anesthetic complications including death. The patient consented to the procedure. All of her questions were answered, and she was in agreement with the treatment plan. OPERATIVE TECHNIQUE:  On 09/01/2023, the patient was seen and evaluated preoperatively. Her middle and ring fingers were identified as the correct operative sites. My initials were placed.   She was transferred to the operating room and transferred to the operating table in supine position. Under sterile conditions, local anesthesia with 1% lidocaine and 0.5% Marcaine in a 1:1 mixture was given at the planned site of incision in both the right middle and right ring fingers. The patient's right upper extremity was prepped and draped in a sterile fashion. A tourniquet was inflated to 250 mmHg. She had a longitudinal incision over her middle and ring finger A1 pulleys with blunt dissection to the level of the A1 pulley. Sequentially under visualization, the A1 pulley was released from the right middle finger and right ring finger. The patient's wounds were then thoroughly and copiously irrigated, and skin was closed with 4-0 Prolene in a horizontal mattress fashion. A large sterile bulky soft dressing was placed. Tourniquet was deflated following wound closure. Blood loss was minimal complications. Complications were none. Condition, the patient was transferred to recovery room in stable condition. DISPOSITION:  She was discharged to home in stable condition with a pain prescription, written instructions, and 24-hour access to emergency number. She will return to clinic in 10 to 14 days for repeat clinical evaluation and suture removal.  All of her questions were answered, and she was in agreement with the treatment plan.      Dictated By Maria L Valdez MD  d: 09/01/2023 13:39:37  t: 09/01/2023 18:50:20  [facility] [id number]  DEBBIE/

## 2023-09-21 ENCOUNTER — TELEPHONE (OUTPATIENT)
Dept: HEMATOLOGY/ONCOLOGY | Facility: HOSPITAL | Age: 58
End: 2023-09-21

## 2023-09-26 ENCOUNTER — APPOINTMENT (OUTPATIENT)
Dept: CT IMAGING | Facility: HOSPITAL | Age: 58
End: 2023-09-26
Attending: EMERGENCY MEDICINE
Payer: COMMERCIAL

## 2023-09-26 ENCOUNTER — HOSPITAL ENCOUNTER (EMERGENCY)
Facility: HOSPITAL | Age: 58
Discharge: HOME | End: 2023-09-26
Attending: EMERGENCY MEDICINE
Payer: COMMERCIAL

## 2023-09-26 VITALS
DIASTOLIC BLOOD PRESSURE: 69 MMHG | HEART RATE: 53 BPM | BODY MASS INDEX: 23.99 KG/M2 | OXYGEN SATURATION: 95 % | TEMPERATURE: 98 F | SYSTOLIC BLOOD PRESSURE: 105 MMHG | WEIGHT: 144 LBS | RESPIRATION RATE: 14 BRPM | HEIGHT: 65 IN

## 2023-09-26 DIAGNOSIS — R10.9 ABDOMINAL PAIN OF UNKNOWN ETIOLOGY: Primary | ICD-10-CM

## 2023-09-26 LAB
ALBUMIN SERPL-MCNC: 3.6 G/DL (ref 3.4–5)
ALBUMIN/GLOB SERPL: 1.3 {RATIO} (ref 1–2)
ALP LIVER SERPL-CCNC: 85 U/L
ALT SERPL-CCNC: 21 U/L
ANION GAP SERPL CALC-SCNC: 3 MMOL/L (ref 0–18)
AST SERPL-CCNC: 25 U/L (ref 15–37)
BASOPHILS # BLD AUTO: 0.03 X10(3) UL (ref 0–0.2)
BASOPHILS NFR BLD AUTO: 0.7 %
BILIRUB SERPL-MCNC: 0.3 MG/DL (ref 0.1–2)
BUN BLD-MCNC: 24 MG/DL (ref 7–18)
CALCIUM BLD-MCNC: 8.1 MG/DL (ref 8.5–10.1)
CHLORIDE SERPL-SCNC: 112 MMOL/L (ref 98–112)
CO2 SERPL-SCNC: 24 MMOL/L (ref 21–32)
CREAT BLD-MCNC: 1.13 MG/DL
EGFRCR SERPLBLD CKD-EPI 2021: 57 ML/MIN/1.73M2 (ref 60–?)
EOSINOPHIL # BLD AUTO: 0.1 X10(3) UL (ref 0–0.7)
EOSINOPHIL NFR BLD AUTO: 2.2 %
ERYTHROCYTE [DISTWIDTH] IN BLOOD BY AUTOMATED COUNT: 13 %
GLOBULIN PLAS-MCNC: 2.8 G/DL (ref 2.8–4.4)
GLUCOSE BLD-MCNC: 93 MG/DL (ref 70–99)
HCT VFR BLD AUTO: 37.7 %
HGB BLD-MCNC: 12.6 G/DL
IMM GRANULOCYTES # BLD AUTO: 0.01 X10(3) UL (ref 0–1)
IMM GRANULOCYTES NFR BLD: 0.2 %
LIPASE SERPL-CCNC: 56 U/L (ref 13–75)
LYMPHOCYTES # BLD AUTO: 1.73 X10(3) UL (ref 1–4)
LYMPHOCYTES NFR BLD AUTO: 38.2 %
MCH RBC QN AUTO: 29.8 PG (ref 26–34)
MCHC RBC AUTO-ENTMCNC: 33.4 G/DL (ref 31–37)
MCV RBC AUTO: 89.1 FL
MONOCYTES # BLD AUTO: 0.44 X10(3) UL (ref 0.1–1)
MONOCYTES NFR BLD AUTO: 9.7 %
NEUTROPHILS # BLD AUTO: 2.22 X10 (3) UL (ref 1.5–7.7)
NEUTROPHILS # BLD AUTO: 2.22 X10(3) UL (ref 1.5–7.7)
NEUTROPHILS NFR BLD AUTO: 49 %
OSMOLALITY SERPL CALC.SUM OF ELEC: 292 MOSM/KG (ref 275–295)
PLATELET # BLD AUTO: 137 10(3)UL (ref 150–450)
POTASSIUM SERPL-SCNC: 3.6 MMOL/L (ref 3.5–5.1)
PROT SERPL-MCNC: 6.4 G/DL (ref 6.4–8.2)
RBC # BLD AUTO: 4.23 X10(6)UL
SODIUM SERPL-SCNC: 139 MMOL/L (ref 136–145)
TROPONIN I HIGH SENSITIVITY: 12 NG/L
WBC # BLD AUTO: 4.5 X10(3) UL (ref 4–11)

## 2023-09-26 PROCEDURE — 83690 ASSAY OF LIPASE: CPT | Performed by: EMERGENCY MEDICINE

## 2023-09-26 PROCEDURE — 96376 TX/PRO/DX INJ SAME DRUG ADON: CPT

## 2023-09-26 PROCEDURE — 96375 TX/PRO/DX INJ NEW DRUG ADDON: CPT

## 2023-09-26 PROCEDURE — 96361 HYDRATE IV INFUSION ADD-ON: CPT

## 2023-09-26 PROCEDURE — 96374 THER/PROPH/DIAG INJ IV PUSH: CPT

## 2023-09-26 PROCEDURE — 85025 COMPLETE CBC W/AUTO DIFF WBC: CPT | Performed by: EMERGENCY MEDICINE

## 2023-09-26 PROCEDURE — 93010 ELECTROCARDIOGRAM REPORT: CPT

## 2023-09-26 PROCEDURE — 93005 ELECTROCARDIOGRAM TRACING: CPT

## 2023-09-26 PROCEDURE — 99285 EMERGENCY DEPT VISIT HI MDM: CPT

## 2023-09-26 PROCEDURE — 84484 ASSAY OF TROPONIN QUANT: CPT | Performed by: EMERGENCY MEDICINE

## 2023-09-26 PROCEDURE — 74176 CT ABD & PELVIS W/O CONTRAST: CPT | Performed by: EMERGENCY MEDICINE

## 2023-09-26 PROCEDURE — C9113 INJ PANTOPRAZOLE SODIUM, VIA: HCPCS | Performed by: EMERGENCY MEDICINE

## 2023-09-26 PROCEDURE — 80053 COMPREHEN METABOLIC PANEL: CPT | Performed by: EMERGENCY MEDICINE

## 2023-09-26 RX ORDER — ONDANSETRON 2 MG/ML
4 INJECTION INTRAMUSCULAR; INTRAVENOUS ONCE
Status: COMPLETED | OUTPATIENT
Start: 2023-09-26 | End: 2023-09-26

## 2023-09-26 RX ORDER — SODIUM CHLORIDE 9 MG/ML
INJECTION, SOLUTION INTRAVENOUS CONTINUOUS
Status: DISCONTINUED | OUTPATIENT
Start: 2023-09-26 | End: 2023-09-26

## 2023-09-26 RX ORDER — MORPHINE SULFATE 2 MG/ML
2 INJECTION, SOLUTION INTRAMUSCULAR; INTRAVENOUS ONCE
Status: COMPLETED | OUTPATIENT
Start: 2023-09-26 | End: 2023-09-26

## 2023-09-26 RX ORDER — HYDROMORPHONE HYDROCHLORIDE 1 MG/ML
1 INJECTION, SOLUTION INTRAMUSCULAR; INTRAVENOUS; SUBCUTANEOUS ONCE
Status: DISCONTINUED | OUTPATIENT
Start: 2023-09-26 | End: 2023-09-26

## 2023-09-26 RX ORDER — MORPHINE SULFATE 4 MG/ML
4 INJECTION, SOLUTION INTRAMUSCULAR; INTRAVENOUS ONCE
Status: COMPLETED | OUTPATIENT
Start: 2023-09-26 | End: 2023-09-26

## 2023-09-26 NOTE — DISCHARGE INSTRUCTIONS
[place] diet advance as tolerated. Follow-up with your PCP/gastroenterologist this week. Return if any problems.

## 2023-10-06 ENCOUNTER — APPOINTMENT (OUTPATIENT)
Dept: HEMATOLOGY/ONCOLOGY | Facility: HOSPITAL | Age: 58
End: 2023-10-06
Attending: INTERNAL MEDICINE
Payer: COMMERCIAL

## 2023-10-08 LAB
ATRIAL RATE: 61 BPM
P AXIS: 59 DEGREES
P-R INTERVAL: 178 MS
Q-T INTERVAL: 440 MS
QRS DURATION: 106 MS
QTC CALCULATION (BEZET): 442 MS
R AXIS: 2 DEGREES
T AXIS: 69 DEGREES
VENTRICULAR RATE: 61 BPM

## 2023-11-16 ENCOUNTER — NURSE ONLY (OUTPATIENT)
Dept: HEMATOLOGY/ONCOLOGY | Facility: HOSPITAL | Age: 58
End: 2023-11-16
Attending: INTERNAL MEDICINE
Payer: COMMERCIAL

## 2023-11-16 VITALS
HEART RATE: 67 BPM | RESPIRATION RATE: 16 BRPM | BODY MASS INDEX: 24.69 KG/M2 | OXYGEN SATURATION: 96 % | WEIGHT: 148.19 LBS | HEIGHT: 65 IN | TEMPERATURE: 97 F | SYSTOLIC BLOOD PRESSURE: 113 MMHG | DIASTOLIC BLOOD PRESSURE: 75 MMHG

## 2023-11-16 DIAGNOSIS — Z86.718 HISTORY OF DVT (DEEP VEIN THROMBOSIS): ICD-10-CM

## 2023-11-16 DIAGNOSIS — K52.9 IBD (INFLAMMATORY BOWEL DISEASE): Primary | ICD-10-CM

## 2023-11-16 DIAGNOSIS — D50.0 IRON DEFICIENCY ANEMIA DUE TO CHRONIC BLOOD LOSS: ICD-10-CM

## 2023-11-16 LAB
DEPRECATED HBV CORE AB SER IA-ACNC: 115.8 NG/ML
IRON SATN MFR SERPL: 16 %
IRON SERPL-MCNC: 55 UG/DL
TIBC SERPL-MCNC: 346 UG/DL (ref 240–450)
TRANSFERRIN SERPL-MCNC: 232 MG/DL (ref 200–360)

## 2023-11-16 PROCEDURE — 36591 DRAW BLOOD OFF VENOUS DEVICE: CPT

## 2023-11-16 PROCEDURE — 83540 ASSAY OF IRON: CPT

## 2023-11-16 PROCEDURE — 83550 IRON BINDING TEST: CPT

## 2023-11-16 PROCEDURE — 82728 ASSAY OF FERRITIN: CPT

## 2023-11-16 PROCEDURE — 99214 OFFICE O/P EST MOD 30 MIN: CPT | Performed by: INTERNAL MEDICINE

## 2023-11-16 NOTE — PROGRESS NOTES
Education Record    Learner:  Patient    Disease / Diagnosis: Here for port labs. Barriers / Limitations:  None    Method:  Brief focused, printed material and  reinforcement    General Topics:  Plan of care reviewed    Outcome:  Shows understanding. Port working appropriately. Left instable condition to see MD. Pt had questions regarding having her port accessed here prior to an MRI on 11/30. Pt to ask off site facility if they are willing to use a line accessed elsewhere and if so we can schedule her for an access appt and a de-access appt for same day.

## 2023-11-16 NOTE — PROGRESS NOTES
Education Record    Learner:  Patient    Disease / Diagnosis: Anemia    Barriers / Limitations:  None   Comments:    Method:  Discussion   Comments:    General Topics:  Medication and Plan of care reviewed   Comments:    Outcome:  Shows understanding   Comments:    Here for follow up. She is not feeling well. She has diarrhea, night sweats, and pain in her hands. Her GI doctor wants an MRI/Enterography. She wants us to order it here so that infusion can access her port for her.

## 2023-12-07 ENCOUNTER — HOSPITAL ENCOUNTER (OUTPATIENT)
Dept: GENERAL RADIOLOGY | Age: 58
Discharge: HOME | End: 2023-12-07
Attending: INTERNAL MEDICINE
Payer: COMMERCIAL

## 2023-12-07 DIAGNOSIS — R09.02 HYPOXIA: ICD-10-CM

## 2023-12-07 DIAGNOSIS — R06.09 DYSPNEA ON EXERTION: ICD-10-CM

## 2023-12-07 DIAGNOSIS — R00.0 TACHYCARDIA: ICD-10-CM

## 2023-12-07 DIAGNOSIS — R53.83 FATIGUE: ICD-10-CM

## 2023-12-07 PROCEDURE — 71046 X-RAY EXAM CHEST 2 VIEWS: CPT | Performed by: INTERNAL MEDICINE

## 2023-12-11 ENCOUNTER — OFFICE VISIT (OUTPATIENT)
Dept: INTERNAL MEDICINE CLINIC | Facility: CLINIC | Age: 58
End: 2023-12-11
Payer: COMMERCIAL

## 2023-12-11 ENCOUNTER — LAB ENCOUNTER (OUTPATIENT)
Dept: LAB | Age: 58
End: 2023-12-11
Attending: INTERNAL MEDICINE
Payer: COMMERCIAL

## 2023-12-11 VITALS
HEIGHT: 64.96 IN | WEIGHT: 151.19 LBS | BODY MASS INDEX: 25.19 KG/M2 | HEART RATE: 62 BPM | TEMPERATURE: 99 F | RESPIRATION RATE: 16 BRPM | OXYGEN SATURATION: 98 % | DIASTOLIC BLOOD PRESSURE: 70 MMHG | SYSTOLIC BLOOD PRESSURE: 114 MMHG

## 2023-12-11 DIAGNOSIS — M62.81 MUSCLE WEAKNESS: ICD-10-CM

## 2023-12-11 DIAGNOSIS — E78.5 DYSLIPIDEMIA: Primary | ICD-10-CM

## 2023-12-11 DIAGNOSIS — R53.81 MALAISE AND FATIGUE: Primary | ICD-10-CM

## 2023-12-11 DIAGNOSIS — R53.83 MALAISE AND FATIGUE: Primary | ICD-10-CM

## 2023-12-11 DIAGNOSIS — R09.02 HYPOXIA: ICD-10-CM

## 2023-12-11 DIAGNOSIS — G25.81 RESTLESS LEG SYNDROME: ICD-10-CM

## 2023-12-11 DIAGNOSIS — M06.00 SERONEGATIVE RHEUMATOID ARTHRITIS (HCC): ICD-10-CM

## 2023-12-11 DIAGNOSIS — E78.2 MIXED HYPERLIPIDEMIA: ICD-10-CM

## 2023-12-11 DIAGNOSIS — R53.83 MALAISE AND FATIGUE: ICD-10-CM

## 2023-12-11 DIAGNOSIS — R53.81 MALAISE AND FATIGUE: ICD-10-CM

## 2023-12-11 DIAGNOSIS — I47.19 ATRIAL TACHYCARDIA: ICD-10-CM

## 2023-12-11 PROCEDURE — 83735 ASSAY OF MAGNESIUM: CPT | Performed by: INTERNAL MEDICINE

## 2023-12-11 PROCEDURE — 82550 ASSAY OF CK (CPK): CPT | Performed by: INTERNAL MEDICINE

## 2023-12-11 PROCEDURE — 80050 GENERAL HEALTH PANEL: CPT | Performed by: INTERNAL MEDICINE

## 2023-12-11 RX ORDER — VALACYCLOVIR HYDROCHLORIDE 500 MG/1
500 TABLET, FILM COATED ORAL 2 TIMES DAILY
Qty: 180 TABLET | Refills: 3 | Status: SHIPPED | OUTPATIENT
Start: 2023-12-11

## 2023-12-12 LAB
ALBUMIN SERPL-MCNC: 4.1 G/DL (ref 3.4–5)
ALBUMIN/GLOB SERPL: 1.5 {RATIO} (ref 1–2)
ALP LIVER SERPL-CCNC: 85 U/L
ALT SERPL-CCNC: 24 U/L
ANION GAP SERPL CALC-SCNC: 7 MMOL/L (ref 0–18)
AST SERPL-CCNC: 27 U/L (ref 15–37)
BILIRUB SERPL-MCNC: 0.4 MG/DL (ref 0.1–2)
BUN BLD-MCNC: 24 MG/DL (ref 9–23)
CALCIUM BLD-MCNC: 8.8 MG/DL (ref 8.5–10.1)
CHLORIDE SERPL-SCNC: 108 MMOL/L (ref 98–112)
CK SERPL-CCNC: 73 U/L
CO2 SERPL-SCNC: 23 MMOL/L (ref 21–32)
CREAT BLD-MCNC: 1.14 MG/DL
EGFRCR SERPLBLD CKD-EPI 2021: 56 ML/MIN/1.73M2 (ref 60–?)
ERYTHROCYTE [DISTWIDTH] IN BLOOD BY AUTOMATED COUNT: 14.4 %
FASTING STATUS PATIENT QL REPORTED: YES
GLOBULIN PLAS-MCNC: 2.8 G/DL (ref 2.8–4.4)
GLUCOSE BLD-MCNC: 97 MG/DL (ref 70–99)
HCT VFR BLD AUTO: 38.7 %
HGB BLD-MCNC: 12.7 G/DL
MAGNESIUM SERPL-MCNC: 2 MG/DL (ref 1.6–2.6)
MCH RBC QN AUTO: 30 PG (ref 26–34)
MCHC RBC AUTO-ENTMCNC: 32.8 G/DL (ref 31–37)
MCV RBC AUTO: 91.5 FL
OSMOLALITY SERPL CALC.SUM OF ELEC: 290 MOSM/KG (ref 275–295)
PLATELET # BLD AUTO: 157 10(3)UL (ref 150–450)
POTASSIUM SERPL-SCNC: 4.1 MMOL/L (ref 3.5–5.1)
PROT SERPL-MCNC: 6.9 G/DL (ref 6.4–8.2)
RBC # BLD AUTO: 4.23 X10(6)UL
SODIUM SERPL-SCNC: 138 MMOL/L (ref 136–145)
TSI SER-ACNC: 2.31 MIU/ML (ref 0.36–3.74)
WBC # BLD AUTO: 5.2 X10(3) UL (ref 4–11)

## 2023-12-22 ENCOUNTER — APPOINTMENT (OUTPATIENT)
Dept: CT IMAGING | Facility: HOSPITAL | Age: 58
End: 2023-12-22
Attending: STUDENT IN AN ORGANIZED HEALTH CARE EDUCATION/TRAINING PROGRAM
Payer: COMMERCIAL

## 2023-12-22 ENCOUNTER — HOSPITAL ENCOUNTER (OUTPATIENT)
Dept: MRI IMAGING | Facility: HOSPITAL | Age: 58
Discharge: HOME | End: 2023-12-22
Attending: INTERNAL MEDICINE
Payer: COMMERCIAL

## 2023-12-22 ENCOUNTER — HOSPITAL ENCOUNTER (EMERGENCY)
Facility: HOSPITAL | Age: 58
Discharge: HOME | End: 2023-12-22
Attending: STUDENT IN AN ORGANIZED HEALTH CARE EDUCATION/TRAINING PROGRAM
Payer: COMMERCIAL

## 2023-12-22 ENCOUNTER — NURSE ONLY (OUTPATIENT)
Dept: HEMATOLOGY/ONCOLOGY | Facility: HOSPITAL | Age: 58
End: 2023-12-22
Attending: INTERNAL MEDICINE
Payer: COMMERCIAL

## 2023-12-22 VITALS
HEART RATE: 70 BPM | SYSTOLIC BLOOD PRESSURE: 142 MMHG | RESPIRATION RATE: 16 BRPM | DIASTOLIC BLOOD PRESSURE: 84 MMHG | BODY MASS INDEX: 24.99 KG/M2 | HEIGHT: 65 IN | WEIGHT: 150 LBS | OXYGEN SATURATION: 97 % | TEMPERATURE: 97 F

## 2023-12-22 DIAGNOSIS — N20.0 KIDNEY STONE: ICD-10-CM

## 2023-12-22 DIAGNOSIS — R10.9 FLANK PAIN: Primary | ICD-10-CM

## 2023-12-22 DIAGNOSIS — K52.9 IBD (INFLAMMATORY BOWEL DISEASE): ICD-10-CM

## 2023-12-22 LAB
ALBUMIN SERPL-MCNC: 4.2 G/DL (ref 3.4–5)
ALBUMIN/GLOB SERPL: 1.4 {RATIO} (ref 1–2)
ALP LIVER SERPL-CCNC: 119 U/L
ALT SERPL-CCNC: 26 U/L
ANION GAP SERPL CALC-SCNC: 7 MMOL/L (ref 0–18)
AST SERPL-CCNC: 33 U/L (ref 15–37)
BASOPHILS # BLD AUTO: 0.04 X10(3) UL (ref 0–0.2)
BASOPHILS NFR BLD AUTO: 0.9 %
BILIRUB SERPL-MCNC: 0.4 MG/DL (ref 0.1–2)
BILIRUB UR QL STRIP.AUTO: NEGATIVE
BUN BLD-MCNC: 21 MG/DL (ref 9–23)
CALCIUM BLD-MCNC: 8.8 MG/DL (ref 8.5–10.1)
CHLORIDE SERPL-SCNC: 109 MMOL/L (ref 98–112)
CLARITY UR REFRACT.AUTO: CLEAR
CO2 SERPL-SCNC: 21 MMOL/L (ref 21–32)
COLOR UR AUTO: COLORLESS
CREAT BLD-MCNC: 1.13 MG/DL
EGFRCR SERPLBLD CKD-EPI 2021: 57 ML/MIN/1.73M2 (ref 60–?)
EOSINOPHIL # BLD AUTO: 0.13 X10(3) UL (ref 0–0.7)
EOSINOPHIL NFR BLD AUTO: 2.8 %
ERYTHROCYTE [DISTWIDTH] IN BLOOD BY AUTOMATED COUNT: 13.7 %
GLOBULIN PLAS-MCNC: 3.1 G/DL (ref 2.8–4.4)
GLUCOSE BLD-MCNC: 84 MG/DL (ref 70–99)
GLUCOSE UR STRIP.AUTO-MCNC: NORMAL MG/DL
HCT VFR BLD AUTO: 40.4 %
HGB BLD-MCNC: 14.1 G/DL
IMM GRANULOCYTES # BLD AUTO: 0.01 X10(3) UL (ref 0–1)
IMM GRANULOCYTES NFR BLD: 0.2 %
KETONES UR STRIP.AUTO-MCNC: NEGATIVE MG/DL
LEUKOCYTE ESTERASE UR QL STRIP.AUTO: NEGATIVE
LYMPHOCYTES # BLD AUTO: 1.68 X10(3) UL (ref 1–4)
LYMPHOCYTES NFR BLD AUTO: 36 %
MCH RBC QN AUTO: 30.4 PG (ref 26–34)
MCHC RBC AUTO-ENTMCNC: 34.9 G/DL (ref 31–37)
MCV RBC AUTO: 87.1 FL
MONOCYTES # BLD AUTO: 0.49 X10(3) UL (ref 0.1–1)
MONOCYTES NFR BLD AUTO: 10.5 %
NEUTROPHILS # BLD AUTO: 2.32 X10 (3) UL (ref 1.5–7.7)
NEUTROPHILS # BLD AUTO: 2.32 X10(3) UL (ref 1.5–7.7)
NEUTROPHILS NFR BLD AUTO: 49.6 %
NITRITE UR QL STRIP.AUTO: NEGATIVE
OSMOLALITY SERPL CALC.SUM OF ELEC: 286 MOSM/KG (ref 275–295)
PH UR STRIP.AUTO: 5 [PH] (ref 5–8)
PLATELET # BLD AUTO: 186 10(3)UL (ref 150–450)
POTASSIUM SERPL-SCNC: 3.7 MMOL/L (ref 3.5–5.1)
PROT SERPL-MCNC: 7.3 G/DL (ref 6.4–8.2)
PROT UR STRIP.AUTO-MCNC: NEGATIVE MG/DL
RBC # BLD AUTO: 4.64 X10(6)UL
RBC UR QL AUTO: NEGATIVE
SODIUM SERPL-SCNC: 137 MMOL/L (ref 136–145)
SP GR UR STRIP.AUTO: 1.01 (ref 1–1.03)
UROBILINOGEN UR STRIP.AUTO-MCNC: NORMAL MG/DL
WBC # BLD AUTO: 4.7 X10(3) UL (ref 4–11)

## 2023-12-22 PROCEDURE — 74183 MRI ABD W/O CNTR FLWD CNTR: CPT | Performed by: INTERNAL MEDICINE

## 2023-12-22 PROCEDURE — 96523 IRRIG DRUG DELIVERY DEVICE: CPT

## 2023-12-22 PROCEDURE — 96374 THER/PROPH/DIAG INJ IV PUSH: CPT

## 2023-12-22 PROCEDURE — 99284 EMERGENCY DEPT VISIT MOD MDM: CPT

## 2023-12-22 PROCEDURE — 80053 COMPREHEN METABOLIC PANEL: CPT

## 2023-12-22 PROCEDURE — 74176 CT ABD & PELVIS W/O CONTRAST: CPT | Performed by: STUDENT IN AN ORGANIZED HEALTH CARE EDUCATION/TRAINING PROGRAM

## 2023-12-22 PROCEDURE — 81003 URINALYSIS AUTO W/O SCOPE: CPT | Performed by: STUDENT IN AN ORGANIZED HEALTH CARE EDUCATION/TRAINING PROGRAM

## 2023-12-22 PROCEDURE — 96361 HYDRATE IV INFUSION ADD-ON: CPT

## 2023-12-22 PROCEDURE — 85025 COMPLETE CBC W/AUTO DIFF WBC: CPT | Performed by: STUDENT IN AN ORGANIZED HEALTH CARE EDUCATION/TRAINING PROGRAM

## 2023-12-22 PROCEDURE — 99285 EMERGENCY DEPT VISIT HI MDM: CPT

## 2023-12-22 PROCEDURE — A9575 INJ GADOTERATE MEGLUMI 0.1ML: HCPCS | Performed by: INTERNAL MEDICINE

## 2023-12-22 PROCEDURE — 80053 COMPREHEN METABOLIC PANEL: CPT | Performed by: STUDENT IN AN ORGANIZED HEALTH CARE EDUCATION/TRAINING PROGRAM

## 2023-12-22 PROCEDURE — 96375 TX/PRO/DX INJ NEW DRUG ADDON: CPT

## 2023-12-22 PROCEDURE — 85025 COMPLETE CBC W/AUTO DIFF WBC: CPT

## 2023-12-22 PROCEDURE — 72197 MRI PELVIS W/O & W/DYE: CPT | Performed by: INTERNAL MEDICINE

## 2023-12-22 PROCEDURE — 81003 URINALYSIS AUTO W/O SCOPE: CPT

## 2023-12-22 RX ORDER — MORPHINE SULFATE 4 MG/ML
4 INJECTION, SOLUTION INTRAMUSCULAR; INTRAVENOUS ONCE
Status: COMPLETED | OUTPATIENT
Start: 2023-12-22 | End: 2023-12-22

## 2023-12-22 RX ORDER — ONDANSETRON 2 MG/ML
4 INJECTION INTRAMUSCULAR; INTRAVENOUS ONCE
Status: COMPLETED | OUTPATIENT
Start: 2023-12-22 | End: 2023-12-22

## 2023-12-22 RX ORDER — GADOTERATE MEGLUMINE 376.9 MG/ML
15 INJECTION INTRAVENOUS
Status: COMPLETED | OUTPATIENT
Start: 2023-12-22 | End: 2023-12-22

## 2023-12-22 RX ADMIN — GADOTERATE MEGLUMINE 15 ML: 376.9 INJECTION INTRAVENOUS at 12:21:00

## 2023-12-22 NOTE — ED INITIAL ASSESSMENT (HPI)
Pt is awake and alert, reports back and side pain for last few days similar to past kidney stone, just had MRI with globulin and contrast for bile duct before arriving,

## 2023-12-22 NOTE — PROGRESS NOTES
Education Record    Learner:  Patient    Disease / Diagnosis: Here for [facility] access    Barriers / Limitations:  None    Method:  Brief focused, printed material and  reinforcement    General Topics:  Plan of care reviewed    Outcome:  Shows understanding. Pt tolerated port access for MRI scan. Left in stable condition.

## 2024-01-11 ENCOUNTER — PATIENT MESSAGE (OUTPATIENT)
Dept: INTERNAL MEDICINE CLINIC | Facility: CLINIC | Age: 59
End: 2024-01-11

## 2024-01-12 NOTE — TELEPHONE ENCOUNTER
From: Drea Kaba  To: Heaven Bear  Sent: 1/11/2024 9:05 PM CST  Subject: Zoloft    I need a refill of this medication. It is 50 mg. I take one a day. This will be the first time you will refilling my perscription. I also take a 100mg of Zoloft. But don’t need that one refilled yet.     Thank you  Drea Kaba

## 2024-01-12 NOTE — TELEPHONE ENCOUNTER
LOV 12/11/23 with Dr. Bear    Pt takes 150mg Sertraline daily    Has 100mg prescription on her med list to take 1.5 tabs daily    Pended 50mg tabs - will confirm with patient that she is taking 1-100mg and 1-50mg daily

## 2024-02-15 ENCOUNTER — HOSPITAL ENCOUNTER (EMERGENCY)
Facility: HOSPITAL | Age: 59
Discharge: HOME | End: 2024-02-15
Attending: EMERGENCY MEDICINE
Payer: COMMERCIAL

## 2024-02-15 ENCOUNTER — APPOINTMENT (OUTPATIENT)
Dept: GENERAL RADIOLOGY | Age: 59
End: 2024-02-15
Attending: NURSE PRACTITIONER
Payer: COMMERCIAL

## 2024-02-15 ENCOUNTER — HOSPITAL ENCOUNTER (OUTPATIENT)
Age: 59
Discharge: ANOTHER HOSPITAL | End: 2024-02-15
Payer: COMMERCIAL

## 2024-02-15 VITALS
BODY MASS INDEX: 25.33 KG/M2 | TEMPERATURE: 98 F | SYSTOLIC BLOOD PRESSURE: 119 MMHG | WEIGHT: 152 LBS | HEART RATE: 48 BPM | HEIGHT: 65 IN | RESPIRATION RATE: 15 BRPM | OXYGEN SATURATION: 100 % | DIASTOLIC BLOOD PRESSURE: 75 MMHG

## 2024-02-15 VITALS
BODY MASS INDEX: 25.33 KG/M2 | SYSTOLIC BLOOD PRESSURE: 135 MMHG | HEIGHT: 65 IN | RESPIRATION RATE: 20 BRPM | HEART RATE: 60 BPM | OXYGEN SATURATION: 98 % | DIASTOLIC BLOOD PRESSURE: 79 MMHG | TEMPERATURE: 98 F | WEIGHT: 152 LBS

## 2024-02-15 DIAGNOSIS — J32.9 RHINOSINUSITIS: Primary | ICD-10-CM

## 2024-02-15 DIAGNOSIS — R10.819 LEFT FLANK TENDERNESS: ICD-10-CM

## 2024-02-15 DIAGNOSIS — R68.89 FLU-LIKE SYMPTOMS: Primary | ICD-10-CM

## 2024-02-15 DIAGNOSIS — R19.7 DIARRHEA, UNSPECIFIED TYPE: ICD-10-CM

## 2024-02-15 LAB
ALBUMIN SERPL-MCNC: 3.7 G/DL (ref 3.4–5)
ALBUMIN/GLOB SERPL: 1.1 {RATIO} (ref 1–2)
ALP LIVER SERPL-CCNC: 109 U/L
ALT SERPL-CCNC: 39 U/L
ANION GAP SERPL CALC-SCNC: 4 MMOL/L (ref 0–18)
AST SERPL-CCNC: 26 U/L (ref 15–37)
BASOPHILS # BLD AUTO: 0.01 X10(3) UL (ref 0–0.2)
BASOPHILS NFR BLD AUTO: 0.2 %
BILIRUB SERPL-MCNC: 0.3 MG/DL (ref 0.1–2)
BILIRUB UR QL STRIP: NEGATIVE
BUN BLD-MCNC: 23 MG/DL (ref 9–23)
CALCIUM BLD-MCNC: 8.6 MG/DL (ref 8.5–10.1)
CHLORIDE SERPL-SCNC: 111 MMOL/L (ref 98–112)
CLARITY UR: CLEAR
CO2 SERPL-SCNC: 23 MMOL/L (ref 21–32)
COLOR UR: YELLOW
CREAT BLD-MCNC: 0.92 MG/DL
EGFRCR SERPLBLD CKD-EPI 2021: 73 ML/MIN/1.73M2 (ref 60–?)
EOSINOPHIL # BLD AUTO: 0.09 X10(3) UL (ref 0–0.7)
EOSINOPHIL NFR BLD AUTO: 1.8 %
ERYTHROCYTE [DISTWIDTH] IN BLOOD BY AUTOMATED COUNT: 13.2 %
GLOBULIN PLAS-MCNC: 3.3 G/DL (ref 2.8–4.4)
GLUCOSE BLD-MCNC: 91 MG/DL (ref 70–99)
GLUCOSE UR STRIP-MCNC: NEGATIVE MG/DL
HCT VFR BLD AUTO: 38.5 %
HGB BLD-MCNC: 13.1 G/DL
HGB UR QL STRIP: NEGATIVE
IMM GRANULOCYTES # BLD AUTO: 0.01 X10(3) UL (ref 0–1)
IMM GRANULOCYTES NFR BLD: 0.2 %
KETONES UR STRIP-MCNC: NEGATIVE MG/DL
LIPASE SERPL-CCNC: 38 U/L (ref 13–75)
LYMPHOCYTES # BLD AUTO: 1.28 X10(3) UL (ref 1–4)
LYMPHOCYTES NFR BLD AUTO: 25.5 %
MCH RBC QN AUTO: 29.8 PG (ref 26–34)
MCHC RBC AUTO-ENTMCNC: 34 G/DL (ref 31–37)
MCV RBC AUTO: 87.5 FL
MONOCYTES # BLD AUTO: 0.48 X10(3) UL (ref 0.1–1)
MONOCYTES NFR BLD AUTO: 9.6 %
NEUTROPHILS # BLD AUTO: 3.15 X10 (3) UL (ref 1.5–7.7)
NEUTROPHILS # BLD AUTO: 3.15 X10(3) UL (ref 1.5–7.7)
NEUTROPHILS NFR BLD AUTO: 62.7 %
NITRITE UR QL STRIP: NEGATIVE
OSMOLALITY SERPL CALC.SUM OF ELEC: 289 MOSM/KG (ref 275–295)
PH UR STRIP: 5.5 [PH]
PLATELET # BLD AUTO: 158 10(3)UL (ref 150–450)
POCT INFLUENZA A: NEGATIVE
POCT INFLUENZA B: NEGATIVE
POTASSIUM SERPL-SCNC: 3.9 MMOL/L (ref 3.5–5.1)
PROT SERPL-MCNC: 7 G/DL (ref 6.4–8.2)
PROT UR STRIP-MCNC: NEGATIVE MG/DL
RBC # BLD AUTO: 4.4 X10(6)UL
S PYO AG THROAT QL: NEGATIVE
SARS-COV-2 RNA RESP QL NAA+PROBE: NOT DETECTED
SODIUM SERPL-SCNC: 138 MMOL/L (ref 136–145)
SP GR UR STRIP: >=1.03
UROBILINOGEN UR STRIP-ACNC: <2 MG/DL
WBC # BLD AUTO: 5 X10(3) UL (ref 4–11)

## 2024-02-15 PROCEDURE — 96361 HYDRATE IV INFUSION ADD-ON: CPT

## 2024-02-15 PROCEDURE — 80053 COMPREHEN METABOLIC PANEL: CPT | Performed by: EMERGENCY MEDICINE

## 2024-02-15 PROCEDURE — 87502 INFLUENZA DNA AMP PROBE: CPT | Performed by: NURSE PRACTITIONER

## 2024-02-15 PROCEDURE — 96375 TX/PRO/DX INJ NEW DRUG ADDON: CPT

## 2024-02-15 PROCEDURE — 99284 EMERGENCY DEPT VISIT MOD MDM: CPT

## 2024-02-15 PROCEDURE — 87880 STREP A ASSAY W/OPTIC: CPT | Performed by: NURSE PRACTITIONER

## 2024-02-15 PROCEDURE — 99203 OFFICE O/P NEW LOW 30 MIN: CPT | Performed by: NURSE PRACTITIONER

## 2024-02-15 PROCEDURE — 85025 COMPLETE CBC W/AUTO DIFF WBC: CPT | Performed by: EMERGENCY MEDICINE

## 2024-02-15 PROCEDURE — 81002 URINALYSIS NONAUTO W/O SCOPE: CPT | Performed by: NURSE PRACTITIONER

## 2024-02-15 PROCEDURE — U0002 COVID-19 LAB TEST NON-CDC: HCPCS | Performed by: NURSE PRACTITIONER

## 2024-02-15 PROCEDURE — 83690 ASSAY OF LIPASE: CPT | Performed by: EMERGENCY MEDICINE

## 2024-02-15 PROCEDURE — 71046 X-RAY EXAM CHEST 2 VIEWS: CPT | Performed by: NURSE PRACTITIONER

## 2024-02-15 PROCEDURE — 96374 THER/PROPH/DIAG INJ IV PUSH: CPT

## 2024-02-15 RX ORDER — ONDANSETRON 2 MG/ML
4 INJECTION INTRAMUSCULAR; INTRAVENOUS ONCE
Status: DISCONTINUED | OUTPATIENT
Start: 2024-02-15 | End: 2024-02-15

## 2024-02-15 RX ORDER — METOPROLOL SUCCINATE 50 MG/1
TABLET, EXTENDED RELEASE ORAL
COMMUNITY
Start: 2024-01-28

## 2024-02-15 RX ORDER — BENZONATATE 100 MG/1
100 CAPSULE ORAL 3 TIMES DAILY PRN
Qty: 30 CAPSULE | Refills: 0 | Status: SHIPPED | OUTPATIENT
Start: 2024-02-15 | End: 2024-03-16

## 2024-02-15 RX ORDER — CEFDINIR 300 MG/1
300 CAPSULE ORAL 2 TIMES DAILY
Qty: 20 CAPSULE | Refills: 0 | Status: SHIPPED | OUTPATIENT
Start: 2024-02-15 | End: 2024-02-25

## 2024-02-15 RX ORDER — DICYCLOMINE HCL 20 MG
20 TABLET ORAL
COMMUNITY
Start: 2023-11-27

## 2024-02-15 RX ORDER — SODIUM CHLORIDE 9 MG/ML
1000 INJECTION, SOLUTION INTRAVENOUS ONCE
Status: DISCONTINUED | OUTPATIENT
Start: 2024-02-15 | End: 2024-02-15

## 2024-02-15 RX ORDER — GABAPENTIN 300 MG/1
300 CAPSULE ORAL NIGHTLY
COMMUNITY
Start: 2024-01-08

## 2024-02-15 RX ORDER — MORPHINE SULFATE 4 MG/ML
4 INJECTION, SOLUTION INTRAMUSCULAR; INTRAVENOUS ONCE
Status: COMPLETED | OUTPATIENT
Start: 2024-02-15 | End: 2024-02-15

## 2024-02-15 RX ORDER — ADALIMUMAB 40MG/0.4ML
40 KIT SUBCUTANEOUS
COMMUNITY

## 2024-02-15 RX ORDER — ONDANSETRON 2 MG/ML
4 INJECTION INTRAMUSCULAR; INTRAVENOUS ONCE
Status: COMPLETED | OUTPATIENT
Start: 2024-02-15 | End: 2024-02-15

## 2024-02-15 NOTE — ED INITIAL ASSESSMENT (HPI)
Patient presents to ED from immediate care for evaluation of diarrhea, ear pain, sore throat, cough/congestion and urinary symptoms x 1 week.

## 2024-02-15 NOTE — ED PROVIDER NOTES
Patient Seen in: [facility]      History     Chief Complaint   Patient presents with    Sinus Problem    Ear Problem Pain    Cough/URI     Stated Complaint: laryngitis/head congestion  x 7 days    Subjective:   HPI    Patient is a pleasant 58-year-old female with history of arrhythmia, kidney stone, Crohn's, DVT, gout, hypertension, lupus, Sjogren's and kidney disease here for evaluation of 1 week history of fever, chills, nasal congestion, cough, chest tightness, urinary frequency, urgency dysuria, flank pain and diarrhea.  Symptoms started last Thursday, 7 days ago and have been stable since onset.  Patient has had about 3 episodes of diarrhea daily.  Denies blood in stool.  Denies recent travel or use of antibiotics.    Patient states she was going to go to ER originally, however, she did not feel well enough to drive herself the distance to emergency room.    States her grandson has chest congestion which her son attributes to \"allergies.\"  But otherwise no close contacts with URI or GI symptoms    Objective:   Past Medical History:   Diagnosis Date    Arrhythmia     atrial tachycardia, svt    Back problem     Calculus of kidney     Connective tissue disease, undifferentiated (HCC)     Crohn disease (HCC)     Deep vein thrombosis (HCC) 2016    Multiple DVTs: 2 in leg and 2 in arm    DEPRESSION     Depression     Esophageal reflux     Fibromyalgia     Gout     Hepatitis 2011    from Medication    High blood pressure     High cholesterol     History of blood transfusion 2014    [facility]    HYPERLIPIDEMIA     HYPERTENSION     IBS (irritable bowel syndrome)     Irritable bowel syndrome     Kidney disease     RT Kidney no longer functions; LT kidney working about 30%; presently has hydronephrosi s and kidney stones    KIDNEY STONE     Lupus (systemic lupus erythematosus) (HCC)     Migraines     bright lights,wine,sulfites,cheeses,sweeteners,barometeric pressure    Muscle weakness     arm weakness     Neuropathy     Boths legs    OBESITY     Osteoarthritis     Pleurisy     Pneumonia due to organism 05/2017    PONV (postoperative nausea and vomiting)     doesn't work sometimes    Port-A-Cath in place     Raynaud disease     Renal disorder     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    Renal disorder     LEFT functioning  stage 3    Rheumatoid arthritis (HCC)     Seizure disorder (HCC) 2016    had x 1 year and went away/unknown etiology/ age 47    Sjogren's disease (HCC)     Visual impairment     wears glasses              Past Surgical History:   Procedure Laterality Date    APPENDECTOMY      BACK SURGERY      3/16 lumbar discectomy  9/2016 lumbar fusion    BIOPSY SALIVARY GLAND,INCISIONAL  12/1/09    Performed by SANTOSH MOYER at [facility] [place], [facility]    CHOLECYSTECTOMY      COLONOSCOPY  10/16/2019    COLONOSCOPY      COLONOSCOPY N/A 8/29/2023    Procedure: COLONOSCOPY W/ BIOPSIES;  Surgeon: Ryne Monzon MD;  Location:  ENDOSCOPY    ELBOW SURGERY      HERNIA SURGERY      HIP REPLACEMENT SURGERY  1/14    right: Dr. Brody    HYSTERECTOMY  1999    LUCA BSO for endometriosis    NDSC ABLATION & RCNSTJ ATRIA LMTD W/O BYPASS  08/2021    OTHER  11/2015    reconstructive surgery of left thumb joint    OTHER  8/2015    vaginal sling    OTHER Right     WRIST SURGERY    OTHER SURGICAL HISTORY      parathyroidectomy    OTHER SURGICAL HISTORY Right 2018    wrist surgery    OTHER SURGICAL HISTORY Left 2018    left wrist surgery    PORT, INDWELLING, IMP  2019    Power Port    REPAIR ENTEROCELE,VAG APPRCH  2012    REPAIR OF RECTOCELE  2012    TONSILLECTOMY      TOTAL HIP REPLACEMENT Right                 No pertinent social history.            Review of Systems    Positive for stated complaint: laryngitis/head congestion  x 7 days  Other systems are as noted in HPI.  Constitutional and vital signs reviewed.      All other systems reviewed and negative except as noted above.    Physical Exam     ED Triage Vitals [02/15/24  1450]   /79   Pulse 60   Resp 20   Temp 97.9 °F (36.6 °C)   Temp src Temporal   SpO2 98 %   O2 Device None (Room air)       Current:/79   Pulse 60   Temp 97.9 °F (36.6 °C) (Temporal)   Resp 20   Ht 165.1 cm (5' 5\")   Wt 68.9 kg   SpO2 98%   BMI 25.29 kg/m²         Physical Exam  Vitals and nursing note reviewed.   Constitutional:       General: She is not in acute distress.     Appearance: Normal appearance. She is not toxic-appearing or diaphoretic.   HENT:      Nose: No congestion.      Mouth/Throat:      Mouth: Mucous membranes are moist.   Eyes:      Extraocular Movements: Extraocular movements intact.      Conjunctiva/sclera: Conjunctivae normal.      Pupils: Pupils are equal, round, and reactive to light.   Cardiovascular:      Rate and Rhythm: Normal rate and regular rhythm.      Heart sounds: Normal heart sounds.   Pulmonary:      Effort: Pulmonary effort is normal.      Breath sounds: Normal breath sounds.   Abdominal:      Palpations: Abdomen is soft.      Tenderness: There is right CVA tenderness. There is no guarding.   Neurological:      Mental Status: She is alert.             ED Course     Labs Reviewed   EM[facility] POCT URINALYSIS DIPSTICK - Abnormal; Notable for the following components:       Result Value    Leukocyte esterase urine Trace (*)     All other components within normal limits   POCT RAPID STREP - Normal   POCT FLU TEST - Normal    Narrative:     This assay is a rapid molecular in vitro test utilizing nucleic acid amplification of influenza A and B viral RNA.   RAPID SARS-COV-2 BY PCR - Normal   POCT CBC                  MDM           Medical Decision Making  Differentials include but are not limited to acute viral syndrome, dehydration, electrolyte imbalance, sepsis, UTI, pyelonephritis, kidney stone, pneumonia, Crohn's flare, diverticulitis and acute gastroenteritis.  Patient is hemodynamically stable, benign abdomen and afebrile here.  Negative rapid COVID, flu and strep  testing here today.  Chest x-ray unremarkable, I do not observe obvious consolidation.  Patient refuses IV stick for blood draw, patient does have a port.  Patient prefers to go to emergency department for further evaluation/IV hydration if indicated.  Patient's  to transport patient directly to ER for further evaluation and stabilization.    Amount and/or Complexity of Data Reviewed  Labs: ordered. Decision-making details documented in ED Course.  Radiology: ordered and independent interpretation performed. Decision-making details documented in ED Course.        Disposition and Plan     Clinical Impression:  1. Flu-like symptoms    2. Left flank tenderness    3. Diarrhea, unspecified type         Disposition:  There is no disposition on file for this visit.  There is no disposition time on file for this visit.    Follow-up:  No follow-up provider specified.        Medications Prescribed:  Current Discharge Medication List

## 2024-02-15 NOTE — ED INITIAL ASSESSMENT (HPI)
Pt sts 1 week ago began with chills, sweats, facial pain/teeth hurt, nasal congestion, cough, antolin ear pain, diarrhea, mid abd pain, nausea. Denies emesis. 3 weeks ago began with dysuria, antolin flank pain/spasms

## 2024-02-16 ENCOUNTER — APPOINTMENT (OUTPATIENT)
Dept: HEMATOLOGY/ONCOLOGY | Facility: HOSPITAL | Age: 59
End: 2024-02-16
Attending: INTERNAL MEDICINE
Payer: COMMERCIAL

## 2024-02-16 NOTE — DISCHARGE INSTRUCTIONS
Try Zyrtec-D x 3 days.    Use Mucinex to thin nasal and cough secretion    Tessalon for cough.    Start antibiotic cefdinir

## 2024-02-16 NOTE — ED PROVIDER NOTES
Patient Seen in: [facility] Emergency Department      History     Chief Complaint   Patient presents with   • Flank Pain   • Diarrhea     Stated Complaint: diarrhea, flank pain, dysuria, sent by     Subjective:   HPI    58-year-old female with multiple rheumatologic illnesses on immunosuppressive's presents for evaluation of fever, chills, cough, sore throat, laryngitis and diarrhea for the past 1 week.  Seen at immediate care earlier today and tested negative for flu and COVID.  Chest x-ray was also reportedly clear.  Urine was negative.  She was sent here to access her Port-A-Cath for lab testing.    Objective:   Past Medical History:   Diagnosis Date   • Arrhythmia     atrial tachycardia, svt   • Back problem    • Calculus of kidney    • Connective tissue disease, undifferentiated (HCC)    • Crohn disease (HCC)    • Deep vein thrombosis (HCC) 2016    Multiple DVTs: 2 in leg and 2 in arm   • DEPRESSION    • Depression    • Esophageal reflux    • Fibromyalgia    • Gout    • Hepatitis 2011    from Medication   • High blood pressure    • High cholesterol    • History of blood transfusion 2014    [facility]   • HYPERLIPIDEMIA    • HYPERTENSION    • IBS (irritable bowel syndrome)    • Irritable bowel syndrome    • Kidney disease     RT Kidney no longer functions; LT kidney working about 30%; presently has hydronephrosi s and kidney stones   • KIDNEY STONE    • Lupus (systemic lupus erythematosus) ([facility])    • Migraines     bright lights,wine,sulfites,cheeses,sweeteners,barometeric pressure   • Muscle weakness     arm weakness   • Neuropathy     Boths legs   • OBESITY    • Osteoarthritis    • Pleurisy    • Pneumonia due to organism 05/2017   • PONV (postoperative nausea and vomiting)     doesn't work sometimes   • Port-A-Cath in place    • Raynaud disease    • Renal disorder     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   • Renal disorder     LEFT functioning  stage 3   • Rheumatoid arthritis (HCC)    • Seizure disorder  (HCC) 2016    had x 1 year and went away/unknown etiology/ age 47   • Sjogren's disease (HCC)    • Visual impairment     wears glasses              Past Surgical History:   Procedure Laterality Date   • APPENDECTOMY     • BACK SURGERY      3/16 lumbar discectomy  9/2016 lumbar fusion   • BIOPSY SALIVARY GLAND,INCISIONAL  12/1/09    Performed by SANTOSH MOYER at [facility], [facility]   • CHOLECYSTECTOMY     • COLONOSCOPY  10/16/2019   • COLONOSCOPY     • COLONOSCOPY N/A 8/29/2023    Procedure: COLONOSCOPY W/ BIOPSIES;  Surgeon: Ryne Monzon MD;  Location:  ENDOSCOPY   • ELBOW SURGERY     • HERNIA SURGERY     • HIP REPLACEMENT SURGERY  1/14    right: Dr. Brody   • HYSTERECTOMY  1999    LUCA BSO for endometriosis   • NDSC ABLATION & RCNSTJ ATRIA LMTD W/O BYPASS  08/2021   • OTHER  11/2015    reconstructive surgery of left thumb joint   • OTHER  8/2015    vaginal sling   • OTHER Right     WRIST SURGERY   • OTHER SURGICAL HISTORY      parathyroidectomy   • OTHER SURGICAL HISTORY Right 2018    wrist surgery   • OTHER SURGICAL HISTORY Left 2018    left wrist surgery   • PORT, INDWELLING, IMP  2019    Power Port   • REPAIR ENTEROCELE,VAG APPRCH  2012   • REPAIR OF RECTOCELE  2012   • TONSILLECTOMY     • TOTAL HIP REPLACEMENT Right                 Social History     Socioeconomic History   • Marital status:    Tobacco Use   • Smoking status: Never     Passive exposure: Never   • Smokeless tobacco: Never   Vaping Use   • Vaping Use: Never used   Substance and Sexual Activity   • Alcohol use: Not Currently     Alcohol/week: 0.0 standard drinks of alcohol   • Drug use: Yes     Frequency: 5.0 times per week     Types: Cannabis     Comment: Gummies, oils   • Sexual activity: Not Currently     Partners: Male              Review of Systems    Positive for stated complaint: diarrhea, flank pain, dysuria, sent by   Other systems are as noted in HPI.  Constitutional and vital signs reviewed.      All other systems  reviewed and negative except as noted above.    Physical Exam     ED Triage Vitals [02/15/24 1739]   /85   Pulse 67   Resp 18   Temp 98.2 °F (36.8 °C)   Temp src Temporal   SpO2 98 %   O2 Device None (Room air)       Current:/75   Pulse (!) 48   Temp 98.2 °F (36.8 °C) (Temporal)   Resp 15   Ht 165.1 cm (5' 5\")   Wt 68.9 kg   SpO2 100%   BMI 25.29 kg/m²         Physical Exam    General: Alert, oriented, no apparent distress  HEENT: Atraumatic, normocephalic.  Pupils equal reactive.  Extraocular motions intact. Oropharynx clear.  Bilateral maxillary sinus tenderness.  TMs clear.  Neck: Supple  Lungs: Clear to auscultation bilaterally.  Heart: Regular rate and rhythm.  Abdomen: Soft, nontender.   Skin: No rash.  No edema.  Neurologic: No focal neurologic deficits.  Normal speech pattern  Musculoskeletal: No tenderness or deformity noted.  Full range of motion throughout.        ED Course     Labs Reviewed   LIPASE - Normal   COMP METABOLIC PANEL (14) - Normal   CBC WITH DIFFERENTIAL WITH PLATELET    Narrative:     The following orders were created for panel order CBC With Differential With Platelet.  Procedure                               Abnormality         Status                     ---------                               -----------         ------                     CBC W/ DIFFERENTIAL[[id number]]                              Final result                 Please view results for these tests on the individual orders.   CBC W/ DIFFERENTIAL                   MDM        Differential diagnosis includes dehydration, metabolic disturbance, anemia, viral syndrome, rhinosinusitis      Per history, suspect ongoing viral illness now with focal sinus tenderness and immunosuppressed patient.  Will cover with antibiotic         Medications   sodium chloride 0.9 % IV bolus 1,000 mL (1,000 mL Intravenous New Bag 2/15/24 2051)   morphINE PF 4 MG/ML injection 4 mg (4 mg Intravenous Given 2/15/24 2050)   ondansetron  (Zofran) 4 MG/2ML injection 4 mg (4 mg Intravenous Given 2/15/24 2050)     Chemistry unremarkable.    CBC normal    Cefdinir and Tessalon called to pharmacy.  Also recommend Zyrtec-D and Mucinex    Follow-up with PCP if symptoms persist.  If symptoms worsen or if new symptoms develop return here                     Medical Decision Making  Amount and/or Complexity of Data Reviewed  External Data Reviewed: labs and radiology.     Details: Reviewed from immediate care.  Negative strep, COVID, flu, chest x-ray and urine          Disposition and Plan     Clinical Impression:  1. Rhinosinusitis         Disposition:  Discharge  2/15/2024  9:47 pm    Follow-up:  Heaven Bear MD  [facility] [address] [address]  [phone]    Call in 3 day(s)  As needed          Medications Prescribed:  Current Discharge Medication List      START taking these medications    Details   cefdinir 300 MG Oral Cap Take 1 capsule (300 mg total) by mouth 2 (two) times daily for 10 days.  Qty: 20 capsule, Refills: 0      benzonatate 100 MG Oral Cap Take 1 capsule (100 mg total) by mouth 3 (three) times daily as needed for cough.  Qty: 30 capsule, Refills: 0

## 2024-02-16 NOTE — ED QUICK NOTES
Pt requested a different pharmacy for her medications. MD Carr notified and prescriptions updated. Pt denied other questions at time of discharge.

## 2024-02-20 ENCOUNTER — APPOINTMENT (OUTPATIENT)
Dept: HEMATOLOGY/ONCOLOGY | Facility: HOSPITAL | Age: 59
End: 2024-02-20
Attending: INTERNAL MEDICINE
Payer: COMMERCIAL

## 2024-03-04 ENCOUNTER — APPOINTMENT (OUTPATIENT)
Dept: GENERAL RADIOLOGY | Facility: HOSPITAL | Age: 59
End: 2024-03-04
Attending: EMERGENCY MEDICINE
Payer: COMMERCIAL

## 2024-03-04 ENCOUNTER — HOSPITAL ENCOUNTER (EMERGENCY)
Facility: HOSPITAL | Age: 59
Discharge: HOME | End: 2024-03-04
Attending: EMERGENCY MEDICINE
Payer: COMMERCIAL

## 2024-03-04 VITALS
TEMPERATURE: 97 F | RESPIRATION RATE: 13 BRPM | SYSTOLIC BLOOD PRESSURE: 133 MMHG | DIASTOLIC BLOOD PRESSURE: 70 MMHG | OXYGEN SATURATION: 98 % | WEIGHT: 154 LBS | HEART RATE: 66 BPM | HEIGHT: 65 IN | BODY MASS INDEX: 25.66 KG/M2

## 2024-03-04 DIAGNOSIS — R51.9 RECURRENT HEADACHE: ICD-10-CM

## 2024-03-04 DIAGNOSIS — B96.89 ACUTE BACTERIAL RHINOSINUSITIS: ICD-10-CM

## 2024-03-04 DIAGNOSIS — J01.90 ACUTE BACTERIAL RHINOSINUSITIS: ICD-10-CM

## 2024-03-04 DIAGNOSIS — J98.01 ACUTE BRONCHOSPASM: Primary | ICD-10-CM

## 2024-03-04 LAB
FLUAV + FLUBV RNA SPEC NAA+PROBE: NEGATIVE
FLUAV + FLUBV RNA SPEC NAA+PROBE: NEGATIVE
RSV RNA SPEC NAA+PROBE: NEGATIVE
SARS-COV-2 RNA RESP QL NAA+PROBE: NOT DETECTED

## 2024-03-04 PROCEDURE — 96375 TX/PRO/DX INJ NEW DRUG ADDON: CPT

## 2024-03-04 PROCEDURE — 96372 THER/PROPH/DIAG INJ SC/IM: CPT

## 2024-03-04 PROCEDURE — 96374 THER/PROPH/DIAG INJ IV PUSH: CPT

## 2024-03-04 PROCEDURE — 99284 EMERGENCY DEPT VISIT MOD MDM: CPT

## 2024-03-04 PROCEDURE — 96361 HYDRATE IV INFUSION ADD-ON: CPT

## 2024-03-04 PROCEDURE — 94640 AIRWAY INHALATION TREATMENT: CPT

## 2024-03-04 PROCEDURE — 0241U SARS-COV-2/FLU A AND B/RSV BY PCR (GENEXPERT): CPT | Performed by: EMERGENCY MEDICINE

## 2024-03-04 PROCEDURE — 71045 X-RAY EXAM CHEST 1 VIEW: CPT | Performed by: EMERGENCY MEDICINE

## 2024-03-04 RX ORDER — ALBUTEROL SULFATE 90 UG/1
2 AEROSOL, METERED RESPIRATORY (INHALATION) EVERY 4 HOURS PRN
Qty: 1 EACH | Refills: 0 | Status: SHIPPED | OUTPATIENT
Start: 2024-03-04 | End: 2024-04-03

## 2024-03-04 RX ORDER — CEFDINIR 300 MG/1
300 CAPSULE ORAL 2 TIMES DAILY
Qty: 28 CAPSULE | Refills: 0 | Status: SHIPPED | OUTPATIENT
Start: 2024-03-04 | End: 2024-03-18

## 2024-03-04 RX ORDER — PROCHLORPERAZINE EDISYLATE 5 MG/ML
5 INJECTION INTRAMUSCULAR; INTRAVENOUS ONCE
Status: COMPLETED | OUTPATIENT
Start: 2024-03-04 | End: 2024-03-04

## 2024-03-04 RX ORDER — ALBUTEROL SULFATE 90 UG/1
4 AEROSOL, METERED RESPIRATORY (INHALATION) 4 TIMES DAILY
Status: DISCONTINUED | OUTPATIENT
Start: 2024-03-04 | End: 2024-03-04

## 2024-03-04 RX ORDER — METHYLPREDNISOLONE 4 MG/1
TABLET ORAL
Qty: 1 EACH | Refills: 0 | Status: SHIPPED | OUTPATIENT
Start: 2024-03-04

## 2024-03-04 RX ORDER — IPRATROPIUM BROMIDE AND ALBUTEROL SULFATE 2.5; .5 MG/3ML; MG/3ML
3 SOLUTION RESPIRATORY (INHALATION) ONCE
Status: COMPLETED | OUTPATIENT
Start: 2024-03-04 | End: 2024-03-04

## 2024-03-04 RX ORDER — DIPHENHYDRAMINE HYDROCHLORIDE 50 MG/ML
25 INJECTION INTRAMUSCULAR; INTRAVENOUS ONCE
Status: COMPLETED | OUTPATIENT
Start: 2024-03-04 | End: 2024-03-04

## 2024-03-04 RX ORDER — METHYLPREDNISOLONE ACETATE 80 MG/ML
80 INJECTION, SUSPENSION INTRA-ARTICULAR; INTRALESIONAL; INTRAMUSCULAR; SOFT TISSUE ONCE
Status: COMPLETED | OUTPATIENT
Start: 2024-03-04 | End: 2024-03-04

## 2024-03-04 RX ORDER — ALBUTEROL SULFATE 2.5 MG/3ML
2.5 SOLUTION RESPIRATORY (INHALATION) EVERY 4 HOURS PRN
Qty: 30 EACH | Refills: 0 | Status: SHIPPED | OUTPATIENT
Start: 2024-03-04 | End: 2024-04-03

## 2024-03-04 NOTE — ED PROVIDER NOTES
Patient Seen in: [facility] Emergency Department      History     Chief Complaint   Patient presents with    Difficulty Breathing    Cough/URI     Stated Complaint: cough    Subjective:   HPI    Patient is 58-year-old female with rheumatoid arthritis, Crohn's disease among other medical problems outlined below presents to ED for evaluation for feeling \"sick for months\".  Complains of cough runny nose congestion sore throat.  Green sputum production.  She was seen in the recent mainly in the ED mid to February and diagnosed with rhinosinusitis and placed on cefdinir for about 10 days which seemed to help.  However after she finished, She started having recurrent symptoms.  No fevers.  No abdominal pain vomiting diarrhea.  No focal motor or sensory gait vision speech problems.  States her symptoms exacerbating her chronic recurrent migraine headaches as well.  No other complaints.    Objective:   Past Medical History:   Diagnosis Date    Arrhythmia     atrial tachycardia, svt    Back problem     Calculus of kidney     Connective tissue disease, undifferentiated (HCC)     Crohn disease (HCC)     Deep vein thrombosis (HCC) 2016    Multiple DVTs: 2 in leg and 2 in arm    DEPRESSION     Depression     Esophageal reflux     Fibromyalgia     Gout     Hepatitis 2011    from Medication    High blood pressure     High cholesterol     History of blood transfusion 2014    [facility]    HYPERLIPIDEMIA     HYPERTENSION     IBS (irritable bowel syndrome)     Irritable bowel syndrome     Kidney disease     RT Kidney no longer functions; LT kidney working about 30%; presently has hydronephrosi s and kidney stones    KIDNEY STONE     Lupus (systemic lupus erythematosus) (HCC)     Migraines     bright lights,wine,sulfites,cheeses,sweeteners,barometeric pressure    Muscle weakness     arm weakness    Neuropathy     Boths legs    OBESITY     Osteoarthritis     Pleurisy     Pneumonia due to organism 05/2017    PONV (postoperative nausea  and vomiting)     doesn't work sometimes    Port-A-Cath in place     Raynaud disease     Renal disorder     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    Renal disorder     LEFT functioning  stage 3    Rheumatoid arthritis (HCC)     Seizure disorder (HCC) 2016    had x 1 year and went away/unknown etiology/ age 47    Sjogren's disease (HCC)     Visual impairment     wears glasses              Past Surgical History:   Procedure Laterality Date    APPENDECTOMY      BACK SURGERY      3/16 lumbar discectomy  9/2016 lumbar fusion    BIOPSY SALIVARY GLAND,INCISIONAL  12/1/09    Performed by SANTOSH MOYER at [facility] [place] [place]    CHOLECYSTECTOMY      COLONOSCOPY  10/16/2019    COLONOSCOPY      COLONOSCOPY N/A 8/29/2023    Procedure: COLONOSCOPY W/ BIOPSIES;  Surgeon: Ryne Monzon MD;  Location:  ENDOSCOPY    ELBOW SURGERY      HERNIA SURGERY      HIP REPLACEMENT SURGERY  1/14    right: Dr. Brody    HYSTERECTOMY  1999    LUCA BSO for endometriosis    NDSC ABLATION & RCNSTJ ATRIA LMTD W/O BYPASS  08/2021    OTHER  11/2015    reconstructive surgery of left thumb joint    OTHER  8/2015    vaginal sling    OTHER Right     WRIST SURGERY    OTHER SURGICAL HISTORY      parathyroidectomy    OTHER SURGICAL HISTORY Right 2018    wrist surgery    OTHER SURGICAL HISTORY Left 2018    left wrist surgery    PORT, INDWELLING, IMP  2019    Power Port    REPAIR ENTEROCELE,VAG APPRCH  2012    REPAIR OF RECTOCELE  2012    TONSILLECTOMY      TOTAL HIP REPLACEMENT Right                 Social History     Socioeconomic History    Marital status:    Tobacco Use    Smoking status: Never     Passive exposure: Never    Smokeless tobacco: Never   Vaping Use    Vaping Use: Never used   Substance and Sexual Activity    Alcohol use: Not Currently     Alcohol/week: 0.0 standard drinks of alcohol    Drug use: Yes     Types: Cannabis     Comment: Gummies, oils    Sexual activity: Not Currently     Partners: Male              Review of  Systems    Positive for stated complaint: cough  Other systems are as noted in HPI.  Constitutional and vital signs reviewed.      All other systems reviewed and negative except as noted above.    Physical Exam     ED Triage Vitals   BP 03/04/24 1047 112/76   Pulse 03/04/24 1046 63   Resp 03/04/24 1046 22   Temp 03/04/24 1049 97.3 °F (36.3 °C)   Temp src 03/04/24 1049 Temporal   SpO2 03/04/24 1046 97 %   O2 Device 03/04/24 1046 None (Room air)       Current:/87   Pulse 61   Temp 97.3 °F (36.3 °C) (Temporal)   Resp 15   Ht 165.1 cm (5' 5\")   Wt 69.9 kg   SpO2 97%   BMI 25.63 kg/m²         Physical Exam  Vitals and nursing note reviewed.   Constitutional:       General: She is not in acute distress.     Appearance: She is well-developed. She is not toxic-appearing.   HENT:      Head: Normocephalic and atraumatic.   Eyes:      General: No scleral icterus.     Conjunctiva/sclera: Conjunctivae normal.   Cardiovascular:      Rate and Rhythm: Normal rate.   Pulmonary:      Effort: Pulmonary effort is normal. No respiratory distress.      Breath sounds: Wheezing present.   Abdominal:      General: There is no distension.   Musculoskeletal:         General: No tenderness. Normal range of motion.      Cervical back: Normal range of motion and neck supple.   Skin:     General: Skin is warm and dry.      Findings: No rash.   Neurological:      General: No focal deficit present.      Mental Status: She is alert and oriented to person, place, and time.      Motor: No abnormal muscle tone.      Coordination: Coordination normal.   Psychiatric:         Mood and Affect: Mood normal.         Behavior: Behavior normal.              ED Course     Labs Reviewed   SARS-COV-2/FLU A AND B/RSV BY PCR (GENEXPERT) - Normal    Narrative:     This test is intended for the qualitative detection and differentiation of SARS-CoV-2, influenza A, influenza B, and respiratory syncytial virus (RSV) viral RNA in nasopharyngeal or nares  swabs from individuals suspected of respiratory viral infection consistent with COVID-19 by their healthcare provider. Signs and symptoms of respiratory viral infection due to SARS-CoV-2, influenza, and RSV can be similar.    Test performed using the Xpert Xpress SARS-CoV-2/FLU/RSV (real time RT-PCR)  assay on the GeneXpert instrument, [employer], [place] [address].   This test is being used under the Food and Drug Administration's Emergency Use Authorization.    The authorized Fact Sheet for Healthcare Providers for this assay is available upon request from the laboratory.                       MDM           -Pulse oximetry was interpreted by me and was normal.  Pulse oximeter was ordered to monitor patient for hypoxia.             -Comorbidities did add complexity to the management are mentioned in the HPI above        -I personally reviewed the prior external notes and the medical record to obtain additional history  -reviewed ED visit from February 15.  Patient was seen for fever chills cough sore throat.  Patient had sinus tenderness and placed Omnicef.        -DDX: Includes but not limited to pneumonia, bronchospasm        -I personally reviewed the radiographs findings and they show no infiltrates  Please refer to radiology report for official interpretation    XR CHEST AP PORTABLE  (CPT=71045)   Final Result   PROCEDURE:  XR CHEST AP PORTABLE  (CPT=71045)       TECHNIQUE:  AP chest radiograph was obtained.       COMPARISON:  [facility], XR, XR CHEST PA + LAT CHEST    (CPT=71046), 2/15/2024, 3:34 PM.       INDICATIONS:  cough       PATIENT STATED HISTORY: (As transcribed by Technologist)  Patient has had    cough, chest pain, and shortness of breath for about a month now.            FINDINGS:  Lungs are clear.  Cardiac silhouette and pulmonary vasculature    are within normal limits.  Left IJ central venous power port catheter is    stable.  No evidence of pleural disease.                          =====   CONCLUSION:     No acute process or significant interval changes.             LOCATION:  [facility]                       Dictated by (CST): Jeffery Cevallos DO on 3/04/2024 at 11:58 AM        Finalized by (CST): Jeffery Cevallos DO on 3/04/2024 at 11:59 AM             Labs Reviewed   SARS-COV-2/FLU A AND B/RSV BY PCR (GENEXPERT) - Normal    Narrative:     This test is intended for the qualitative detection and differentiation of SARS-CoV-2, influenza A, influenza B, and respiratory syncytial virus (RSV) viral RNA in nasopharyngeal or nares swabs from individuals suspected of respiratory viral infection consistent with COVID-19 by their healthcare provider. Signs and symptoms of respiratory viral infection due to SARS-CoV-2, influenza, and RSV can be similar.    Test performed using the Xpert Xpress SARS-CoV-2/FLU/RSV (real time RT-PCR)  assay on the GeneXpert instrument, [employer], [place] [address].   This test is being used under the Food and Drug Administration's Emergency Use Authorization.    The authorized Fact Sheet for Healthcare Providers for this assay is available upon request from the laboratory.     Patient RSV flu COVID negative.    She is given DuoNeb and steroids.  Does not like prednisone so she was given IM Depo-Medrol.    Patient also reports chronic migraine headache.  Requesting morphine.  Advised the morphine is not first-line for headaches.  Previous ED visit last year, she received Benadryl Ativan Haldol however patient does not have a ride do not feel comfortable her taking it where she received these medicines.  Will try some Benadryl and Compazine migraine cocktail and some fluids and rest.  I do not feel she needs neuroimaging such as a CT as her symptoms are chronic recurrent and unchanged.    Patient will be discharged after symptoms improved.  I will place her back on another round of Omnicef.  She would likely benefit from short course of steroids and bronchodilator therapy.  She  was provided spacer training here in the ED.  Patient demonstrates understanding to the plan.  Patient discharged home in stable condition.                                           Medical Decision Making      Disposition and Plan     Clinical Impression:  1. Acute bronchospasm    2. Acute bacterial rhinosinusitis    3. Recurrent headache         Disposition:  There is no disposition on file for this visit.  There is no disposition time on file for this visit.    Follow-up:  Heaven Bear MD  [address] [place] [address]  [phone]    Follow up            Medications Prescribed:  Current Discharge Medication List        START taking these medications    Details   cefdinir 300 MG Oral Cap Take 1 capsule (300 mg total) by mouth 2 (two) times daily for 14 days.  Qty: 28 capsule, Refills: 0      albuterol 108 (90 Base) MCG/ACT Inhalation Aero Soln Inhale 2 puffs into the lungs every 4 (four) hours as needed for Wheezing.  Qty: 1 each, Refills: 0      methylPREDNISolone (MEDROL) 4 MG Oral Tablet Therapy Pack Dosepack: take as directed  Qty: 1 each, Refills: 0

## 2024-03-04 NOTE — ED INITIAL ASSESSMENT (HPI)
Patient states she has been sick for a month. Complains of chest congestion, cough, intermittent fevers, & sore throat x1 month.

## 2024-03-25 ENCOUNTER — HOSPITAL ENCOUNTER (OUTPATIENT)
Dept: MRI IMAGING | Facility: HOSPITAL | Age: 59
Discharge: HOME | End: 2024-03-25
Attending: Other
Payer: COMMERCIAL

## 2024-03-25 ENCOUNTER — NURSE ONLY (OUTPATIENT)
Dept: HEMATOLOGY/ONCOLOGY | Facility: HOSPITAL | Age: 59
End: 2024-03-25
Attending: INTERNAL MEDICINE
Payer: COMMERCIAL

## 2024-03-25 DIAGNOSIS — N39.44 NOCTURNAL ENURESIS: ICD-10-CM

## 2024-03-25 DIAGNOSIS — R53.1 WEAKNESS: ICD-10-CM

## 2024-03-25 DIAGNOSIS — M79.2 NEURALGIA: ICD-10-CM

## 2024-03-25 DIAGNOSIS — D50.0 IRON DEFICIENCY ANEMIA DUE TO CHRONIC BLOOD LOSS: ICD-10-CM

## 2024-03-25 LAB
ALBUMIN SERPL-MCNC: 3.6 G/DL (ref 3.4–5)
ALBUMIN/GLOB SERPL: 1.2 {RATIO} (ref 1–2)
ALP LIVER SERPL-CCNC: 128 U/L
ALT SERPL-CCNC: 42 U/L
ANION GAP SERPL CALC-SCNC: 4 MMOL/L (ref 0–18)
AST SERPL-CCNC: 35 U/L (ref 15–37)
BASOPHILS # BLD AUTO: 0.04 X10(3) UL (ref 0–0.2)
BASOPHILS # BLD AUTO: 0.05 X10(3) UL (ref 0–0.2)
BASOPHILS NFR BLD AUTO: 0.7 %
BASOPHILS NFR BLD AUTO: 0.8 %
BILIRUB SERPL-MCNC: 0.4 MG/DL (ref 0.1–2)
BUN BLD-MCNC: 20 MG/DL (ref 9–23)
CALCIUM BLD-MCNC: 8.2 MG/DL (ref 8.5–10.1)
CHLORIDE SERPL-SCNC: 110 MMOL/L (ref 98–112)
CO2 SERPL-SCNC: 26 MMOL/L (ref 21–32)
CREAT BLD-MCNC: 1.1 MG/DL
CRP SERPL-MCNC: 1.07 MG/DL (ref ?–0.3)
DEPRECATED HBV CORE AB SER IA-ACNC: 56.2 NG/ML
EGFRCR SERPLBLD CKD-EPI 2021: 59 ML/MIN/1.73M2 (ref 60–?)
EOSINOPHIL # BLD AUTO: 0.14 X10(3) UL (ref 0–0.7)
EOSINOPHIL # BLD AUTO: 0.17 X10(3) UL (ref 0–0.7)
EOSINOPHIL NFR BLD AUTO: 2.4 %
EOSINOPHIL NFR BLD AUTO: 2.8 %
ERYTHROCYTE [DISTWIDTH] IN BLOOD BY AUTOMATED COUNT: 13.4 %
ERYTHROCYTE [DISTWIDTH] IN BLOOD BY AUTOMATED COUNT: 13.5 %
ERYTHROCYTE [SEDIMENTATION RATE] IN BLOOD: 18 MM/HR
GLOBULIN PLAS-MCNC: 3.1 G/DL (ref 2.8–4.4)
GLUCOSE BLD-MCNC: 93 MG/DL (ref 70–99)
HCT VFR BLD AUTO: 37.2 %
HCT VFR BLD AUTO: 38.8 %
HGB BLD-MCNC: 13 G/DL
HGB BLD-MCNC: 13.1 G/DL
IMM GRANULOCYTES # BLD AUTO: 0.01 X10(3) UL (ref 0–1)
IMM GRANULOCYTES # BLD AUTO: 0.03 X10(3) UL (ref 0–1)
IMM GRANULOCYTES NFR BLD: 0.2 %
IMM GRANULOCYTES NFR BLD: 0.5 %
IRON SATN MFR SERPL: 14 %
IRON SERPL-MCNC: 49 UG/DL
LYMPHOCYTES # BLD AUTO: 1.59 X10(3) UL (ref 1–4)
LYMPHOCYTES # BLD AUTO: 1.7 X10(3) UL (ref 1–4)
LYMPHOCYTES NFR BLD AUTO: 26.9 %
LYMPHOCYTES NFR BLD AUTO: 27.8 %
MCH RBC QN AUTO: 29.3 PG (ref 26–34)
MCH RBC QN AUTO: 30.1 PG (ref 26–34)
MCHC RBC AUTO-ENTMCNC: 33.5 G/DL (ref 31–37)
MCHC RBC AUTO-ENTMCNC: 35.2 G/DL (ref 31–37)
MCV RBC AUTO: 85.5 FL
MCV RBC AUTO: 87.4 FL
MONOCYTES # BLD AUTO: 0.39 X10(3) UL (ref 0.1–1)
MONOCYTES # BLD AUTO: 0.42 X10(3) UL (ref 0.1–1)
MONOCYTES NFR BLD AUTO: 6.6 %
MONOCYTES NFR BLD AUTO: 6.9 %
NEUTROPHILS # BLD AUTO: 3.74 X10 (3) UL (ref 1.5–7.7)
NEUTROPHILS # BLD AUTO: 3.74 X10 (3) UL (ref 1.5–7.7)
NEUTROPHILS # BLD AUTO: 3.74 X10(3) UL (ref 1.5–7.7)
NEUTROPHILS # BLD AUTO: 3.74 X10(3) UL (ref 1.5–7.7)
NEUTROPHILS NFR BLD AUTO: 61.2 %
NEUTROPHILS NFR BLD AUTO: 63.2 %
OSMOLALITY SERPL CALC.SUM OF ELEC: 292 MOSM/KG (ref 275–295)
PLATELET # BLD AUTO: 144 10(3)UL (ref 150–450)
PLATELET # BLD AUTO: 149 10(3)UL (ref 150–450)
POTASSIUM SERPL-SCNC: 4.1 MMOL/L (ref 3.5–5.1)
PROT SERPL-MCNC: 6.7 G/DL (ref 6.4–8.2)
RBC # BLD AUTO: 4.35 X10(6)UL
RBC # BLD AUTO: 4.44 X10(6)UL
SODIUM SERPL-SCNC: 140 MMOL/L (ref 136–145)
TIBC SERPL-MCNC: 338 UG/DL (ref 240–450)
TRANSFERRIN SERPL-MCNC: 227 MG/DL (ref 200–360)
WBC # BLD AUTO: 5.9 X10(3) UL (ref 4–11)
WBC # BLD AUTO: 6.1 X10(3) UL (ref 4–11)

## 2024-03-25 PROCEDURE — 85025 COMPLETE CBC W/AUTO DIFF WBC: CPT | Performed by: INTERNAL MEDICINE

## 2024-03-25 PROCEDURE — 80053 COMPREHEN METABOLIC PANEL: CPT | Performed by: INTERNAL MEDICINE

## 2024-03-25 PROCEDURE — 86140 C-REACTIVE PROTEIN: CPT | Performed by: INTERNAL MEDICINE

## 2024-03-25 PROCEDURE — 72157 MRI CHEST SPINE W/O & W/DYE: CPT | Performed by: OTHER

## 2024-03-25 PROCEDURE — 85025 COMPLETE CBC W/AUTO DIFF WBC: CPT

## 2024-03-25 PROCEDURE — 83550 IRON BINDING TEST: CPT

## 2024-03-25 PROCEDURE — 72156 MRI NECK SPINE W/O & W/DYE: CPT | Performed by: OTHER

## 2024-03-25 PROCEDURE — 70553 MRI BRAIN STEM W/O & W/DYE: CPT | Performed by: OTHER

## 2024-03-25 PROCEDURE — 82728 ASSAY OF FERRITIN: CPT

## 2024-03-25 PROCEDURE — 85652 RBC SED RATE AUTOMATED: CPT

## 2024-03-25 PROCEDURE — 36591 DRAW BLOOD OFF VENOUS DEVICE: CPT

## 2024-03-25 PROCEDURE — 83540 ASSAY OF IRON: CPT

## 2024-03-25 PROCEDURE — A9575 INJ GADOTERATE MEGLUMI 0.1ML: HCPCS

## 2024-03-25 RX ORDER — GADOTERATE MEGLUMINE 376.9 MG/ML
15 INJECTION INTRAVENOUS
Status: COMPLETED | OUTPATIENT
Start: 2024-03-25 | End: 2024-03-25

## 2024-03-25 RX ADMIN — GADOTERATE MEGLUMINE 13 ML: 376.9 INJECTION INTRAVENOUS at 13:00:00

## 2024-03-25 NOTE — PROGRESS NOTES
Education Record    Learner:  Patient    Disease / Diagnosis:    Barriers / Limitations:  None   Comments:    Method:  Discussion   Comments:    General Topics:  Plan of care reviewed   Comments:    Outcome:  Shows understanding   Comments:    Patient in treatment center for CLL and port access for MRI. Labs for Christian as well as labs for Rhew collected - per scanned orders. PowerPort needle used to access port- verified powerport from IR note. Patient left treatment center in stable condition.

## 2024-03-27 ENCOUNTER — TELEPHONE (OUTPATIENT)
Dept: HEMATOLOGY/ONCOLOGY | Facility: HOSPITAL | Age: 59
End: 2024-03-27

## 2024-03-27 LAB
M TB IFN-G CD4+ T-CELLS BLD-ACNC: 0.07 IU/ML
M TB TUBERC IFN-G BLD QL: NEGATIVE
M TB TUBERC IGNF/MITOGEN IGNF CONTROL: >10 IU/ML
QFT TB1 AG MINUS NIL: 0 IU/ML
QFT TB2 AG MINUS NIL: 0 IU/ML

## 2024-03-29 ENCOUNTER — OFFICE VISIT (OUTPATIENT)
Dept: HEMATOLOGY/ONCOLOGY | Facility: HOSPITAL | Age: 59
End: 2024-03-29
Attending: INTERNAL MEDICINE
Payer: COMMERCIAL

## 2024-03-29 VITALS
OXYGEN SATURATION: 97 % | HEART RATE: 60 BPM | DIASTOLIC BLOOD PRESSURE: 58 MMHG | TEMPERATURE: 98 F | SYSTOLIC BLOOD PRESSURE: 91 MMHG | RESPIRATION RATE: 18 BRPM

## 2024-03-29 DIAGNOSIS — D50.0 IRON DEFICIENCY ANEMIA DUE TO CHRONIC BLOOD LOSS: Primary | ICD-10-CM

## 2024-03-29 PROCEDURE — 96365 THER/PROPH/DIAG IV INF INIT: CPT

## 2024-03-29 PROCEDURE — 96375 TX/PRO/DX INJ NEW DRUG ADDON: CPT

## 2024-03-29 PROCEDURE — S0028 INJECTION, FAMOTIDINE, 20 MG: HCPCS | Performed by: CLINICAL NURSE SPECIALIST

## 2024-03-29 RX ORDER — DIPHENHYDRAMINE HYDROCHLORIDE 50 MG/ML
25 INJECTION INTRAMUSCULAR; INTRAVENOUS ONCE
Status: COMPLETED | OUTPATIENT
Start: 2024-03-29 | End: 2024-03-29

## 2024-03-29 RX ORDER — FAMOTIDINE 10 MG/ML
20 INJECTION, SOLUTION INTRAVENOUS ONCE
Status: COMPLETED | OUTPATIENT
Start: 2024-03-29 | End: 2024-03-29

## 2024-03-29 RX ORDER — DIPHENHYDRAMINE HYDROCHLORIDE 50 MG/ML
25 INJECTION INTRAMUSCULAR; INTRAVENOUS ONCE
Status: CANCELLED
Start: 2024-03-29 | End: 2024-03-29

## 2024-03-29 RX ORDER — FAMOTIDINE 10 MG/ML
20 INJECTION, SOLUTION INTRAVENOUS ONCE
Status: CANCELLED
Start: 2024-03-29 | End: 2024-03-29

## 2024-03-29 RX ADMIN — FAMOTIDINE 20 MG: 10 INJECTION, SOLUTION INTRAVENOUS at 15:24:00

## 2024-03-29 RX ADMIN — DIPHENHYDRAMINE HYDROCHLORIDE 25 MG: 50 INJECTION INTRAMUSCULAR; INTRAVENOUS at 15:24:00

## 2024-03-29 NOTE — PROGRESS NOTES
Education Record    Learner:  Patient and Spouse    Disease / Diagnosis: iron deficiency anemia    Barriers / Limitations:  None   Comments:    Method:  Discussion   Comments:    General Topics:  Medication, Side effects and symptom management, Plan of care reviewed, and Fall risk and prevention   Comments:    Outcome:  Shows understanding   Comments:    Pt here for monoferric iron infusion. Pt states she last had a dose in October of 2021 and required premedication due to having itchiness during the infusion. APN contacted, benadryl and pepcid given as premedication. Pt tolerated infusion well. Appt for repeat lab draw scheduled. Pt discharged in stable condition.

## 2024-04-03 DIAGNOSIS — D50.0 IRON DEFICIENCY ANEMIA DUE TO CHRONIC BLOOD LOSS: ICD-10-CM

## 2024-04-03 DIAGNOSIS — D68.59 HYPERCOAGULABLE STATE (HCC): ICD-10-CM

## 2024-04-25 ENCOUNTER — TELEPHONE (OUTPATIENT)
Dept: INTERNAL MEDICINE CLINIC | Facility: CLINIC | Age: 59
End: 2024-04-25

## 2024-04-25 DIAGNOSIS — R92.333 HETEROGENEOUSLY DENSE TISSUE OF BOTH BREASTS ON MAMMOGRAPHY: ICD-10-CM

## 2024-04-25 DIAGNOSIS — Z12.39 ENCOUNTER FOR SCREENING FOR MALIGNANT NEOPLASM OF BREAST, UNSPECIFIED SCREENING MODALITY: Primary | ICD-10-CM

## 2024-04-25 NOTE — TELEPHONE ENCOUNTER
Requesting a ABUS ultrasound order to be placed. Patient is scheduled for 5/2/24.   DX code Z12.39,  R92.333

## 2024-04-26 NOTE — TELEPHONE ENCOUNTER
Called and spoke with Michaelle from [facility]. Stated pt is scheduled for mammogram on 5/2/24 and they need order for the whole breast US. Asked about the mammogram order and she stated they already have that. Confirmed fax number is [phone].     TB - Pended breast US order if agreeable. Will fax once ordered.     PSR - Please call pt to schedule extended f/u

## 2024-05-16 ENCOUNTER — TELEPHONE (OUTPATIENT)
Dept: HEMATOLOGY/ONCOLOGY | Facility: HOSPITAL | Age: 59
End: 2024-05-16

## 2024-05-16 NOTE — TELEPHONE ENCOUNTER
Called Drea back. She needs her port accessed for her Sunday morning MRI. She will come in Saturday for her Sunday appointment.

## 2024-05-16 NOTE — TELEPHONE ENCOUNTER
Drea [phone]  I need to know if it's ok to have my port flush on Sat 5/25/24 for access on  my MRI on 5/26 @ 8:15 am I need to know if this would be ok. Would like a call back. Thanks Megan

## 2024-05-20 ENCOUNTER — PATIENT MESSAGE (OUTPATIENT)
Dept: INTERNAL MEDICINE CLINIC | Facility: CLINIC | Age: 59
End: 2024-05-20

## 2024-05-20 RX ORDER — SERTRALINE HYDROCHLORIDE 100 MG/1
100 TABLET, FILM COATED ORAL DAILY
Qty: 90 TABLET | Refills: 3 | Status: SHIPPED | OUTPATIENT
Start: 2024-05-20

## 2024-05-20 RX ORDER — SERTRALINE HYDROCHLORIDE 100 MG/1
100 TABLET, FILM COATED ORAL DAILY
Qty: 90 TABLET | Refills: 0 | OUTPATIENT
Start: 2024-05-20

## 2024-05-20 NOTE — TELEPHONE ENCOUNTER
REFILL PASSED PER [facility] PROTOCOLS    Requested Prescriptions   Pending Prescriptions Disp Refills    SERTRALINE 100 MG Oral Tab [Pharmacy Med Name: SERTRALINE HCL TABS 100MG] 90 tablet 3     Sig: TAKE 1 TABLET DAILY (TOTAL  MG DAILY)       Psychiatric Non-Scheduled (Anti-Anxiety) Passed - 5/16/2024  9:48 AM        Passed - In person appointment or virtual visit in the past 6 mos or appointment in next 3 mos     Recent Outpatient Visits              1 month ago Iron deficiency anemia due to chronic blood loss    [facility]    Office Visit    1 month ago Iron deficiency anemia due to chronic blood loss    [facility]    Nurse Only    5 months ago     [facility]    Nurse Only    5 months ago Malaise and fatigue    [facility], [address] [place] [address] Heaven Bear MD    Office Visit    6 months ago IBD (inflammatory bowel disease)    [facility] in [place] Lito Gonzales MD    Office Visit          Future Appointments         Provider Department Appt Notes    In 5 days EH TX RN1 [facility] Port access for MRI    In 6 days EH MR RM4 (3T WIDE) [facility] MRI MRA HEAD MRA NECK W+WO   Okay per Ramy EH/MRI. LMP    In 6 days EH MR RM4 (3T WIDE) [facility] MRI MRI ORBITS (W+WO)  Okay per Ramy EH/MRI. LMP    In 1 month EH TX RN2 [facility] CLL iron labs    In 1 month Heaven Bear MD [address]                     Passed - Depression Screening completed within the past 12 months             Future Appointments         Provider Department Appt Notes    In 5 days EH TX RN1 [facility] Port access for MRI    In 6 days EH MR RM4 (3T WIDE) [facility] MRI MRA HEAD MRA NECK W+WO   Okay per Ramy EH/MRI. LMP    In 6 days EH MR RM4 (3T WIDE)  [facility] MRI MRI ORBITS (W+WO)  Okay per Ramy EH/MRI. LMP    In 1 month EH TX RN2 [facility] CLL iron labs    In 1 month Heaven Bear MD [address]           Recent Outpatient Visits              1 month ago Iron deficiency anemia due to chronic blood loss    [facility]    Office Visit    1 month ago Iron deficiency anemia due to chronic blood loss    [facility]    Nurse Only    5 months ago     [facility]    Nurse Only    5 months ago Malaise and fatigue    [address] Heaven Bear MD    Office Visit    6 months ago IBD (inflammatory bowel disease)    [facility] in [place] Lito Gonzales MD    Office Visit

## 2024-05-21 NOTE — TELEPHONE ENCOUNTER
From: Drea Kaba  To: Heaven Bear  Sent: 5/20/2024 12:33 PM CDT  Subject: Meds    Hi   I need a refill for my sertaline 100mg and I also take a 50mg. [facility]. Thank you!

## 2024-05-25 ENCOUNTER — NURSE ONLY (OUTPATIENT)
Dept: HEMATOLOGY/ONCOLOGY | Facility: HOSPITAL | Age: 59
End: 2024-05-25
Attending: INTERNAL MEDICINE

## 2024-05-25 NOTE — PROGRESS NOTES
Patient here for port access for MRI tomorrow. Patient states labs were sent from MD office but unable to find any lab orders from Dr. Romero.    Port accessed, patient encouraged to call MRI and let them know and how to proceed. Patient also attempting to reach Dr. Romero's office to attempt to obtain lab orders.

## 2024-05-26 ENCOUNTER — HOSPITAL ENCOUNTER (OUTPATIENT)
Dept: MRI IMAGING | Facility: HOSPITAL | Age: 59
End: 2024-05-26
Attending: Other

## 2024-05-26 ENCOUNTER — HOSPITAL ENCOUNTER (OUTPATIENT)
Dept: MRI IMAGING | Facility: HOSPITAL | Age: 59
Discharge: HOME | End: 2024-05-26
Attending: Other

## 2024-05-26 DIAGNOSIS — H53.2 DIPLOPIA: ICD-10-CM

## 2024-05-26 DIAGNOSIS — I77.6 VASCULITIS (HCC): ICD-10-CM

## 2024-05-26 PROCEDURE — 70549 MR ANGIOGRAPH NECK W/O&W/DYE: CPT | Performed by: OTHER

## 2024-05-26 PROCEDURE — 70546 MR ANGIOGRAPH HEAD W/O&W/DYE: CPT | Performed by: OTHER

## 2024-05-26 PROCEDURE — A9575 INJ GADOTERATE MEGLUMI 0.1ML: HCPCS | Performed by: RADIOLOGY

## 2024-05-26 PROCEDURE — 70543 MRI ORBT/FAC/NCK W/O &W/DYE: CPT | Performed by: OTHER

## 2024-05-26 RX ORDER — GADOTERATE MEGLUMINE 376.9 MG/ML
15 INJECTION INTRAVENOUS
Status: COMPLETED | OUTPATIENT
Start: 2024-05-26 | End: 2024-05-26

## 2024-05-26 RX ADMIN — GADOTERATE MEGLUMINE 14 ML: 376.9 INJECTION INTRAVENOUS at 10:47:00

## 2024-06-19 ENCOUNTER — OFFICE VISIT (OUTPATIENT)
Dept: NEUROLOGY | Facility: CLINIC | Age: 59
End: 2024-06-19

## 2024-06-19 VITALS
SYSTOLIC BLOOD PRESSURE: 102 MMHG | RESPIRATION RATE: 16 BRPM | WEIGHT: 154 LBS | HEIGHT: 65 IN | BODY MASS INDEX: 25.66 KG/M2 | DIASTOLIC BLOOD PRESSURE: 68 MMHG | HEART RATE: 65 BPM

## 2024-06-19 DIAGNOSIS — G25.81 RESTLESS LEG SYNDROME: ICD-10-CM

## 2024-06-19 DIAGNOSIS — F51.04 CHRONIC INSOMNIA: ICD-10-CM

## 2024-06-19 DIAGNOSIS — M32.9 LUPUS (HCC): ICD-10-CM

## 2024-06-19 DIAGNOSIS — M06.9 RHEUMATOID ARTHRITIS INVOLVING RIGHT WRIST, UNSPECIFIED WHETHER RHEUMATOID FACTOR PRESENT (HCC): ICD-10-CM

## 2024-06-19 DIAGNOSIS — G43.809 VESTIBULAR MIGRAINE: Primary | ICD-10-CM

## 2024-06-19 PROCEDURE — 3008F BODY MASS INDEX DOCD: CPT | Performed by: OTHER

## 2024-06-19 PROCEDURE — 3078F DIAST BP <80 MM HG: CPT | Performed by: OTHER

## 2024-06-19 PROCEDURE — 99215 OFFICE O/P EST HI 40 MIN: CPT | Performed by: OTHER

## 2024-06-19 PROCEDURE — 3074F SYST BP LT 130 MM HG: CPT | Performed by: OTHER

## 2024-06-19 NOTE — PATIENT INSTRUCTIONS
Refill policies:    Allow 2-3 business days for refills; controlled substances may take longer.  Contact your pharmacy at least 5 days prior to running out of medication and have them send an electronic request or submit request through the “request refill” option in your [employer] account.  Refills are not addressed on weekends; covering physicians do not authorize routine medications on weekends.  No narcotics or controlled substances are refilled after noon on Fridays or by on call physicians.  By law, narcotics must be electronically prescribed.  A 30 day supply with no refills is the maximum allowed.  If your prescription is due for a refill, you may be due for a follow up appointment.  To best provide you care, patients receiving routine medications need to be seen at least once a year.  Patients receiving narcotic/controlled substance medications need to be seen at least once every 3 months.  In the event that your preferred pharmacy does not have the requested medication in stock (e.g. Backordered), it is your responsibility to find another pharmacy that has the requested medication available.  We will gladly send a new prescription to that pharmacy at your request.    Scheduling Tests:    If your physician has ordered radiology tests such as MRI or CT scans, please contact Central Scheduling at [phone] right away to schedule the test.  Once scheduled, the [facility] Centralized Referral Team will work with your insurance carrier to obtain pre-certification or prior authorization.  Depending on your insurance carrier, approval may take 3-10 days.  It is highly recommended patients assure they have received an authorization before having a test performed.  If test is done without insurance authorization, patient may be responsible for the entire amount billed.      Precertification and Prior Authorizations:  If your physician has recommended that you have a procedure or additional testing performed the [facility]  Centralized Referral Team will contact your insurance carrier to obtain pre-certification or prior authorization.    You are strongly encouraged to contact your insurance carrier to verify that your procedure/test has been approved and is a COVERED benefit.  Although the [facility] Centralized Referral Team does its due diligence, the insurance carrier gives the disclaimer that \"Although the procedure is authorized, this does not guarantee payment.\"    Ultimately the patient is responsible for payment.   Thank you for your understanding in this matter.  Paperwork Completion:  If you require FMLA or disability paperwork for your recovery, please make sure to either drop it off or have it faxed to our office at [phone]. Be sure the form has your name and date of birth on it.  The form will be faxed to our Forms Department and they will complete it for you.  There is a 25$ fee for all forms that need to be filled out.  Please be aware there is a 10-14 day turnaround time.  You will need to sign a release of information (EVERARDO) form if your paperwork does not come with one.  You may call the Forms Department with any questions at [phone].  Their fax number is [phone].

## 2024-06-19 NOTE — PROGRESS NOTES
[facility] Progress Note    HPI  Chief Complaint   Patient presents with    Neurologic Problem     Referred by Cards, C/O neurological Sx's x 1yr, notes fatigue/lethargy/BLE heaviness and weakness/numbness bilat hands and feet/blurred and double vision L eye/feeling off balance/\"feeling like drunk\"    Other     S/P bilat carpal tunnel relase    Test Results     MRI/MRA Brain/Orbits 5/26     Patient is here as new patient but was previously seen in [facility] 4/2022 by this author.       She was originally seen for PRES syndrome 4/2022. She has been following with Dr. Romero from neurology for management of headaches. Patient expresses multiple concerns. She states since her PRES syndrome, she has improved but has not returned to baseline. She has episodes of not being able to speak with preservation of awareness, lasting ~5-10 minutes, with no preceding aura of odd tastes, smells or sounds and denies associated headaches. These episodes occur ~ once a month.  She also admits to urinating in bed at night. She admits to poor sleep for \"years\" and has poor sleep habits only sleeping ~4 hrs per night with broken sleep. She has restless legs syndrome and is on requip 2 mg in AM and 0.25 mg nightly and is on gabapentin 300 mg AM / 600 mg nightly. She has low iron and had infusion ~ 3 months ago with only minimal improvement.      She notes she has issues with getting up in the middle of the night to walk around.     She admits to intermittent episodes of not being able to understand others.      She is on Aimovig and Ubrelvy and Botox injections for migraines, along with Keppra 500 mg bid for migraines.       EEG is normal recently.     She had improvement in double vision after PRES but has some intermittent difficulty still.       She admits she had sleep study in past and was told she does not get into REM - admits to having poor sleep and sometimes acts out dreams.     Past Medical History:     Arrhythmia    atrial tachycardia, svt    Back problem    Calculus of kidney    Connective tissue disease, undifferentiated (HCC)    Crohn disease (HCC)    Deep vein thrombosis (HCC)    Multiple DVTs: 2 in leg and 2 in arm    DEPRESSION    Depression    Esophageal reflux    Fibromyalgia    Gout    Hepatitis    from Medication    High blood pressure    High cholesterol    History of blood transfusion    CDH    HYPERLIPIDEMIA    HYPERTENSION    IBS (irritable bowel syndrome)    Irritable bowel syndrome    Kidney disease    RT Kidney no longer functions; LT kidney working about 30%; presently has hydronephrosi s and kidney stones    KIDNEY STONE    Lupus (systemic lupus erythematosus) (HCC)    Migraines    bright lights,wine,sulfites,cheeses,sweeteners,barometeric pressure    Muscle weakness    arm weakness    Neuropathy    Boths legs    OBESITY    Osteoarthritis    Pleurisy    Pneumonia due to organism    PONV (postoperative nausea and vomiting)    doesn't work sometimes    Port-A-Cath in place    Raynaud disease    Renal disorder    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    Renal disorder    LEFT functioning  stage 3    Rheumatoid arthritis (HCC)    Seizure disorder (HCC)    had x 1 year and went away/unknown etiology/ age 47    Sjogren's disease (HCC)    Visual impairment    wears glasses     Past Surgical History:   Procedure Laterality Date    Appendectomy      Back surgery      3/16 lumbar discectomy  9/2016 lumbar fusion    Biopsy salivary gland,incisional  12/1/09    Performed by SANTOSH MOYER at [facility]    Cholecystectomy      Colonoscopy  10/16/2019    Colonoscopy      Colonoscopy N/A 8/29/2023    Procedure: COLONOSCOPY W/ BIOPSIES;  Surgeon: Ryne Monzon MD;  Location:  ENDOSCOPY    Elbow surgery      Hernia surgery      Hip replacement surgery  1/14    right: Dr. Brody    Hysterectomy  1999    LUCA BSO for endometriosis    Ndsc ablation & rcnstj [facility] w/o bypass  08/2021    Other   11/2015    reconstructive surgery of left thumb joint    Other  8/2015    vaginal sling    Other Right     WRIST SURGERY    Other surgical history      parathyroidectomy    Other surgical history Right 2018    wrist surgery    Other surgical history Left 2018    left wrist surgery    Port, indwelling, imp  2019    Power Port    Repair enterocele,vag apprch  2012    Repair of rectocele  2012    Tonsillectomy      Total hip replacement Right      Family History   Problem Relation Age of Onset    Dementia Father     Hypertension Father     Prostate Cancer Father     High Cholesterol Father     Obesity Mother     Heart Disorder Mother     Depression Mother     Cancer Mother     Hypertension Mother     Stroke Maternal Grandmother     Depression Sister     Diabetes Other      Social History     Socioeconomic History    Marital status:    Tobacco Use    Smoking status: Never     Passive exposure: Never    Smokeless tobacco: Never   Vaping Use    Vaping status: Never Used   Substance and Sexual Activity    Alcohol use: Not Currently     Alcohol/week: 0.0 standard drinks of alcohol    Drug use: Yes     Types: Cannabis     Comment: occasional    Sexual activity: Not Currently     Partners: Male   Other Topics Concern    Caffeine Concern No    Exercise No       Allergies   Allergen Reactions    Adhesive Tape UNKNOWN and HIVES    Cipro [Ciprofloxacin] OTHER (SEE COMMENTS)     Drug induced hepatitis    Dihydroergotamine OTHER (SEE COMMENTS)     Hx of SVT s/p 2 ablations    Epinephrine SWELLING     Patient does not recall this allergy, thinks it possibly made her restless but is unsure of the event.    Heparin OTHER (SEE COMMENTS)     HIT-Heparin \"eats up red blood cells\"    Latex UNKNOWN, OTHER (SEE COMMENTS) and RASH     POWDER \"CHOKES\" HER    unknown    Pain Relieving Patch SWELLING     Swelling with lidocaine patch. Has tolerated IV and Subcutaneous lidocaine multiple times since lido patch allergy recorded, no  reaction. Possibly reacted to another ingredient in patch such as adhesive.     Prednisone UNKNOWN and OTHER (SEE COMMENTS)    Pregabalin ANAPHYLAXIS, ANGIOEDEMA and SWELLING    Radiology Contrast Iodinated Dyes RASH and OTHER (SEE COMMENTS)     History of iodine allergy and chronic renal failure      Topamax [Topiramate] ANAPHYLAXIS    Toradol OTHER (SEE COMMENTS)     Patient has stage 3 kidney failure    Compazine NAUSEA ONLY     Hyperactivity, restlessness, HA,     Feraheme [Ferumoxytol] FACE FLUSHING    Latex RASH     POWDER \"CHOKES\" HER    Phenergan [Promethazine Hcl] OTHER (SEE COMMENTS)     HYPERACTIVITY    Reglan [Metoclopramide Hcl] OTHER (SEE COMMENTS)     Restless legs, skin crawling    Hydromorphone Hcl UNKNOWN    Iodine (Topical) UNKNOWN    Nsaids OTHER (SEE COMMENTS)     CDK- has a solitary kidney    Tramadol UNKNOWN    Triptans OTHER (SEE COMMENTS)     Hx of SVT s/p 2 ablations    Dronedarone DIARRHEA and OTHER (SEE COMMENTS)    Pcn [Bicillin L-A] RASH     ampicillin    Povidone Iodine RASH     rash    Vancomycin ITCHING         Current Outpatient Medications:     sertraline 50 MG Oral Tab, Take 1 tablet (50 mg total) by mouth daily., Disp: 90 tablet, Rfl: 1    sertraline 100 MG Oral Tab, Take 1 tablet (100 mg total) by mouth daily., Disp: 90 tablet, Rfl: 3    cyanocobalamin 1000 MCG/ML Injection Solution, Inject 1 mL (1,000 mcg total) into the muscle every 30 (thirty) days., Disp: 3 mL, Rfl: 3    rivaroxaban (XARELTO) 20 MG Oral Tab, Take 1 tablet (20 mg total) by mouth every evening., Disp: 90 tablet, Rfl: 3    metoprolol succinate ER 50 MG Oral Tablet 24 Hr, Take 1 tablet (50 mg total) by mouth daily., Disp: , Rfl:     HUMIRA, 2 PEN, 40 MG/0.4ML Subcutaneous Pen-injector Kit, Inject 40 mg into the skin every 14 (fourteen) days., Disp: , Rfl:     dicyclomine 20 MG Oral Tab, Take 1 tablet (20 mg total) by mouth 4 (four) times daily before meals and nightly., Disp: , Rfl:     gabapentin 300 MG Oral  Cap, Take 2 capsules (600 mg total) by mouth nightly., Disp: , Rfl:     valACYclovir 500 MG Oral Tab, Take 1 tablet (500 mg total) by mouth 2 (two) times daily., Disp: 180 tablet, Rfl: 3    mycophenolate mofetil 250 MG Oral Cap, Take 2 capsules (500 mg total) by mouth 2 (two) times daily before meals., Disp: , Rfl:     clonazePAM 0.125 MG Oral Tablet Dispersible, Take 1 tablet (0.125 mg total) by mouth nightly as needed., Disp: , Rfl:     levETIRAcetam  MG Oral Tablet 24 Hr, Take 1 tablet (500 mg total) by mouth in the morning and 1 tablet (500 mg total) before bedtime., Disp: , Rfl:     rOPINIRole 2 MG Oral Tab, Take 1 tablet (2 mg total) by mouth every morning., Disp: , Rfl:     ROPINIROLE 0.25 MG Oral Tab, TAKE 1 TABLET BY MOUTH NIGHTLY BUT CAN USE AN EXTRA TABLET IF NEEDED (Patient taking differently: every evening.), Disp: 180 tablet, Rfl: 3    AIMOVIG 140 MG/ML Subcutaneous Solution Auto-injector, INJECT 1 ML (140 MG TOTAL) INTO THE SKIN EVERY 30 DAYS, Disp: 3 mL, Rfl: 3    UBRELVY 50 MG Oral Tab, TAKE 1 TABLET BY MOUTH AT ONSET OF MIGRAINE. MAY REPEAT 1 TABLET IN 2 HOURS IF NEEDED., Disp: 10 tablet, Rfl: 0    flecainide 100 MG Oral Tab, Take 1 tablet (100 mg total) by mouth 2 (two) times daily. 50mg at noon also, Disp: , Rfl:     ivabradine 5 MG Oral Tab, Take 0.5 tablets (2.5 mg total) by mouth 3 (three) times daily., Disp: , Rfl:     Cevimeline HCl 30 MG Oral Cap, Take 1 capsule (30 mg total) by mouth 3 (three) times daily., Disp: , Rfl:     ondansetron (ZOFRAN) 4 mg tablet, Take 1 tablet (4 mg total) by mouth every 8 (eight) hours as needed for Nausea., Disp: , Rfl:     Estradiol 2 MG Vaginal Ring, Place 2 mg vaginally every 3 (three) months., Disp: 1 each, Rfl: 4    allopurinol 100 MG Oral Tab, Take 1 tablet (100 mg total) by mouth nightly., Disp: , Rfl:     Esomeprazole Magnesium 40 MG Oral Capsule Delayed Release, Take 1 capsule (40 mg total) by mouth 2 (two) times daily., Disp: , Rfl:     Review  of Systems:  No chest pain or palpitations; otherwise as noted in HPI.    Exam:  /68 (BP Location: Left arm, Patient Position: Sitting, Cuff Size: adult)   Pulse 65   Resp 16   Ht 65\"   Wt 154 lb (69.9 kg)   BMI 25.63 kg/m²   Estimated body mass index is 25.63 kg/m² as calculated from the following:    Height as of this encounter: 65\".    Weight as of this encounter: 154 lb (69.9 kg).    Gen: well developed, well nourished, no acute distress  HEENT: normocephalic  Heart; normal S1/S2, regular rate and rhythm  Lungs: clear to auscultation bilaterally  Extremities: no edema, peripheral pulses intact    Neck: supple, full range of motion; no carotid bruits    Fundoscopic Exam: optic discs sharp bilaterally    Neuro:  Mental status:  Orientation: Alert and oriented to person, place, time  Speech Fluent and conversational    CN: PERRL, EOMI with no nystagmus, VFF, smile symmetric, sensation intact, tongue and palate midline, SCM intact, otherwise, CN 2-12 intact  Motor: 5/5 strength throughout, tone normal  DTR: 2+ symmetric throughout, except trace ankle jerks; toes downgoing bilaterally, no clonus  Sensory: intact to light touch throughout  Coord: FNF intact with no tremor or dysmetria; rapid alternating movements intact bilaterally  Romberg: absent  Gait: normal casual, heel, toe and tandem gait    Labs:  Reviewed in EMR     Imaging:  Reviewed in EMR    Most recent as below    MRI brain (5/26/2024)     FINDINGS:    Motion artifact limits evaluation     GLOBES:               Negative.  No mass or abnormal signal.  EXTRAOCULAR MUSCLES:   Negative.  Bilateral symmetric rectus muscles.  OPTIC NERVES/CHAISM:      Negative.  No mass or abnormal signal.  INTRACONAL SPACES:  Negative.  Normal retrobulbar fat.  EXTRACONAL SPACES:   Negative.  Normal lacrimal glands.  CAVERNOUS SINUS:              Normal appearance.     Mild partial opacification of the left mastoid air cells is noted.                   Impression    CONCLUSION:  Normal MRI of the orbits.  Incidental partial opacification of the left mastoid air cells.     MRA head/neck with and without contrast (5/26/2024):   FINDINGS:    INTERNAL CAROTIDS:  No visible stenosis or aneurysm.    ANTERIOR CEREBRALS:  No visible stenosis or aneurysm.    MIDDLE CEREBRALS:  No visible stenosis or aneurysm.    POSTERIOR CEREBRALS:  No visible stenosis or aneurysm.    BASILAR:  No visible stenosis or aneurysm.    VERTEBRALS:  No visible stenosis or aneurysm.    OTHER:  Negative with no evidence of a vascular malformation.                     Impression   CONCLUSION:  Normal MRA of the brain.       Other procedures     EEG (6/4/2024):   INTERPRETATION:  This is a normal, awake and asleep electroencephalogram. There is no clear-cut focal abnormality or epileptiform activity seen in this record.          Impression/ Plan:  Drea Kaba is a 58 year old female with complicated PMHx including but not limited to Lupus and/or undifferentiated connective tissue disorder, neuropathy, restless legs syndrome, migraines, who presents for evaluation of episodes of confusion during the day with preservation of awareness.         She was originally seen for PRES syndrome 4/2022. She has been following with Dr. Romero from [facility] neurology for management of headaches and migraines as well as other neurologic symtpoms      Patient expresses multiple concerns. She states since her PRES syndrome, she has improved but has not returned to baseline. She has episodes of not being able to speak with preservation of awareness, lasting ~5-10 minutes, with no preceding aura of odd tastes, smells or sounds and denies associated headaches. These episodes occur ~ once a month.  She also admits to urinating in bed at night. She admits to poor sleep for \"years\" and has poor sleep habits only sleeping ~4 hrs per night with broken sleep. She has restless legs syndrome and is on requip 2 mg in AM and 0.25 mg  nightly and is on gabapentin 300 mg AM / 600 mg nightly. She has low iron and had infusion ~ 3 months ago with only minimal improvement.      She notes she has issues with getting up in the middle of the night to walk around.     She admits to intermittent episodes of not being able to understand others.      She is on Aimovig and Ubrelvy and Botox injections for migraines, along with Keppra 500 mg bid for migraines.       EEG is normal recently.     She had improvement in double vision after PRES but has some intermittent difficulty still.       Exam is overall non-focal and normal and MRI brain no longer shows evidence for PRES and extensive workup thus far has not shown evidence for demyelinating disease / multiple sclerosis and EEG was also done and normal.      Furthermore, she is already on Keppra which may cover for any potential seizures, as well.       Given her complicated history, I recommend she continue to follow with her established neurologist rather than changing any medications at this time.     This being said, however, I would recommend consideration for consultation with sleep medicine specialist to consider detailed sleep study evaluating for other parasomnias (ie REM sleep behavior disorder) aside from SHERLY. She seems to have significant sleep deprivation and suspect some of these events of confusion are related to sleep deprivation rather than seizures or TIA. She admits to a maximum of 4 hrs sleep per night which is broken and this may be related to her restless legs syndrome. However, she is already on gabapentin and ropinirole for treatment. I did discuss potentially increasing the night time dose of gabapentin from 600 mg nightly to 900 mg nightly but recommend she discuss with her primary neurologist and consider consultation with sleep medicine specialist, with neurological background, given prominent neurologic symptoms (restless legs syndrome and/or REM sleep behavior disorder).  Otherwise, she was advised her management appears appropriate to this author and no additional labs, imaging, or medications are recommended.     1. Vestibular migraine  As noted above     2. Restless leg syndrome  As noted above     3. Chronic insomnia  As noted above     4. Rheumatoid arthritis involving right wrist, unspecified whether rheumatoid factor present (HCC)  As noted above     5. Lupus (HCC)  As noted above    45 total minutes spent, including chart review, discussion of pertinent labs and imaging with patient with plan of care as noted above;  patient allowed to ask questions and all questions answered to the best of my ability       Return if symptoms worsen or fail to improve.    Trace Estrada MD, Neurology  [facility]  Pager [phone]  6/19/2024

## 2024-06-20 DIAGNOSIS — D50.0 IRON DEFICIENCY ANEMIA DUE TO CHRONIC BLOOD LOSS: Primary | ICD-10-CM

## 2024-06-21 ENCOUNTER — NURSE ONLY (OUTPATIENT)
Dept: HEMATOLOGY/ONCOLOGY | Facility: HOSPITAL | Age: 59
End: 2024-06-21
Attending: INTERNAL MEDICINE

## 2024-06-21 DIAGNOSIS — D50.0 IRON DEFICIENCY ANEMIA DUE TO CHRONIC BLOOD LOSS: ICD-10-CM

## 2024-06-21 LAB
BASOPHILS # BLD AUTO: 0.03 X10(3) UL (ref 0–0.2)
BASOPHILS NFR BLD AUTO: 0.7 %
DEPRECATED HBV CORE AB SER IA-ACNC: 371.9 NG/ML
EOSINOPHIL # BLD AUTO: 0.09 X10(3) UL (ref 0–0.7)
EOSINOPHIL NFR BLD AUTO: 2.2 %
ERYTHROCYTE [DISTWIDTH] IN BLOOD BY AUTOMATED COUNT: 14.2 %
HCT VFR BLD AUTO: 40.3 %
HGB BLD-MCNC: 13.8 G/DL
IMM GRANULOCYTES # BLD AUTO: 0.01 X10(3) UL (ref 0–1)
IMM GRANULOCYTES NFR BLD: 0.2 %
IRON SATN MFR SERPL: 26 %
IRON SERPL-MCNC: 82 UG/DL
LYMPHOCYTES # BLD AUTO: 1.43 X10(3) UL (ref 1–4)
LYMPHOCYTES NFR BLD AUTO: 34.4 %
MCH RBC QN AUTO: 30.9 PG (ref 26–34)
MCHC RBC AUTO-ENTMCNC: 34.2 G/DL (ref 31–37)
MCV RBC AUTO: 90.4 FL
MONOCYTES # BLD AUTO: 0.3 X10(3) UL (ref 0.1–1)
MONOCYTES NFR BLD AUTO: 7.2 %
NEUTROPHILS # BLD AUTO: 2.3 X10 (3) UL (ref 1.5–7.7)
NEUTROPHILS # BLD AUTO: 2.3 X10(3) UL (ref 1.5–7.7)
NEUTROPHILS NFR BLD AUTO: 55.3 %
PLATELET # BLD AUTO: 149 10(3)UL (ref 150–450)
RBC # BLD AUTO: 4.46 X10(6)UL
TIBC SERPL-MCNC: 310 UG/DL (ref 240–450)
TRANSFERRIN SERPL-MCNC: 208 MG/DL (ref 200–360)
WBC # BLD AUTO: 4.2 X10(3) UL (ref 4–11)

## 2024-06-21 PROCEDURE — 36591 DRAW BLOOD OFF VENOUS DEVICE: CPT

## 2024-06-21 PROCEDURE — 82728 ASSAY OF FERRITIN: CPT

## 2024-06-21 PROCEDURE — 85025 COMPLETE CBC W/AUTO DIFF WBC: CPT

## 2024-06-21 PROCEDURE — 83540 ASSAY OF IRON: CPT

## 2024-06-21 PROCEDURE — 83550 IRON BINDING TEST: CPT

## 2024-07-05 RX ORDER — CYANOCOBALAMIN 1000 UG/ML
1000 INJECTION, SOLUTION INTRAMUSCULAR; SUBCUTANEOUS
Qty: 3 ML | Refills: 3 | Status: SHIPPED | OUTPATIENT
Start: 2024-07-05

## 2024-07-08 ENCOUNTER — OFFICE VISIT (OUTPATIENT)
Dept: INTERNAL MEDICINE CLINIC | Facility: CLINIC | Age: 59
End: 2024-07-08
Payer: COMMERCIAL

## 2024-07-08 VITALS
HEIGHT: 65 IN | BODY MASS INDEX: 28.22 KG/M2 | HEART RATE: 65 BPM | DIASTOLIC BLOOD PRESSURE: 66 MMHG | TEMPERATURE: 97 F | WEIGHT: 169.38 LBS | RESPIRATION RATE: 18 BRPM | SYSTOLIC BLOOD PRESSURE: 106 MMHG

## 2024-07-08 DIAGNOSIS — G47.09 OTHER INSOMNIA: ICD-10-CM

## 2024-07-08 DIAGNOSIS — D69.6 THROMBOCYTOPENIA (HCC): ICD-10-CM

## 2024-07-08 DIAGNOSIS — F32.A CHRONIC DEPRESSION: Primary | ICD-10-CM

## 2024-07-08 RX ORDER — TRAZODONE HYDROCHLORIDE 50 MG/1
50 TABLET ORAL NIGHTLY PRN
Qty: 30 TABLET | Refills: 0 | Status: SHIPPED | OUTPATIENT
Start: 2024-07-08

## 2024-07-08 NOTE — PROGRESS NOTES
Drea Kaba is a 58 year old female.    Chief Complaint   Patient presents with    Follow - Up     EJ RM 4- Pt is here to f/u       HPI:       Patient with multiple medical problems including RA, SLE, migraines, recurrent DVT, depression, ANASTASIYA here for follow up-  Depression symptoms are controlled on sertraline 150mg daily, she is taking it for 10+ years. No SE noted on it. Refills were recently sent.  She struggles with getting good sleep, sleeps only 3 hours in a row before she wakes up. She has sleep study ordered already. She is currently taking melatonin but it is not helping, Discussed trial of trazodone prn and she would like to try it.  She is concerned about downward trend of PLTs because she is on Xarelto and had nose bleed 2 days ago. Last CBC PLT mildly low at 149K  Discussed shingrix vaccine- declined at this time. She will consider PCV 20 at next CPE       Patient Active Problem List   Diagnosis    Restless leg syndrome    Intrinsic asthma, unspecified    Malaise and fatigue    Embolism and thrombosis (HCC)    Vitamin D deficiency    Connective tissue disease, undifferentiated (HCC)    Inflammatory polyarthritis (HCC)    Sicca (HCC)    Neck pain    Seizure (HCC)    Osteoarthrosis, unspecified whether generalized or localized, pelvic region and thigh    Rheumatoid arthritis (HCC)    Osteoarthritis of carpometacarpal joint of left thumb, unspecified osteoarthritis type    Other unilateral secondary osteoarthritis of first carpometacarpal joint, left hand    Other secondary osteoarthritis of first carpometacarpal joint of left hand    Hypercoagulable state (HCC)    PVC (premature ventricular contraction)    Lumbar disc herniation    Seronegative rheumatoid arthritis (HCC)    History of DVT (deep vein thrombosis)    Renal insufficiency    Chronic bilateral low back pain with left-sided sciatica    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    Osteopenia, unspecified location    Right wrist  pain    Chest pain    Lumbar pseudoarthrosis    Ulnar impaction syndrome, right    Aftercare following surgery of the musculoskeletal system    Iron deficiency anemia, unspecified    Right lateral epicondylitis    Palpitations    Abnormal EKG    Syncope, near    Sprain of left ankle, unspecified ligament, initial encounter    History of revision of total knee arthroplasty    Arthritis of left hip    S/P hip replacement, left    Hypotension after procedure    Acute chest pain    Migraine    Abnormal brain MRI    PRES (posterior reversible encephalopathy syndrome)    Cervicalgia    Lupus (HCC)    Fibromyalgia    Acute intractable headache    Carpal tunnel syndrome, bilateral    Left carpal tunnel syndrome    Trigger finger of left thumb    Irritable bowel syndrome    Trigger finger, right middle finger    Trigger finger, right ring finger    Atrial tachycardia (HCC)    Muscle weakness    Other insomnia    Thrombocytopenia (HCC)    Chronic depression     Current Outpatient Medications   Medication Sig Dispense Refill    traZODone 50 MG Oral Tab Take 1 tablet (50 mg total) by mouth nightly as needed for Sleep. 30 tablet 0    CYANOCOBALAMIN 1000 MCG/ML Injection Solution INJECT 1 ML (1,000 MCG) INTO THE MUSCLE EVERY 30 DAYS 3 mL 3    sertraline 50 MG Oral Tab Take 1 tablet (50 mg total) by mouth daily. 90 tablet 1    sertraline 100 MG Oral Tab Take 1 tablet (100 mg total) by mouth daily. 90 tablet 3    rivaroxaban (XARELTO) 20 MG Oral Tab Take 1 tablet (20 mg total) by mouth every evening. 90 tablet 3    metoprolol succinate ER 50 MG Oral Tablet 24 Hr Take 1 tablet (50 mg total) by mouth daily.      HUMIRA, 2 PEN, 40 MG/0.4ML Subcutaneous Pen-injector Kit Inject 40 mg into the skin every 14 (fourteen) days.      dicyclomine 20 MG Oral Tab Take 1 tablet (20 mg total) by mouth 4 (four) times daily before meals and nightly.      gabapentin 300 MG Oral Cap Take 2 capsules (600 mg total) by mouth nightly.      valACYclovir  500 MG Oral Tab Take 1 tablet (500 mg total) by mouth 2 (two) times daily. 180 tablet 3    mycophenolate mofetil 250 MG Oral Cap Take 2 capsules (500 mg total) by mouth 2 (two) times daily before meals.      clonazePAM 0.125 MG Oral Tablet Dispersible Take 1 tablet (0.125 mg total) by mouth nightly as needed.      levETIRAcetam  MG Oral Tablet 24 Hr Take 1 tablet (500 mg total) by mouth in the morning and 1 tablet (500 mg total) before bedtime.      rOPINIRole 2 MG Oral Tab Take 1 tablet (2 mg total) by mouth every morning.      ROPINIROLE 0.25 MG Oral Tab TAKE 1 TABLET BY MOUTH NIGHTLY BUT CAN USE AN EXTRA TABLET IF NEEDED (Patient taking differently: every evening.) 180 tablet 3    AIMOVIG 140 MG/ML Subcutaneous Solution Auto-injector INJECT 1 ML (140 MG TOTAL) INTO THE SKIN EVERY 30 DAYS 3 mL 3    UBRELVY 50 MG Oral Tab TAKE 1 TABLET BY MOUTH AT ONSET OF MIGRAINE. MAY REPEAT 1 TABLET IN 2 HOURS IF NEEDED. 10 tablet 0    flecainide 100 MG Oral Tab Take 1 tablet (100 mg total) by mouth 2 (two) times daily. 50mg at noon also      ivabradine 5 MG Oral Tab Take 0.5 tablets (2.5 mg total) by mouth 3 (three) times daily.      Cevimeline HCl 30 MG Oral Cap Take 1 capsule (30 mg total) by mouth 3 (three) times daily.      ondansetron (ZOFRAN) 4 mg tablet Take 1 tablet (4 mg total) by mouth every 8 (eight) hours as needed for Nausea.      Estradiol 2 MG Vaginal Ring Place 2 mg vaginally every 3 (three) months. 1 each 4    allopurinol 100 MG Oral Tab Take 1 tablet (100 mg total) by mouth nightly.      Esomeprazole Magnesium 40 MG Oral Capsule Delayed Release Take 1 capsule (40 mg total) by mouth 2 (two) times daily.        Past Medical History:    Arrhythmia    atrial tachycardia, svt    Back problem    Calculus of kidney    Connective tissue disease, undifferentiated (HCC)    Crohn disease (HCC)    Deep vein thrombosis (HCC)    Multiple DVTs: 2 in leg and 2 in arm    DEPRESSION    Depression    Esophageal reflux     Fibromyalgia    Gout    Hepatitis    from Medication    High blood pressure    High cholesterol    History of blood transfusion    CDH    HYPERLIPIDEMIA    HYPERTENSION    IBS (irritable bowel syndrome)    Irritable bowel syndrome    Kidney disease    RT Kidney no longer functions; LT kidney working about 30%; presently has hydronephrosi s and kidney stones    KIDNEY STONE    Lupus (systemic lupus erythematosus) (HCC)    Migraines    bright lights,wine,sulfites,cheeses,sweeteners,barometeric pressure    Muscle weakness    arm weakness    Neuropathy    Boths legs    OBESITY    Osteoarthritis    Pleurisy    Pneumonia due to organism    PONV (postoperative nausea and vomiting)    doesn't work sometimes    Port-A-Cath in place    Raynaud disease    Renal disorder    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    Renal disorder    LEFT functioning  stage 3    Rheumatoid arthritis (HCC)    Seizure disorder (HCC)    had x 1 year and went away/unknown etiology/ age 47    Sjogren's disease (HCC)    Visual impairment    wears glasses      Social History:  Social History     Socioeconomic History    Marital status:    Tobacco Use    Smoking status: Never     Passive exposure: Never    Smokeless tobacco: Never   Vaping Use    Vaping status: Never Used   Substance and Sexual Activity    Alcohol use: Not Currently     Alcohol/week: 0.0 standard drinks of alcohol    Drug use: Yes     Types: Cannabis     Comment: occasional    Sexual activity: Not Currently     Partners: Male   Other Topics Concern    Caffeine Concern No    Exercise No     Social Determinants of Health     Physical Activity: Inactive (9/11/2020)    [facility]    Exercise Vital Sign     Days of Exercise per Week: 0 days     Minutes of Exercise per Session: 0 min    [facility]    Social Connections    Received from [employer]    [facility]      Family History   Problem Relation Age of Onset    Dementia Father     Hypertension Father     Prostate Cancer Father     High Cholesterol Father     Obesity Mother     Heart Disorder Mother     Depression Mother     Cancer Mother     Hypertension Mother     Stroke Maternal Grandmother     Depression Sister     Diabetes Other         Allergies  Allergies   Allergen Reactions    Adhesive Tape UNKNOWN and HIVES    Cipro [Ciprofloxacin] OTHER (SEE COMMENTS)     Drug induced hepatitis    Dihydroergotamine OTHER (SEE COMMENTS)     Hx of SVT s/p 2 ablations    Epinephrine SWELLING     Patient does not recall this allergy, thinks it possibly made her restless but is unsure of the event.    Heparin OTHER (SEE COMMENTS)     HIT-Heparin \"eats up red blood cells\"    Latex UNKNOWN, OTHER (SEE COMMENTS) and RASH     POWDER \"CHOKES\" HER    unknown    Pain Relieving Patch SWELLING     Swelling with lidocaine patch. Has tolerated IV and Subcutaneous lidocaine multiple times since lido patch allergy recorded, no reaction. Possibly reacted to another ingredient in patch such as adhesive.     Prednisone UNKNOWN and OTHER (SEE COMMENTS)    Pregabalin ANAPHYLAXIS, ANGIOEDEMA and SWELLING    Radiology Contrast Iodinated Dyes RASH and OTHER (SEE COMMENTS)     History of iodine allergy and chronic renal failure      Topamax [Topiramate] ANAPHYLAXIS    Toradol OTHER (SEE COMMENTS)     Patient has stage 3 kidney failure    Compazine NAUSEA ONLY     Hyperactivity, restlessness, HA,     Feraheme [Ferumoxytol] FACE FLUSHING    Latex RASH     POWDER \"CHOKES\" HER    Phenergan [Promethazine Hcl] OTHER (SEE COMMENTS)     HYPERACTIVITY    Reglan [Metoclopramide Hcl] OTHER (SEE COMMENTS)     Restless legs, skin crawling    Hydromorphone Hcl UNKNOWN    Iodine (Topical) UNKNOWN    Nsaids OTHER (SEE COMMENTS)     CDK- has a solitary kidney    Tramadol UNKNOWN    Triptans OTHER (SEE COMMENTS)     Hx of SVT s/p 2 ablations    Dronedarone DIARRHEA and  OTHER (SEE COMMENTS)    Pcn [Bicillin L-A] RASH     ampicillin    Povidone Iodine RASH     rash    Vancomycin ITCHING         REVIEW OF SYSTEMS:   GENERAL HEALTH:  no fevers   RESPIRATORY: no cough  CARDIOVASCULAR: denies chest pain  GI: improved diarrhea  : no dysuria  NEURO: migraine headaches controlled on medication  PSYCH:  depression symptoms controlled with sertraline  HEME: No adenopathy      EXAM:   /66   Pulse 65   Temp 97.1 °F (36.2 °C) (Temporal)   Resp 18   Ht 5' 5\" (1.651 m)   Wt 169 lb 6.4 oz (76.8 kg)   BMI 28.19 kg/m²   GENERAL: well developed, well nourished,in no apparent distress  HEENT: atraumatic, normocephalic  NECK: supple,no adenopathy  LUNGS: normal rate without respiratory distress, lungs clear to auscultation  CARDIO: RRR nl S1 S2  GI: normal bowel sounds, soft, NT/ND  EXTREMITIES: no cyanosis, clubbing or edema  NEURO: Alert and oriented    ASSESSMENT AND PLAN:     Encounter Diagnoses   Name     Chronic depression - well controlled on sertraline 150mg daily, CPM     Thrombocytopenia (HCC)- pt with nosebleed 2 days ago (did resolve on its own per pt), will monitor counts especially because she is on anticoagulation with Xarelto due to h/o recurrent DVTs          Other insomnia- trial trazodone 50mg half to one tab nightly prn, SE reviewed             Orders Placed This Encounter   Procedures    CBC W Differential W Platelet [E]       Meds & Refills for this Visit:  Requested Prescriptions     Signed Prescriptions Disp Refills    traZODone 50 MG Oral Tab 30 tablet 0     Sig: Take 1 tablet (50 mg total) by mouth nightly as needed for Sleep.       Imaging & Consults:  None    Return in about 5 months (around 12/10/2024), or if symptoms worsen or fail to improve, for physical.  There are no Patient Instructions on file for this visit.      The patient indicates understanding of these issues and agrees to the plan.

## 2024-08-08 ENCOUNTER — APPOINTMENT (OUTPATIENT)
Dept: HEMATOLOGY/ONCOLOGY | Facility: HOSPITAL | Age: 59
End: 2024-08-08
Attending: INTERNAL MEDICINE
Payer: COMMERCIAL

## 2024-08-13 ENCOUNTER — NURSE ONLY (OUTPATIENT)
Dept: HEMATOLOGY/ONCOLOGY | Facility: HOSPITAL | Age: 59
End: 2024-08-13
Attending: INTERNAL MEDICINE
Payer: COMMERCIAL

## 2024-08-13 DIAGNOSIS — D69.6 THROMBOCYTOPENIA (HCC): ICD-10-CM

## 2024-08-13 LAB
BASOPHILS # BLD AUTO: 0.05 X10(3) UL (ref 0–0.2)
BASOPHILS NFR BLD AUTO: 1.2 %
EOSINOPHIL # BLD AUTO: 0.09 X10(3) UL (ref 0–0.7)
EOSINOPHIL NFR BLD AUTO: 2.2 %
ERYTHROCYTE [DISTWIDTH] IN BLOOD BY AUTOMATED COUNT: 13.3 %
HCT VFR BLD AUTO: 38 %
HGB BLD-MCNC: 13.1 G/DL
IMM GRANULOCYTES # BLD AUTO: 0.01 X10(3) UL (ref 0–1)
IMM GRANULOCYTES NFR BLD: 0.2 %
LYMPHOCYTES # BLD AUTO: 1.36 X10(3) UL (ref 1–4)
LYMPHOCYTES NFR BLD AUTO: 33.5 %
MCH RBC QN AUTO: 30.4 PG (ref 26–34)
MCHC RBC AUTO-ENTMCNC: 34.5 G/DL (ref 31–37)
MCV RBC AUTO: 88.2 FL
MONOCYTES # BLD AUTO: 0.34 X10(3) UL (ref 0.1–1)
MONOCYTES NFR BLD AUTO: 8.4 %
NEUTROPHILS # BLD AUTO: 2.21 X10 (3) UL (ref 1.5–7.7)
NEUTROPHILS # BLD AUTO: 2.21 X10(3) UL (ref 1.5–7.7)
NEUTROPHILS NFR BLD AUTO: 54.5 %
PLATELET # BLD AUTO: 147 10(3)UL (ref 150–450)
RBC # BLD AUTO: 4.31 X10(6)UL
WBC # BLD AUTO: 4.1 X10(3) UL (ref 4–11)

## 2024-08-13 PROCEDURE — 85025 COMPLETE CBC W/AUTO DIFF WBC: CPT

## 2024-08-13 PROCEDURE — 36591 DRAW BLOOD OFF VENOUS DEVICE: CPT

## 2024-08-13 NOTE — PROGRESS NOTES
Education Record    Learner:  Patient    Disease / Diagnosis: Thrombocytopenia    Barriers / Limitations:  None   Comments:    Method:  Brief focused   Comments:    General Topics:  Plan of care reviewed   Comments:    Outcome:  Shows understanding   Comments:

## 2024-08-26 PROCEDURE — 99283 EMERGENCY DEPT VISIT LOW MDM: CPT

## 2024-08-26 PROCEDURE — 96372 THER/PROPH/DIAG INJ SC/IM: CPT

## 2024-08-26 PROCEDURE — 99284 EMERGENCY DEPT VISIT MOD MDM: CPT

## 2024-08-27 ENCOUNTER — HOSPITAL ENCOUNTER (EMERGENCY)
Facility: HOSPITAL | Age: 59
Discharge: HOME | End: 2024-08-27
Attending: EMERGENCY MEDICINE
Payer: COMMERCIAL

## 2024-08-27 VITALS
TEMPERATURE: 98 F | RESPIRATION RATE: 13 BRPM | HEIGHT: 65 IN | SYSTOLIC BLOOD PRESSURE: 128 MMHG | OXYGEN SATURATION: 100 % | HEART RATE: 58 BPM | WEIGHT: 169 LBS | DIASTOLIC BLOOD PRESSURE: 84 MMHG | BODY MASS INDEX: 28.16 KG/M2

## 2024-08-27 DIAGNOSIS — T78.40XA ALLERGIC REACTION, INITIAL ENCOUNTER: Primary | ICD-10-CM

## 2024-08-27 RX ORDER — DEXAMETHASONE SODIUM PHOSPHATE 10 MG/ML
10 INJECTION, SOLUTION INTRAMUSCULAR; INTRAVENOUS ONCE
Status: COMPLETED | OUTPATIENT
Start: 2024-08-27 | End: 2024-08-27

## 2024-08-27 RX ORDER — HYDROXYZINE HYDROCHLORIDE 25 MG/1
25 TABLET, FILM COATED ORAL ONCE
Status: COMPLETED | OUTPATIENT
Start: 2024-08-27 | End: 2024-08-27

## 2024-08-27 NOTE — ED PROVIDER NOTES
Patient Seen in: [facility] Emergency Department      History     Chief Complaint   Patient presents with    Allergic Rxn Allergies     Face and neck burning. Rash on sides of neck and moving down. Throat burning.      Stated Complaint:     Subjective:   HPI    Patient is a 58-year-old female presenting to the ED with possible allergic reaction.  The history is obtained from patient who is a good historian.  The patient states that she had a sleep study performed last night with the electrodes placed on her head.  She notes that she was developing some itching overnight and today she developed some mild swelling to the face with a sensation of burning.  No itching at this time.  She feels like her eyelids are swollen.  No generalized rash.  No difficulty swallowing or breathing.  No nausea or vomiting.  She does feel like her eyes are slightly itchy.  No drainage from her eye.  She does not tolerate p.o. prednisone stating that it causes issues with her emotional state and moods.  She is able to tolerate steroid injections in the past.  She did take hydroxyzine 25 mg prior to arrival with some improvement of her symptoms.  She does not tolerate Benadryl as she states it makes her restless leg syndrome worse.    Objective:   No pertinent past medical history.            No pertinent past surgical history.              No pertinent social history.            Review of Systems    Positive for stated Chief Complaint: Allergic Rxn Allergies (Face and neck burning. Rash on sides of neck and moving down. Throat burning. )    Other systems are as noted in HPI.  Constitutional and vital signs reviewed.      All other systems reviewed and negative except as noted above.    Physical Exam     ED Triage Vitals [08/26/24 2142]   /69   Pulse 75   Resp 18   Temp 98.4 °F (36.9 °C)   Temp src Temporal   SpO2 96 %   O2 Device None (Room air)       Current Vitals:   Vital Signs  BP: 128/84  Pulse: 58  Resp: 13  Temp: 98.4  °F (36.9 °C)  Temp src: Temporal  MAP (mmHg): 91    Oxygen Therapy  SpO2: 100 %  O2 Device: None (Room air)            Physical Exam  Vitals and nursing note reviewed.   Constitutional:       General: She is not in acute distress.     Appearance: Normal appearance. She is well-developed. She is not ill-appearing or toxic-appearing.      Comments: Clear speech, no hoarseness to voice, no stridor   HENT:      Head: Normocephalic and atraumatic.      Right Ear: External ear normal.      Left Ear: External ear normal.      Mouth/Throat:      Mouth: Mucous membranes are moist.      Pharynx: Oropharynx is clear.      Comments: No angioedema.    Eyes:      Conjunctiva/sclera: Conjunctivae normal.   Cardiovascular:      Rate and Rhythm: Normal rate and regular rhythm.      Heart sounds: Normal heart sounds.   Pulmonary:      Effort: Pulmonary effort is normal.      Breath sounds: Normal breath sounds.   Abdominal:      General: Abdomen is flat. Bowel sounds are normal. There is no distension.      Tenderness: There is no abdominal tenderness.   Musculoskeletal:      Right lower leg: No edema.      Left lower leg: No edema.   Skin:     General: Skin is warm.      Capillary Refill: Capillary refill takes less than 2 seconds.      Findings: No rash.   Neurological:      General: No focal deficit present.      Mental Status: She is alert and oriented to person, place, and time.   Psychiatric:         Mood and Affect: Mood normal.         Behavior: Behavior normal.               ED Course   Labs Reviewed - No data to display                   MDM      History is obtained from patient who is a good historian.   is also at bedside.  Differential diagnosis includes allergic reaction, as patient has responded to antihistamines.  The patient does have a history of autoimmune disease including lupus.  Consider SLE flareup.    Patient was given Decadron 10 mg IM.  States she does not tolerate p.o. prednisone.  She was given an  additional dose of hydroxyzine as this also seemed to improve her symptoms.  May continue 25 to 50 mg every 6 hours as needed at home.  Recommend close outpatient follow-up with her rheumatologist for reevaluation of symptoms or return to ED if any symptoms worsen, persist, or new symptoms develop.  Patient is comfortable with discharge plan.  Patient is otherwise well-appearing, nontoxic.    Previous records were reviewed.  Patient is followed by Dr. Arguello from rheumatology.  Recent labs reviewed from July 2, 2024 patient had a GFR of 67.56.                                   Medical Decision Making      Disposition and Plan     Clinical Impression:  1. Allergic reaction, initial encounter         Disposition:  Discharge  8/27/2024  1:48 am    Follow-up:  Heaven Bear MD  [address] [place] [address]  [address]  [facility] [id number] [phone]    Schedule an appointment as soon as possible for a visit in 2 day(s)      Radha Arguello MD  [address] [facility]  [address]  [facility] [id number] [phone]    Schedule an appointment as soon as possible for a visit      [facility] Emergency Department  [address] [facility] [id number] [phone]  Follow up  IF SYMPTOMS WORSEN, PERSIST, OR NEW SYMPTOMS DEVEL          Medications Prescribed:  Discharge Medication List as of 8/27/2024  2:09 AM

## 2024-08-27 NOTE — ED INITIAL ASSESSMENT (HPI)
Pt ambulated into the ED with c/o facial swelling, itchiness and burning since last night at 8pm. Pt also endorses throat burning that started today and severe sweating x 1 week. Airway patent    Pt states she did not take anything that she us allergic to and thinks it might be an autoimmune reaction    Pt stated she had a sleep study done on 8/24/2024 and they used a gel for the electrodes on her head. She states this may be a reaction to the gel.

## 2024-08-27 NOTE — ED QUICK NOTES
Patient took hydroxyzine 25 mg 9pm, stated that symptom of facial swelling went away slowly. No facial swelling as of this time

## 2024-08-31 ENCOUNTER — HOSPITAL ENCOUNTER (OUTPATIENT)
Dept: GENERAL RADIOLOGY | Facility: HOSPITAL | Age: 59
Discharge: HOME | End: 2024-08-31
Attending: INTERNAL MEDICINE
Payer: COMMERCIAL

## 2024-08-31 ENCOUNTER — OFFICE VISIT (OUTPATIENT)
Dept: INTERNAL MEDICINE CLINIC | Facility: CLINIC | Age: 59
End: 2024-08-31
Payer: COMMERCIAL

## 2024-08-31 VITALS
BODY MASS INDEX: 28.19 KG/M2 | OXYGEN SATURATION: 98 % | DIASTOLIC BLOOD PRESSURE: 72 MMHG | WEIGHT: 169.19 LBS | HEART RATE: 71 BPM | RESPIRATION RATE: 16 BRPM | SYSTOLIC BLOOD PRESSURE: 120 MMHG | TEMPERATURE: 99 F | HEIGHT: 65 IN

## 2024-08-31 DIAGNOSIS — R10.31 RIGHT GROIN PAIN: ICD-10-CM

## 2024-08-31 DIAGNOSIS — M79.672 ACUTE PAIN OF LEFT FOOT: Primary | ICD-10-CM

## 2024-08-31 DIAGNOSIS — G47.09 OTHER INSOMNIA: ICD-10-CM

## 2024-08-31 DIAGNOSIS — M79.672 ACUTE PAIN OF LEFT FOOT: ICD-10-CM

## 2024-08-31 PROCEDURE — 73630 X-RAY EXAM OF FOOT: CPT | Performed by: INTERNAL MEDICINE

## 2024-08-31 PROCEDURE — 3078F DIAST BP <80 MM HG: CPT | Performed by: INTERNAL MEDICINE

## 2024-08-31 PROCEDURE — 99214 OFFICE O/P EST MOD 30 MIN: CPT | Performed by: INTERNAL MEDICINE

## 2024-08-31 PROCEDURE — 73502 X-RAY EXAM HIP UNI 2-3 VIEWS: CPT | Performed by: INTERNAL MEDICINE

## 2024-08-31 PROCEDURE — 3074F SYST BP LT 130 MM HG: CPT | Performed by: INTERNAL MEDICINE

## 2024-08-31 PROCEDURE — 3008F BODY MASS INDEX DOCD: CPT | Performed by: INTERNAL MEDICINE

## 2024-08-31 RX ORDER — TRAZODONE HYDROCHLORIDE 50 MG/1
100 TABLET, FILM COATED ORAL NIGHTLY PRN
COMMUNITY
Start: 2024-08-31

## 2024-08-31 NOTE — PROGRESS NOTES
Drea Kaba is a 58 year old female.    Chief Complaint   Patient presents with    Follow - Up     Discuss labs, rt foot pain with swelling bruising and pain rt side groin area. Tried trazodone for insomnia- did not work       HPI:     Pleasant patient with RA, SLE, recurrent DVT, migraines, insomnia here several issues-  C/o right groin pain for a few  months, worse if she bends over or has to bear down for BM. Did not notice bulging there. Had hernia right above that area that was repaired 10 years ago, she also had right  hip replacement 9 years ago.   C/o left forefoot pain and swelling since yesterday, does not recall bumping something. Hurts to even put shoe on. She sees bruising on the forefoot and underneath the foot  C/o chronic insomnia- tried trazodone 50mg without any help, no SE noted on it. No sedation at all on it which surprised her daughter as she takes the same thing, patient open to trying 100mg dose. Otc meds have not helped         Patient Active Problem List   Diagnosis    Restless leg syndrome    Intrinsic asthma, unspecified    Malaise and fatigue    Embolism and thrombosis (HCC)    Vitamin D deficiency    Connective tissue disease, undifferentiated (HCC)    Inflammatory polyarthritis (HCC)    Sicca (HCC)    Neck pain    Seizure (HCC)    Osteoarthrosis, unspecified whether generalized or localized, pelvic region and thigh    Rheumatoid arthritis (HCC)    Osteoarthritis of carpometacarpal joint of left thumb, unspecified osteoarthritis type    Other unilateral secondary osteoarthritis of first carpometacarpal joint, left hand    Other secondary osteoarthritis of first carpometacarpal joint of left hand    Hypercoagulable state (HCC)    PVC (premature ventricular contraction)    Lumbar disc herniation    Seronegative rheumatoid arthritis (HCC)    History of DVT (deep vein thrombosis)    Renal insufficiency    Chronic bilateral low back pain with left-sided sciatica    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    Osteopenia, unspecified location    Right wrist pain    Chest pain    Lumbar pseudoarthrosis    Ulnar impaction syndrome, right    Aftercare following surgery of the musculoskeletal system    Iron deficiency anemia, unspecified    Right lateral epicondylitis    Palpitations    Abnormal EKG    Syncope, near    Sprain of left ankle, unspecified ligament, initial encounter    History of revision of total knee arthroplasty    Arthritis of left hip    S/P hip replacement, left    Hypotension after procedure    Acute chest pain    Migraine    Abnormal brain MRI    PRES (posterior reversible encephalopathy syndrome)    Cervicalgia    Lupus (HCC)    Fibromyalgia    Acute intractable headache    Carpal tunnel syndrome, bilateral    Left carpal tunnel syndrome    Trigger finger of left thumb    Irritable bowel syndrome    Trigger finger, right middle finger    Trigger finger, right ring finger    Atrial tachycardia (HCC)    Muscle weakness    Other insomnia    Thrombocytopenia (HCC)    Chronic depression     Current Outpatient Medications   Medication Sig Dispense Refill    traZODone 50 MG Oral Tab Take 2 tablets (100 mg total) by mouth nightly as needed for Sleep.      CYANOCOBALAMIN 1000 MCG/ML Injection Solution INJECT 1 ML (1,000 MCG) INTO THE MUSCLE EVERY 30 DAYS 3 mL 3    sertraline 50 MG Oral Tab Take 1 tablet (50 mg total) by mouth daily. 90 tablet 1    sertraline 100 MG Oral Tab Take 1 tablet (100 mg total) by mouth daily. 90 tablet 3    rivaroxaban (XARELTO) 20 MG Oral Tab Take 1 tablet (20 mg total) by mouth every evening. 90 tablet 3    metoprolol succinate ER 50 MG Oral Tablet 24 Hr Take 1 tablet (50 mg total) by mouth daily.      HUMIRA, 2 PEN, 40 MG/0.4ML Subcutaneous Pen-injector Kit Inject 40 mg into the skin every 14 (fourteen) days.      dicyclomine 20 MG Oral Tab Take 1 tablet (20 mg total) by mouth 4 (four) times daily before meals and nightly.      gabapentin 300 MG Oral  Cap Take 2 capsules (600 mg total) by mouth nightly.      valACYclovir 500 MG Oral Tab Take 1 tablet (500 mg total) by mouth 2 (two) times daily. 180 tablet 3    mycophenolate mofetil 250 MG Oral Cap Take 2 capsules (500 mg total) by mouth 2 (two) times daily before meals.      levETIRAcetam  MG Oral Tablet 24 Hr Take 1 tablet (500 mg total) by mouth in the morning and 1 tablet (500 mg total) before bedtime.      rOPINIRole 2 MG Oral Tab Take 1 tablet (2 mg total) by mouth every morning.      ROPINIROLE 0.25 MG Oral Tab TAKE 1 TABLET BY MOUTH NIGHTLY BUT CAN USE AN EXTRA TABLET IF NEEDED (Patient taking differently: every evening.) 180 tablet 3    AIMOVIG 140 MG/ML Subcutaneous Solution Auto-injector INJECT 1 ML (140 MG TOTAL) INTO THE SKIN EVERY 30 DAYS 3 mL 3    UBRELVY 50 MG Oral Tab TAKE 1 TABLET BY MOUTH AT ONSET OF MIGRAINE. MAY REPEAT 1 TABLET IN 2 HOURS IF NEEDED. 10 tablet 0    flecainide 100 MG Oral Tab Take 1 tablet (100 mg total) by mouth 2 (two) times daily. 50mg at noon also      ivabradine 5 MG Oral Tab Take 0.5 tablets (2.5 mg total) by mouth 3 (three) times daily.      Cevimeline HCl 30 MG Oral Cap Take 1 capsule (30 mg total) by mouth 3 (three) times daily.      ondansetron (ZOFRAN) 4 mg tablet Take 1 tablet (4 mg total) by mouth every 8 (eight) hours as needed for Nausea.      Estradiol 2 MG Vaginal Ring Place 2 mg vaginally every 3 (three) months. 1 each 4    allopurinol 100 MG Oral Tab Take 1 tablet (100 mg total) by mouth nightly.      Esomeprazole Magnesium 40 MG Oral Capsule Delayed Release Take 1 capsule (40 mg total) by mouth 2 (two) times daily.      clonazePAM 0.125 MG Oral Tablet Dispersible Take 1 tablet (0.125 mg total) by mouth nightly as needed. (Patient not taking: Reported on 8/31/2024)        Past Medical History:    Arrhythmia    atrial tachycardia, svt    Back problem    Calculus of kidney    Connective tissue disease, undifferentiated (HCC)    Crohn disease (HCC)    Deep  vein thrombosis (HCC)    Multiple DVTs: 2 in leg and 2 in arm    DEPRESSION    Depression    Esophageal reflux    Fibromyalgia    Gout    Hepatitis    from Medication    High blood pressure    High cholesterol    History of blood transfusion    CDH    HYPERLIPIDEMIA    HYPERTENSION    IBS (irritable bowel syndrome)    Irritable bowel syndrome    Kidney disease    RT Kidney no longer functions; LT kidney working about 30%; presently has hydronephrosi s and kidney stones    KIDNEY STONE    Lupus (systemic lupus erythematosus) (HCC)    Migraines    bright lights,wine,sulfites,cheeses,sweeteners,barometeric pressure    Muscle weakness    arm weakness    Neuropathy    Boths legs    OBESITY    Osteoarthritis    Pleurisy    Pneumonia due to organism    PONV (postoperative nausea and vomiting)    doesn't work sometimes    Port-A-Cath in place    Raynaud disease    Renal disorder    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    Renal disorder    LEFT functioning  stage 3    Rheumatoid arthritis (HCC)    Seizure disorder (HCC)    had x 1 year and went away/unknown etiology/ age 47    Sjogren's disease (HCC)    Visual impairment    wears glasses      Social History:  Social History     Socioeconomic History    Marital status:    Tobacco Use    Smoking status: Never     Passive exposure: Never    Smokeless tobacco: Never   Vaping Use    Vaping status: Never Used   Substance and Sexual Activity    Alcohol use: Not Currently     Alcohol/week: 0.0 standard drinks of alcohol    Drug use: Yes     Types: Cannabis     Comment: occasional    Sexual activity: Not Currently     Partners: Male   Other Topics Concern    Caffeine Concern No    Exercise No     Social Determinants of Health     Physical Activity: Inactive (9/11/2020)    [facility]    Exercise Vital Sign     Days of Exercise per Week: 0 days     Minutes of Exercise per Session: 0 min    Received from [facility]  [place], [facility]    Social Connections    [facility]    [facility]     Family History   Problem Relation Age of Onset    Dementia Father     Hypertension Father     Prostate Cancer Father     High Cholesterol Father     Obesity Mother     Heart Disorder Mother     Depression Mother     Cancer Mother     Hypertension Mother     Stroke Maternal Grandmother     Depression Sister     Diabetes Other         Allergies  Allergies   Allergen Reactions    Adhesive Tape UNKNOWN and HIVES    Cipro [Ciprofloxacin] OTHER (SEE COMMENTS)     Drug induced hepatitis    Dihydroergotamine OTHER (SEE COMMENTS)     Hx of SVT s/p 2 ablations    Epinephrine SWELLING     Patient does not recall this allergy, thinks it possibly made her restless but is unsure of the event.    Heparin OTHER (SEE COMMENTS)     HIT-Heparin \"eats up red blood cells\"    Latex UNKNOWN, OTHER (SEE COMMENTS) and RASH     POWDER \"CHOKES\" HER    unknown    Pain Relieving Patch SWELLING     Swelling with lidocaine patch. Has tolerated IV and Subcutaneous lidocaine multiple times since lido patch allergy recorded, no reaction. Possibly reacted to another ingredient in patch such as adhesive.     Prednisone UNKNOWN and OTHER (SEE COMMENTS)    Pregabalin ANAPHYLAXIS, ANGIOEDEMA and SWELLING    Radiology Contrast Iodinated Dyes RASH and OTHER (SEE COMMENTS)     History of iodine allergy and chronic renal failure      Topamax [Topiramate] ANAPHYLAXIS    Toradol OTHER (SEE COMMENTS)     Patient has stage 3 kidney failure    Compazine NAUSEA ONLY     Hyperactivity, restlessness, HA,     Feraheme [Ferumoxytol] FACE FLUSHING    Latex RASH     POWDER \"CHOKES\" HER    Phenergan [Promethazine Hcl] OTHER (SEE COMMENTS)     HYPERACTIVITY    Reglan [Metoclopramide Hcl] OTHER (SEE COMMENTS)     Restless legs, skin crawling    Hydromorphone Hcl UNKNOWN    Iodine (Topical) UNKNOWN    Nsaids OTHER (SEE COMMENTS)     CDK- has a solitary  kidney    Tramadol UNKNOWN    Triptans OTHER (SEE COMMENTS)     Hx of SVT s/p 2 ablations    Dronedarone DIARRHEA and OTHER (SEE COMMENTS)    Pcn [Bicillin L-A] RASH     ampicillin    Povidone Iodine RASH     rash    Vancomycin ITCHING         REVIEW OF SYSTEMS:   GENERAL HEALTH:  no fevers   RESPIRATORY: no cough  CARDIOVASCULAR: denies chest pain  GI: r groin discomfort- worse with bending   : no dysuria  NEURO: h/o migraine headaches  HEME: No adenopathy      EXAM:   /72 (BP Location: Left arm, Patient Position: Sitting, Cuff Size: adult)   Pulse 71   Temp 98.5 °F (36.9 °C) (Temporal)   Resp 16   Ht 5' 5\" (1.651 m)   Wt 169 lb 3.2 oz (76.7 kg)   SpO2 98%   BMI 28.16 kg/m²   GENERAL: well developed, not wearing left shoe due to swelling and pain  HEENT: atraumatic, normocephalic  NECK: supple,no adenopathy  LUNGS: normal rate without respiratory distress, lungs clear to auscultation  CARDIO: RRR nl S1 S2  GI: normal bowel sounds, soft, NT/ND, right groin region with mild TTP and possible lump  EXTREMITIES: left forefoot with ecchymosis and swelling, TTP proximal to all the toes, pulses intact  NEURO: Alert and oriented    ASSESSMENT AND PLAN:     Encounter Diagnoses   Name     Acute pain of left foot- rule out occult fracture with xray, advised to elevate, ice and take otc analgesics prn     Right groin pain- suspicion for hernia, will check US right groin     Other insomnia- increase trazodone to 100mg at bedtime prn, potential SE reviewed        No orders of the defined types were placed in this encounter.      Meds & Refills for this Visit:  Requested Prescriptions      No prescriptions requested or ordered in this encounter       Imaging & Consults:  US GROIN RIGHT LIMITED (CPT=76882)    Return in about 3 months (around 11/30/2024), or if symptoms worsen or fail to improve, for annual.  There are no Patient Instructions on file for this visit.      The patient indicates understanding of these  issues and agrees to the plan.

## 2024-09-04 ENCOUNTER — PATIENT MESSAGE (OUTPATIENT)
Dept: INTERNAL MEDICINE CLINIC | Facility: CLINIC | Age: 59
End: 2024-09-04

## 2024-09-11 ENCOUNTER — TELEPHONE (OUTPATIENT)
Dept: FAMILY MEDICINE CLINIC | Facility: CLINIC | Age: 59
End: 2024-09-11

## 2024-09-11 NOTE — TELEPHONE ENCOUNTER
Pt calling to report she is unable to attend appt for R groin US today d/t migraine.  She states she was informed that the soonest she could reschedule would be for in November and that she will need a new order for US.    US groin order entered 8/31/24.  RN to Central Scheduling call to inquire about need for new order, as current order is valid for one calendar year.  Per Central Scheduling staff, she is unsure as to why pt was informed of need for a new order as pt's current order would remain valid for 1 calendar year from when it was originally placed.

## 2024-09-18 ENCOUNTER — OFFICE VISIT (OUTPATIENT)
Dept: NEUROLOGY | Facility: CLINIC | Age: 59
End: 2024-09-18
Payer: COMMERCIAL

## 2024-09-18 VITALS
WEIGHT: 169 LBS | DIASTOLIC BLOOD PRESSURE: 85 MMHG | HEART RATE: 84 BPM | SYSTOLIC BLOOD PRESSURE: 99 MMHG | BODY MASS INDEX: 28.16 KG/M2 | RESPIRATION RATE: 16 BRPM | HEIGHT: 65 IN

## 2024-09-18 DIAGNOSIS — G43.809 VESTIBULAR MIGRAINE: Primary | ICD-10-CM

## 2024-09-18 DIAGNOSIS — M06.9 RHEUMATOID ARTHRITIS INVOLVING RIGHT WRIST, UNSPECIFIED WHETHER RHEUMATOID FACTOR PRESENT (HCC): ICD-10-CM

## 2024-09-18 DIAGNOSIS — F51.04 CHRONIC INSOMNIA: ICD-10-CM

## 2024-09-18 DIAGNOSIS — M32.9 LUPUS: ICD-10-CM

## 2024-09-18 DIAGNOSIS — G25.81 RESTLESS LEG SYNDROME: ICD-10-CM

## 2024-09-18 PROCEDURE — 99213 OFFICE O/P EST LOW 20 MIN: CPT | Performed by: OTHER

## 2024-09-18 PROCEDURE — 3008F BODY MASS INDEX DOCD: CPT | Performed by: OTHER

## 2024-09-18 PROCEDURE — 3079F DIAST BP 80-89 MM HG: CPT | Performed by: OTHER

## 2024-09-18 PROCEDURE — 3074F SYST BP LT 130 MM HG: CPT | Performed by: OTHER

## 2024-09-18 RX ORDER — IVABRADINE 5 MG/1
2.5 TABLET, FILM COATED ORAL 4 TIMES DAILY
COMMUNITY

## 2024-09-18 NOTE — PATIENT INSTRUCTIONS
Refill policies:    Allow 2-3 business days for refills; controlled substances may take longer.  Contact your pharmacy at least 5 days prior to running out of medication and have them send an electronic request or submit request through the “request refill” option in your [employer] account.  Refills are not addressed on weekends; covering physicians do not authorize routine medications on weekends.  No narcotics or controlled substances are refilled after noon on Fridays or by on call physicians.  By law, narcotics must be electronically prescribed.  A 30 day supply with no refills is the maximum allowed.  If your prescription is due for a refill, you may be due for a follow up appointment.  To best provide you care, patients receiving routine medications need to be seen at least once a year.  Patients receiving narcotic/controlled substance medications need to be seen at least once every 3 months.  In the event that your preferred pharmacy does not have the requested medication in stock (e.g. Backordered), it is your responsibility to find another pharmacy that has the requested medication available.  We will gladly send a new prescription to that pharmacy at your request.    Scheduling Tests:    If your physician has ordered radiology tests such as MRI or CT scans, please contact Central Scheduling at [phone] right away to schedule the test.  Once scheduled, the [facility] Centralized Referral Team will work with your insurance carrier to obtain pre-certification or prior authorization.  Depending on your insurance carrier, approval may take 3-10 days.  It is highly recommended patients assure they have received an authorization before having a test performed.  If test is done without insurance authorization, patient may be responsible for the entire amount billed.      Precertification and Prior Authorizations:  If your physician has recommended that you have a procedure or additional testing performed the [facility]  Centralized Referral Team will contact your insurance carrier to obtain pre-certification or prior authorization.    You are strongly encouraged to contact your insurance carrier to verify that your procedure/test has been approved and is a COVERED benefit.  Although the [facility] Centralized Referral Team does its due diligence, the insurance carrier gives the disclaimer that \"Although the procedure is authorized, this does not guarantee payment.\"    Ultimately the patient is responsible for payment.   Thank you for your understanding in this matter.  Paperwork Completion:  If you require FMLA or disability paperwork for your recovery, please make sure to either drop it off or have it faxed to our office at [phone]. Be sure the form has your name and date of birth on it.  The form will be faxed to our Forms Department and they will complete it for you.  There is a 25$ fee for all forms that need to be filled out.  Please be aware there is a 10-14 day turnaround time.  You will need to sign a release of information (EVERARDO) form if your paperwork does not come with one.  You may call the Forms Department with any questions at [phone].  Their fax number is [phone].

## 2024-09-18 NOTE — PROGRESS NOTES
[facility] Progress Note    HPI  Chief Complaint   Patient presents with    Migraine     Follow up and gets about 3 a week/month.Patient saw Dr Landin at [place] for a sleep study     [facility] is here as new patient [facility] this author.       \"She was originally seen for PRES syndrome 4/2022. She has been following with Dr. Romero from neurology for management of headaches. Patient expresses multiple concerns. She states since her PRES syndrome, she has improved but has not returned to baseline. She has episodes of not being able to speak with preservation of awareness, lasting ~5-10 minutes, with no preceding aura of odd tastes, smells or sounds and denies associated headaches. These episodes occur ~ once a month.  She also admits to urinating in bed at night. She admits to poor sleep for \"years\" and has poor sleep habits only sleeping ~4 hrs per night with broken sleep. She has restless legs syndrome and is on requip 2 mg in AM and 0.25 mg nightly and is on gabapentin 300 mg AM / 600 mg nightly. She has low iron and had infusion ~ 3 months ago with only minimal improvement.      She notes she has issues with getting up in the middle of the night to walk around.     She admits to intermittent episodes of not being able to understand others.      She is on Aimovig and Ubrelvy and Botox injections for migraines, along with Keppra 500 mg bid for migraines.       EEG is normal recently.     She had improvement in double vision after PRES but has some intermittent difficulty still.       She admits she had sleep study in past and was told she does not get into REM - admits to having poor sleep and sometimes acts out dreams.\"         Patient last seen 6/19/2024 - she still has chronic issues - states she is going to be seeing [facility] specialist in 11/2024 - she has electrical shocks in her body as well intermittently and has chronic migraine for  which she was following with Dr. Romero but wishes to establish with new provider for Botox injections - she remains on Aimovig and Ubrelvy as well.     Past Medical History:    Arrhythmia    atrial tachycardia, svt    Back problem    Calculus of kidney    Connective tissue disease, undifferentiated (HCC)    Crohn disease (HCC)    Deep vein thrombosis (HCC)    Multiple DVTs: 2 in leg and 2 in arm    DEPRESSION    Depression    Esophageal reflux    Fibromyalgia    Gout    Hepatitis    from Medication    High blood pressure    High cholesterol    History of blood transfusion    CDH    HYPERLIPIDEMIA    HYPERTENSION    IBS (irritable bowel syndrome)    Irritable bowel syndrome    Kidney disease    RT Kidney no longer functions; LT kidney working about 30%; presently has hydronephrosi s and kidney stones    KIDNEY STONE    Lupus (systemic lupus erythematosus) (HCC)    Migraines    bright lights,wine,sulfites,cheeses,sweeteners,barometeric pressure    Muscle weakness    arm weakness    Neuropathy    Boths legs    OBESITY    Osteoarthritis    Pleurisy    Pneumonia due to organism    PONV (postoperative nausea and vomiting)    doesn't work sometimes    Port-A-Cath in place    Raynaud disease    Renal disorder    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    Renal disorder    LEFT functioning  stage 3    Rheumatoid arthritis (HCC)    Seizure disorder (HCC)    had x 1 year and went away/unknown etiology/ age 47    Sjogren's disease (HCC)    Visual impairment    wears glasses     Past Surgical History:   Procedure Laterality Date    Appendectomy      Back surgery      3/16 lumbar discectomy  9/2016 lumbar fusion    Biopsy salivary gland,incisional  12/1/09    Performed by SANTOSH MOYER at [facility], [facility]    Cholecystectomy      Colonoscopy  10/16/2019    Colonoscopy      Colonoscopy N/A 8/29/2023    Procedure: COLONOSCOPY W/ BIOPSIES;  Surgeon: Ryne Monzon MD;  Location:  ENDOSCOPY    Elbow surgery      Hernia  surgery      Hip replacement surgery  1/14    right: Dr. Brody    Hysterectomy  1999    LUCA BSO for endometriosis    Ndsc ablation & rcnstj [facility] w/o bypass  08/2021    Other  11/2015    reconstructive surgery of left thumb joint    Other  8/2015    vaginal sling    Other Right     WRIST SURGERY    Other surgical history      parathyroidectomy    Other surgical history Right 2018    wrist surgery    Other surgical history Left 2018    left wrist surgery    Port, indwelling, imp  2019    Power Port    Repair enterocele,vag apprch  2012    Repair of rectocele  2012    Tonsillectomy      Total hip replacement Right      Family History   Problem Relation Age of Onset    Dementia Father     Hypertension Father     Prostate Cancer Father     High Cholesterol Father     Obesity Mother     Heart Disorder Mother     Depression Mother     Cancer Mother     Hypertension Mother     Stroke Maternal Grandmother     Depression Sister     Diabetes Other      Social History     Socioeconomic History    Marital status:    Tobacco Use    Smoking status: Never     Passive exposure: Never    Smokeless tobacco: Never   Vaping Use    Vaping status: Never Used   Substance and Sexual Activity    Alcohol use: Never    Drug use: Yes     Types: Cannabis     Comment: occasional    Sexual activity: Not Currently     Partners: Male   Other Topics Concern    Caffeine Concern No    Exercise Yes     Comment: stretches       Allergies   Allergen Reactions    Adhesive Tape UNKNOWN and HIVES    Cipro [Ciprofloxacin] OTHER (SEE COMMENTS)     Drug induced hepatitis    Dihydroergotamine OTHER (SEE COMMENTS)     Hx of SVT s/p 2 ablations    Epinephrine SWELLING     Patient does not recall this allergy, thinks it possibly made her restless but is unsure of the event.    Heparin OTHER (SEE COMMENTS)     HIT-Heparin \"eats up red blood cells\"    Latex UNKNOWN, OTHER (SEE COMMENTS) and RASH     POWDER \"CHOKES\" HER    unknown    Pain Relieving  Patch SWELLING     Swelling with lidocaine patch. Has tolerated IV and Subcutaneous lidocaine multiple times since lido patch allergy recorded, no reaction. Possibly reacted to another ingredient in patch such as adhesive.     Prednisone UNKNOWN and OTHER (SEE COMMENTS)    Pregabalin ANAPHYLAXIS, ANGIOEDEMA and SWELLING    Radiology Contrast Iodinated Dyes RASH and OTHER (SEE COMMENTS)     History of iodine allergy and chronic renal failure      Topamax [Topiramate] ANAPHYLAXIS    Toradol OTHER (SEE COMMENTS)     Patient has stage 3 kidney failure    Compazine NAUSEA ONLY     Hyperactivity, restlessness, HA,     Feraheme [Ferumoxytol] FACE FLUSHING    Latex RASH     POWDER \"CHOKES\" HER    Phenergan [Promethazine Hcl] OTHER (SEE COMMENTS)     HYPERACTIVITY    Reglan [Metoclopramide Hcl] OTHER (SEE COMMENTS)     Restless legs, skin crawling    Hydromorphone Hcl UNKNOWN    Iodine (Topical) UNKNOWN    Nsaids OTHER (SEE COMMENTS)     CDK- has a solitary kidney    Tramadol UNKNOWN    Triptans OTHER (SEE COMMENTS)     Hx of SVT s/p 2 ablations    Dronedarone DIARRHEA and OTHER (SEE COMMENTS)    Pcn [Bicillin L-A] RASH     ampicillin    Povidone Iodine RASH     rash    Vancomycin ITCHING         Current Outpatient Medications:     ivabradine 5 MG Oral Tab, Take 0.5 tablets (2.5 mg total) by mouth in the morning, at noon, in the evening, and at bedtime., Disp: , Rfl:     CYANOCOBALAMIN 1000 MCG/ML Injection Solution, INJECT 1 ML (1,000 MCG) INTO THE MUSCLE EVERY 30 DAYS, Disp: 3 mL, Rfl: 3    sertraline 50 MG Oral Tab, Take 1 tablet (50 mg total) by mouth daily., Disp: 90 tablet, Rfl: 1    sertraline 100 MG Oral Tab, Take 1 tablet (100 mg total) by mouth daily., Disp: 90 tablet, Rfl: 3    rivaroxaban (XARELTO) 20 MG Oral Tab, Take 1 tablet (20 mg total) by mouth every evening., Disp: 90 tablet, Rfl: 3    metoprolol succinate ER 50 MG Oral Tablet 24 Hr, Take 1 tablet (50 mg total) by mouth daily., Disp: , Rfl:     HUMIRA, 2  PEN, 40 MG/0.4ML Subcutaneous Pen-injector Kit, Inject 40 mg into the skin every 14 (fourteen) days., Disp: , Rfl:     dicyclomine 20 MG Oral Tab, Take 1 tablet (20 mg total) by mouth 4 (four) times daily before meals and nightly., Disp: , Rfl:     gabapentin 300 MG Oral Cap, Take 2 capsules (600 mg total) by mouth nightly., Disp: , Rfl:     valACYclovir 500 MG Oral Tab, Take 1 tablet (500 mg total) by mouth 2 (two) times daily., Disp: 180 tablet, Rfl: 3    mycophenolate mofetil 250 MG Oral Cap, Take 2 capsules (500 mg total) by mouth 2 (two) times daily before meals., Disp: , Rfl:     levETIRAcetam  MG Oral Tablet 24 Hr, Take 1 tablet (500 mg total) by mouth in the morning and 1 tablet (500 mg total) before bedtime., Disp: , Rfl:     rOPINIRole 2 MG Oral Tab, Take 1 tablet (2 mg total) by mouth every morning., Disp: , Rfl:     ROPINIROLE 0.25 MG Oral Tab, TAKE 1 TABLET BY MOUTH NIGHTLY BUT CAN USE AN EXTRA TABLET IF NEEDED, Disp: 180 tablet, Rfl: 3    AIMOVIG 140 MG/ML Subcutaneous Solution Auto-injector, INJECT 1 ML (140 MG TOTAL) INTO THE SKIN EVERY 30 DAYS, Disp: 3 mL, Rfl: 3    UBRELVY 50 MG Oral Tab, TAKE 1 TABLET BY MOUTH AT ONSET OF MIGRAINE. MAY REPEAT 1 TABLET IN 2 HOURS IF NEEDED., Disp: 10 tablet, Rfl: 0    flecainide 100 MG Oral Tab, Take 1 tablet (100 mg total) by mouth 2 (two) times daily. 50mg at noon also, Disp: , Rfl:     Cevimeline HCl 30 MG Oral Cap, Take 1 capsule (30 mg total) by mouth 3 (three) times daily., Disp: , Rfl:     ondansetron (ZOFRAN) 4 mg tablet, Take 1 tablet (4 mg total) by mouth every 8 (eight) hours as needed for Nausea., Disp: , Rfl:     Estradiol 2 MG Vaginal Ring, Place 2 mg vaginally every 3 (three) months., Disp: 1 each, Rfl: 4    allopurinol 100 MG Oral Tab, Take 1 tablet (100 mg total) by mouth nightly., Disp: , Rfl:     Esomeprazole Magnesium 40 MG Oral Capsule Delayed Release, Take 1 capsule (40 mg total) by mouth 2 (two) times daily., Disp: , Rfl:     Review of  Systems:  No chest pain or palpitations; otherwise as noted in HPI.    Exam:  BP 99/85 (BP Location: Left arm, Patient Position: Sitting, Cuff Size: large)   Pulse 84   Resp 16   Ht 65\"   Wt 169 lb (76.7 kg)   BMI 28.12 kg/m²   Estimated body mass index is 28.12 kg/m² as calculated from the following:    Height as of this encounter: 65\".    Weight as of this encounter: 169 lb (76.7 kg).    Gen: well developed, well nourished, no acute distress  HEENT: normocephalic  Heart; normal S1/S2, regular rate and rhythm  Lungs: clear to auscultation bilaterally  Extremities: no edema, peripheral pulses intact    Neck: supple, full range of motion; no carotid bruits    Fundoscopic Exam: optic discs sharp bilaterally    Neuro:  Mental status:  Orientation: Alert and oriented to person, place, time  Speech Fluent and conversational    CN: PERRL, EOMI with no nystagmus, VFF, smile symmetric, sensation intact, tongue and palate midline, SCM intact, otherwise, CN 2-12 intact  Motor: 5/5 strength throughout, tone normal  DTR: 2+ symmetric throughout, except trace ankle jerks; toes downgoing bilaterally, no clonus  Sensory: intact to light touch throughout; intact to pin; mildly reduce distal LE at great toes  Coord: FNF intact with no tremor or dysmetria; rapid alternating movements intact bilaterally  Romberg: absent  Gait: normal casual, heel toe gait; off balance with tandem but able to do so    Labs:  Reviewed in EMR     Imaging:  Reviewed in EMR    Most recent as below    MRI brain (5/26/2024)     FINDINGS:    Motion artifact limits evaluation     GLOBES:               Negative.  No mass or abnormal signal.  EXTRAOCULAR MUSCLES:   Negative.  Bilateral symmetric rectus muscles.  OPTIC NERVES/CHAISM:      Negative.  No mass or abnormal signal.  INTRACONAL SPACES:  Negative.  Normal retrobulbar fat.  EXTRACONAL SPACES:   Negative.  Normal lacrimal glands.  CAVERNOUS SINUS:              Normal appearance.     Mild partial  opacification of the left mastoid air cells is noted.                   Impression   CONCLUSION:  Normal MRI of the orbits.  Incidental partial opacification of the left mastoid air cells.     MRA head/neck with and without contrast (5/26/2024):   FINDINGS:    INTERNAL CAROTIDS:  No visible stenosis or aneurysm.    ANTERIOR CEREBRALS:  No visible stenosis or aneurysm.    MIDDLE CEREBRALS:  No visible stenosis or aneurysm.    POSTERIOR CEREBRALS:  No visible stenosis or aneurysm.    BASILAR:  No visible stenosis or aneurysm.    VERTEBRALS:  No visible stenosis or aneurysm.    OTHER:  Negative with no evidence of a vascular malformation.                     Impression   CONCLUSION:  Normal MRA of the brain.       Other procedures     EEG (6/4/2024):   INTERPRETATION:  This is a normal, awake and asleep electroencephalogram. There is no clear-cut focal abnormality or epileptiform activity seen in this record.          Impression/ Plan:  Drea Kaba is a 58 year old female with complicated PMHx including but not limited to Lupus and/or undifferentiated connective tissue disorder, neuropathy, restless legs syndrome, migraines, who presents for evaluation of episodes of confusion during the day with preservation of awareness.         She was originally seen for PRES syndrome 4/2022. She has been following with Dr. Romero from [facility] neurology for management of headaches and migraines as well as other neurologic symtpoms      Patient expresses multiple concerns. She states since her PRES syndrome, she has improved but has not returned to baseline. She has episodes of not being able to speak with preservation of awareness, lasting ~5-10 minutes, with no preceding aura of odd tastes, smells or sounds and denies associated headaches. These episodes occur ~ once a month.  She also admits to urinating in bed at night. She admits to poor sleep for \"years\" and has poor sleep habits only sleeping ~4 hrs per night with broken  sleep. She has restless legs syndrome and is on requip 2 mg in AM and 0.25 mg nightly and is on gabapentin 300 mg AM / 600 mg nightly. She has low iron and had infusion ~ 3 months ago with only minimal improvement.      She notes she has issues with getting up in the middle of the night to walk around.     She admits to intermittent episodes of not being able to understand others.      She is on Aimovig and Ubrelvy and Botox injections for migraines, along with Keppra 500 mg bid for migraines.       EEG is normal recently.     She had improvement in double vision after PRES but has some intermittent difficulty still.       Exam is overall non-focal and normal and MRI brain no longer shows evidence for PRES and extensive workup thus far has not shown evidence for demyelinating disease / multiple sclerosis and EEG was also done and normal.      Furthermore, she is already on Keppra which may cover for any potential seizures, as well.       Given her complicated history, I recommend she continue current regimen.       She seems to have significant sleep deprivation and suspect some of these events of confusion are related to sleep deprivation rather than seizures or TIA. She admits to a maximum of 4 hrs sleep per night which is broken and this may be related to her restless legs syndrome. However, she is already on gabapentin and ropinirole for treatment.  Otherwise, she was advised her management appears appropriate to this author and no additional labs, imaging, or medications are recommended.       For migraines, will refer to my neurology associate for management, who has expertise in headaches; otherwise, recommend follow up with movement disorders specialist for workup    1. Vestibular migraine  As noted above     2. Restless leg syndrome  As noted above     3. Chronic insomnia  As noted above     4. Rheumatoid arthritis involving right wrist, unspecified whether rheumatoid factor present (HCC)  As noted  above    5. Lupus (HCC)  As noted above     No follow-ups on file.    Trace Estrada MD, Neurology  [facility]  Pager [phone]  9/18/2024

## 2024-11-30 RX ORDER — METHYLPREDNISOLONE 4 MG/1
4 TABLET ORAL DAILY
COMMUNITY
Start: 2024-11-14

## 2024-12-03 NOTE — PAT NURSING NOTE
LM to Sam from Dr Sofia ofc re: pt LM if she needs pulmonary cl before surgery which pt cancelled apt 12/2 per pt due to migraines. Noted with Dr Radha Arguello notes in EPIC 11/14/24 that she addressed pt with upcoming apt with Pulmonology :  \"Had CP/congestion: occ can cough up phelgm  Since bronchitis/pneumonary last year.   Has made appt w/ pulmonary. \"  LM sheeba Greenwood to f/u if needed to see Pulmonologist before surgery and to call pt.

## 2024-12-05 RX ORDER — HYDROCODONE BITARTRATE AND ACETAMINOPHEN 5; 325 MG/1; MG/1
1 TABLET ORAL EVERY 4 HOURS PRN
Qty: 20 TABLET | Refills: 0 | Status: SHIPPED | OUTPATIENT
Start: 2024-12-05

## 2024-12-05 NOTE — DISCHARGE INSTRUCTIONS
My goal is to provide excellent care and a positive patient experience.  Please help me to share your experience with others by leaving a review for me on [email or web address], [email or web address], and/or [facility].  Thank you for your help.    Go to [email or web address] and search Elayne Sofia.  Click on write a review.  Sign in to google.  (You will not receive any spam or ads from this site and your email will not be posted).  Click on the stars to give a star rating and leave a comment.     Go to [email or web address] and search [facility].  Click on [facility].  Click on the stars to give a star rating and leave a comment.  Enter your email address (You will not receive any spam or ads from this site and your email will not be posted).  Click the box to confirm that you are a patient and post your review.    Go to [email or web address] and search [facility].  Click on [facility].  Click on the stars to give a star rating and leave a comment.  Enter your email address (You will not receive any spam or ads from this site and your email will not be posted).  Submit your review.        Elayne Sofia MD      Hand Surgery Post-Operative Instructions    PAIN:  You will be given a prescription for pain medicine.  Have this filled at your local pharmacy or where your insurance plan has made arrangements.  The pills may be taken every four hours for pain if needed.  Do not drive while you are taking the pain medication.    ACTIVITY:  Elevate the surgical area as much as possible for the first three days.  You should bend and straighten your fingers, wrist, elbow, and shoulder often and as much as you desire, unless it is restricted by a splint.  It is safe to write and eat with your operated arm as long as there is no pain.  Do not carry anything heavier than a dinner fork or pencil with your operated arm.    SLING:  A sling may be worn if needed for comfort.  If you had a nerve block, you should use the sling until you regain  sensation and strength to your arm.  It is better to elevate the surgical area above your heart, rather than hanging it below your heart in the sling.    ICE:  Apply ice to the surgical area for 1 hour, 4 times a day for 3 days after surgery.  This will help to reduce swelling and pain.  After that you may apply ice as much as you like.    WOUNDS:  Your stitches may need to be removed in 2 weeks.  The incision may be sore, swell, and develop bruising over the next several days.  This will go away and no special care is required.  Your skin may be dry and peel due to the strong disinfectant soap we use at the time of surgery.    BATHING:  You should keep your splint clean and dry, but may loosen the outer ACE wrap if it feels tight.     FOLLOW-UP:  Call the office to make an appointment to see me in about 2 weeks after your surgery.  If you have a temperature over 101°F, severe pain, or redness at the incision site; please contact the office.  You may be asked to come back to the office early.       HOME INSTRUCTIONS  AMBSURG HOME CARE INSTRUCTIONS: POST-OP ANESTHESIA  The medication that you received for sedation or general anesthesia can last up to 24 hours. Your judgment and reflexes may be altered, even if you feel like your normal self.      We Recommend:   Do not drive any motor vehicle or bicycle   Avoid mowing the lawn, playing sports, or working with power tools/applicances (power saws, electric knives or mixers)   That you have someone stay with you on your first night home   Do not drink alcohol or take sleeping pills or tranquilizers   Do not sign legal documents within 24 hours of your procedure   If you had a nerve block for your surgery, take extra care not to put any pressure on your arm or hand for 24 hours    It is normal:  For you to have a sore throat if you had a breathing tube during surgery (while you were asleep!). The sore throat should get better within 48 hours. You can gargle with warm  salt water (1/2 tsp in 4 oz warm water) or use a throat lozenge for comfort  To feel muscle aches or soreness especially in the abdomen, chest or neck. The achy feeling should go away in the next 24 hours  To feel weak, sleepy or \"wiped out\". Your should start feeling better in the next 24 hours.   To experience mild discomforts such as sore lip or tongue, headache, cramps, gas pains or a bloated feeling in your abdomen.   To experience mild back pain or soreness for a day or two if you had spinal or epidural anesthesia.   If you had laparoscopic surgery, to feel shoulder pain or discomfort on the day of surgery.   For some patients to have nausea after surgery/anesthesia    If you feel nausea or experience vomiting:   Try to move around less.   Eat less than usual or drink only liquids until the next morning   Nausea should resolve in about 24 hours    If you have a problem when you are at home:    Call your surgeons office   Discharge Instructions: After Your Surgery  You’ve just had surgery. During surgery, you were given medicine called anesthesia to keep you relaxed and free of pain. After surgery, you may have some pain or nausea. This is common. Here are some tips for feeling better and getting well after surgery.   Going home  Your healthcare provider will show you how to take care of yourself when you go home. They'll also answer your questions. Have an adult family member or friend drive you home. For the first 24 hours after your surgery:   Don't drive or use heavy equipment.  Don't make important decisions or sign legal papers.  Take medicines as directed.  Don't drink alcohol.  Have someone stay with you, if needed. They can watch for problems and help keep you safe.  Be sure to go to all follow-up visits with your healthcare provider. And rest after your surgery for as long as your provider tells you to.   Coping with pain  If you have pain after surgery, pain medicine will help you feel better. Take  it as directed, before pain becomes severe. Also, ask your healthcare provider or pharmacist about other ways to control pain. This might be with heat, ice, or relaxation. And follow any other instructions your surgeon or nurse gives you.      Stay on schedule with your medicine.     Tips for taking pain medicine  To get the best relief possible, remember these points:   Pain medicines can upset your stomach. Taking them with a little food may help.  Most pain relievers taken by mouth need at least 20 to 30 minutes to start to work.  Don't wait till your pain becomes severe before you take your medicine. Try to time your medicine so that you can take it before starting an activity. This might be before you get dressed, go for a walk, or sit down for dinner.  Constipation is a common side effect of some pain medicines. Call your healthcare provider before taking any medicines such as laxatives or stool softeners to help ease constipation. Also ask if you should skip any foods. Drinking lots of fluids and eating foods such as fruits and vegetables that are high in fiber can also help. Remember, don't take laxatives unless your surgeon has prescribed them.  Drinking alcohol and taking pain medicine can cause dizziness and slow your breathing. It can even be deadly. Don't drink alcohol while taking pain medicine.  Pain medicine can make you react more slowly to things. Don't drive or run machinery while taking pain medicine.  Your healthcare provider may tell you to take acetaminophen to help ease your pain. Ask them how much you're supposed to take each day. Acetaminophen or other pain relievers may interact with your prescription medicines or other over-the-counter (OTC) medicines. Some prescription medicines have acetaminophen and other ingredients in them. Using both prescription and OTC acetaminophen for pain can cause you to accidentally overdose. Read the labels on your OTC medicines with care. This will help you  to clearly know the list of ingredients, how much to take, and any warnings. It may also help you not take too much acetaminophen. If you have questions or don't understand the information, ask your pharmacist or healthcare provider to explain it to you before you take the OTC medicine.   Managing nausea  Some people have an upset stomach (nausea) after surgery. This is often because of anesthesia, pain, or pain medicine, less movement of food in the stomach, or the stress of surgery. These tips will help you handle nausea and eat healthy foods as you get better. If you were on a special food plan before surgery, ask your healthcare provider if you should follow it while you get better. Check with your provider on how your eating should progress. It may depend on the surgery you had. These general tips may help:   Don't push yourself to eat. Your body will tell you when to eat and how much.  Start off with clear liquids and soup. They're easier to digest.  Next try semi-solid foods as you feel ready. These include mashed potatoes, applesauce, and gelatin.  Slowly move to solid foods. Don’t eat fatty, rich, or spicy foods at first.  Don't force yourself to have 3 large meals a day. Instead eat smaller amounts more often.  Take pain medicines with a small amount of solid food, such as crackers or toast. This helps prevent nausea.  When to call your healthcare provider  Call your healthcare provider right away if you have any of these:   You still have too much pain, or the pain gets worse, after taking the medicine. The medicine may not be strong enough. Or there may be a complication from the surgery.  You feel too sleepy, dizzy, or groggy. The medicine may be too strong.  Side effects such as nausea or vomiting. Your healthcare provider may advise taking other medicines to .  Skin changes such as rash, itching, or hives. This may mean you have an allergic reaction. Your provider may advise taking other  medicines.  The incision looks different (for instance, part of it opens up).  Bleeding or fluid leaking from the incision site, and weren't told to expect that.  Fever of 100.4°F (38°C) or higher, or as directed by your provider.  Call 911  Call 911 right away if you have:   Trouble breathing  Facial swelling    If you have obstructive sleep apnea   You were given anesthesia medicine during surgery to keep you comfortable and free of pain. After surgery, you may have more apnea spells because of this medicine and other medicines you were given. The spells may last longer than normal.    At home:  Keep using the continuous positive airway pressure (CPAP) device when you sleep. Unless your healthcare provider tells you not to, use it when you sleep, day or night. CPAP is a common device used to treat obstructive sleep apnea.  Talk with your provider before taking any pain medicine, muscle relaxants, or sedatives. Your provider will tell you about the possible dangers of taking these medicines.  Contact your provider if your sleeping changes a lot even when taking medicines as directed.  Ellen last reviewed this educational content on 10/1/2021  © 5162-8024 [employer]. All rights reserved. This information is not intended as a substitute for professional medical care. Always follow your healthcare professional's instructions.

## 2024-12-06 ENCOUNTER — HOSPITAL ENCOUNTER (OUTPATIENT)
Facility: HOSPITAL | Age: 59
Setting detail: HOSPITAL OUTPATIENT SURGERY
Discharge: HOME | End: 2024-12-06
Attending: ORTHOPAEDIC SURGERY | Admitting: ORTHOPAEDIC SURGERY
Payer: COMMERCIAL

## 2024-12-06 ENCOUNTER — APPOINTMENT (OUTPATIENT)
Dept: GENERAL RADIOLOGY | Facility: HOSPITAL | Age: 59
End: 2024-12-06
Attending: ORTHOPAEDIC SURGERY
Payer: COMMERCIAL

## 2024-12-06 ENCOUNTER — ANESTHESIA (OUTPATIENT)
Dept: SURGERY | Facility: HOSPITAL | Age: 59
End: 2024-12-06
Payer: COMMERCIAL

## 2024-12-06 ENCOUNTER — ANESTHESIA EVENT (OUTPATIENT)
Dept: SURGERY | Facility: HOSPITAL | Age: 59
End: 2024-12-06
Payer: COMMERCIAL

## 2024-12-06 VITALS
OXYGEN SATURATION: 99 % | HEIGHT: 65 IN | HEART RATE: 55 BPM | BODY MASS INDEX: 28.89 KG/M2 | DIASTOLIC BLOOD PRESSURE: 76 MMHG | TEMPERATURE: 98 F | RESPIRATION RATE: 18 BRPM | SYSTOLIC BLOOD PRESSURE: 126 MMHG | WEIGHT: 173.38 LBS

## 2024-12-06 DIAGNOSIS — Z01.818 PREOP TESTING: Primary | ICD-10-CM

## 2024-12-06 DIAGNOSIS — M18.11 OSTEOARTHRITIS OF CARPOMETACARPAL (CMC) JOINT OF RIGHT THUMB, UNSPECIFIED OSTEOARTHRITIS TYPE: ICD-10-CM

## 2024-12-06 PROCEDURE — 3E0T3BZ INTRODUCTION OF ANESTHETIC AGENT INTO PERIPHERAL NERVES AND PLEXI, PERCUTANEOUS APPROACH: ICD-10-PCS | Performed by: ORTHOPAEDIC SURGERY

## 2024-12-06 PROCEDURE — 0PTM0ZZ RESECTION OF RIGHT CARPAL, OPEN APPROACH: ICD-10-PCS | Performed by: ORTHOPAEDIC SURGERY

## 2024-12-06 PROCEDURE — 0LX50ZZ TRANSFER RIGHT LOWER ARM AND WRIST TENDON, OPEN APPROACH: ICD-10-PCS | Performed by: ORTHOPAEDIC SURGERY

## 2024-12-06 PROCEDURE — 76000 FLUOROSCOPY <1 HR PHYS/QHP: CPT | Performed by: ORTHOPAEDIC SURGERY

## 2024-12-06 PROCEDURE — 64415 NJX AA&/STRD BRCH PLXS IMG: CPT | Performed by: ORTHOPAEDIC SURGERY

## 2024-12-06 PROCEDURE — 0RQN0ZZ REPAIR RIGHT WRIST JOINT, OPEN APPROACH: ICD-10-PCS | Performed by: ORTHOPAEDIC SURGERY

## 2024-12-06 RX ORDER — LIDOCAINE HYDROCHLORIDE 10 MG/ML
INJECTION, SOLUTION INFILTRATION; PERINEURAL
Status: COMPLETED | OUTPATIENT
Start: 2024-12-06 | End: 2024-12-06

## 2024-12-06 RX ORDER — ONDANSETRON 2 MG/ML
4 INJECTION INTRAMUSCULAR; INTRAVENOUS EVERY 6 HOURS PRN
OUTPATIENT
Start: 2024-12-06

## 2024-12-06 RX ORDER — LIDOCAINE HYDROCHLORIDE 10 MG/ML
INJECTION, SOLUTION EPIDURAL; INFILTRATION; INTRACAUDAL; PERINEURAL AS NEEDED
Status: DISCONTINUED | OUTPATIENT
Start: 2024-12-06 | End: 2024-12-06 | Stop reason: SURG

## 2024-12-06 RX ORDER — ACETAMINOPHEN 500 MG
1000 TABLET ORAL ONCE
Status: COMPLETED | OUTPATIENT
Start: 2024-12-06 | End: 2024-12-06

## 2024-12-06 RX ORDER — SODIUM CHLORIDE, SODIUM LACTATE, POTASSIUM CHLORIDE, CALCIUM CHLORIDE 600; 310; 30; 20 MG/100ML; MG/100ML; MG/100ML; MG/100ML
INJECTION, SOLUTION INTRAVENOUS CONTINUOUS
Status: DISCONTINUED | OUTPATIENT
Start: 2024-12-06 | End: 2024-12-06

## 2024-12-06 RX ORDER — MIDAZOLAM HYDROCHLORIDE 1 MG/ML
INJECTION INTRAMUSCULAR; INTRAVENOUS
Status: COMPLETED | OUTPATIENT
Start: 2024-12-06 | End: 2024-12-06

## 2024-12-06 RX ORDER — ONDANSETRON 2 MG/ML
4 INJECTION INTRAMUSCULAR; INTRAVENOUS EVERY 6 HOURS PRN
Status: DISCONTINUED | OUTPATIENT
Start: 2024-12-06 | End: 2024-12-06

## 2024-12-06 RX ORDER — METOPROLOL TARTRATE 25 MG/1
25 TABLET, FILM COATED ORAL ONCE AS NEEDED
Status: DISCONTINUED | OUTPATIENT
Start: 2024-12-06 | End: 2024-12-06 | Stop reason: HOSPADM

## 2024-12-06 RX ORDER — NALOXONE HYDROCHLORIDE 0.4 MG/ML
0.08 INJECTION, SOLUTION INTRAMUSCULAR; INTRAVENOUS; SUBCUTANEOUS AS NEEDED
Status: DISCONTINUED | OUTPATIENT
Start: 2024-12-06 | End: 2024-12-06

## 2024-12-06 RX ORDER — ONDANSETRON 2 MG/ML
INJECTION INTRAMUSCULAR; INTRAVENOUS AS NEEDED
Status: DISCONTINUED | OUTPATIENT
Start: 2024-12-06 | End: 2024-12-06 | Stop reason: SURG

## 2024-12-06 RX ORDER — DEXAMETHASONE SODIUM PHOSPHATE 10 MG/ML
INJECTION, SOLUTION INTRAMUSCULAR; INTRAVENOUS
Status: COMPLETED | OUTPATIENT
Start: 2024-12-06 | End: 2024-12-06

## 2024-12-06 RX ORDER — ROPIVACAINE HYDROCHLORIDE 5 MG/ML
INJECTION, SOLUTION EPIDURAL; INFILTRATION; PERINEURAL
Status: COMPLETED | OUTPATIENT
Start: 2024-12-06 | End: 2024-12-06

## 2024-12-06 RX ADMIN — ONDANSETRON 4 MG: 2 INJECTION INTRAMUSCULAR; INTRAVENOUS at 17:55:00

## 2024-12-06 RX ADMIN — ROPIVACAINE HYDROCHLORIDE 30 ML: 5 INJECTION, SOLUTION EPIDURAL; INFILTRATION; PERINEURAL at 16:21:00

## 2024-12-06 RX ADMIN — DEXAMETHASONE SODIUM PHOSPHATE 4 MG: 10 INJECTION, SOLUTION INTRAMUSCULAR; INTRAVENOUS at 17:55:00

## 2024-12-06 RX ADMIN — LIDOCAINE HYDROCHLORIDE 50 MG: 10 INJECTION, SOLUTION EPIDURAL; INFILTRATION; INTRACAUDAL; PERINEURAL at 17:50:00

## 2024-12-06 RX ADMIN — MIDAZOLAM HYDROCHLORIDE 4 MG: 1 INJECTION INTRAMUSCULAR; INTRAVENOUS at 16:21:00

## 2024-12-06 RX ADMIN — SODIUM CHLORIDE, SODIUM LACTATE, POTASSIUM CHLORIDE, CALCIUM CHLORIDE: 600; 310; 30; 20 INJECTION, SOLUTION INTRAVENOUS at 17:46:00

## 2024-12-06 RX ADMIN — LIDOCAINE HYDROCHLORIDE 3 ML: 10 INJECTION, SOLUTION INFILTRATION; PERINEURAL at 16:21:00

## 2024-12-06 RX ADMIN — DEXAMETHASONE SODIUM PHOSPHATE 10 MG: 10 INJECTION, SOLUTION INTRAMUSCULAR; INTRAVENOUS at 16:21:00

## 2024-12-06 NOTE — H&P
Drea Kaba  12/5/1965  58 year old   female  Elayne Sofia MD    HPI:   Patient presents with:  Left Wrist - Pain  Right Wrist - Pain    The patient is right-handed.  Date of injury/ onset of symptoms: 6 months without injury  The patient complains of pain located in right wrist and thumb and left wrist ulnarly. The pain is graded as severe.  The patient's pain occurs with certain movements and gripping.  The patient denies numbness and tingling.  The patient denies skin laceration or breakdown.  Prior treatments have included wrist braces and RA medication.    ALLERGIES:    Cipro [Ciprofloxaci* OTHER (SEE COMMENTS)  Comment:Drug induced hepatitis  Epinephrine SWELLING  Comment:Patient does not recall this allergy, thinks it  possibly made her restless but is unsure of the  event.  Heparin OTHER (SEE COMMENTS)  Comment:HIT-Heparin \"eats up red blood cells\"  Ketorolac Trometham* OTHER (SEE COMMENTS)  Comment:Patient has stage 3 kidney failure  Lidocaine SWELLING  Comment:Lidocaine patch  Lyrica [Pregabalin] ANGIOEDEMA  Radiology Contrast * RASH, OTHER (SEE COMMENTS)  Comment:History of iodine allergy and chronic renal  failure  Topamax [Topiramate] ANAPHYLAXIS  Betadine [Povidone * RASH  Compazine NAUSEA ONLY  Comment:Hyperactivity, restlessness, HA,  Dilaudid [Hydromorp* OTHER (SEE COMMENTS)  Comment:HYPERACTIVITY  Feraheme [Ferumoxyt* FACE FLUSHING  Latex RASH  Comment:POWDER \"CHOKES\" HER  Phenergan [Prometha* OTHER (SEE COMMENTS)  Comment:HYPERACTIVITY  Reglan [Metoclopram* OTHER (SEE COMMENTS)  Comment:Restless legs, skin crawling  Pcn [Bicillin L-A] RASH  Comment:ampicillin   Current Outpatient Medications   Medication Sig Dispense Refill   Cevimeline HCl 30 MG Oral Cap Take 1 capsule (30 mg total) by mouth 3 (three) times daily. 270 capsule 1   mesalamine 1.2 g Oral Tab EC Take 4 tablets (4.8 g total) by mouth daily. 360 tablet 5   clindamycin 300 MG Oral Cap Take 300 mg by mouth 2 (two) times a day.    flecainide 100 MG Oral Tab Take 100 mg by mouth 2 (two) times daily. Take 1 per am, half pill at dinner time   CORLANOR 5 MG Oral Tab Take 2.5 mg by mouth. 3-4 times daily   methylPREDNISolone (MEDROL) 4 MG Oral Tab Take 1 tablet (4 mg total) by mouth daily. 30 tablet 1   hydrOXYzine 25 MG Oral Tab Take 1 tablet (25 mg total) by mouth every 8 (eight) hours as needed for Itching (RLS). 90 tablet 1   adalimumab (HUMIRA, 2 PEN,) 40 MG/0.4ML Subcutaneous Pen-injector Kit Inject 40 mg into the skin every 14 (fourteen) days. 6 each 0   ROPINIROLE HCL ER 2 MG Oral Tablet 24 Hr TAKE 1 TABLET DAILY 90 tablet 3   MYCOPHENOLATE MOFETIL 500 MG Oral Tab TAKE 1 TABLET TWICE A  tablet 0   Insulin Syringe-Needle U-100 (BD INSULIN SYRINGE) 25G X 5/8\" 1 ML Does not apply Misc Use weekly for methotrexate injections 12 each 0   Esomeprazole Magnesium 40 MG Oral Capsule Delayed Release Take 1 capsule (40 mg total) by mouth 2 (two) times daily. 180 capsule 1   levETIRAcetam  MG Oral Tablet 24 Hr Take 2 tablets (1,000 mg total) by mouth daily. (Patient not taking: Reported on 9/23/2024) 180 tablet 1   ubrogepant (UBRELVY) 50 MG Oral Tab TAKE 1 TABLET BY MOUTH AT ONSET OF MIGRAINE. MAY REPEAT 1 TABLET IN 2 HOURS AS NEEDED Strength: 50 mg 48 tablet 2   ergocalciferol 1.25 MG (98387 UT) Oral Cap Take 1 capsule (50,000 Units total) by mouth every 14 (fourteen) days. 6 capsule 1   GABAPENTIN 300 MG Oral Cap TAKE ONE CAPSULE THREE TIMES DAILY 270 capsule 3   rOPINIRole 0.25 MG Oral Tab TAKE 1 TABLET BY MOUTH NIGHTLY BUT CAN USE AN EXTRA TABLET IF NEEDED 180 tablet 3   ondansetron (ZOFRAN) 4 mg tablet Take 1 tablet (4 mg total) by mouth every 8 (eight) hours as needed for Nausea. 20 tablet 1   AIMOVIG 140 MG/ML Subcutaneous Solution Auto-injector INJECT 1 ML (140 MG TOTAL) INTO THE SKIN EVERY 30 DAYS 3 mL 3   metoprolol tartrate 25 MG Oral Tab Take 25 mg by mouth daily.   rivaroxaban (XARELTO) 20 MG Oral Tab Take 1 tablet (20 mg  total) by mouth every evening. 90 tablet 3   cyanocobalamin 1000 MCG/ML Injection Solution Inject 1,000 mcg into the muscle every 30 (thirty) days.   Estradiol 2 MG Vaginal Ring Place 2 mg vaginally every 3 (three) months. 1 each 4   ValACYclovir HCl 500 MG Oral Tab Take 500 mg by mouth 2 (two) times daily.   allopurinol 100 MG Oral Tab Take 100 mg by mouth nightly.   Sertraline HCl (ZOLOFT) 100 MG Oral Tab Take 150 mg by mouth daily.     HISTORY:  Past Medical History:   Diagnosis Date   Arrhythmia   atrial tachycardia, svt   Back problem   Calculus of kidney   Connective tissue disease, undifferentiated (HCC)   Deep vein thrombosis (HCC) 2016   Multiple DVTs: 2 in leg and 2 in arm   DEPRESSION   Depression   Esophageal reflux   Fibromyalgia   Gout   Hepatitis 2011   from Medication   High blood pressure   High cholesterol   History of blood transfusion 2014   [facility]   HYPERLIPIDEMIA   HYPERTENSION   IBS (irritable bowel syndrome)   Irritable bowel syndrome   Kidney disease   RT Kidney no longer functions; LT kidney working about 30%; presently has hydronephrosi s and kidney stones   KIDNEY STONE   Lupus (systemic lupus erythematosus) ([facility])   Migraines   bright lights,wine,sulfites,cheeses,sweeteners,barometeric pressure   Muscle weakness   arm weakness   Neuropathy   Boths legs   OBESITY   Osteoarthritis   Pleurisy   Pneumonia due to organism 05/2017   PONV (postoperative nausea and vomiting)   Raynaud disease   Renal disorder   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   Renal disorder   LEFT functioning stage 3   Rheumatoid arthritis (HCC)   Seizure disorder (HCC)   had x 1 year and went away/unknown etiology/ age 47   Sjogren's disease (HCC)   Visual impairment   wears glasses     Past Surgical History:   Procedure Laterality Date   APPENDECTOMY   BACK SURGERY   3/16 lumbar discectomy 9/2016 lumbar fusion   BIOPSY SALIVARY GLAND,INCISIONAL 12/1/09   Performed [facility] [place]    CHOLECYSTECTOMY   COLONOSCOPY 10/16/2019   COLONOSCOPY   ELBOW SURGERY   HERNIA SURGERY   HIP REPLACEMENT SURGERY 1/14   right: Dr. Brody   HYSTERECTOMY 1999   LUCA BSO for endometriosis   NDSC ABLATION & RCNSTJ ATRIA LMTD W/O BYPASS 08/2021   OTHER 11/2015   reconstructive surgery of left thumb joint   OTHER 8/2015   vaginal sling   OTHER Right   WRIST SURGERY   OTHER SURGICAL HISTORY   parathyroidectomy   OTHER SURGICAL HISTORY Right 2018   wrist surgery   OTHER SURGICAL HISTORY Left 2018   left wrist surgery   PORT, INDWELLING, IMP 2019   Power Port   REPAIR ENTEROCELE,VAG APPRCH 2012   REPAIR OF RECTOCELE 2012   TONSILLECTOMY   TOTAL HIP REPLACEMENT Right     Family History   Problem Relation Age of Onset   Hypertension Father   Prostate Cancer Father   High Cholesterol Father   Hypertension Mother     Social History  Tobacco Use  Smoking status: Never  Smokeless tobacco: Never  Vaping Use  Vaping status: Never Used  Alcohol use: Not Currently  Alcohol/week: 0.0 standard drinks of alcohol  Drug use: Yes  Frequency: 7.0 times per week  Types: Cannabis  Comment: Gummies, oils        REVIEW OF SYSTEMS:   A 12 point review of systems was performed as documented on the intake form and reviewed by me today with pertinent positives and negatives listed in the HPI.    EXAM:   Ht 5' 5\" (1.651 m)  Wt 170 lb (77.1 kg)  BMI 28.29 kg/m²   The patient is awake and oriented x 3 and overall well appearing. The patient has normal mood.  The patient is non-tender and atraumatic with the exception of their bilateral upper extremities.  The patient's skin is intact and compartments are soft.  The patient's upper extremities are warm and pink with brisk capillary refill and 2+ radial pulses.  Sensation is intact to light touch in axillary, median, ulnar, and radial nerve distributions.  The patient has 5/5 strength in finger flexion, finger extension, EPL, FPL, and interosseous muscle function.  The patient's wrist range of  motion if from 60 degrees of dorsiflexion to 60 volar flexion with supination of 80 degrees and pronation of 90 degrees compared to 60 degrees of dorsiflexion to 60 volar flexion with supination of 80 degrees and pronation of 90 degrees on the opposite side.  The patient's DRUJ is stable.  Radially, the patient has no tenderness to palpation over the first dorsal compartment and negative Finkelstein's testing. The patient has tenderness to palpation over the thumb CMC joint and positive CMC grind on the right. The patient has no tenderness to palpation over the FCR tendon and no pain with resisted wrist flexion. The patient has negative Hunter's testing and negative pre-dynamic instability testing.  Ulnarly, the patient has tenderness to palpation over the TFCC and positive ulnar grind on the left. The patient has negative Chinyere and ballotment testing. The patient has no tenderness to palpation over the ECU tendon, no pain with resisted thumb and index abduction, and the ECU tendon is stable in supination. The patient has no tenderness to palpation over the FCU insertion and negative pisiform grind.    IMAGING AND TESTING:  Xrays of the patient's right wrist with 3 views taken today and independently reviewed by me reveal a healed ulnar shortening osteotomy and small osteophytes at her thumb CMC joint.    Xrays of the patient's left wrist with 3 views taken today and independently reviewed by me reveal 2mm ulnar positive variance and reduced thumb metacarpal base after trapezial excision.    ASSESSMENT AND PLAN:   Right wrist pain (primary encounter diagnosis)  Arthritis of carpometacarpal (cmc) joint of right thumb  Rheumatoid arthritis involving right wrist, unspecified whether rheumatoid factor present (hcc)  Left wrist pain  Ulnar impaction syndrome, left    The risks, indications, benefits, and procedures of both operative and non-operative treatment were discussed with the patient.    The patient desired  surgery for her right thumb. Surgery recommended was right basilar thumb arthroplasty with trapezial excision and tendon transfer. Risks include bleeding, infection, neurovascular injury, failure of the procedure, stiffness, and potential need for additional surgery as well as anesthetic complications including death. The patient consented to the procedure.    All of their questions were answered and they are in agreement with the treatment plan.    The patient will return to clinic in 2 weeks for further clinical and radiographic evaluation with right wrist AP, lateral, and CMC Torres's views.

## 2024-12-06 NOTE — ANESTHESIA PREPROCEDURE EVALUATION
Anesthesia PreOp Note    HPI:     Drea Kaba is a 59 year old female who presents for preoperative consultation requested by: Elayne Sofia MD    Date of Surgery: 12/6/2024    Procedure(s):  Right basilar thumb arthroplasty with trapezial excision and tendon transfer  Indication: Rheumatoid arthritis involving right wrist, unspecified whether rheumatoid factor present (HCC)    Relevant Problems   No relevant active problems       NPO:  Last Liquid Consumption Date: 12/06/24  Last Liquid Consumption Time: 1100 (sip of water)  Last Solid Consumption Date: 12/05/24  Last Solid Consumption Time: 1900  Last Liquid Consumption Date: 12/06/24          History Review:  Patient Active Problem List    Diagnosis Date Noted    Arthritis of carpometacarpal (CMC) joint of right thumb 12/06/2024    Other insomnia 07/08/2024    Thrombocytopenia (HCC) 07/08/2024    Chronic depression 07/08/2024    Atrial tachycardia (HCC) 12/11/2023    Muscle weakness 12/11/2023    Trigger finger, right middle finger 09/01/2023    Trigger finger, right ring finger 09/01/2023    Irritable bowel syndrome 08/30/2023    Trigger finger of left thumb 07/07/2023    Left carpal tunnel syndrome 12/30/2022    Carpal tunnel syndrome, bilateral 12/16/2022    Acute intractable headache     Fibromyalgia     Lupus     Cervicalgia 04/10/2022    Abnormal brain MRI 04/09/2022    PRES (posterior reversible encephalopathy syndrome) 04/09/2022    Migraine     Acute chest pain 04/02/2022    S/P hip replacement, left 08/28/2021    Hypotension after procedure 08/28/2021    History of revision of total knee arthroplasty 08/24/2021    Arthritis of left hip 08/24/2021    Palpitations 08/07/2021    Abnormal EKG 08/07/2021    Syncope, near 08/07/2021    Sprain of left ankle, unspecified ligament, initial encounter 08/07/2021    Right lateral epicondylitis 12/31/2020    Iron deficiency anemia, unspecified 07/05/2019    Aftercare following surgery of the musculoskeletal  system 02/05/2019    Ulnar impaction syndrome, right 01/25/2019    Lumbar pseudoarthrosis 11/08/2018    Chest pain     Right wrist pain 08/29/2018    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    Osteopenia, unspecified location 08/13/2018    Chronic bilateral low back pain with left-sided sciatica 05/15/2018    Renal insufficiency 02/07/2017    History of DVT (deep vein thrombosis) 06/08/2016    Seronegative rheumatoid arthritis ([facility]) 05/12/2016    Hypercoagulable state ([facility]) 03/16/2016    PVC (premature ventricular contraction) 03/16/2016    Lumbar disc herniation 03/16/2016    Other secondary osteoarthritis of first carpometacarpal joint of left hand 10/19/2015    Other unilateral secondary osteoarthritis of first carpometacarpal joint, left hand 10/08/2015    Osteoarthritis of carpometacarpal joint of left thumb, unspecified osteoarthritis type 07/08/2015    Rheumatoid arthritis ([facility]) 06/11/2014    Osteoarthrosis, unspecified whether generalized or localized, pelvic region and thigh 09/03/2013    Seizure ([facility]) 08/10/2011    Neck pain 04/07/2011    Sicca ([facility]) 03/03/2010    Connective tissue disease, undifferentiated ([facility]) 02/05/2010    Inflammatory polyarthritis ([facility]) 02/05/2010    Vitamin D deficiency 10/22/2009    Restless leg syndrome 12/19/2007    Intrinsic asthma, unspecified 12/19/2007    Malaise and fatigue 12/19/2007    Embolism and thrombosis ([facility]) 12/19/2007       Past Medical History:    Arrhythmia    atrial tachycardia, svt    Back problem    Calculus of kidney    Connective tissue disease, undifferentiated ([facility])    Crohn disease ([facility])    Deep vein thrombosis ([facility])    Multiple DVTs: 2 in leg and 2 in arm    DEPRESSION    Depression    Disorder of thyroid    Esophageal reflux    Fibromyalgia    Gout    Hepatitis    from Medication    High blood pressure    High cholesterol    patients denies    History of blood transfusion    CDH    HYPERLIPIDEMIA    HYPERTENSION    IBS (irritable  bowel syndrome)    Irritable bowel syndrome    Kidney disease    RT Kidney no longer functions; LT kidney working about 30%; presently has hydronephrosi s and kidney stones    KIDNEY STONE    Lupus (systemic lupus erythematosus) (HCC)    Migraines    bright lights,wine,sulfites,cheeses,sweeteners,barometeric pressure    Muscle weakness    arm weakness    Neuropathy    Boths legs    OBESITY    Osteoarthritis    Pleurisy    Pneumonia due to organism    PONV (postoperative nausea and vomiting)    doesn't work sometimes    Port-A-Cath in place    Raynaud disease    Renal disorder    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    Renal disorder    LEFT functioning  stage 3    Rheumatoid arthritis (HCC)    RLS (restless legs syndrome)    Seizure disorder (HCC)    had x 1 year and went away/unknown etiology/ age 47    Sjogren's disease (HCC)    Visual impairment    wears glasses       Past Surgical History:   Procedure Laterality Date    Appendectomy      Back surgery      3/16 lumbar discectomy  9/2016 lumbar fusion    Biopsy salivary gland,incisional  12/1/09    Performed by SANTOSH MOYER at [facility]    Cholecystectomy      Colonoscopy  10/16/2019    Colonoscopy      Colonoscopy N/A 8/29/2023    Procedure: COLONOSCOPY W/ BIOPSIES;  Surgeon: Ryne Monzon MD;  Location:  ENDOSCOPY    Elbow surgery      Hernia surgery      Hip replacement surgery  1/14    right: Dr. Brody    Hysterectomy  1999    LUCA BSO for endometriosis    Ndsc ablation & rcnstj [facility] w/o bypass  08/2021    Other  11/2015    reconstructive surgery of left thumb joint    Other  8/2015    vaginal sling    Other Right     WRIST SURGERY    Other surgical history      parathyroidectomy    Other surgical history Right 2018    wrist surgery    Other surgical history Left 2018    left wrist surgery    Port, indwelling, imp  2019    Power Port    Repair enterocele,vag apprch  2012    Repair of rectocele  2012    Tonsillectomy      Total hip  replacement Right        Prescriptions Prior to Admission[1]  Current Medications and Prescriptions Ordered in Epic[2]    Allergies[3]    Family History   Problem Relation Age of Onset    Dementia Father     Hypertension Father     Prostate Cancer Father     High Cholesterol Father     Obesity Mother     Heart Disorder Mother     Depression Mother     Cancer Mother     Hypertension Mother     Stroke Maternal Grandmother     Depression Sister     Diabetes Other      Social History     Socioeconomic History    Marital status:    Tobacco Use    Smoking status: Never     Passive exposure: Never    Smokeless tobacco: Never   Vaping Use    Vaping status: Never Used   Substance and Sexual Activity    Alcohol use: Never    Drug use: Yes     Types: Cannabis     Comment: occasional    Sexual activity: Not Currently     Partners: Male   Other Topics Concern    Caffeine Concern No    Exercise Yes     Comment: stretches       Available pre-op labs reviewed.             Vital Signs:  Body mass index is 28.86 kg/m².   height is 1.651 m (5' 5\") and weight is 78.7 kg (173 lb 6.4 oz). Her oral temperature is 98.3 °F (36.8 °C). Her blood pressure is 131/81 and her pulse is 67. Her respiration is 18 and oxygen saturation is 98%.   Vitals:    11/30/24 1053 12/06/24 1355   BP:  131/81   Pulse:  67   Resp:  18   Temp:  98.3 °F (36.8 °C)   TempSrc:  Oral   SpO2:  98%   Weight: 76.7 kg (169 lb) 78.7 kg (173 lb 6.4 oz)   Height: 1.651 m (5' 5\") 1.651 m (5' 5\")        Anesthesia Evaluation     Patient summary reviewed and Nursing notes reviewed    History of anesthetic complications (PONV)   Airway   Mallampati: II  TM distance: >3 FB  Neck ROM: full  Dental - Dentition appears grossly intact     Pulmonary - normal exam   (+) asthma  (-) shortness of breath, recent URI  Cardiovascular   (+) hypertension, dysrhythmias  (-) angina, ALVAREZ    ECG reviewed  Rhythm: regular  Rate: normal    Neuro/Psych    (+)  seizures, neuromuscular disease,  headaches,  depression      Comments: Fibromyalgia   Raynauds Disease     GI/Hepatic/Renal    (+) GERD, liver disease  (-) renal disease    Endo/Other    (+) arthritis    Comments: Lupus   Sjogrens   Rheumatoid Arthritis     Abdominal  - normal exam     Other findings: Port left upper chest             Anesthesia Plan:   ASA:  3  Plan:   MAC and regional  Post-op Pain Management: Supraclavicular block  Informed Consent Plan and Risks Discussed With:  Patient  Discussed plan with:  Surgeon      I have informed Drea Kaba and/or legal guardian or family member of the nature of the anesthetic plan, benefits, risks including possible dental damage if relevant, major complications, and any alternative forms of anesthetic management.   All of the patient's questions were answered to the best of my ability. The patient desires the anesthetic management as planned.  Kaitlin Mcnulty DO  12/6/2024 3:43 PM  Present on Admission:  **None**           [1]   Medications Prior to Admission   Medication Sig Dispense Refill Last Dose/Taking    methylPREDNISolone 4 MG Oral Tab Take 1 tablet (4 mg total) by mouth daily.   12/6/2024 at  8:30 AM    ivabradine 5 MG Oral Tab Take 0.5 tablets (2.5 mg total) by mouth in the morning, at noon, in the evening, and at bedtime.   12/6/2024 at  8:30 AM    CYANOCOBALAMIN 1000 MCG/ML Injection Solution INJECT 1 ML (1,000 MCG) INTO THE MUSCLE EVERY 30 DAYS 3 mL 3 12/5/2024    sertraline 50 MG Oral Tab Take 1 tablet (50 mg total) by mouth daily. 90 tablet 1 12/6/2024 at  8:30 AM    sertraline 100 MG Oral Tab Take 1 tablet (100 mg total) by mouth daily. 90 tablet 3 12/6/2024 at  8:30 AM    rivaroxaban (XARELTO) 20 MG Oral Tab Take 1 tablet (20 mg total) by mouth every evening. 90 tablet 3 12/2/2024    metoprolol succinate ER 50 MG Oral Tablet 24 Hr Take 1.5 tablets (75 mg total) by mouth daily.   12/6/2024 at  8:30 AM    HUMIRA, 2 PEN, 40 MG/0.4ML Subcutaneous Pen-injector Kit Inject 40 mg  into the skin every 14 (fourteen) days.   11/13/2024    gabapentin 300 MG Oral Cap Take 2 capsules (600 mg total) by mouth in the morning and 2 capsules (600 mg total) before bedtime.   12/6/2024 at  8:30 AM    valACYclovir 500 MG Oral Tab Take 1 tablet (500 mg total) by mouth 2 (two) times daily. 180 tablet 3 12/5/2024    mycophenolate mofetil 250 MG Oral Cap Take 2 capsules (500 mg total) by mouth 2 (two) times daily before meals.   12/6/2024 at  8:30 AM    rOPINIRole 2 MG Oral Tab Take 1 tablet (2 mg total) by mouth every morning.   12/6/2024 at  8:30 AM    ROPINIROLE 0.25 MG Oral Tab TAKE 1 TABLET BY MOUTH NIGHTLY BUT CAN USE AN EXTRA TABLET IF NEEDED 180 tablet 3 12/5/2024 Bedtime    AIMOVIG 140 MG/ML Subcutaneous Solution Auto-injector INJECT 1 ML (140 MG TOTAL) INTO THE SKIN EVERY 30 DAYS 3 mL 3 11/7/2024    UBRELVY 50 MG Oral Tab TAKE 1 TABLET BY MOUTH AT ONSET OF MIGRAINE. MAY REPEAT 1 TABLET IN 2 HOURS IF NEEDED. 10 tablet 0 12/5/2024 Evening    flecainide 100 MG Oral Tab Take 1 tablet (100 mg total) by mouth 2 (two) times daily. 50mg at noon also   12/6/2024 at  8:30 AM    Cevimeline HCl 30 MG Oral Cap Take 1 capsule (30 mg total) by mouth 3 (three) times daily.   Past Week    ondansetron (ZOFRAN) 4 mg tablet Take 1 tablet (4 mg total) by mouth every 8 (eight) hours as needed for Nausea.   Past Month    Estradiol 2 MG Vaginal Ring Place 2 mg vaginally every 3 (three) months. 1 each 4 12/6/2024    allopurinol 100 MG Oral Tab Take 1 tablet (100 mg total) by mouth nightly.   12/5/2024 Evening    Esomeprazole Magnesium 40 MG Oral Capsule Delayed Release Take 1 capsule (40 mg total) by mouth 2 (two) times daily.   12/6/2024 at  8:30 AM   [2]   Current Facility-Administered Medications Ordered in Epic   Medication Dose Route Frequency Provider Last Rate Last Admin    lactated ringers infusion   Intravenous Continuous Elayne Sofia MD 20 mL/hr at 12/06/24 1410 New Bag at 12/06/24 1410    metoprolol tartrate  (Lopressor) tab 25 mg  25 mg Oral Once PRN Elayne Sofia MD        ceFAZolin (Ancef) 2g in 10mL IV syringe premix  2 g Intravenous Once Elayne Sofia MD         Current Outpatient Medications Ordered in Epic   Medication Sig Dispense Refill    HYDROcodone-acetaminophen 5-325 MG Oral Tab Take 1 tablet by mouth every 4 (four) hours as needed. 20 tablet 0   [3]   Allergies  Allergen Reactions    Adhesive Tape UNKNOWN and HIVES    Cipro [Ciprofloxacin] OTHER (SEE COMMENTS)     Drug induced hepatitis    Dihydroergotamine OTHER (SEE COMMENTS)     Hx of SVT s/p 2 ablations    Epinephrine SWELLING     Patient does not recall this allergy, thinks it possibly made her restless but is unsure of the event.    Heparin OTHER (SEE COMMENTS)     HIT-Heparin \"eats up red blood cells\"    Latex UNKNOWN, OTHER (SEE COMMENTS) and RASH     POWDER \"CHOKES\" HER    unknown    Pain Relieving Patch SWELLING     Swelling with lidocaine patch. Has tolerated IV and Subcutaneous lidocaine multiple times since lido patch allergy recorded, no reaction. Possibly reacted to another ingredient in patch such as adhesive.     Prednisone UNKNOWN and OTHER (SEE COMMENTS)    Pregabalin ANAPHYLAXIS, ANGIOEDEMA and SWELLING    Radiology Contrast Iodinated Dyes RASH and OTHER (SEE COMMENTS)     History of iodine allergy and chronic renal failure      Topamax [Topiramate] ANAPHYLAXIS    Toradol OTHER (SEE COMMENTS)     Patient has stage 3 kidney failure    Compazine NAUSEA ONLY     Hyperactivity, restlessness, HA,     Feraheme [Ferumoxytol] FACE FLUSHING    Latex RASH     POWDER \"CHOKES\" HER    Phenergan [Promethazine Hcl] OTHER (SEE COMMENTS)     HYPERACTIVITY    Reglan [Metoclopramide Hcl] OTHER (SEE COMMENTS)     Restless legs, skin crawling    Hydromorphone Hcl UNKNOWN    Iodine (Topical) UNKNOWN    Nsaids OTHER (SEE COMMENTS)     CDK- has a solitary kidney    Tramadol UNKNOWN    Triptans OTHER (SEE COMMENTS)     Hx of SVT s/p 2 ablations     Dronedarone DIARRHEA and OTHER (SEE COMMENTS)    Pcn [Bicillin L-A] RASH     Ampicillin    No skin peeling/blistering, denies organ damage per patient    Povidone Iodine RASH     rash    Vancomycin ITCHING

## 2024-12-06 NOTE — ANESTHESIA PROCEDURE NOTES
Peripheral Block    Date/Time: 12/6/2024 4:21 PM    Performed by: Kaitlin Mcnulty DO  Authorized by: Kaitlin Mcnulty DO      General Information and Staff    Start Time:  12/6/2024 4:21 PM  End Time:  12/6/2024 4:28 PM  Anesthesiologist:  Kaitlin Mcnulty DO  Performed by:  Anesthesiologist  Patient Location:  PACU    Block Placement: Pre Induction  Site Identification: surface landmarks    Block site/laterality marked before start: site marked  Reason for Block: at surgeon's request and post-op pain management    Preanesthetic Checklist: 2 patient identifers, IV checked, site marked, risks and benefits discussed, monitors and equipment checked, pre-op evaluation, timeout performed, anesthesia consent, sterile technique used, no prohibitive neurological deficits and no local skin infection at insertion site      Procedure Details    Patient Position:  Supine  Prep: ChloraPrep and patient draped    Monitoring:  Cardiac monitor, continuous pulse ox and blood pressure cuff  Block Type:  Supraclavicular  Laterality:  Right  Injection Technique:  Single-shot    Needle    Needle Type:  Tuohy and short-bevel  Needle Gauge:  21 G  Needle Length:  50 mm  Needle Localization:  Ultrasound guidance  Reason for Ultrasound Use: appropriate spread of the medication was noted in real time and no ultrasound evidence of intravascular and/or intraneural injection            Assessment    Injection Assessment:  Good spread noted, incremental injection, local visualized surrounding nerve on ultrasound, negative aspiration for heme, negative resistance, no pain on injection, low pressure and transient paresthesias  Heart Rate Change: No    - Patient tolerated block procedure well without evidence of immediate block related complications.     Medications  12/6/2024 4:21 PM  midazolam (VERSED)injection 2mg/2ml - Intravenous   4 mg - 12/6/2024 4:21:00 PM  lidocaine injection 1% - Intradermal   3 mL - 12/6/2024 4:21:00  PM  ropivacaine (NAROPIN) injection 0.5% - Infiltration   30 mL - 12/6/2024 4:21:00 PM  dexamethasone (DECADRON) PF injection 10 mg/ml - Injection   10 mg - 12/6/2024 4:21:00 PM    Additional Comments

## 2024-12-07 NOTE — BRIEF OP NOTE
Pre-Operative Diagnosis: Rheumatoid arthritis involving right wrist, unspecified whether rheumatoid factor present (HCC)     Post-Operative Diagnosis: Rheumatoid arthritis involving right wrist, unspecified whether rheumatoid factor present (HCC)      Procedure Performed:   Right basilar thumb arthroplasty with trapezial excision and tendon transfer    Surgeons and Role:     * Elayne Sofia MD - Primary    Assistant(s):        Surgical Findings: arthritis     Specimen: none     Estimated Blood Loss: minimal    Dictation Number:  #[id number]    Elayne Sofia MD  12/6/2024  6:48 PM

## 2024-12-07 NOTE — OPERATIVE REPORT
[facility]    PATIENT'S NAME: AMINA HUERTAS   ATTENDING PHYSICIAN: Elayne Sofia MD   OPERATING PHYSICIAN: Elayne Sofia MD   PATIENT ACCOUNT#:   [id number]    LOCATION:  [place] [address]  MEDICAL RECORD #:   [id number]       YOB: 1965  ADMISSION DATE:       12/06/2024      OPERATION DATE:  12/06/2024    OPERATIVE REPORT    PREOPERATIVE DIAGNOSIS:  Right wrist rheumatoid arthritis.  POSTOPERATIVE DIAGNOSIS:  Right wrist rheumatoid arthritis.  PROCEDURE:    1.   Right basal thumb arthroplasty with trapezial excision.  2.  Right thumb partial trapezoid excision.  3.   Tendon transfer.    ANESTHESIA:  Sedation and a block.    BLOOD LOSS:  Minimal.    COMPLICATIONS:  None.    CONDITION:  The patient was aroused from anesthesia and transferred to recovery room in stable condition.      INDICATIONS:  The patient is a 59-year-old female, who based history, physical examination and imaging studies, was diagnosed with having a right thumb basilar arthritis associated with rheumatoid arthritis.  She had failed a long course of nonoperative treatment and desired surgery.  The surgery recommended was a right thumb basal thumb arthroplasty with trapezial excision and tendon transfer.  Risks include bleeding, infection, neurovascular injury, chronic pain, chronic disability, failure of the procedure, stiffness, recurrence, potential need for additional surgery, as well as anesthetic complications including death.  The patient consented to the procedure.  All of her questions were answered, and she was in agreement with the treatment plan.      OPERATIVE TECHNIQUE:  On 12/06/2024, the patient was seen and evaluated preoperatively.  Her right thumb was identified as the correct operative site.  My initials were placed.  She was transferred to the operating room and transferred to the operating table in supine position.  Sedation was induced.  Preoperatively, she received a block.  She  was given Ancef as antibiotic prophylaxis within 1 hour of incision time.  Her right upper extremity was prepped and draped in a sterile fashion.  Tourniquet was inflated to 250 mmHg.  She had a longitudinal incision from the base of her thumb metacarpal to the level of her radial styloid with blunt dissection to the level of the first dorsal compartment.  The cutaneous nerves were retracted.  The first dorsal compartment was released from proximal to distal extent.  There was a separate compartment for the APL and the EPB tendons.  They were retracted.  The radial artery was identified in the base of the incision and retracted.  The patient's trapezium was identified.  A capsulotomy was performed over the STT and CMC joints.  The trapezium was excised protecting the FCR tendon at the base of the incision.  Scaphotrapezoid joint was evaluated, and there was evidence of arthritic changes at that joint as well.  Therefore, a partial trapezoid excision proximally was also performed.  The patient then had 1 slip of the APL tendon harvested proximally.  Its stump remained attached to the metacarpal base distally.  It was passed through and around the FCR tendon and secured with 2-0 FiberWire suture.  It was passed through and around itself and secured with 2-0 FiberWire suture, and then abductor pollicis longus advancement was performed and secured with a 2-0 FiberWire suture.  The stump of the APL transfer was then placed in the scaphotrapezoid joint.  The patient's wound was thoroughly and copiously irrigated.  The patient had fluoroscopy with anterior, posterior, oblique, lateral, and CMC Torres views which showed reduced thumb metacarpal base following trapezial excision and partial trapezoid excision.  The patient's hemostasis was achieved, and the wound was closed in layers with 3-0 Vicryl subcutaneous stitch and a 4-0 Monocryl subcuticular stitch.  A large sterile bulky soft dressing was placed, followed by a  thumb spica splint.  The patient's tourniquet was deflated following wound closure.  Blood loss was minimal.  Complications were none.  Condition, the patient was aroused from anesthesia and transferred to recovery room in stable condition.      DISPOSITION:  She was discharged to home in stable condition with a pain prescription, written instructions, and 24-hour access to emergency number.  She will return to clinic in 10 to 14 days for repeat clinical and radiographic evaluation.  All of her questions were answered, and she was in agreement with the treatment plan.     Dictated By Elayne Sofia MD  d: 12/06/2024 18:51:02  t: 12/07/2024 09:26:09  [facility] [id number]  L/

## 2024-12-07 NOTE — ANESTHESIA POSTPROCEDURE EVALUATION
Patient: Drea Kaba    Procedure Summary       Date: 12/06/24 Room / Location: [facility] MAIN OR 04 / [facility] MAIN OR    Anesthesia Start: 1746 Anesthesia Stop: 1851    Procedure: Right basilar thumb arthroplasty with trapezial excision and tendon transfer (Right: Finger) Diagnosis: (Rheumatoid arthritis involving right wrist, unspecified whether rheumatoid factor present (HCC))    Surgeons: Elayne Sofia MD Anesthesiologist: Kaitlin Mcnulty DO    Anesthesia Type: MAC ASA Status: 3            Anesthesia Type: MAC    Vitals Value Taken Time   /88 12/06/24 1848   Temp 97.0 12/06/24 1851   Pulse 66 12/06/24 1850   Resp 16 12/06/24 1851   SpO2 97 % 12/06/24 1850   Vitals shown include unfiled device data.    [facility] AN Post Evaluation:   Patient Evaluated in PACU  Patient Participation: waiting for patient participation  Level of Consciousness: sleepy but conscious  Pain Score: 0  Pain Management: adequate  Airway Patency:patent  Dental exam unchanged from preop  Yes    Nausea/Vomiting: none  Cardiovascular Status: hemodynamically stable  Respiratory Status: spontaneous ventilation, nasal cannula, unassisted and nonlabored ventilation  Postoperative Hydration stable      Kaitlin Mcnulty DO  12/6/2024 6:51 PM

## 2024-12-25 NOTE — TELEPHONE ENCOUNTER
Refill passed per [facility] protocol.    Requested Prescriptions   Pending Prescriptions Disp Refills    SERTRALINE 50 MG Oral Tab [Pharmacy Med Name: SERTRALINE HCL TABS 50MG] 90 tablet 3     Sig: TAKE 1 TABLET DAILY       Psychiatric Non-Scheduled (Anti-Anxiety) Passed - 12/25/2024  8:48 AM        Passed - In person appointment or virtual visit in the past 6 mos or appointment in next 3 mos     Recent Outpatient Visits              3 months ago Vestibular migraine    [facility] Trace Estrada MD    Office Visit    3 months ago Acute pain of left foot    [address] Heaven Lloyd MD    Office Visit    4 months ago Thrombocytopenia ([facility])    [facility]    Nurse Only    5 months ago Chronic depression    [address]    Office Visit    6 months ago Iron deficiency anemia due to chronic blood loss    [facility]    Nurse Only                      Passed - Depression Screening completed within the past 12 months             Recent Outpatient Visits              3 months ago Vestibular migraine    [facility] Trace Estrada MD    Office Visit    3 months ago Acute pain of left foot    [address]    Office Visit    4 months ago Thrombocytopenia ([facility])    [facility]    Nurse Only    5 months ago Chronic depression    [address]    Office Visit    6 months ago Iron deficiency anemia due to chronic blood loss    [facility]    Nurse Only

## 2025-01-31 RX ORDER — VALACYCLOVIR HYDROCHLORIDE 500 MG/1
500 TABLET, FILM COATED ORAL 2 TIMES DAILY
Qty: 180 TABLET | Refills: 3 | Status: SHIPPED | OUTPATIENT
Start: 2025-01-31

## 2025-02-14 ENCOUNTER — HOSPITAL ENCOUNTER (EMERGENCY)
Facility: HOSPITAL | Age: 60
Discharge: HOME | End: 2025-02-14
Attending: EMERGENCY MEDICINE
Payer: COMMERCIAL

## 2025-02-14 ENCOUNTER — APPOINTMENT (OUTPATIENT)
Dept: GENERAL RADIOLOGY | Facility: HOSPITAL | Age: 60
End: 2025-02-14
Payer: COMMERCIAL

## 2025-02-14 ENCOUNTER — APPOINTMENT (OUTPATIENT)
Dept: CT IMAGING | Facility: HOSPITAL | Age: 60
End: 2025-02-14
Attending: EMERGENCY MEDICINE
Payer: COMMERCIAL

## 2025-02-14 VITALS
DIASTOLIC BLOOD PRESSURE: 80 MMHG | TEMPERATURE: 99 F | OXYGEN SATURATION: 100 % | WEIGHT: 173 LBS | HEART RATE: 66 BPM | RESPIRATION RATE: 11 BRPM | SYSTOLIC BLOOD PRESSURE: 135 MMHG | BODY MASS INDEX: 28.82 KG/M2 | HEIGHT: 65 IN

## 2025-02-14 DIAGNOSIS — R10.11 ABDOMINAL PAIN, RIGHT UPPER QUADRANT: ICD-10-CM

## 2025-02-14 DIAGNOSIS — B37.0 THRUSH, ORAL: ICD-10-CM

## 2025-02-14 DIAGNOSIS — R07.89 CHEST HEAVINESS: ICD-10-CM

## 2025-02-14 DIAGNOSIS — R13.10 ODYNOPHAGIA: Primary | ICD-10-CM

## 2025-02-14 LAB
ALBUMIN SERPL-MCNC: 4.3 G/DL (ref 3.2–4.8)
ALBUMIN/GLOB SERPL: 1.7 {RATIO} (ref 1–2)
ALP LIVER SERPL-CCNC: 89 U/L
ALT SERPL-CCNC: 36 U/L
ANION GAP SERPL CALC-SCNC: 5 MMOL/L (ref 0–18)
AST SERPL-CCNC: 32 U/L (ref ?–34)
ATRIAL RATE: 72 BPM
BASOPHILS # BLD AUTO: 0.04 X10(3) UL (ref 0–0.2)
BASOPHILS NFR BLD AUTO: 0.6 %
BILIRUB SERPL-MCNC: 0.3 MG/DL (ref 0.3–1.2)
BILIRUB UR QL STRIP.AUTO: NEGATIVE
BUN BLD-MCNC: 24 MG/DL (ref 9–23)
CALCIUM BLD-MCNC: 8.7 MG/DL (ref 8.7–10.6)
CHLORIDE SERPL-SCNC: 109 MMOL/L (ref 98–112)
CLARITY UR REFRACT.AUTO: CLEAR
CO2 SERPL-SCNC: 22 MMOL/L (ref 21–32)
CREAT BLD-MCNC: 1.09 MG/DL
EGFRCR SERPLBLD CKD-EPI 2021: 59 ML/MIN/1.73M2 (ref 60–?)
EOSINOPHIL # BLD AUTO: 0.05 X10(3) UL (ref 0–0.7)
EOSINOPHIL NFR BLD AUTO: 0.7 %
ERYTHROCYTE [DISTWIDTH] IN BLOOD BY AUTOMATED COUNT: 14.9 %
FLUAV + FLUBV RNA SPEC NAA+PROBE: NEGATIVE
FLUAV + FLUBV RNA SPEC NAA+PROBE: NEGATIVE
GLOBULIN PLAS-MCNC: 2.5 G/DL (ref 2–3.5)
GLUCOSE BLD-MCNC: 110 MG/DL (ref 70–99)
GLUCOSE UR STRIP.AUTO-MCNC: NORMAL MG/DL
HCT VFR BLD AUTO: 37.8 %
HETEROPH AB SER QL: NEGATIVE
HGB BLD-MCNC: 13.2 G/DL
IMM GRANULOCYTES # BLD AUTO: 0.02 X10(3) UL (ref 0–1)
IMM GRANULOCYTES NFR BLD: 0.3 %
KETONES UR STRIP.AUTO-MCNC: NEGATIVE MG/DL
LEUKOCYTE ESTERASE UR QL STRIP.AUTO: NEGATIVE
LIPASE SERPL-CCNC: 44 U/L (ref 12–53)
LYMPHOCYTES # BLD AUTO: 1.38 X10(3) UL (ref 1–4)
LYMPHOCYTES NFR BLD AUTO: 19.7 %
MAGNESIUM SERPL-MCNC: 2 MG/DL (ref 1.6–2.6)
MCH RBC QN AUTO: 31.4 PG (ref 26–34)
MCHC RBC AUTO-ENTMCNC: 34.9 G/DL (ref 31–37)
MCV RBC AUTO: 90 FL
MONOCYTES # BLD AUTO: 0.38 X10(3) UL (ref 0.1–1)
MONOCYTES NFR BLD AUTO: 5.4 %
NEUTROPHILS # BLD AUTO: 5.12 X10 (3) UL (ref 1.5–7.7)
NEUTROPHILS # BLD AUTO: 5.12 X10(3) UL (ref 1.5–7.7)
NEUTROPHILS NFR BLD AUTO: 73.3 %
NITRITE UR QL STRIP.AUTO: NEGATIVE
NT-PROBNP SERPL-MCNC: 271 PG/ML (ref ?–125)
OSMOLALITY SERPL CALC.SUM OF ELEC: 287 MOSM/KG (ref 275–295)
P AXIS: 68 DEGREES
P-R INTERVAL: 190 MS
PH UR STRIP.AUTO: 5 [PH] (ref 5–8)
PLATELET # BLD AUTO: 160 10(3)UL (ref 150–450)
POTASSIUM SERPL-SCNC: 4 MMOL/L (ref 3.5–5.1)
PROT SERPL-MCNC: 6.8 G/DL (ref 5.7–8.2)
PROT UR STRIP.AUTO-MCNC: NEGATIVE MG/DL
Q-T INTERVAL: 436 MS
QRS DURATION: 116 MS
QTC CALCULATION (BEZET): 477 MS
R AXIS: -7 DEGREES
RBC # BLD AUTO: 4.2 X10(6)UL
RBC UR QL AUTO: NEGATIVE
RSV RNA SPEC NAA+PROBE: NEGATIVE
SARS-COV-2 RNA RESP QL NAA+PROBE: NOT DETECTED
SODIUM SERPL-SCNC: 136 MMOL/L (ref 136–145)
SP GR UR STRIP.AUTO: 1.02 (ref 1–1.03)
T AXIS: 106 DEGREES
TROPONIN I SERPL HS-MCNC: 9 NG/L
TSI SER-ACNC: 1.01 UIU/ML (ref 0.55–4.78)
UROBILINOGEN UR STRIP.AUTO-MCNC: NORMAL MG/DL
VENTRICULAR RATE: 72 BPM
WBC # BLD AUTO: 7 X10(3) UL (ref 4–11)

## 2025-02-14 PROCEDURE — 99285 EMERGENCY DEPT VISIT HI MDM: CPT

## 2025-02-14 PROCEDURE — 83690 ASSAY OF LIPASE: CPT | Performed by: EMERGENCY MEDICINE

## 2025-02-14 PROCEDURE — 81003 URINALYSIS AUTO W/O SCOPE: CPT | Performed by: EMERGENCY MEDICINE

## 2025-02-14 PROCEDURE — 84484 ASSAY OF TROPONIN QUANT: CPT | Performed by: EMERGENCY MEDICINE

## 2025-02-14 PROCEDURE — 36415 COLL VENOUS BLD VENIPUNCTURE: CPT

## 2025-02-14 PROCEDURE — 86665 EPSTEIN-BARR CAPSID VCA: CPT | Performed by: EMERGENCY MEDICINE

## 2025-02-14 PROCEDURE — 74176 CT ABD & PELVIS W/O CONTRAST: CPT | Performed by: EMERGENCY MEDICINE

## 2025-02-14 PROCEDURE — 0241U SARS-COV-2/FLU A AND B/RSV BY PCR (GENEXPERT): CPT | Performed by: EMERGENCY MEDICINE

## 2025-02-14 PROCEDURE — 86403 PARTICLE AGGLUT ANTBDY SCRN: CPT | Performed by: EMERGENCY MEDICINE

## 2025-02-14 PROCEDURE — 93010 ELECTROCARDIOGRAM REPORT: CPT

## 2025-02-14 PROCEDURE — 85025 COMPLETE CBC W/AUTO DIFF WBC: CPT | Performed by: EMERGENCY MEDICINE

## 2025-02-14 PROCEDURE — 80053 COMPREHEN METABOLIC PANEL: CPT | Performed by: EMERGENCY MEDICINE

## 2025-02-14 PROCEDURE — 71045 X-RAY EXAM CHEST 1 VIEW: CPT

## 2025-02-14 PROCEDURE — 93005 ELECTROCARDIOGRAM TRACING: CPT

## 2025-02-14 PROCEDURE — 86664 EPSTEIN-BARR NUCLEAR ANTIGEN: CPT | Performed by: EMERGENCY MEDICINE

## 2025-02-14 PROCEDURE — 87430 STREP A AG IA: CPT | Performed by: EMERGENCY MEDICINE

## 2025-02-14 PROCEDURE — 84443 ASSAY THYROID STIM HORMONE: CPT | Performed by: EMERGENCY MEDICINE

## 2025-02-14 PROCEDURE — 83880 ASSAY OF NATRIURETIC PEPTIDE: CPT | Performed by: EMERGENCY MEDICINE

## 2025-02-14 PROCEDURE — 83735 ASSAY OF MAGNESIUM: CPT | Performed by: EMERGENCY MEDICINE

## 2025-02-14 RX ORDER — NYSTATIN 100000 [USP'U]/ML
5 SUSPENSION ORAL 4 TIMES DAILY
Qty: 140 ML | Refills: 0 | Status: SHIPPED | OUTPATIENT
Start: 2025-02-14 | End: 2025-02-21

## 2025-02-14 RX ORDER — MUPIROCIN 20 MG/G
1 OINTMENT TOPICAL 3 TIMES DAILY
Qty: 15 G | Refills: 0 | Status: SHIPPED | OUTPATIENT
Start: 2025-02-14 | End: 2025-02-21

## 2025-02-14 RX ORDER — TOFACITINIB 11 MG/1
11 TABLET, FILM COATED, EXTENDED RELEASE ORAL AS DIRECTED
COMMUNITY
Start: 2025-01-20

## 2025-02-14 NOTE — ED PROVIDER NOTES
Patient Seen in: [facility] Emergency Department      History     Chief Complaint   Patient presents with    Chest Pain Angina     Stated Complaint: CP x 1 week    Subjective:   HPI  Patient is a 59-year-old female with medical history as below who presents to the ED for evaluation of multiple symptoms.  Patient states that over the past 3 weeks she has had some sort of \"infection\" on her face.  She states that she has had a few lesions for starting around her mouth and then the sides of her nose and forehead which burn.  Patient also notes burning sensation in her mouth and pain with swallowing. She feels like she has oral thrush. She is already on valtrex prophylactically for shingles.  Patient states that sometimes she feels very fatigued, cold and has tingling all over associated with low-grade fever with Tmax of 100. In addition, pt notes chest heaviness for several months. She also c/o RUQ pain when she sits up which has been present for a few weeks. No fevers, vomiting.     From chart review, pt saw neuro movement specialist on 2/7/2025 for tremors, RLS and periodic limb movement of sleep disorder. Since hospitalization 2 years ago with PRES pt has had blurry vision, electrical shocks up and down her body, burning face sensation, balance issues, headaches, sleep disturbances. Plan was general neuro evaluation, increasing gabapentin dose, reducing ropinorol dose.       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    DEPRESSION    Depression    Disorder of thyroid    Esophageal reflux    Fibromyalgia    Gout    Hepatitis    from Medication    High blood pressure    High cholesterol    patients denies    History of blood transfusion    CDH    HYPERLIPIDEMIA    HYPERTENSION    IBS (irritable  bowel syndrome)    Irritable bowel syndrome    Kidney disease    RT Kidney no longer functions; LT kidney working about 30%; presently has hydronephrosi s and kidney stones    KIDNEY STONE    Lupus (systemic lupus erythematosus) (HCC)    Migraines    bright lights,wine,sulfites,cheeses,sweeteners,barometeric pressure    Muscle weakness    arm weakness    Neuropathy    Boths legs    OBESITY    Osteoarthritis    Pleurisy    Pneumonia due to organism    PONV (postoperative nausea and vomiting)    doesn't work sometimes    Port-A-Cath in place    Raynaud disease    Renal disorder    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    Renal disorder    LEFT functioning  stage 3    Rheumatoid arthritis (HCC)    RLS (restless legs syndrome)    Seizure disorder (HCC)    had x 1 year and went away/unknown etiology/ age 47    Sjogren's disease (HCC)    Visual impairment    wears glasses              Past Surgical History:   Procedure Laterality Date    Appendectomy      Back surgery      3/16 lumbar discectomy  9/2016 lumbar fusion    Biopsy salivary gland,incisional  12/1/09    Performed by SANTOSH MOYER at [facility]    Cholecystectomy      Colonoscopy  10/16/2019    Colonoscopy      Colonoscopy N/A 8/29/2023    Procedure: COLONOSCOPY W/ BIOPSIES;  Surgeon: Ryne Monzon MD;  Location:  ENDOSCOPY    Elbow surgery      Hernia surgery      Hip replacement surgery  1/14    right: Dr. Brody    Hysterectomy  1999    LUCA BSO for endometriosis    Ndsc ablation & rcnstj [facility] w/o bypass  08/2021    Other  11/2015    reconstructive surgery of left thumb joint    Other  8/2015    vaginal sling    Other Right     WRIST SURGERY    Other surgical history      parathyroidectomy    Other surgical history Right 2018    wrist surgery    Other surgical history Left 2018    left wrist surgery    Port, indwelling, imp  2019    Power Port    Repair enterocele,vag apprch  2012    Repair of rectocele  2012    Tonsillectomy       Total hip replacement Right                 Social History     Socioeconomic History    Marital status:    Tobacco Use    Smoking status: Never     Passive exposure: Never    Smokeless tobacco: Never   Vaping Use    Vaping status: Never Used   Substance and Sexual Activity    Alcohol use: Never    Drug use: Yes     Types: Cannabis     Comment: occasional    Sexual activity: Not Currently     Partners: Male   Other Topics Concern    Caffeine Concern No    Exercise Yes     Comment: stretches     Social Drivers of Health      [facility]                  Physical Exam     ED Triage Vitals [02/14/25 1101]   /77   Pulse 73   Resp 16   Temp 98.8 °F (37.1 °C)   Temp src Temporal   SpO2 95 %   O2 Device None (Room air)       Current Vitals:   Vital Signs  BP: 135/80  Pulse: 66  Resp: 11  Temp: 98.8 °F (37.1 °C)  Temp src: Temporal  MAP (mmHg): 94    Oxygen Therapy  SpO2: 100 %  O2 Device: None (Room air)        Physical Exam  Vitals and nursing note reviewed.   Constitutional:       General: She is not in acute distress.     Appearance: She is not ill-appearing.   HENT:      Head: Normocephalic and atraumatic.      Comments: Small ulcerative lesions in posterior oropharynx.   White plaque over tongue     Mouth/Throat:      Mouth: Mucous membranes are moist.   Eyes:      Extraocular Movements: Extraocular movements intact.      Pupils: Pupils are equal, round, and reactive to light.   Cardiovascular:      Rate and Rhythm: Normal rate and regular rhythm.   Pulmonary:      Effort: Pulmonary effort is normal.      Breath sounds: Normal breath sounds.   Abdominal:      General: There is no distension.      Palpations: Abdomen is soft.      Tenderness: There is no abdominal tenderness.   Musculoskeletal:      Cervical back: Neck supple.      Right lower leg: No edema.      Left lower leg: No edema.   Skin:     General: Skin is warm and dry.      Capillary Refill: Capillary  refill takes less than 2 seconds.      Comments: 4 scabbed over lesions inferior to lip, bilateral nasal fold and L forehead. No bleeding. No rash involving palms/soles. No mucous membrane involvement    Neurological:      General: No focal deficit present.      Mental Status: She is alert.   Psychiatric:         Mood and Affect: Mood normal.                   ED Course     Labs Reviewed   COMP METABOLIC PANEL (14) - Abnormal; Notable for the following components:       Result Value    Glucose 110 (*)     BUN 24 (*)     Creatinine 1.09 (*)     eGFR-Cr 59 (*)     All other components within normal limits   PRO BETA NATRIURETIC PEPTIDE - Abnormal; Notable for the following components:    Pro-Beta Natriuretic Peptide 271 (*)     All other components within normal limits   LIPASE - Normal   TROPONIN I HIGH SENSITIVITY - Normal   MONO QUAL, RFX TO EBV-VCA ON NEG - Normal   MAGNESIUM - Normal   TSH W REFLEX TO FREE T4 - Normal   RAPID STREP A SCREEN (LC) - Normal    Narrative:     A confirmatory culture is recommended if clinically indicated.   SARS-COV-2/FLU A AND B/RSV BY PCR (GENEXPERT) - Normal    Narrative:     This test is intended for the qualitative detection and differentiation of SARS-CoV-2, influenza A, influenza B, and respiratory syncytial virus (RSV) viral RNA in nasopharyngeal or nares swabs from individuals suspected of respiratory viral infection consistent with COVID-19 by their healthcare provider. Signs and symptoms of respiratory viral infection due to SARS-CoV-2, influenza, and RSV can be similar.    Test performed using the Xpert Xpress SARS-CoV-2/FLU/RSV (real time RT-PCR)  assay on the GeneXpert instrument, [employer], [place] [address].   This test is being used under the Food and Drug Administration's Emergency Use Authorization.    The authorized Fact Sheet for Healthcare Providers for this assay is available upon request from the laboratory.   CBC WITH DIFFERENTIAL WITH PLATELET   URINALYSIS  WITH CULTURE REFLEX   EBV, CHRONIC/ACTIVE INFECTION,IGG/IGM   RAINBOW DRAW BLUE     EKG    Rate, intervals and axes as noted on EKG Report.  Rate: 72  Rhythm: Sinus Rhythm  Reading: NSR with ventricular rate of 72, no acute ST elevations, TWI in V3-V6, I, aVL which are similar to prior.          ED Course as of 02/15/25 0823  ------------------------------------------------------------  Time: 02/14 1443  Comment: Strep negative  ------------------------------------------------------------  Time: 02/14 1537  Comment: Mono neg  ------------------------------------------------------------  Time: 02/14 3128  Comment: Discussed with [facility], who reviewed patient's EKG which is similar to prior.  Patient can follow-up as outpatient.  Discussed results of workup with patient at bedside.  Will plan for nystatin for possibly oral thrush and recommended GI for follow-up if continued pain with swallowing for further management with an EGD.  I will prescribe mupirocin ointment for patient skin lesions.    Unclear cause of patient's abdominal pain at this time but recommended follow-up with GI for this as well.  Low suspicion for pancreatitis given no epigastric pain shooting to the back, normal lipase.    I also recommended close follow-up with patient's PCP for reevaluation.  Patient may need dermatology follow-up of her skin lesions do not improve.  Patient otherwise understanding and agreement plan.  Provided with strict return precautions.            MDM      Patient is a 59-year-old female with medical history as below who presents to the ED for evaluation of multiple symptoms as above.  VSS. Patient is non toxic appearing on exam. Will obtain labs, EKG, CXR, CT abd/pelvis, strep/mono test.     ED Course:   - CBC shows no leukcoytosis, normal hgb. CMP unremarkable. Strep, mono neg. Covid, flu, RSV neg. TSH wnl.   - UA neg for UTI  - CXR shows no focal consolidation or other acute abnormalities.   - CT abd/pelvis shows mild fat  stranding around pancreas. Pt has no epigastric pain radiating to back and lipase normal, so doubt pancreatitis. Pt has post cholecystectomy changes noted on CT.    - EKG appears similar to prior. Troponin negative. Discussed with [facility] on call who reviewed EKG. Pt ok to f/u as outpatient from cardiology perspective.   - In regards to skin lesions. Unclear etiology at this time, but doubt shingles given lesions cross midline and are not localized to dermatome. In addition patient is already on valtrex. Discussed lesions could be related to lupus, medication side effect. Will prescribe mupirocin. Rash not c/w dangerous etiology such as SJS/TEN. Recommended close re-evaluation of rash with PCP.     Pt re-evaluated, remains nontoxic appearing. Multiple diagnoses considered. Discussed all results of work up with pt. Patient is stable for discharge home with close PCP f/u. Will prescribe nystatin suspension for oral thrush. Recommended f/u with pt's GI doctor as outpatient if continued pain with swallowing for possible EGD as outpatient. Discussed strict return precautions and supportive care. Patient verbalized understanding and agreement of plan.             Medical Decision Making  Amount and/or Complexity of Data Reviewed  Labs: ordered. Decision-making details documented in ED Course.  Radiology: ordered and independent interpretation performed. Decision-making details documented in ED Course.  ECG/medicine tests: ordered and independent interpretation performed. Decision-making details documented in ED Course.  Discussion of management or test interpretation with external provider(s): NELLI    Risk  Prescription drug management.        Disposition and Plan     Clinical Impression:  1. Odynophagia    2. Chest heaviness    3. Thrush, oral    4. Abdominal pain, right upper quadrant         Disposition:  Discharge  2/14/2025  5:38 pm    Follow-up:  NELLI Slaughter  [facility]  [id number] [phone]  Schedule an appointment as soon as possible for a visit      Ryne Monzon MD  [address]  [address]  [facility] [place] [address] [phone]    Schedule an appointment as soon as possible for a visit      Heaven Bear MD  [address]  [facility] [address] [phone]    Schedule an appointment as soon as possible for a visit            Medications Prescribed:  Discharge Medication List as of 2/14/2025  5:40 PM        START taking these medications    Details   nystatin 343318 UNIT/ML Mouth/Throat Suspension Take 5 mL (500,000 Units total) by mouth 4 (four) times daily for 7 days., Normal, Disp-140 mL, R-0      mupirocin 2 % External Ointment Apply 1 Application topically 3 (three) times daily for 7 days., Normal, Disp-15 g, R-0                 Supplementary Documentation:

## 2025-02-14 NOTE — DISCHARGE INSTRUCTIONS
You were seen in the emergency department.    - You can use the nystatin up to 4 times daily for the next 2 days until your symptoms improve.  - Apply the mupirocin ointment to your skin lesions.  - If your pain with swallowing does not improve, we recommend that you see GI as outpatient.   - In regards to your chest pain, please call your cardiologist to schedule a follow-up ointment for this.    Return to the ER if develop worsening chest pain, difficulty breathing, vomiting and you are unable keep anything down, or other new concerns or worsening symptoms.  Physical closely with your primary care physician as well.

## 2025-02-14 NOTE — ED INITIAL ASSESSMENT (HPI)
Pt arrives to ED for evaluation of chest pressure and \"some sort of infection,\" pt isn't sure iif it is shingles or thrush, pt c/o burning in her mouth and a felt-like tongue. Pt states it hurts her mouth to breath. Pt also c/o abdominal pain, feeling cold, tingly, and dizzy, pt states she has had most of these symptoms for a couple of weeks. Pt c/o low grade fever. Itchy eyes, itchy everywhere, and sores on her face. Pt is on preventative medication for shingles but has not been officially diagnosed this time around.   Multiple, multiple complaints. Pt continues to add on ailments, multiple complaints.

## 2025-02-17 ENCOUNTER — TELEPHONE (OUTPATIENT)
Dept: INTERNAL MEDICINE CLINIC | Facility: CLINIC | Age: 60
End: 2025-02-17

## 2025-02-17 LAB
EBV NA IGG SER QL IA: POSITIVE
EBV VCA IGG SER QL IA: POSITIVE
EBV VCA IGM SER QL IA: NEGATIVE

## 2025-02-17 NOTE — TELEPHONE ENCOUNTER
See 2/14/25 ED visit:  In regards to skin lesions. Unclear etiology at this time, but doubt shingles given lesions cross midline and are not localized to dermatome. In addition patient is already on valtrex. Discussed lesions could be related to lupus, medication side effect. Will prescribe mupirocin. Rash not c/w dangerous etiology such as SJS/TEN. Recommended close re-evaluation of rash with PCP.     Called pt. Discussed that she will need to follow up with provider prior to any medication adjustments. Pt voiced understanding.    Future Appointments         Provider Department Appt Notes    In 2 days Shawanda Calderon APRN [facility], [address] [place] [address] Went to the ER on Friday for Thrush and Shingles. Also chest pressure

## 2025-02-17 NOTE — TELEPHONE ENCOUNTER
Patient recently seen in the ER 2/14/2025.  Patient states for shingles treatment her previous primary care provider would typically increase for a short period her valtrex.  Patient would like to know if she can be prescribed an increase of valtrex, prior to the office visit.      Future Appointments   Date Time Provider Department Center   2/19/2025  3:00 PM Shawanda Calderon APRN [facility]

## 2025-03-03 ENCOUNTER — OFFICE VISIT (OUTPATIENT)
Dept: INTERNAL MEDICINE CLINIC | Facility: CLINIC | Age: 60
End: 2025-03-03
Payer: COMMERCIAL

## 2025-03-03 VITALS
SYSTOLIC BLOOD PRESSURE: 111 MMHG | HEART RATE: 66 BPM | HEIGHT: 65 IN | BODY MASS INDEX: 29.82 KG/M2 | TEMPERATURE: 98 F | WEIGHT: 179 LBS | DIASTOLIC BLOOD PRESSURE: 70 MMHG | OXYGEN SATURATION: 97 % | RESPIRATION RATE: 16 BRPM

## 2025-03-03 DIAGNOSIS — B37.0 THRUSH: ICD-10-CM

## 2025-03-03 DIAGNOSIS — R61 EXCESSIVE SWEATING: ICD-10-CM

## 2025-03-03 DIAGNOSIS — R21 SKIN RASH: Primary | ICD-10-CM

## 2025-03-03 PROCEDURE — 3074F SYST BP LT 130 MM HG: CPT | Performed by: INTERNAL MEDICINE

## 2025-03-03 PROCEDURE — 3008F BODY MASS INDEX DOCD: CPT | Performed by: INTERNAL MEDICINE

## 2025-03-03 PROCEDURE — 99213 OFFICE O/P EST LOW 20 MIN: CPT | Performed by: INTERNAL MEDICINE

## 2025-03-03 PROCEDURE — 3078F DIAST BP <80 MM HG: CPT | Performed by: INTERNAL MEDICINE

## 2025-03-03 PROCEDURE — G2211 COMPLEX E/M VISIT ADD ON: HCPCS | Performed by: INTERNAL MEDICINE

## 2025-03-03 RX ORDER — HYDROXYZINE HYDROCHLORIDE 25 MG/1
TABLET, FILM COATED ORAL
COMMUNITY
Start: 2024-09-19

## 2025-03-03 RX ORDER — CEPHALEXIN 500 MG/1
500 CAPSULE ORAL 3 TIMES DAILY
Qty: 21 CAPSULE | Refills: 0 | Status: SHIPPED | OUTPATIENT
Start: 2025-03-03

## 2025-03-03 RX ORDER — NYSTATIN 100000 [USP'U]/ML
5 SUSPENSION ORAL 4 TIMES DAILY
Qty: 140 ML | Refills: 0 | Status: SHIPPED | OUTPATIENT
Start: 2025-03-03

## 2025-03-03 RX ORDER — HYDROXYZINE HYDROCHLORIDE 25 MG/1
TABLET, FILM COATED ORAL
Refills: 0 | Status: CANCELLED | OUTPATIENT
Start: 2025-03-03

## 2025-03-03 NOTE — PROGRESS NOTES
Drea Kaba is a 59 year old female.    Chief Complaint   Patient presents with    Skin     Rm 3 SS  Chin issue had blisters       HPI:     Patient with RA, SLE, migraines, insomnia here with c/o rashes on her chin, nose and jaw line for several weeks. Burning sensation, she had tiny blisters there with clear fluid. Seems to be spreading. +fevers and chills. Denies cough/congestion. +CP and went to  last month for it, work up reviewed and EKG was unchanged and troponin neg. She has follow up scheduled with her cardiologist.  She also went to  last month due to bad taste and discomfort of tongue. She was diagnosed with thrush and given 7 days of treatment, pt says she usually needs longer treatment. Symptoms better but not resolved. She is also c/o excessive sweating, TSH and CBC recently were WNL.         Patient Active Problem List   Diagnosis    Restless leg syndrome    Intrinsic asthma, unspecified    Malaise and fatigue    Embolism and thrombosis (HCC)    Vitamin D deficiency    Connective tissue disease, undifferentiated (HCC)    Inflammatory polyarthritis (HCC)    Sicca (HCC)    Neck pain    Seizure (HCC)    Osteoarthrosis, unspecified whether generalized or localized, pelvic region and thigh    Rheumatoid arthritis (HCC)    Osteoarthritis of carpometacarpal joint of left thumb, unspecified osteoarthritis type    Other unilateral secondary osteoarthritis of first carpometacarpal joint, left hand    Other secondary osteoarthritis of first carpometacarpal joint of left hand    Hypercoagulable state (HCC)    PVC (premature ventricular contraction)    Lumbar disc herniation    Seronegative rheumatoid arthritis (HCC)    History of DVT (deep vein thrombosis)    Renal insufficiency    Chronic bilateral low back pain with left-sided sciatica    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    Osteopenia, unspecified location    Right wrist pain    Chest pain    Lumbar pseudoarthrosis    Ulnar impaction  syndrome, right    Aftercare following surgery of the musculoskeletal system    Iron deficiency anemia, unspecified    Right lateral epicondylitis    Palpitations    Abnormal EKG    Syncope, near    Sprain of left ankle, unspecified ligament, initial encounter    History of revision of total knee arthroplasty    Arthritis of left hip    S/P hip replacement, left    Hypotension after procedure    Acute chest pain    Migraine    Abnormal brain MRI    PRES (posterior reversible encephalopathy syndrome)    Cervicalgia    Lupus    Fibromyalgia    Acute intractable headache    Carpal tunnel syndrome, bilateral    Left carpal tunnel syndrome    Trigger finger of left thumb    Irritable bowel syndrome    Trigger finger, right middle finger    Trigger finger, right ring finger    Atrial tachycardia (HCC)    Muscle weakness    Other insomnia    Thrombocytopenia    Chronic depression    Arthritis of carpometacarpal (CMC) joint of right thumb     Current Outpatient Medications   Medication Sig Dispense Refill    hydrOXYzine 25 MG Oral Tab       cephALEXin 500 MG Oral Cap Take 1 capsule (500 mg total) by mouth 3 (three) times daily. 21 capsule 0    nystatin 730761 UNIT/ML Mouth/Throat Suspension Take 5 mL (500,000 Units total) by mouth 4 (four) times daily. 140 mL 0    valACYclovir 500 MG Oral Tab Take 1 tablet (500 mg total) by mouth 2 (two) times daily. 180 tablet 3    sertraline 50 MG Oral Tab Take 1 tablet (50 mg total) by mouth daily. 90 tablet 3    methylPREDNISolone 4 MG Oral Tab Take 1 tablet (4 mg total) by mouth daily.      ivabradine 5 MG Oral Tab Take 0.5 tablets (2.5 mg total) by mouth in the morning, at noon, in the evening, and at bedtime.      CYANOCOBALAMIN 1000 MCG/ML Injection Solution INJECT 1 ML (1,000 MCG) INTO THE MUSCLE EVERY 30 DAYS 3 mL 3    sertraline 100 MG Oral Tab Take 1 tablet (100 mg total) by mouth daily. 90 tablet 3    rivaroxaban (XARELTO) 20 MG Oral Tab Take 1 tablet (20 mg total) by mouth  every evening. 90 tablet 3    metoprolol succinate ER 50 MG Oral Tablet 24 Hr Take 1.5 tablets (75 mg total) by mouth daily.      gabapentin 300 MG Oral Cap Take 2 capsules (600 mg total) by mouth in the morning and 2 capsules (600 mg total) before bedtime. 300 in the am, 1600 at night.      mycophenolate mofetil 250 MG Oral Cap Take 2 capsules (500 mg total) by mouth 2 (two) times daily before meals.      rOPINIRole 2 MG Oral Tab Take 1 mg by mouth every morning.      ROPINIROLE 0.25 MG Oral Tab TAKE 1 TABLET BY MOUTH NIGHTLY BUT CAN USE AN EXTRA TABLET IF NEEDED 180 tablet 3    AIMOVIG 140 MG/ML Subcutaneous Solution Auto-injector INJECT 1 ML (140 MG TOTAL) INTO THE SKIN EVERY 30 DAYS 3 mL 3    UBRELVY 50 MG Oral Tab TAKE 1 TABLET BY MOUTH AT ONSET OF MIGRAINE. MAY REPEAT 1 TABLET IN 2 HOURS IF NEEDED. 10 tablet 0    flecainide 100 MG Oral Tab Take 1 tablet (100 mg total) by mouth 2 (two) times daily. 50mg at noon also      Cevimeline HCl 30 MG Oral Cap Take 1 capsule (30 mg total) by mouth 3 (three) times daily.      ondansetron (ZOFRAN) 4 mg tablet Take 1 tablet (4 mg total) by mouth every 8 (eight) hours as needed for Nausea.      Estradiol 2 MG Vaginal Ring Place 2 mg vaginally every 3 (three) months. 1 each 4    allopurinol 100 MG Oral Tab Take 1 tablet (100 mg total) by mouth nightly.      Esomeprazole Magnesium 40 MG Oral Capsule Delayed Release Take 1 capsule (40 mg total) by mouth 2 (two) times daily.      XELJANZ XR 11 MG Oral Tablet 24 Hr Take 11 mg by mouth As Directed.      HYDROcodone-acetaminophen 5-325 MG Oral Tab Take 1 tablet by mouth every 4 (four) hours as needed. 20 tablet 0      Past Medical History:    Arrhythmia    atrial tachycardia, svt    Back problem    Calculus of kidney    Connective tissue disease, undifferentiated (HCC)    Crohn disease (HCC)    Deep vein thrombosis (HCC)    Multiple DVTs: 2 in leg and 2 in arm    DEPRESSION    Depression    Disorder of thyroid    Esophageal reflux     Fibromyalgia    Gout    Hepatitis    from Medication    High blood pressure    High cholesterol    patients denies    History of blood transfusion    CDH    HYPERLIPIDEMIA    HYPERTENSION    IBS (irritable bowel syndrome)    Irritable bowel syndrome    Kidney disease    RT Kidney no longer functions; LT kidney working about 30%; presently has hydronephrosi s and kidney stones    KIDNEY STONE    Lupus (systemic lupus erythematosus) (HCC)    Migraines    bright lights,wine,sulfites,cheeses,sweeteners,barometeric pressure    Muscle weakness    arm weakness    Neuropathy    Boths legs    OBESITY    Osteoarthritis    Pleurisy    Pneumonia due to organism    PONV (postoperative nausea and vomiting)    doesn't work sometimes    Port-A-Cath in place    Raynaud disease    Renal disorder    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    Renal disorder    LEFT functioning  stage 3    Rheumatoid arthritis (HCC)    RLS (restless legs syndrome)    Seizure disorder (HCC)    had x 1 year and went away/unknown etiology/ age 47    Sjogren's disease (HCC)    Visual impairment    wears glasses      Social History:  Social History     Socioeconomic History    Marital status:    Tobacco Use    Smoking status: Never     Passive exposure: Never    Smokeless tobacco: Never   Vaping Use    Vaping status: Never Used   Substance and Sexual Activity    Alcohol use: Never    Drug use: Yes     Types: Cannabis     Comment: occasional    Sexual activity: Not Currently     Partners: Male   Other Topics Concern    Caffeine Concern No    Exercise Yes     Comment: stretches     Social Drivers of Health     Food Insecurity: No Food Insecurity (3/3/2025)    NCSS - Food Insecurity     Worried About Running Out of Food in the Last Year: No     Ran Out of Food in the Last Year: No   Transportation Needs: No Transportation Needs (3/3/2025)    NCSS - Transportation     Lack of Transportation: No   Housing Stability: Not At Risk (3/3/2025)    NCSS -  Housing/Utilities     Has Housing: Yes     Worried About Losing Housing: No     Unable to Get Utilities: No     Family History   Problem Relation Age of Onset    Dementia Father     Hypertension Father     Prostate Cancer Father     High Cholesterol Father     Obesity Mother     Heart Disorder Mother     Depression Mother     Cancer Mother     Hypertension Mother     Stroke Maternal Grandmother     Depression Sister     Diabetes Other         Allergies  Allergies[1]      REVIEW OF SYSTEMS:   GENERAL HEALTH:  feels chills and fevers   RESPIRATORY: no cough  CARDIOVASCULAR: as above  GI: denies abdominal pain  SKIN: rashes as above      EXAM:   /70   Pulse 66   Temp 98 °F (36.7 °C) (Temporal)   Resp 16   Ht 5' 5\" (1.651 m)   Wt 179 lb (81.2 kg)   SpO2 97%   BMI 29.79 kg/m²   GENERAL: well developed, well nourished,in no apparent distress  HEENT: atraumatic, normocephalic, tongue with thrush, erythematous rash on chin, sides of nose and by jaw line,  Chin with blisters that already dried up  NECK: supple,no adenopathy  LUNGS: normal rate without respiratory distress, lungs clear to auscultation  CARDIO: RRR nl S1 S2  NEURO: Alert and oriented    ASSESSMENT AND PLAN:     Encounter Diagnoses   Name           Thrush- improving with nystatin, refill given so patient can complete longer course of 10-14 days     Skin rash- ?impetigo/folliculitis. Will treat with keflex 500mg TID for 7 days. Consider derm evaluation if persistent symptoms       Excessive sweating- may be r/t above, CBC and TSH were WNL. Monitor symptoms       No orders of the defined types were placed in this encounter.      Meds & Refills for this Visit:  Requested Prescriptions     Signed Prescriptions Disp Refills    cephALEXin 500 MG Oral Cap 21 capsule 0     Sig: Take 1 capsule (500 mg total) by mouth 3 (three) times daily.    nystatin [id number] UNIT/ML Mouth/Throat Suspension 140 mL 0     Sig: Take 5 mL (500,000 Units total) by mouth 4 (four)  times daily.       Imaging & Consults:  None    Return if symptoms worsen or fail to improve.  There are no Patient Instructions on file for this visit.      The patient indicates understanding of these issues and agrees to the plan.           [1]   Allergies  Allergen Reactions    Adhesive Tape UNKNOWN and HIVES    Cipro [Ciprofloxacin] OTHER (SEE COMMENTS)     Drug induced hepatitis    Dihydroergotamine OTHER (SEE COMMENTS)     Hx of SVT s/p 2 ablations    Epinephrine SWELLING     Patient does not recall this allergy, thinks it possibly made her restless but is unsure of the event.    Heparin OTHER (SEE COMMENTS)     HIT-Heparin \"eats up red blood cells\"    Latex UNKNOWN, OTHER (SEE COMMENTS) and RASH     POWDER \"CHOKES\" HER    unknown    Pain Relieving Patch SWELLING     Swelling with lidocaine patch. Has tolerated IV and Subcutaneous lidocaine multiple times since lido patch allergy recorded, no reaction. Possibly reacted to another ingredient in patch such as adhesive.     Prednisone UNKNOWN and OTHER (SEE COMMENTS)    Pregabalin ANAPHYLAXIS, ANGIOEDEMA and SWELLING    Radiology Contrast Iodinated Dyes RASH and OTHER (SEE COMMENTS)     History of iodine allergy and chronic renal failure      Topamax [Topiramate] ANAPHYLAXIS    Toradol OTHER (SEE COMMENTS)     Patient has stage 3 kidney failure    Compazine NAUSEA ONLY     Hyperactivity, restlessness, HA,     Feraheme [Ferumoxytol] FACE FLUSHING    Latex RASH     POWDER \"CHOKES\" HER    Phenergan [Promethazine Hcl] OTHER (SEE COMMENTS)     HYPERACTIVITY    Reglan [Metoclopramide Hcl] OTHER (SEE COMMENTS)     Restless legs, skin crawling    Hydromorphone Hcl UNKNOWN    Iodine (Topical) UNKNOWN    Nsaids OTHER (SEE COMMENTS)     CDK- has a solitary kidney    Tramadol UNKNOWN    Triptans OTHER (SEE COMMENTS)     Hx of SVT s/p 2 ablations    Dronedarone DIARRHEA and OTHER (SEE COMMENTS)    Pcn [Bicillin L-A] RASH     Ampicillin    No skin peeling/blistering, denies  organ damage per patient    Povidone Iodine RASH     rash    Vancomycin ITCHING

## 2025-03-05 RX ORDER — HYDROXYZINE HYDROCHLORIDE 25 MG/1
25 TABLET, FILM COATED ORAL NIGHTLY PRN
Qty: 30 TABLET | Refills: 0 | Status: SHIPPED | OUTPATIENT
Start: 2025-03-05

## 2025-03-05 NOTE — TELEPHONE ENCOUNTER
Patient spouse called requesting a refill of patients hydroxyzine for her symptoms of her itching.     Patient continues to have itching and is almost out of hydroxyzine.     Pended please approve or deny     LOV 3/3/25     Thrush- improving with nystatin, refill given so patient can complete longer course of 10-14 days      Skin rash- ?impetigo/folliculitis. Will treat with keflex 500mg TID for 7 days. Consider derm evaluation if persistent symptoms          Future Appointments   Date Time Provider Department Center   4/28/2025  3:00 PM [facility]   5/5/2025  4:20 PM Heaven Bear MD [facility]

## 2025-03-06 ENCOUNTER — TELEPHONE (OUTPATIENT)
Dept: INTERNAL MEDICINE CLINIC | Facility: CLINIC | Age: 60
End: 2025-03-06

## 2025-03-06 NOTE — TELEPHONE ENCOUNTER
Dr. Bear- pt's rash spreading, unable to see derm for 2 weeks. Devin asking if pt can be seen for rash to be swabbed? Stated there was concern for bacterial infection when in ER.     Received call from pt's spouse, Devin. Per Devin, pt was seen by Dr. Bear 3/3 for ER follow-up but did not have enough time in appointment to ask all questions. Patient has rash on neck that has spread to ear and in throat. They tried to get pt into their derm but cannot get in for 2 weeks. Per Devin, they were told rash may need to be swabbed as there is concern for bacterial infection. Intermittent fevers ever couple of days per Devin. Is on abx but rash is not improving. No appts open, informed Devin we will discuss with Dr. Bear and call back. Devin appreciative.

## 2025-03-07 NOTE — TELEPHONE ENCOUNTER
If intermittent fevers and rash spreading, advise to go to [facility] for evaluation. I do not have any openings this week

## 2025-03-07 NOTE — TELEPHONE ENCOUNTER
Called and spoke with pt's spouse Devin. Notified of recommendations below. Devin stated he is disappointed that this is our recommendation as pt's concerns were not all addressed at [facility] this week. Stated they already went to ER and the recommendation was to f/u with PCP for swab of rash to ensure treating correctly. Offered to schedule next soonest apt which is Monday. Spouse agreeable to apt. I did encourage Devin to still schedule with dermatology. He stated their normal derm was booked out for next month but were going to call around to see if any others had appointments sooner. Max temp 100. Resolves on it's own, pt not taking Tylenol. ER warning signs given for the weekend. Devin verbalizes understanding and is agreeable to plan.     Future Appointments   Date Time Provider Department Center   3/10/2025  4:00 PM Ruthy Mak APRN [facility]   4/28/2025  3:00 PM [facility] [place]   5/5/2025  4:20 PM Heaven Bear MD [facility]     FYI TB

## 2025-03-12 ENCOUNTER — HOSPITAL ENCOUNTER (OUTPATIENT)
Age: 60
Discharge: HOME | End: 2025-03-12
Payer: COMMERCIAL

## 2025-03-12 ENCOUNTER — APPOINTMENT (OUTPATIENT)
Dept: GENERAL RADIOLOGY | Age: 60
End: 2025-03-12
Attending: PHYSICIAN ASSISTANT
Payer: COMMERCIAL

## 2025-03-12 VITALS
TEMPERATURE: 99 F | WEIGHT: 177 LBS | OXYGEN SATURATION: 95 % | DIASTOLIC BLOOD PRESSURE: 88 MMHG | RESPIRATION RATE: 16 BRPM | BODY MASS INDEX: 29.49 KG/M2 | HEART RATE: 74 BPM | SYSTOLIC BLOOD PRESSURE: 148 MMHG | HEIGHT: 65 IN

## 2025-03-12 DIAGNOSIS — R05.1 ACUTE COUGH: ICD-10-CM

## 2025-03-12 DIAGNOSIS — J02.9 SORE THROAT: ICD-10-CM

## 2025-03-12 DIAGNOSIS — R07.9 CHEST PAIN OF UNCERTAIN ETIOLOGY: Primary | ICD-10-CM

## 2025-03-12 LAB
#MXD IC: 0.2 X10ˆ3/UL (ref 0.1–1)
BUN BLD-MCNC: 24 MG/DL (ref 7–18)
CHLORIDE BLD-SCNC: 107 MMOL/L (ref 98–112)
CO2 BLD-SCNC: 24 MMOL/L (ref 21–32)
CREAT BLD-MCNC: 1.2 MG/DL
EGFRCR SERPLBLD CKD-EPI 2021: 52 ML/MIN/1.73M2 (ref 60–?)
GLUCOSE BLD-MCNC: 107 MG/DL (ref 70–99)
HCT VFR BLD AUTO: 40 %
HCT VFR BLD CALC: 39 %
HGB BLD-MCNC: 13.4 G/DL
ISTAT IONIZED CALCIUM FOR CHEM 8: 1.08 MMOL/L (ref 1.12–1.32)
LYMPHOCYTES # BLD AUTO: 1 X10ˆ3/UL (ref 1–4)
LYMPHOCYTES NFR BLD AUTO: 21.9 %
MCH RBC QN AUTO: 31 PG (ref 26–34)
MCHC RBC AUTO-ENTMCNC: 33.5 G/DL (ref 31–37)
MCV RBC AUTO: 92.6 FL (ref 80–100)
MIXED CELL %: 4.3 %
NEUTROPHILS # BLD AUTO: 3.3 X10ˆ3/UL (ref 1.5–7.7)
NEUTROPHILS NFR BLD AUTO: 73.8 %
PLATELET # BLD AUTO: 153 X10ˆ3/UL (ref 150–450)
POCT INFLUENZA A: NEGATIVE
POCT INFLUENZA B: NEGATIVE
POTASSIUM BLD-SCNC: 4.3 MMOL/L (ref 3.6–5.1)
RBC # BLD AUTO: 4.32 X10ˆ6/UL
S PYO AG THROAT QL: NEGATIVE
SARS-COV-2 RNA RESP QL NAA+PROBE: NOT DETECTED
SODIUM BLD-SCNC: 141 MMOL/L (ref 136–145)
TROPONIN I BLD-MCNC: <0.02 NG/ML
WBC # BLD AUTO: 4.5 X10ˆ3/UL (ref 4–11)

## 2025-03-12 PROCEDURE — 87081 CULTURE SCREEN ONLY: CPT | Performed by: PHYSICIAN ASSISTANT

## 2025-03-12 PROCEDURE — 85025 COMPLETE CBC W/AUTO DIFF WBC: CPT | Performed by: PHYSICIAN ASSISTANT

## 2025-03-12 PROCEDURE — 71046 X-RAY EXAM CHEST 2 VIEWS: CPT | Performed by: PHYSICIAN ASSISTANT

## 2025-03-12 PROCEDURE — 80047 BASIC METABLC PNL IONIZED CA: CPT | Performed by: PHYSICIAN ASSISTANT

## 2025-03-12 PROCEDURE — 87880 STREP A ASSAY W/OPTIC: CPT | Performed by: PHYSICIAN ASSISTANT

## 2025-03-12 PROCEDURE — 93000 ELECTROCARDIOGRAM COMPLETE: CPT | Performed by: PHYSICIAN ASSISTANT

## 2025-03-12 PROCEDURE — U0002 COVID-19 LAB TEST NON-CDC: HCPCS | Performed by: PHYSICIAN ASSISTANT

## 2025-03-12 PROCEDURE — 99204 OFFICE O/P NEW MOD 45 MIN: CPT | Performed by: PHYSICIAN ASSISTANT

## 2025-03-12 PROCEDURE — 84484 ASSAY OF TROPONIN QUANT: CPT | Performed by: PHYSICIAN ASSISTANT

## 2025-03-12 PROCEDURE — 87502 INFLUENZA DNA AMP PROBE: CPT | Performed by: PHYSICIAN ASSISTANT

## 2025-03-12 NOTE — ED INITIAL ASSESSMENT (HPI)
Patient reports burning sensation in her chest and throat x 2 days and dry cough for the past \"few days\", patient states that she feels a burning sensation also to her face, throat, and esophagus, increased diaphoresis without fever and chills, pt reports feeling intermittently near syncopal, but states that she has not passed out or lost consciousness

## 2025-03-12 NOTE — DISCHARGE INSTRUCTIONS
As we discussed this is a very limited workup here in the immediate care.  Due to your extensive cardiac history and ER evaluation would be best.      Please keep your appointments with your specialist and your stress test.      As we discussed your COVID influenza are negative.  Your throat culture is pending.  Your chest x-ray shows no pneumonia.  EKG shows no acute findings.  Your i-STAT does show signs of decrease in kidney function however after conversation we discussed that this is a chronic issue for you.  Your troponin here was negative however further pulmonary or cardiac etiologies could be the cause, if symptoms worsen or do not improve report immediately to the emergency department

## 2025-03-12 NOTE — ED PROVIDER NOTES
Patient Seen in: [facility]      History     Chief Complaint   Patient presents with    Chest Pain     Burning in my chest. Probably an infection - Entered by patient     Stated Complaint: Chest Pain - Burning in my chest. Probably an infection    Subjective:   The history is provided by the patient.         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, significant arrhythmia history    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    DEPRESSION    Depression    Disorder of thyroid    Esophageal reflux    Fibromyalgia    Gout    Hepatitis    from Medication    High blood pressure    High cholesterol    patients denies    History of blood transfusion    CDH    HYPERLIPIDEMIA    HYPERTENSION    IBS (irritable bowel syndrome)    Irritable bowel syndrome    Kidney disease    RT Kidney no longer functions; LT kidney working about 30%; presently has hydronephrosi s and kidney stones    KIDNEY STONE    Lupus (systemic lupus erythematosus) (HCC)    Migraines    bright lights,wine,sulfites,cheeses,sweeteners,barometeric pressure    Muscle weakness    arm weakness    Neuropathy    Boths legs    OBESITY    Osteoarthritis    Pleurisy    Pneumonia due to organism    PONV (postoperative nausea and vomiting)    doesn't work sometimes    Port-A-Cath in place    Raynaud disease    Renal disorder    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    Cholecystectomy      Colonoscopy  10/16/2019    Colonoscopy      Colonoscopy N/A 8/29/2023    Procedure: COLONOSCOPY W/ BIOPSIES;  Surgeon: Ryne Monzon MD;  Location:  ENDOSCOPY    Elbow surgery      Hernia surgery      Hip replacement surgery  1/14    right: Dr. Brody    Hysterectomy  1999    LUCA BSO for endometriosis    Ndsc ablation & rcnstj [facility] w/o bypass  08/2021    Other  11/2015    reconstructive surgery of left thumb joint    Other  8/2015    vaginal sling    Other Right     WRIST SURGERY    Other surgical history      parathyroidectomy    Other surgical history Right 2018    wrist surgery    Other surgical history Left 2018    left wrist surgery    Port, indwelling, imp  2019    Power Port    Repair enterocele,vag apprch  2012    Repair of rectocele  2012    Tonsillectomy      Total hip replacement Right                 Social History     Socioeconomic History    Marital status:    Tobacco Use    Smoking status: Never     Passive exposure: Never    Smokeless tobacco: Never   Vaping Use    Vaping status: Never Used   Substance and Sexual Activity    Alcohol use: Never    Drug use: Yes     Types: Cannabis     Comment: occasional    Sexual activity: Not Currently     Partners: Male   Other Topics Concern    Caffeine Concern No    Exercise Yes     Comment: stretches     Social Drivers of Health     Food Insecurity: No Food Insecurity (3/3/2025)    NCSS - Food Insecurity     Worried About Running Out of Food in the Last Year: No     Ran Out of Food in the Last Year: No   Transportation Needs: No Transportation Needs (3/3/2025)    NCSS - Transportation     Lack of Transportation: No   Housing Stability: Not At Risk (3/3/2025)    NCSS  - Housing/Utilities     Has Housing: Yes     Worried About Losing Housing: No     Unable to Get Utilities: No              Review of Systems   Constitutional:  Positive for chills, fatigue and fever.   HENT:  Positive for congestion and sore throat. Negative for trouble swallowing and voice change.    Respiratory:  Positive for cough and shortness of breath.    Cardiovascular:  Positive for chest pain. Negative for palpitations and leg swelling.   Gastrointestinal: Negative.        Positive for stated complaint: Chest Pain - Burning in my chest. Probably an infection  Other systems are as noted in HPI.  Constitutional and vital signs reviewed.      All other systems reviewed and negative except as noted above.    Physical Exam     ED Triage Vitals [03/12/25 1626]   /88   Pulse 74   Resp 16   Temp 98.6 °F (37 °C)   Temp src Oral   SpO2 95 %   O2 Device None (Room air)       Current Vitals:   Vital Signs  BP: 148/88  Pulse: 74  Resp: 16  Temp: 98.6 °F (37 °C)  Temp src: Oral    Oxygen Therapy  SpO2: 95 %  O2 Device: None (Room air)        Physical Exam  Vitals and nursing note reviewed.   Constitutional:       General: She is not in acute distress.     Appearance: She is well-developed. She is not toxic-appearing.   HENT:      Head: Normocephalic.      Right Ear: Tympanic membrane, ear canal and external ear normal.      Left Ear: Tympanic membrane, ear canal and external ear normal.      Nose: Congestion present.      Mouth/Throat:      Mouth: Mucous membranes are moist.      Pharynx: Posterior oropharyngeal erythema present. No oropharyngeal exudate.      Comments: No white patches.  No angioedema.  No tongue swelling.  Eyes:      Conjunctiva/sclera: Conjunctivae normal.   Cardiovascular:      Rate and Rhythm: Normal rate and regular rhythm.      Heart sounds: Normal heart sounds.   Pulmonary:      Effort: Pulmonary effort is normal.      Breath sounds: Normal breath sounds.   Abdominal:      Palpations:  Abdomen is soft.      Tenderness: There is no abdominal tenderness.   Musculoskeletal:         General: Normal range of motion.      Cervical back: Normal range of motion.      Right lower leg: No edema.      Left lower leg: No edema.   Skin:     General: Skin is warm.   Neurological:      General: No focal deficit present.      Mental Status: She is alert and oriented to person, place, and time.   Psychiatric:         Mood and Affect: Mood normal.         Behavior: Behavior normal.             ED Course     Labs Reviewed   POCT ISTAT CHEM8 CARTRIDGE - Abnormal; Notable for the following components:       Result Value    ISTAT BUN 24 (*)     ISTAT Ionized Calcium 1.08 (*)     ISTAT Glucose 107 (*)     ISTAT Creatinine 1.20 (*)     eGFR-Cr 52 (*)     All other components within normal limits   POCT RAPID STREP - Normal   ISTAT TROPONIN - Normal   RAPID SARS-COV-2 BY PCR - Normal   POCT FLU TEST - Normal    Narrative:     This assay is a rapid molecular in vitro test utilizing nucleic acid amplification of influenza A and B viral RNA.   POCT CBC   GRP A STREP CULT, THROAT     EKG    Rate, intervals and axes as noted on EKG Report.  Rate: 66 bpm  Rhythm: Normal sinus rhythm with ST and T wave abnormality consistent with previous.  There is mild abnormal changes in the T waves in the inferior leads  Reading: Consistent with previous EKGs no acute findings                XR CHEST PA + LAT CHEST (CPT=71046)    Result Date: 3/12/2025  PROCEDURE:  XR CHEST PA + LAT CHEST (CPT=71046)  INDICATIONS:  Chest Pain - Burning in my chest. Probably an infection  COMPARISON:  [facility] , XR, XR CHEST AP PORTABLE  (CPT=71045), 2/14/2025, 12:10 PM.  TECHNIQUE:  PA and lateral chest radiographs were obtained.  PATIENT STATED HISTORY: (As transcribed by Technologist)    Chest Pain - Burning in my chest. Probably an infection.    FINDINGS:  LUNGS:  No focal consolidation.  Normal vascularity. CARDIAC:  Normal size cardiac silhouette.  MEDIASTINUM:  Normal. PLEURA:  Normal.  No pleural effusions. BONES:  Normal for age.            CONCLUSION:  No acute process.   LOCATION:  [id number]   Dictated by (CST): Ryne Ordaz MD on 3/12/2025 at 5:17 PM     Finalized by (CST): Ryne Ordaz MD on 3/12/2025 at 5:23 PM       CT ABDOMEN+PELVIS(CPT=74176)    Result Date: 2/14/2025      XR CHEST AP PORTABLE  (CPT=71045)    Result Date: 2/14/2025  PROCEDURE:  XR CHEST AP PORTABLE  (CPT=71045)  TECHNIQUE:  AP chest radiograph was obtained.  COMPARISON:  [facility] , XR, XR CHEST AP PORTABLE  (CPT=71045), 3/04/2024, 11:20 AM.  INDICATIONS:  CP x 1 week  PATIENT STATED HISTORY: (As transcribed by Technologist)  Patient states she has had sores on her face with a sore throat and has had trouble eating as well because of her abdominal pain. Patient states she has had a fever as well.    FINDINGS:  Cardiac silhouette is normal in size.  Left-sided chest port noted.  No lobar consolidation.  No significant pleural fluid or pneumothorax.            CONCLUSION:  No acute cardiopulmonary process.   LOCATION:  [id number]      Dictated by (CST): Stromberg, LeRoy, MD on 2/14/2025 at 12:40 PM     Finalized by (CST): Stromberg, LeRoy, MD on 2/14/2025 at 12:41 PM             MDM   Ddx -viral URI, COVID, influenza, bronchitis, CAP, pericarditis, ACS, PE    On exam the patient is in no acute distress.  Her vital signs are stable.  She does have scant nasal congestion.  Posterior pharynx is erythematous there is no tonsillar edema or exudate.  Uvula midline.  No tongue patches erythema or edema.  Tolerating her own secretions.  Heart regular rate and rhythm.  Lungs clear to auscultation.  She is not wheezing no rhonchi.  No lower leg edema.  She has a very extensive history including cardiac and other multiple autoimmune disease.  Most of the symptoms actually have been ongoing for several months and currently in follow-up with multiple specialist including cardiology and GI.  She had a  recent echo 2 days ago and normal and is scheduled for a stress test in the next few days.  I did discussed at length with the patient that this is a very limited workup here in the immediate care and the emergency room is best for her for this evaluation.  Patient states she just wanted to make sure that she did not have pneumonia or other COVID or flu.  She understands limitations of this immediate care and voiced understanding that if symptoms progress or worsen to report to the emergency department    EKG shows no acute changes from previous.    Chest x-ray shows no acute findings.    CBC shows no leukocytosis stable H&H.   I-STAT does show a creatinine of 1.2 GFR 52 and a BUN of 24 patient states she has chronic kidney disease and these are normal findings for her.  COVID influenza strep are negative.  Throat culture is pending  Chart review shows a cardiac echo 2 days ago outpatient and normal.    I did discuss these findings with the patient who voiced understanding.  I stressed once again the limitations of this work and advised of further workup in the Emergency room would be best for evaluation of these symptoms  Patient voiced understanding and the risk of not following up -states she will take this recommendation however unsure if she will report to the emergency room.  Regardless she will contact her specialist about this visit today and attempt for a sooner outpatient follow-up if she decides not to go to the emergency department.  All questions were answered and the patient is comfortable treatment discharge home    Medical Decision Making  Problems Addressed:  Acute cough: acute illness or injury  Chest pain of uncertain etiology: acute illness or injury  Sore throat: acute illness or injury    Amount and/or Complexity of Data Reviewed  Independent Historian: parent  Labs: ordered. Decision-making details documented in ED Course.  Radiology: ordered and independent interpretation performed.  Decision-making details documented in ED Course.  ECG/medicine tests: ordered and independent interpretation performed. Decision-making details documented in ED Course.    Risk  OTC drugs.        Disposition and Plan     Clinical Impression:  1. Chest pain of uncertain etiology    2. Acute cough    3. Sore throat         Disposition:  Discharge  3/12/2025  5:41 pm    Follow-up:  No follow-up provider specified.        Medications Prescribed:  Discharge Medication List as of 3/12/2025  5:43 PM              Supplementary Documentation:

## 2025-03-13 LAB
ATRIAL RATE: 66 BPM
P AXIS: 61 DEGREES
P-R INTERVAL: 196 MS
Q-T INTERVAL: 432 MS
QRS DURATION: 110 MS
QTC CALCULATION (BEZET): 452 MS
R AXIS: -5 DEGREES
T AXIS: 19 DEGREES
VENTRICULAR RATE: 66 BPM

## 2025-03-18 ENCOUNTER — MED REC SCAN ONLY (OUTPATIENT)
Dept: INTERNAL MEDICINE CLINIC | Facility: CLINIC | Age: 60
End: 2025-03-18

## 2025-03-31 DIAGNOSIS — D50.0 IRON DEFICIENCY ANEMIA DUE TO CHRONIC BLOOD LOSS: ICD-10-CM

## 2025-03-31 DIAGNOSIS — D68.59 HYPERCOAGULABLE STATE (HCC): ICD-10-CM

## 2025-04-03 ENCOUNTER — TELEPHONE (OUTPATIENT)
Dept: INTERNAL MEDICINE CLINIC | Facility: CLINIC | Age: 60
End: 2025-04-03

## 2025-04-03 DIAGNOSIS — R21 RASH: Primary | ICD-10-CM

## 2025-04-03 NOTE — TELEPHONE ENCOUNTER
Patient  called stating patient was in the emergency room yesterday and they prescribed \"Magic mouth wash\" and the pharmacy cannot fill because she has an allergy to lidocaine and there is lidocaine in this mouth wash-they were told to call PCP for us to be able to [facility] ride [facility] filling this for her.    [facility] to [facility] for them to fill-we will need to send in new prescription to local [facility] on [address]

## 2025-04-03 NOTE — TELEPHONE ENCOUNTER
See 4/2/25 ED encounter:  First Mouthwash BLM     Per Google search:  How is First Mouthwash BLM supplied  Size 8 FL OZ (237 mL)  NDC# 03132-440-02  Diphenhydramine HCl 0.2g  Lidocaine HCl 1.6g  FIRST®-Mouthwash Suspension 236 mL    Patient Allergy: Pain Relieving Patch   Swelling with lidocaine patch. Has tolerated IV and Subcutaneous lidocaine multiple times since lido patch allergy recorded, no reaction. Possibly reacted to another ingredient in patch such as adhesive.    Please advise.

## 2025-04-03 NOTE — TELEPHONE ENCOUNTER
Triage call transferred.   Spoke with pt's  for referral request, not required by insurance but from ID.  Pt was in ER for rash 4/2/25 and was referred pt to ID.  Per , requesting referral to Dr. Faustino Gore with [facility]  [address] [facility] [address] [place], [place]   P: [phone]  F: [phone]  Informed will relay to PCP referral request.   verbalized understanding and agreed with POC.    ID referral pended for your approval if ok.  Please review and advise. Thank you.

## 2025-04-04 NOTE — TELEPHONE ENCOUNTER
Attempted to call patient, left detailed message on VM that mouthwash was updated and sent to [facility] on [address].    Called [facility] on [address] and canceled rx. Rx sent to updated pharmacy.

## 2025-04-04 NOTE — TELEPHONE ENCOUNTER
Referral faxed to Dr Faustino Gore/[facility] at [facility] at [phone]. Patient advised via [employer] message.

## 2025-04-04 NOTE — TELEPHONE ENCOUNTER
26 allergies listed, removed one that is not permitting to send the mouthwash (allergy to lidocaine) since she is not allergic to lidocaine  Magic mouthwash sent in, please let her know

## 2025-04-14 ENCOUNTER — TELEPHONE (OUTPATIENT)
Age: 60
End: 2025-04-14

## 2025-04-14 NOTE — TELEPHONE ENCOUNTER
Patient is calling to make an appointment for port access for patient's upcoming MRI. Please contact patient to schedule.

## 2025-05-06 ENCOUNTER — HOSPITAL ENCOUNTER (OUTPATIENT)
Dept: MRI IMAGING | Facility: HOSPITAL | Age: 60
Discharge: HOME | End: 2025-05-06
Attending: INTERNAL MEDICINE
Payer: COMMERCIAL

## 2025-05-06 ENCOUNTER — NURSE ONLY (OUTPATIENT)
Age: 60
End: 2025-05-06
Attending: INTERNAL MEDICINE
Payer: COMMERCIAL

## 2025-05-06 DIAGNOSIS — K85.90 ACUTE PANCREATITIS, UNSPECIFIED COMPLICATION STATUS, UNSPECIFIED PANCREATITIS TYPE (HCC): ICD-10-CM

## 2025-05-06 LAB
BASOPHILS # BLD AUTO: 0.03 X10(3) UL (ref 0–0.2)
BASOPHILS NFR BLD AUTO: 0.7 %
DEPRECATED HBV CORE AB SER IA-ACNC: 432 NG/ML (ref 50–306)
EOSINOPHIL # BLD AUTO: 0.09 X10(3) UL (ref 0–0.7)
EOSINOPHIL NFR BLD AUTO: 2.2 %
ERYTHROCYTE [DISTWIDTH] IN BLOOD BY AUTOMATED COUNT: 13.6 %
HCT VFR BLD AUTO: 37.4 % (ref 35–48)
HGB BLD-MCNC: 12.8 G/DL (ref 12–16)
IMM GRANULOCYTES # BLD AUTO: 0.04 X10(3) UL (ref 0–1)
IMM GRANULOCYTES NFR BLD: 1 %
IRON SATN MFR SERPL: 25 % (ref 15–50)
IRON SERPL-MCNC: 70 UG/DL (ref 50–170)
LYMPHOCYTES # BLD AUTO: 1.25 X10(3) UL (ref 1–4)
LYMPHOCYTES NFR BLD AUTO: 30.5 %
MCH RBC QN AUTO: 31.1 PG (ref 26–34)
MCHC RBC AUTO-ENTMCNC: 34.2 G/DL (ref 31–37)
MCV RBC AUTO: 90.8 FL (ref 80–100)
MONOCYTES # BLD AUTO: 0.43 X10(3) UL (ref 0.1–1)
MONOCYTES NFR BLD AUTO: 10.5 %
NEUTROPHILS # BLD AUTO: 2.26 X10 (3) UL (ref 1.5–7.7)
NEUTROPHILS # BLD AUTO: 2.26 X10(3) UL (ref 1.5–7.7)
NEUTROPHILS NFR BLD AUTO: 55.1 %
PLATELET # BLD AUTO: 138 10(3)UL (ref 150–450)
RBC # BLD AUTO: 4.12 X10(6)UL (ref 3.8–5.3)
TOTAL IRON BINDING CAPACITY: 285 UG/DL (ref 250–425)
TRANSFERRIN SERPL-MCNC: 239 MG/DL (ref 250–380)
WBC # BLD AUTO: 4.1 X10(3) UL (ref 4–11)

## 2025-05-06 PROCEDURE — A9575 INJ GADOTERATE MEGLUMI 0.1ML: HCPCS | Performed by: INTERNAL MEDICINE

## 2025-05-06 PROCEDURE — 76376 3D RENDER W/INTRP POSTPROCES: CPT | Performed by: INTERNAL MEDICINE

## 2025-05-06 PROCEDURE — 74183 MRI ABD W/O CNTR FLWD CNTR: CPT | Performed by: INTERNAL MEDICINE

## 2025-05-06 RX ORDER — GADOTERATE MEGLUMINE 376.9 MG/ML
20 INJECTION INTRAVENOUS
Status: COMPLETED | OUTPATIENT
Start: 2025-05-06 | End: 2025-05-06

## 2025-05-06 RX ADMIN — GADOTERATE MEGLUMINE 16 ML: 376.9 INJECTION INTRAVENOUS at 15:30:00

## 2025-05-06 NOTE — PROGRESS NOTES
Pt arrived amb for port to be accessed for CT.  Labs were drawn for the 5/13 [facility] appt as well.  Pt left amb with port accessed, denies c/o.

## 2025-05-12 ENCOUNTER — TELEPHONE (OUTPATIENT)
Dept: INTERNAL MEDICINE CLINIC | Facility: CLINIC | Age: 60
End: 2025-05-12

## 2025-05-12 DIAGNOSIS — Z12.39 ENCOUNTER FOR SCREENING FOR MALIGNANT NEOPLASM OF BREAST, UNSPECIFIED SCREENING MODALITY: Primary | ICD-10-CM

## 2025-05-12 NOTE — TELEPHONE ENCOUNTER
Attempted to call patient. Left a detailed message (+identifier) that Dr. Bear signed order and was faxed

## 2025-05-12 NOTE — TELEPHONE ENCOUNTER
Rajni from [facility] states that patient has an appointment tomorrow for mammogram and is supposed to get breast ultrasound due to being Level C. Requesting breast ultrasound order and faxed to [facility] at [phone].    linda Merrill for breast ultrasound order? Patient has appointment tomorrow and is doing them at [facility] since 2019.

## 2025-05-13 ENCOUNTER — TELEPHONE (OUTPATIENT)
Age: 60
End: 2025-05-13

## 2025-05-13 ENCOUNTER — APPOINTMENT (OUTPATIENT)
Age: 60
End: 2025-05-13
Attending: INTERNAL MEDICINE
Payer: COMMERCIAL

## 2025-05-13 NOTE — TELEPHONE ENCOUNTER
Spoke to spouse, patient not feeling well with Lupus flair up today.  Will reschedule when bettwr.

## 2025-05-13 NOTE — TELEPHONE ENCOUNTER
Pt spouse called to cancel appt due to patient not feeling well. She has upset stomach and headache due to her lupus.

## 2025-05-14 RX ORDER — FLECAINIDE ACETATE 100 MG/1
100 TABLET ORAL 2 TIMES DAILY
COMMUNITY

## 2025-05-14 RX ORDER — PREDNISONE 1 MG/1
4 TABLET ORAL DAILY
COMMUNITY

## 2025-05-14 RX ORDER — FOLIC ACID 1 MG/1
1 TABLET ORAL
COMMUNITY

## 2025-05-19 RX ORDER — SERTRALINE HYDROCHLORIDE 100 MG/1
100 TABLET, FILM COATED ORAL DAILY
Qty: 90 TABLET | Refills: 3 | Status: SHIPPED | OUTPATIENT
Start: 2025-05-19

## 2025-05-19 NOTE — TELEPHONE ENCOUNTER
Refill Per Protocol     Requested Prescriptions   Pending Prescriptions Disp Refills    SERTRALINE 100 MG Oral Tab [Pharmacy Med Name: SERTRALINE HCL TABS 100MG] 90 tablet 3     Sig: TAKE 1 TABLET DAILY       Psychiatric Non-Scheduled (Anti-Anxiety) Passed - 5/19/2025 10:00 AM        Passed - In person appointment or virtual visit in the past 6 mos or appointment in next 3 mos     Recent Outpatient Visits              1 week ago     [facility] [place]    Nurse Only    2 months ago Skin rash    [facility], [address] [place] [address] Heaven Lloyd MD    Office Visit    8 months ago Vestibular migraine    [facility], [facility] Trace Estrada MD    Office Visit    8 months ago Acute pain of left foot    [facility], [address] [place] [address], Heaven Lloyd MD    Office Visit    9 months ago Thrombocytopenia    [facility]    Nurse Only          Future Appointments         Provider Department Appt Notes    In 1 week Lito Gonzales MD [facility] [place] It’s a year apt                    Passed - Depression Screening completed within the past 12 months        Passed - Medication is active on med list

## 2025-05-21 ENCOUNTER — ANESTHESIA (OUTPATIENT)
Dept: ENDOSCOPY | Facility: HOSPITAL | Age: 60
End: 2025-05-21
Payer: COMMERCIAL

## 2025-05-21 ENCOUNTER — HOSPITAL ENCOUNTER (OUTPATIENT)
Facility: HOSPITAL | Age: 60
Setting detail: HOSPITAL OUTPATIENT SURGERY
Discharge: HOME | End: 2025-05-21
Attending: INTERNAL MEDICINE | Admitting: INTERNAL MEDICINE
Payer: COMMERCIAL

## 2025-05-21 ENCOUNTER — ANESTHESIA EVENT (OUTPATIENT)
Dept: ENDOSCOPY | Facility: HOSPITAL | Age: 60
End: 2025-05-21
Payer: COMMERCIAL

## 2025-05-21 VITALS
OXYGEN SATURATION: 97 % | RESPIRATION RATE: 16 BRPM | SYSTOLIC BLOOD PRESSURE: 124 MMHG | WEIGHT: 180 LBS | HEIGHT: 65 IN | TEMPERATURE: 97 F | HEART RATE: 70 BPM | DIASTOLIC BLOOD PRESSURE: 73 MMHG | BODY MASS INDEX: 29.99 KG/M2

## 2025-05-21 PROCEDURE — 88305 TISSUE EXAM BY PATHOLOGIST: CPT | Performed by: INTERNAL MEDICINE

## 2025-05-21 RX ORDER — ONDANSETRON 2 MG/ML
4 INJECTION INTRAMUSCULAR; INTRAVENOUS ONCE AS NEEDED
Status: DISCONTINUED | OUTPATIENT
Start: 2025-05-21 | End: 2025-05-21

## 2025-05-21 RX ORDER — NALOXONE HYDROCHLORIDE 0.4 MG/ML
0.08 INJECTION, SOLUTION INTRAMUSCULAR; INTRAVENOUS; SUBCUTANEOUS ONCE AS NEEDED
Status: DISCONTINUED | OUTPATIENT
Start: 2025-05-21 | End: 2025-05-21

## 2025-05-21 RX ORDER — SODIUM CHLORIDE, SODIUM LACTATE, POTASSIUM CHLORIDE, CALCIUM CHLORIDE 600; 310; 30; 20 MG/100ML; MG/100ML; MG/100ML; MG/100ML
INJECTION, SOLUTION INTRAVENOUS CONTINUOUS
Status: DISCONTINUED | OUTPATIENT
Start: 2025-05-21 | End: 2025-05-21

## 2025-05-21 RX ORDER — METHYLPREDNISOLONE 4 MG/1
4 TABLET ORAL DAILY
COMMUNITY

## 2025-05-21 NOTE — ANESTHESIA POSTPROCEDURE EVALUATION
[facility]    Drea Kaba Patient Status:  Hospital Outpatient Surgery   Age/Gender 59 year old female MRN [id number]   Location [facility] Attending No att. providers found   Hosp Day # 0 PCP Heaven Bear MD       Anesthesia Post-op Note    ESOPHAGOGASTRODUODENOSCOPY (EGD) WITH BIOPSIES    Procedure Summary       Date: 05/21/25 Room / Location:  ENDOSCOPY 03 /  ENDOSCOPY    Anesthesia Start: 1413 Anesthesia Stop:     Procedure: ESOPHAGOGASTRODUODENOSCOPY (EGD) WITH BIOPSIES Diagnosis: (GASTRITIS)    Surgeons: Ryne Monzon MD Anesthesiologist: Ro Ibarra DO    Anesthesia Type: MAC ASA Status: 3            Anesthesia Type: MAC    Vitals Value Taken Time   /73 05/21/25 14:45   Temp  05/21/25 16:41   Pulse 68 05/21/25 14:47   Resp 16 05/21/25 14:27   SpO2 98 % 05/21/25 14:47   Vitals shown include unfiled device data.        Patient Location: Endoscopy    Anesthesia Type: MAC    Airway Patency: patent    Postop Pain Control: adequate    Mental Status: mildly sedated but able to meaningfully participate in the post-anesthesia evaluation    Nausea/Vomiting: none    Cardiopulmonary/Hydration status: stable euvolemic    Complications: no apparent anesthesia related complications    Postop vital signs: stable    Dental Exam: Unchanged from Preop    Patient to be discharged home when criteria met.

## 2025-05-21 NOTE — OPERATIVE REPORT
EGD Operative Report    Drea Kaba Patient Status:  [facility] Outpatient Surgery    1965 MRN [id number]   [facility] Attending Ryne Monzon MD   Hosp Day #   0 PCP Heaven Bear MD       Pre-op Diagnosis: abdominal pain    Post-Op Diagnosis:    ESOPHAGUS:  normal.  Biopsied.   STOMACH:  mild antral gastritis biopsied.   DUODENUM:  normal.  Biopsied.    Procedure Performed: EGD with biopsy    Informed Consent: Informed consent for both the procedure and sedation were obtained from the patient. The potentially life-threatening complications of sedation, bleeding,  Perforation, transfusion or repeat endoscopy were reviewed along with the possible need for hospitalization, surgical management, transfusion or repeat endoscopy should one of these complications arise. The patient understands and is agreeable to proceed.  Sedation Type: MAC-Patient received sedation with monitored anesthesia provided by an anesthesiologist  Moderate Sedation Time: None.  Deep sedation provided by anesthesia.  Procedure Description: The patient was placed in the left lateral decubitus position.  A bite block was placed in the patient’s mouth.  The endoscope was inserted through the mouth and advanced under direct visualization to the 3rd portion of the duodenum and was then withdrawn to examine the duodenal bulb and gastric antrum.  The endoscope was then retroflexed to examine the angulus, GE junction, cardia, body and fundus and then withdrawn to examine the esophagus. The endoscope was then removed from the patient. The patient tolerated the procedure well with no immediate complications and was transferred to the recovery area in stable condition.  Findings:    ESOPHAGUS:  normal.  Biopsied.   STOMACH:  mild antral gastritis biopsied.   DUODENUM:   normal.  Biopsied.    Recommendations:   Await pathology  Discharge:  The patient was given an after visit summary detailing the procedure, findings, recommendations and follow up plans.  Ryne Monzon MD  5/21/2025  2:07 PM

## 2025-05-21 NOTE — H&P
History and Physical for Endoscopic Procedure      Drea Kaba Patient Status:  Hospital Outpatient Surgery    1965 MRN [id number]   [facility] Attending Ryne Monzon MD   Hosp Day # 0 PCP Heaven Bear MD     Date of Consult:  25    Reason for Consultation:  Abd pain    History of Present Illness:  Drea Kaba is a a(n) 59 year old female.     History:  Past Medical History[1]  Past Surgical History[2]  Family History[3]   reports that she has never smoked. She has never been exposed to tobacco smoke. She has never used smokeless tobacco. She reports current drug use. Drug: Cannabis. She reports that she does not drink alcohol.    Allergies:  Allergies[4]    Medications:  Current Hospital Medications[5]    Review of Systems:  Gastrointestinal: negative other than specified in the HPI  General: negative other than specified in the HPI  Neurological: negative other than specified in the HPI  Cardiovascular: negative other than specified in the HPI  Respiratory: negative other than specified in the HPI    Physical Exam:  No acute distress  RRR  CTA B/L  SOFT +BS    Assessment/Plan:  Problem List[6]    EGD today.    Ryne Monzon MD  2025  2:06 PM         [1]   Past Medical History:   Arrhythmia    atrial tachycardia, svt, pac    Back problem    Calculus of kidney    Connective tissue disease, undifferentiated (HCC)    Crohn disease (HCC)    Deep vein thrombosis (HCC)    Multiple DVTs: 2 in leg and 2 in arm    DEPRESSION    Depression    Disorder of thyroid    Esophageal reflux    Fibromyalgia    Gout    Hepatitis    from Medication    High blood pressure    High cholesterol    patients denies    History of blood transfusion    CDH    Hx of motion sickness    HYPERLIPIDEMIA    HYPERTENSION    IBS (irritable bowel syndrome)    Irritable bowel syndrome    Kidney disease    RT Kidney no longer functions; LT kidney working about 30%; presently has hydronephrosi s  and kidney stones    KIDNEY STONE    Lupus (systemic lupus erythematosus) (HCC)    Migraines    bright lights,wine,sulfites,cheeses,sweeteners,barometeric pressure    Muscle weakness    arm weakness    Neuropathy    Boths legs    OBESITY    Osteoarthritis    Pleurisy    Pneumonia due to organism    PONV (postoperative nausea and vomiting)    doesn't work sometimes    Port-A-Cath in place    Raynaud disease    Renal disorder    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    Renal disorder    LEFT functioning  stage 3    Rheumatoid arthritis (HCC)    RLS (restless legs syndrome)    Seizure disorder (HCC)    had x 1 year and went away/unknown etiology/ age 47    Sjogren's disease (HCC)    Visual impairment    glasses   [2]   Past Surgical History:  Procedure Laterality Date    Appendectomy      Back surgery      3/16 lumbar discectomy  9/2016 lumbar fusion    Biopsy salivary gland,incisional  12/1/09    Performed by SANTOSH MOYER at [facility], [facility]    Cholecystectomy      Colonoscopy  10/16/2019    Colonoscopy      Colonoscopy N/A 8/29/2023    Procedure: COLONOSCOPY W/ BIOPSIES;  Surgeon: Ryne Monzon MD;  Location:  ENDOSCOPY    Elbow surgery      Hernia surgery      Hip replacement surgery  1/14    right: Dr. Brody    Hysterectomy  1999    LUCA BSO for endometriosis    Ndsc ablation & rcnstj atria lmtd w/o bypass  08/2021    Other  11/2015    reconstructive surgery of left thumb joint    Other  8/2015    vaginal sling    Other Right     WRIST SURGERY    Other surgical history      parathyroidectomy    Other surgical history Right 2018    wrist surgery    Other surgical history Left 2018    left wrist surgery    Port, indwelling, imp  2019    Power Port    Repair enterocele,vag apprch  2012    Repair of rectocele  2012    Tonsillectomy      Total hip replacement Right    [3]   Family History  Problem Relation Age of Onset    Dementia Father     Hypertension Father     Prostate Cancer Father     High  Cholesterol Father     Obesity Mother     Heart Disorder Mother     Depression Mother     Cancer Mother     Hypertension Mother     Stroke Maternal Grandmother     Depression Sister     Diabetes Other    [4]   Allergies  Allergen Reactions    Adhesive Tape UNKNOWN and HIVES    Cipro [Ciprofloxacin] OTHER (SEE COMMENTS)     Drug induced hepatitis    Dihydroergotamine OTHER (SEE COMMENTS)     Hx of SVT s/p 2 ablations    Epinephrine SWELLING     Patient does not recall this allergy, thinks it possibly made her restless but is unsure of the event.    Heparin OTHER (SEE COMMENTS)     HIT-Heparin \"eats up red blood cells\"    Latex UNKNOWN, OTHER (SEE COMMENTS) and RASH     POWDER \"CHOKES\" HER    unknown    Prednisone UNKNOWN and OTHER (SEE COMMENTS)    Pregabalin ANAPHYLAXIS, ANGIOEDEMA and SWELLING    Radiology Contrast Iodinated Dyes RASH and OTHER (SEE COMMENTS)     History of iodine allergy and chronic renal failure      Topamax [Topiramate] ANAPHYLAXIS    Toradol OTHER (SEE COMMENTS)     Patient has stage 3 kidney failure    Compazine NAUSEA ONLY     Hyperactivity, restlessness, HA,     Feraheme [Ferumoxytol] FACE FLUSHING    Latex RASH     POWDER \"CHOKES\" HER    Phenergan [Promethazine Hcl] OTHER (SEE COMMENTS)     HYPERACTIVITY    Reglan [Metoclopramide Hcl] OTHER (SEE COMMENTS)     Restless legs, skin crawling    Hydromorphone Hcl UNKNOWN    Iodine (Topical) UNKNOWN    Nsaids OTHER (SEE COMMENTS)     CDK- has a solitary kidney    Tramadol UNKNOWN    Triptans OTHER (SEE COMMENTS)     Hx of SVT s/p 2 ablations    Dronedarone DIARRHEA and OTHER (SEE COMMENTS)    Pcn [Bicillin L-A] RASH     Ampicillin    No skin peeling/blistering, denies organ damage per patient    Povidone Iodine RASH     rash    Vancomycin ITCHING   [5]   Current Facility-Administered Medications:     lactated ringers infusion, , Intravenous, Continuous  [6]   Patient Active Problem List  Diagnosis    Restless leg syndrome    Intrinsic asthma,  unspecified    Malaise and fatigue    Embolism and thrombosis (HCC)    Vitamin D deficiency    Connective tissue disease, undifferentiated (HCC)    Inflammatory polyarthritis (HCC)    Sicca (HCC)    Neck pain    Seizure (HCC)    Osteoarthrosis, unspecified whether generalized or localized, pelvic region and thigh    Rheumatoid arthritis (HCC)    Osteoarthritis of carpometacarpal joint of left thumb, unspecified osteoarthritis type    Other unilateral secondary osteoarthritis of first carpometacarpal joint, left hand    Other secondary osteoarthritis of first carpometacarpal joint of left hand    Hypercoagulable state (HCC)    PVC (premature ventricular contraction)    Lumbar disc herniation    Seronegative rheumatoid arthritis (HCC)    History of DVT (deep vein thrombosis)    Renal insufficiency    Chronic bilateral low back pain with left-sided sciatica    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    Osteopenia, unspecified location    Right wrist pain    Chest pain    Lumbar pseudoarthrosis    Ulnar impaction syndrome, right    Aftercare following surgery of the musculoskeletal system    Iron deficiency anemia, unspecified    Right lateral epicondylitis    Palpitations    Abnormal EKG    Syncope, near    Sprain of left ankle, unspecified ligament, initial encounter    History of revision of total knee arthroplasty    Arthritis of left hip    S/P hip replacement, left    Hypotension after procedure    Acute chest pain    Migraine    Abnormal brain MRI    PRES (posterior reversible encephalopathy syndrome)    Cervicalgia    Lupus    Fibromyalgia    Acute intractable headache    Carpal tunnel syndrome, bilateral    Left carpal tunnel syndrome    Trigger finger of left thumb    Irritable bowel syndrome    Trigger finger, right middle finger    Trigger finger, right ring finger    Atrial tachycardia (HCC)    Muscle weakness    Other insomnia    Thrombocytopenia    Chronic depression    Arthritis of  carpometacarpal (CMC) joint of right thumb

## 2025-05-21 NOTE — DISCHARGE INSTRUCTIONS
FINDINGS:  1.  There was only some mild inflammation in your stomach found.  2.  I took routine biopsies of your intestines, stomach and esophagus.    PLAN:  1.  Await the biopsy results.      If you have any questions, please call us at [phone].    Thank you,  Ryne Monzon MD          Home Care Instructions for Gastroscopy with Sedation    Diet:  - Resume your regular diet as tolerated unless otherwise instructed.  - Start with light meals to minimize bloating.  - Do not drink alcohol today.    Medication: Resume Xarelto tonight  - If you have questions about resuming your normal medications, please contact your Primary Care Physician.    Activities:  - Take it easy today. Do not return to work today.  - Do not drive today.  - Do not operate any machinery today (including kitchen equipment).    Gastroscopy:  - You may have a sore throat for 2-3 days following the exam. This is normal. Gargling with warm salt water (1/2 tsp salt to 1 glass warm water) or using throat lozenges will help.  - If you experience any sharp pain in your neck, abdomen or chest, vomiting of blood, oral temperature over 100 degrees Fahrenheit, light-headedness or dizziness, or any other problems, contact your doctor.    **If unable to reach your doctor, please go to the [facility] Emergency Room**    - Your referring physician will receive a full report of your examination.  - If you do not hear from your doctor's office within two weeks of your biopsy, please call them for your results.    You may be able to see your laboratory results in [employer] between 4 and 7 business days.  In some cases, your physician may not have viewed the results before they are released to [employer].  If you have questions regarding your results contact the physician who ordered the test/exam by phone or via [employer] by choosing \"Ask a Medical Question.\"

## 2025-05-21 NOTE — ANESTHESIA PREPROCEDURE EVALUATION
PRE-OP EVALUATION    Patient Name: Drea Kaba    Admit Diagnosis: ODYNOPHAGIA    Pre-op Diagnosis: ODYNOPHAGIA    ESOPHAGOGASTRODUODENOSCOPY (EGD)    Anesthesia Procedure: ESOPHAGOGASTRODUODENOSCOPY (EGD)    Surgeons and Role:     * Ryne Monzon MD - Primary    Pre-op vitals reviewed.  Temp: 96.7 °F (35.9 °C)  Pulse: 72  Resp: 16  BP: 110/74  SpO2: 98 %  Body mass index is 29.95 kg/m².    Current medications reviewed.  Hospital Medications:  Current Medications[1]    Outpatient Medications:   Prescriptions Prior to Admission[2]    Allergies: Adhesive tape, Cipro [ciprofloxacin], Dihydroergotamine, Epinephrine, Heparin, Latex, Prednisone, Pregabalin, Radiology contrast iodinated dyes, Topamax [topiramate], Toradol, Compazine, Feraheme [ferumoxytol], Latex, Phenergan [promethazine hcl], Reglan [metoclopramide hcl], Hydromorphone hcl, Iodine (topical), Nsaids, Tramadol, Triptans, Dronedarone, Pcn [bicillin l-a], Povidone iodine, and Vancomycin      Anesthesia Evaluation    Patient summary reviewed.    Anesthetic Complications  (-) history of anesthetic complications         GI/Hepatic/Renal    Negative GI/hepatic/renal ROS.  (+) GERD          (+) liver disease  (+) hepatitis      (+) Crohn's disease    (+) irritable bowel syndrome     Cardiovascular    Negative cardiovascular ROS.  ECG reviewed.  Exercise tolerance: good     MET: >4      (+) hypertension   (+) hyperlipidemia               (+) dysrhythmias                   Endo/Other  Comment: SLE  Negative endo/other ROS.                 (+) clotting disorder (hx dvt)  (+) thrombocytopenia    (+) arthritis  (+) rheumatoid arthritis     Pulmonary    Negative pulmonary ROS.  (+) asthma                     Neuro/Psych  Comment: PRES  Negative neuro/psych ROS.                                  Past Surgical History[3]  Social Hx on file[4]  History   Drug Use    Types: Cannabis     Comment: occasional     Available pre-op labs reviewed.  Lab Results   Component Value  Date    WBC 4.1 05/06/2025    RBC 4.12 05/06/2025    HGB 12.8 05/06/2025    HCT 37.4 05/06/2025    MCV 90.8 05/06/2025    MCH 31.1 05/06/2025    MCHC 34.2 05/06/2025    RDW 13.6 05/06/2025    .0 (L) 05/06/2025               Airway      Mallampati: I  Mouth opening: >3 FB  TM distance: 4 - 6 cm  Neck ROM: full Cardiovascular    Cardiovascular exam normal.  Rhythm: regular  Rate: normal  (-) murmur   Dental    Dentition appears grossly intact         Pulmonary    Pulmonary exam normal.  Breath sounds clear to auscultation bilaterally.               Other findings              ASA: 3   Plan: MAC  NPO status verified and patient meets guidelines.  Patient has not taken beta blockers in last 24 hours.  Post-procedure pain management plan discussed with surgeon and patient.      Plan/risks discussed with: patient                Present on Admission:  **None**             [1]    lactated ringers infusion   Intravenous Continuous   [2]   Medications Prior to Admission   Medication Sig Dispense Refill Last Dose/Taking    methylPREDNISolone 4 MG Oral Tab Take 1 tablet (4 mg total) by mouth daily.   5/20/2025    sertraline 100 MG Oral Tab Take 1 tablet (100 mg total) by mouth daily. 90 tablet 3 5/20/2025    Cholecalciferol (VITAMIN D3) 1.25 MG (76546 UT) Oral Cap Take 1 capsule by mouth every 14 (fourteen) days.   Past Week    flecainide 100 MG Oral Tab Take 1 tablet (100 mg total) by mouth 2 (two) times daily.   5/20/2025    predniSONE 1 MG Oral Tab Take 4 tablets (4 mg total) by mouth daily.   5/20/2025    Benadryl/Maalox/Lidocaine 1:1:1 solution Take 5-10 mL by mouth every 6 (six) hours as needed. Swish and Spit 100 mL 1 Taking As Needed    rivaroxaban (XARELTO) 20 MG Oral Tab Take 1 tablet (20 mg total) by mouth every evening. 90 tablet 0 5/18/2025    hydrOXYzine 25 MG Oral Tab Take 1 tablet (25 mg total) by mouth nightly as needed for Itching. 30 tablet 0 Past Week    XELJANZ XR 11 MG Oral Tablet 24 Hr Take 11 mg  by mouth daily.   5/20/2025    valACYclovir 500 MG Oral Tab Take 1 tablet (500 mg total) by mouth 2 (two) times daily. 180 tablet 3 5/20/2025    sertraline 50 MG Oral Tab Take 1 tablet (50 mg total) by mouth daily. 90 tablet 3 5/20/2025    ivabradine 5 MG Oral Tab Take 0.5 tablets (2.5 mg total) by mouth in the morning, at noon, in the evening, and at bedtime.   5/21/2025    CYANOCOBALAMIN 1000 MCG/ML Injection Solution INJECT 1 ML (1,000 MCG) INTO THE MUSCLE EVERY 30 DAYS 3 mL 3 Taking    metoprolol succinate ER 50 MG Oral Tablet 24 Hr Take 1.5 tablets (75 mg total) by mouth in the morning.   5/21/2025    gabapentin 300 MG Oral Cap Take 2 capsules (600 mg total) by mouth in the morning and 2 capsules (600 mg total) before bedtime. 600 in the am, 1200 at night.   5/20/2025    mycophenolate mofetil 250 MG Oral Cap Take 2 capsules (500 mg total) by mouth in the morning and 2 capsules (500 mg total) in the evening. Take before meals.   5/20/2025    rOPINIRole 2 MG Oral Tab Take 1 mg by mouth every morning.   5/20/2025    ROPINIROLE 0.25 MG Oral Tab TAKE 1 TABLET BY MOUTH NIGHTLY BUT CAN USE AN EXTRA TABLET IF NEEDED 180 tablet 3 5/20/2025    AIMOVIG 140 MG/ML Subcutaneous Solution Auto-injector INJECT 1 ML (140 MG TOTAL) INTO THE SKIN EVERY 30 DAYS 3 mL 3 Taking    UBRELVY 50 MG Oral Tab TAKE 1 TABLET BY MOUTH AT ONSET OF MIGRAINE. MAY REPEAT 1 TABLET IN 2 HOURS IF NEEDED. 10 tablet 0 Past Week    Cevimeline HCl 30 MG Oral Cap Take 1 capsule (30 mg total) by mouth in the morning and 1 capsule (30 mg total) in the evening and 1 capsule (30 mg total) before bedtime.   5/20/2025    ondansetron (ZOFRAN) 4 mg tablet Take 1 tablet (4 mg total) by mouth every 8 (eight) hours as needed for Nausea.   Taking As Needed    Estradiol 2 MG Vaginal Ring Place 2 mg vaginally every 3 (three) months. 1 each 4 Taking    allopurinol 100 MG Oral Tab Take 1 tablet (100 mg total) by mouth nightly.   5/20/2025    Esomeprazole Magnesium 40 MG  Oral Capsule Delayed Release Take 1 capsule (40 mg total) by mouth in the morning and 1 capsule (40 mg total) before bedtime.   5/20/2025   [3]   Past Surgical History:  Procedure Laterality Date    Appendectomy      Back surgery      3/16 lumbar discectomy  9/2016 lumbar fusion    Biopsy salivary gland,incisional  12/1/09    Performed by SANTOSH MOYRE at [facility]    Cholecystectomy      Colonoscopy  10/16/2019    Colonoscopy      Colonoscopy N/A 8/29/2023    Procedure: COLONOSCOPY W/ BIOPSIES;  Surgeon: Ryne Monzon MD;  Location:  ENDOSCOPY    Elbow surgery      Hernia surgery      Hip replacement surgery  1/14    right: Dr. Brody    Hysterectomy  1999    LUCA BSO for endometriosis    Ndsc ablation & rcnstj [facility] w/o bypass  08/2021    Other  11/2015    reconstructive surgery of left thumb joint    Other  8/2015    vaginal sling    Other Right     WRIST SURGERY    Other surgical history      parathyroidectomy    Other surgical history Right 2018    wrist surgery    Other surgical history Left 2018    left wrist surgery    Port, indwelling, imp  2019    Power Port    Repair enterocele,vag apprch  2012    Repair of rectocele  2012    Tonsillectomy      Total hip replacement Right    [4]   Social History  Socioeconomic History    Marital status:    Tobacco Use    Smoking status: Never     Passive exposure: Never    Smokeless tobacco: Never   Vaping Use    Vaping status: Never Used   Substance and Sexual Activity    Alcohol use: Never    Drug use: Yes     Types: Cannabis     Comment: occasional    Sexual activity: Not Currently     Partners: Male   Other Topics Concern    Caffeine Concern No    Exercise Yes     Comment: stretches

## 2025-05-27 ENCOUNTER — TELEPHONE (OUTPATIENT)
Age: 60
End: 2025-05-27

## 2025-05-27 ENCOUNTER — APPOINTMENT (OUTPATIENT)
Age: 60
End: 2025-05-27
Attending: INTERNAL MEDICINE
Payer: COMMERCIAL

## 2025-06-05 ENCOUNTER — HOSPITAL ENCOUNTER (OUTPATIENT)
Dept: CT IMAGING | Facility: HOSPITAL | Age: 60
Discharge: HOME | End: 2025-06-05
Attending: INTERNAL MEDICINE
Payer: COMMERCIAL

## 2025-06-05 DIAGNOSIS — R19.7 DIARRHEA, UNSPECIFIED: ICD-10-CM

## 2025-06-05 PROCEDURE — 74177 CT ABD & PELVIS W/CONTRAST: CPT | Performed by: INTERNAL MEDICINE

## 2025-06-16 ENCOUNTER — HOSPITAL ENCOUNTER (OUTPATIENT)
Dept: MRI IMAGING | Facility: HOSPITAL | Age: 60
Discharge: HOME | End: 2025-06-16
Payer: COMMERCIAL

## 2025-06-16 DIAGNOSIS — H53.9 VISION CHANGES: ICD-10-CM

## 2025-06-16 DIAGNOSIS — H53.462 LEFT HOMONYMOUS HEMIANOPSIA: ICD-10-CM

## 2025-06-16 PROCEDURE — 70553 MRI BRAIN STEM W/O & W/DYE: CPT

## 2025-06-16 PROCEDURE — A9575 INJ GADOTERATE MEGLUMI 0.1ML: HCPCS

## 2025-06-16 PROCEDURE — 70543 MRI ORBT/FAC/NCK W/O &W/DYE: CPT

## 2025-06-16 RX ORDER — GADOTERATE MEGLUMINE 376.9 MG/ML
20 INJECTION INTRAVENOUS
Status: COMPLETED | OUTPATIENT
Start: 2025-06-16 | End: 2025-06-16

## 2025-06-16 RX ADMIN — GADOTERATE MEGLUMINE 18 ML: 376.9 INJECTION INTRAVENOUS at 14:45:00

## 2025-06-19 ENCOUNTER — OFFICE VISIT (OUTPATIENT)
Age: 60
End: 2025-06-19
Attending: INTERNAL MEDICINE
Payer: COMMERCIAL

## 2025-06-19 DIAGNOSIS — D50.0 IRON DEFICIENCY ANEMIA DUE TO CHRONIC BLOOD LOSS: Primary | ICD-10-CM

## 2025-06-19 DIAGNOSIS — D68.59 HYPERCOAGULABLE STATE (HCC): ICD-10-CM

## 2025-06-19 RX ORDER — CYANOCOBALAMIN 1000 UG/ML
1000 INJECTION, SOLUTION INTRAMUSCULAR; SUBCUTANEOUS
Qty: 3 ML | Refills: 3 | Status: SHIPPED | OUTPATIENT
Start: 2025-06-19

## 2025-06-19 NOTE — PROGRESS NOTES
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    Received 1000mg monoferric 10/2021. Previously reacted to ferraheme and iron sucrose.     Crohn's is stable. She is on Xeljanz now which is not quite controlling her Crohn's. Otherwise she has not needed IV iron in a while now. No recurrent leg swelling since.    Past Medical History:  Past Medical History:    Arrhythmia    atrial tachycardia, svt, pac    Back problem    Calculus of kidney    Connective tissue disease, undifferentiated (HCC)    Crohn disease (HCC)    Deep vein thrombosis (HCC)    Multiple DVTs: 2 in leg and 2 in arm    DEPRESSION    Depression    Disorder of thyroid    Esophageal reflux    Fibromyalgia    Gout    Hepatitis    from Medication    High blood pressure    High cholesterol    patients denies    History of blood transfusion    CDH    Hx of motion sickness    HYPERLIPIDEMIA    HYPERTENSION    IBS (irritable bowel syndrome)    Irritable bowel syndrome    Kidney disease    RT Kidney no longer functions; LT kidney working about 30%; presently has hydronephrosi s and kidney stones    KIDNEY STONE    Lupus (systemic lupus erythematosus) (HCC)    Migraines    bright lights,wine,sulfites,cheeses,sweeteners,barometeric pressure    Muscle weakness    arm weakness    Neuropathy    Boths legs    OBESITY    Osteoarthritis    Pleurisy    Pneumonia due to organism    PONV (postoperative nausea and vomiting)    doesn't work sometimes    Port-A-Cath in place    Raynaud disease    Renal disorder    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    Renal disorder    LEFT functioning  stage 3    Rheumatoid arthritis (HCC)    RLS (restless legs syndrome)    Seizure disorder (HCC)    had x 1 year and went away/unknown etiology/ age 47    Sjogren's disease (HCC)    Visual impairment    glasses     Past Surgical History:   Procedure Laterality Date    Appendectomy      Back surgery      3/16 lumbar discectomy  9/2016 lumbar fusion    Biopsy salivary gland,incisional  12/1/09    Performed by SANTOSH MOYER at [facility]    Cholecystectomy      Colonoscopy  10/16/2019    Colonoscopy      Colonoscopy N/A 8/29/2023    Procedure: COLONOSCOPY W/ BIOPSIES;  Surgeon: Ryne Monzon MD;  Location:  ENDOSCOPY    Elbow surgery      Hernia surgery      Hip replacement surgery  1/14    right: Dr. Brody    Hysterectomy  1999    LUCA BSO for endometriosis    Ndsc ablation & rcnstj [facility] w/o bypass  08/2021    Other  11/2015    reconstructive surgery of left thumb joint    Other  8/2015    vaginal sling    Other Right     WRIST SURGERY    Other surgical history      parathyroidectomy    Other surgical history Right 2018    wrist surgery    Other surgical history Left 2018    left wrist surgery    Port, indwelling, imp  2019    Power Port    Repair enterocele,vag apprch  2012    Repair of rectocele  2012    Tonsillectomy      Total hip replacement Right        Home Medications:  No outpatient medications have been marked as taking for the 6/19/25 encounter (Appointment) with Lito Gonzales MD.       Allergies:   Allergies   Allergen Reactions    Adhesive Tape UNKNOWN and HIVES    Cipro [Ciprofloxacin] OTHER (SEE COMMENTS)     Drug induced hepatitis    Dihydroergotamine OTHER (SEE COMMENTS)     Hx of SVT s/p 2 ablations    Epinephrine SWELLING     Patient does not recall this allergy, thinks it possibly made her restless but is unsure of the event.    Heparin OTHER (SEE COMMENTS)     HIT-Heparin \"eats up red blood cells\"    Latex UNKNOWN, OTHER  (SEE COMMENTS) and RASH     POWDER \"CHOKES\" HER    unknown    Prednisone UNKNOWN and OTHER (SEE COMMENTS)     On 6/16/25 pt states no allergy to prednisone, patient used prednisone for ct dye allergy prep for ct on 6/5/25    Pregabalin ANAPHYLAXIS, ANGIOEDEMA and SWELLING    Radiology Contrast Iodinated Dyes RASH and OTHER (SEE COMMENTS)     History of iodine allergy and chronic renal failure      Topamax [Topiramate] ANAPHYLAXIS    Toradol OTHER (SEE COMMENTS)     Patient has stage 3 kidney failure    Compazine NAUSEA ONLY     Hyperactivity, restlessness, HA,     Feraheme [Ferumoxytol] FACE FLUSHING    Latex RASH     POWDER \"CHOKES\" HER    Phenergan [Promethazine Hcl] OTHER (SEE COMMENTS)     HYPERACTIVITY    Reglan [Metoclopramide Hcl] OTHER (SEE COMMENTS)     Restless legs, skin crawling    Hydromorphone Hcl UNKNOWN    Iodine (Topical) UNKNOWN    Nsaids OTHER (SEE COMMENTS)     CDK- has a solitary kidney    Tramadol UNKNOWN    Triptans OTHER (SEE COMMENTS)     Hx of SVT s/p 2 ablations    Dronedarone DIARRHEA and OTHER (SEE COMMENTS)    Pcn [Bicillin L-A] RASH     Ampicillin    No skin peeling/blistering, denies organ damage per patient    Povidone Iodine RASH     rash    Vancomycin ITCHING       Psychosocial History:  Social History     Socioeconomic History    Marital status:      Spouse name: Not on file    Number of children: Not on file    Years of education: Not on file    Highest education level: Not on file   Occupational History    Not on file   Tobacco Use    Smoking status: Never     Passive exposure: Never    Smokeless tobacco: Never   Vaping Use    Vaping status: Never Used   Substance and Sexual Activity    Alcohol use: Never    Drug use: Yes     Types: Cannabis     Comment: occasional    Sexual activity: Not Currently     Partners: Male   Other Topics Concern    Caffeine Concern No    Exercise Yes     Comment: stretches    Seat Belt Not Asked    Special Diet Not Asked    Stress Concern Not  Asked    Weight Concern Not Asked     Service Not Asked    Blood Transfusions Not Asked    Occupational Exposure Not Asked    Hobby Hazards Not Asked    Sleep Concern Not Asked    Back Care Not Asked    Bike Helmet Not Asked    Self-Exams Not Asked   Social History Narrative    Not on file     Social Drivers of Health     Food Insecurity: No Food Insecurity (4/2/2025)    Received from [facility]    Food Insecurity     Currently or in the past 3 months, have you worried your food would run out before you had money to buy more?: No     In the past 12 months, have you run out of food or been unable to get more?: No   Transportation Needs: No Transportation Needs (4/2/2025)    Received from [facility]    Transportation Needs     Currently or in the past 3 months, has lack of transportation kept you from medical appointments, getting food or medicine, or providing care to a family member?: No     Non-Medical Transportation Needs?: Not on file   Housing Stability: Not At Risk (3/3/2025)    NCSS - Housing/Utilities     Has Housing: Yes     Worried About Losing Housing: No     Unable to Get Utilities: No       Family Medical History:  Family History   Problem Relation Age of Onset    Dementia Father     Hypertension Father     Prostate Cancer Father     High Cholesterol Father     Obesity Mother     Heart Disorder Mother     Depression Mother     Cancer Mother     Hypertension Mother     Stroke Maternal Grandmother     Depression Sister     Diabetes Other        Review of Systems:  A 10-point ROS was done with pertinent positives and negative per the HPI    Vital Signs:  Height: --  Weight: --  BSA (Calculated - sq m): --  Pulse: --  BP: --  Temp: --  Do Not Use - Resp Rate: --  SpO2: --    Wt Readings from Last 6 Encounters:   05/21/25 81.6 kg (180 lb)   03/12/25 80.3 kg (177 lb)   03/03/25 81.2 kg (179 lb)   02/14/25 78.5 kg (173 lb)   12/06/24 78.7 kg (173 lb 6.4 oz)    09/18/24 76.7 kg (169 lb)       Physical Examination:  General: Patient is alert and oriented, not in acute distress  Psych: Mood and affect are appropriate  Eyes: EOMI, PERRL  ENT: Oropharynx is clear, no adenopathy  CV: no LE edema  Respiratory: Non-labored respirations  Neurological: Grossly intact   Skin: no rashes or petechiae    Laboratory:  Lab Results   Component Value Date    WBC 4.1 05/06/2025    WBC 7.0 02/14/2025    WBC 4.1 08/13/2024    HGB 12.8 05/06/2025    HGB 13.2 02/14/2025    HGB 13.1 08/13/2024    HCT 37.4 05/06/2025    MCV 90.8 05/06/2025    MCH 31.1 05/06/2025    MCHC 34.2 05/06/2025    RDW 13.6 05/06/2025    .0 (L) 05/06/2025    .0 02/14/2025    .0 (L) 08/13/2024     Lab Results   Component Value Date     (H) 02/14/2025    BUN 24 (H) 02/14/2025    BUNCREA 16.2 08/25/2021    CREATSERUM 1.09 (H) 02/14/2025    CREATSERUM 1.10 (H) 03/25/2024    CREATSERUM 0.92 02/15/2024    ANIONGAP 5 02/14/2025    GFR 46 (L) 03/23/2017    GFRNAA 49 (L) 07/11/2022    GFRAA 57 (L) 07/11/2022    CA 8.7 02/14/2025    OSMOCALC 287 02/14/2025    ALKPHO 89 02/14/2025    AST 32 02/14/2025    ALT 36 02/14/2025    BILT 0.3 02/14/2025    TP 6.8 02/14/2025    ALB 4.3 02/14/2025    GLOBULIN 2.5 02/14/2025    AGRATIO 2.3 07/06/2010     02/14/2025    K 4.0 02/14/2025     02/14/2025    CO2 22.0 02/14/2025     Lab Results   Component Value Date    PTT 29.5 09/11/2020    INR 1.10 04/08/2022         Impression & Plan:     Hx iron deficiency anemia  - has reacted to multiple iron products (ferraheme, iron sucrose). Tolerated monoferric with minimal reaction  - iron levels normal on iron studies today  - was previously getting iron levels checked every 4 months; continue current monitoring plan    Crohn's  - now on Xeljanz  - on monthly vitamin B12 supplementation    Recurrent DVTs, hx of HIT  - continue lifelong anticoagulation with rivaroxaban    Health maintenance  - reports being up to  date on colonoscopy, mammogram. She has hx of hysterectomy.    Follow up: yearly on anticoagulation    Lito Gonzales MD  [facility] Hematology Oncology

## 2025-07-01 ENCOUNTER — TELEPHONE (OUTPATIENT)
Dept: INTERNAL MEDICINE CLINIC | Facility: CLINIC | Age: 60
End: 2025-07-01

## 2025-07-01 DIAGNOSIS — R21 RASH AND NONSPECIFIC SKIN ERUPTION: Primary | ICD-10-CM

## 2025-07-01 NOTE — TELEPHONE ENCOUNTER
Pt states she has seen her PCP, hematologist, GI, ophthalmology and on an on. Pt states she still don't know what's wrong with her. It was recommended that she see an infectious disease specialist. Pt found one at [facility] and they are faxing over a request for referral. Please notify pt when this is done.

## 2025-07-08 ENCOUNTER — TELEPHONE (OUTPATIENT)
Dept: INTERNAL MEDICINE CLINIC | Facility: CLINIC | Age: 60
End: 2025-07-08

## 2025-07-08 NOTE — TELEPHONE ENCOUNTER
Dr. Bear: referral pended with provider information, should also be receiving fax from [facility]. Please submit if OK.    In-office staff: please keep an eye out for fax from [facility] and assist with referral when received. Thank you.    Pt's spouse called office, ok to speak per EVERARDO, provided name/phone number of Infectious Disease doctor whom she will be seeing. He stated they should have faxed something to the office, RN informed  no mention below that fax has been received.     RN to [facility] call, spoke to  Yessenia whom informed they resent fax approximately 15 minutes ago. RN verified that correct fax # was utilized. Routed to in-office staff for assistance.       Dr. Rodney Martínez  [facility]   P:[phone]

## 2025-07-08 NOTE — TELEPHONE ENCOUNTER
Received a medical record request fax today from [facility].  Sent to [employer] and [employer]

## 2025-07-10 RX ORDER — HYDROXYZINE HYDROCHLORIDE 25 MG/1
25 TABLET, FILM COATED ORAL NIGHTLY PRN
Qty: 30 TABLET | Refills: 0 | Status: CANCELLED | OUTPATIENT
Start: 2025-07-10

## 2025-07-10 NOTE — TELEPHONE ENCOUNTER
Drea Kaba requesting Medication Refill for:    Medication name and dose           hydrOXYzine 25 MG Oral Tab 30 tablet 0 3/5/2025 --    Sig - Route: Take 1 tablet (25 mg total) by mouth nightly as needed for Itching. - Oral    Sent to pharmacy as: hydrOXYzine HCl 25 MG Oral Tablet (Atarax)    E-Prescribing Status: Receipt confirmed by pharmacy (3/5/2025 10:24 AM CST)        Preferred Pharmacy: [id number] [place] [address] AT [facility] [address], [phone], [phone]     LOV: @[email or web address]  Last Refill date: 03/05/2025  Next Scheduled appointment: [place]

## 2025-07-12 ENCOUNTER — TELEPHONE (OUTPATIENT)
Dept: INTERNAL MEDICINE CLINIC | Facility: CLINIC | Age: 60
End: 2025-07-12

## 2025-07-12 RX ORDER — HYDROXYZINE HYDROCHLORIDE 25 MG/1
25 TABLET, FILM COATED ORAL NIGHTLY PRN
Qty: 30 TABLET | Refills: 0 | Status: SHIPPED | OUTPATIENT
Start: 2025-07-12 | End: 2025-07-12

## 2025-07-12 RX ORDER — HYDROXYZINE HYDROCHLORIDE 25 MG/1
25 TABLET, FILM COATED ORAL DAILY PRN
Qty: 30 TABLET | Refills: 0 | Status: SHIPPED | OUTPATIENT
Start: 2025-07-12

## 2025-07-13 NOTE — TELEPHONE ENCOUNTER
Patient frustrated that hydroxyzine not called in yet. Rx was sent this morning by me but to [facility] which is why she has not received it. Read to patient that pharmacy did in fact receive the electronic Rx. Sending to local pharmacy now.

## 2025-07-14 NOTE — TELEPHONE ENCOUNTER
Patient's spouse called again to confirm that medical records were sent to Infectious Disease doctor at [facility]. He states this is the third request for medical records and for some reason they are not receiving them.  Fax number is [phone].    In office staff can you please follow up on this and call  when complete?

## 2025-07-14 NOTE — TELEPHONE ENCOUNTER
Outpatient Medication Detail     Disp Refills Start End    hydrOXYzine 25 MG Oral Tab 30 tablet 0 7/12/2025 --    Sig - Route: Take 1 tablet (25 mg total) by mouth daily as needed for Itching. - Oral    Sent to pharmacy as: hydrOXYzine HCl 25 MG Oral Tablet (Atarax)    E-Prescribing Status: Receipt confirmed by pharmacy (7/12/2025  9:10 PM CDT)      Pharmacy    [facility] #[id number] [place], [place] - [facility] AT [facility] [address], [phone], [phone]

## 2025-07-14 NOTE — TELEPHONE ENCOUNTER
Patients  called stating that the medical records have not been received by [facility]    In office staff can you please re fax this information?    Fax number from patients  is [phone]

## 2025-07-28 RX ORDER — SYRINGE, DISPOSABLE, 1 ML
SYRINGE, EMPTY DISPOSABLE MISCELLANEOUS
Qty: 30 EACH | Refills: 0 | Status: SHIPPED | OUTPATIENT
Start: 2025-07-28

## 2025-08-01 ENCOUNTER — TELEPHONE (OUTPATIENT)
Dept: INTERNAL MEDICINE CLINIC | Facility: CLINIC | Age: 60
End: 2025-08-01

## 2025-08-06 ENCOUNTER — PATIENT OUTREACH (OUTPATIENT)
Dept: INFECTIOUS DISEASE | Facility: CLINIC | Age: 60
End: 2025-08-06

## 2025-08-06 DIAGNOSIS — R53.83 FATIGUE, UNSPECIFIED TYPE: ICD-10-CM

## 2025-08-06 DIAGNOSIS — B83.9 WORM: Primary | ICD-10-CM

## 2025-08-08 ENCOUNTER — APPOINTMENT (OUTPATIENT)
Dept: GENERAL RADIOLOGY | Facility: HOSPITAL | Age: 60
End: 2025-08-08
Attending: EMERGENCY MEDICINE

## 2025-08-08 ENCOUNTER — APPOINTMENT (OUTPATIENT)
Dept: NUCLEAR MEDICINE | Facility: HOSPITAL | Age: 60
End: 2025-08-08
Attending: EMERGENCY MEDICINE

## 2025-08-08 ENCOUNTER — HOSPITAL ENCOUNTER (EMERGENCY)
Facility: HOSPITAL | Age: 60
Discharge: HOME | End: 2025-08-08
Attending: EMERGENCY MEDICINE

## 2025-08-08 VITALS
DIASTOLIC BLOOD PRESSURE: 75 MMHG | HEART RATE: 65 BPM | SYSTOLIC BLOOD PRESSURE: 145 MMHG | TEMPERATURE: 98 F | RESPIRATION RATE: 17 BRPM | OXYGEN SATURATION: 99 %

## 2025-08-08 DIAGNOSIS — U07.1 COVID: Primary | ICD-10-CM

## 2025-08-08 LAB
ALBUMIN SERPL-MCNC: 4.3 G/DL (ref 3.2–4.8)
ALBUMIN/GLOB SERPL: 1.7 (ref 1–2)
ALP LIVER SERPL-CCNC: 59 U/L (ref 46–118)
ALT SERPL-CCNC: 44 U/L (ref 10–49)
ANION GAP SERPL CALC-SCNC: 14 MMOL/L (ref 0–18)
AST SERPL-CCNC: 33 U/L (ref ?–34)
BASOPHILS # BLD AUTO: 0.01 X10(3) UL (ref 0–0.2)
BASOPHILS NFR BLD AUTO: 0.3 %
BILIRUB SERPL-MCNC: 0.4 MG/DL (ref 0.3–1.2)
BUN BLD-MCNC: 19 MG/DL (ref 9–23)
CALCIUM BLD-MCNC: 8.5 MG/DL (ref 8.7–10.6)
CHLORIDE SERPL-SCNC: 106 MMOL/L (ref 98–112)
CO2 SERPL-SCNC: 21 MMOL/L (ref 21–32)
CREAT BLD-MCNC: 0.99 MG/DL (ref 0.55–1.02)
EGFRCR SERPLBLD CKD-EPI 2021: 66 ML/MIN/1.73M2 (ref 60–?)
EOSINOPHIL # BLD AUTO: 0.04 X10(3) UL (ref 0–0.7)
EOSINOPHIL NFR BLD AUTO: 1.1 %
ERYTHROCYTE [DISTWIDTH] IN BLOOD BY AUTOMATED COUNT: 13.7 %
FLUAV + FLUBV RNA SPEC NAA+PROBE: NEGATIVE
FLUAV + FLUBV RNA SPEC NAA+PROBE: NEGATIVE
GLOBULIN PLAS-MCNC: 2.5 G/DL (ref 2–3.5)
GLUCOSE BLD-MCNC: 103 MG/DL (ref 70–99)
HCT VFR BLD AUTO: 34.4 % (ref 35–48)
HGB BLD-MCNC: 12 G/DL (ref 12–16)
IMM GRANULOCYTES # BLD AUTO: 0.02 X10(3) UL (ref 0–1)
IMM GRANULOCYTES NFR BLD: 0.6 %
LACTATE SERPL-SCNC: 0.5 MMOL/L (ref 0.5–2)
LYMPHOCYTES # BLD AUTO: 0.9 X10(3) UL (ref 1–4)
LYMPHOCYTES NFR BLD AUTO: 25.9 %
MCH RBC QN AUTO: 30.7 PG (ref 26–34)
MCHC RBC AUTO-ENTMCNC: 34.9 G/DL (ref 31–37)
MCV RBC AUTO: 88 FL (ref 80–100)
MONOCYTES # BLD AUTO: 0.37 X10(3) UL (ref 0.1–1)
MONOCYTES NFR BLD AUTO: 10.6 %
NEUTROPHILS # BLD AUTO: 2.14 X10 (3) UL (ref 1.5–7.7)
NEUTROPHILS # BLD AUTO: 2.14 X10(3) UL (ref 1.5–7.7)
NEUTROPHILS NFR BLD AUTO: 61.5 %
OSMOLALITY SERPL CALC.SUM OF ELEC: 295 MOSM/KG (ref 275–295)
PLATELET # BLD AUTO: 140 10(3)UL (ref 150–450)
POTASSIUM SERPL-SCNC: 3.7 MMOL/L (ref 3.5–5.1)
PROT SERPL-MCNC: 6.8 G/DL (ref 5.7–8.2)
RBC # BLD AUTO: 3.91 X10(6)UL (ref 3.8–5.3)
RSV RNA SPEC NAA+PROBE: NEGATIVE
SARS-COV-2 RNA RESP QL NAA+PROBE: DETECTED
SODIUM SERPL-SCNC: 141 MMOL/L (ref 136–145)
WBC # BLD AUTO: 3.5 X10(3) UL (ref 4–11)

## 2025-08-08 PROCEDURE — 99285 EMERGENCY DEPT VISIT HI MDM: CPT

## 2025-08-08 PROCEDURE — 87040 BLOOD CULTURE FOR BACTERIA: CPT | Performed by: EMERGENCY MEDICINE

## 2025-08-08 PROCEDURE — 96375 TX/PRO/DX INJ NEW DRUG ADDON: CPT

## 2025-08-08 PROCEDURE — 80053 COMPREHEN METABOLIC PANEL: CPT | Performed by: EMERGENCY MEDICINE

## 2025-08-08 PROCEDURE — 96361 HYDRATE IV INFUSION ADD-ON: CPT

## 2025-08-08 PROCEDURE — 71045 X-RAY EXAM CHEST 1 VIEW: CPT | Performed by: EMERGENCY MEDICINE

## 2025-08-08 PROCEDURE — 83605 ASSAY OF LACTIC ACID: CPT | Performed by: EMERGENCY MEDICINE

## 2025-08-08 PROCEDURE — 78580 LUNG PERFUSION IMAGING: CPT | Performed by: EMERGENCY MEDICINE

## 2025-08-08 PROCEDURE — 94664 DEMO&/EVAL PT USE INHALER: CPT

## 2025-08-08 PROCEDURE — 36415 COLL VENOUS BLD VENIPUNCTURE: CPT

## 2025-08-08 PROCEDURE — 94640 AIRWAY INHALATION TREATMENT: CPT

## 2025-08-08 PROCEDURE — 0241U SARS-COV-2/FLU A AND B/RSV BY PCR (GENEXPERT): CPT | Performed by: EMERGENCY MEDICINE

## 2025-08-08 PROCEDURE — 85025 COMPLETE CBC W/AUTO DIFF WBC: CPT | Performed by: EMERGENCY MEDICINE

## 2025-08-08 PROCEDURE — 96374 THER/PROPH/DIAG INJ IV PUSH: CPT

## 2025-08-08 RX ORDER — IPRATROPIUM BROMIDE AND ALBUTEROL SULFATE 2.5; .5 MG/3ML; MG/3ML
3 SOLUTION RESPIRATORY (INHALATION)
Status: DISCONTINUED | OUTPATIENT
Start: 2025-08-08 | End: 2025-08-08

## 2025-08-08 RX ORDER — ACETAMINOPHEN 500 MG
1000 TABLET ORAL ONCE
Status: DISCONTINUED | OUTPATIENT
Start: 2025-08-08 | End: 2025-08-08

## 2025-08-08 RX ORDER — MORPHINE SULFATE 4 MG/ML
4 INJECTION, SOLUTION INTRAMUSCULAR; INTRAVENOUS ONCE
Status: COMPLETED | OUTPATIENT
Start: 2025-08-08 | End: 2025-08-08

## 2025-08-08 RX ORDER — ONDANSETRON 2 MG/ML
4 INJECTION INTRAMUSCULAR; INTRAVENOUS ONCE
Status: COMPLETED | OUTPATIENT
Start: 2025-08-08 | End: 2025-08-08

## 2025-08-09 PROCEDURE — 87209 SMEAR COMPLEX STAIN: CPT

## 2025-08-09 PROCEDURE — 87272 CRYPTOSPORIDIUM AG IF: CPT

## 2025-08-09 PROCEDURE — 87177 OVA AND PARASITES SMEARS: CPT

## 2025-08-09 PROCEDURE — 87329 GIARDIA AG IA: CPT

## 2025-08-10 ENCOUNTER — LAB ENCOUNTER (OUTPATIENT)
Dept: LAB | Facility: HOSPITAL | Age: 60
End: 2025-08-10
Attending: INTERNAL MEDICINE

## 2025-08-10 DIAGNOSIS — B83.9 WORM: ICD-10-CM

## 2025-08-11 LAB
CRYPTOSP AG STL QL IA: NEGATIVE
G LAMBLIA AG STL QL IA: NEGATIVE

## 2025-08-12 ENCOUNTER — APPOINTMENT (OUTPATIENT)
Facility: LOCATION | Age: 60
End: 2025-08-12
Attending: INTERNAL MEDICINE

## 2025-08-15 ENCOUNTER — APPOINTMENT (OUTPATIENT)
Facility: LOCATION | Age: 60
End: 2025-08-15
Attending: INTERNAL MEDICINE

## 2025-08-19 ENCOUNTER — NURSE ONLY (OUTPATIENT)
Facility: LOCATION | Age: 60
End: 2025-08-19
Attending: INTERNAL MEDICINE

## 2025-08-19 DIAGNOSIS — D50.0 IRON DEFICIENCY ANEMIA DUE TO CHRONIC BLOOD LOSS: ICD-10-CM

## 2025-08-19 DIAGNOSIS — R53.83 FATIGUE, UNSPECIFIED TYPE: ICD-10-CM

## 2025-08-19 LAB
DEPRECATED HBV CORE AB SER IA-ACNC: 319 NG/ML (ref 50–306)
HCV AB SERPL QL IA: NONREACTIVE
IRON SATN MFR SERPL: 22 % (ref 15–50)
IRON SERPL-MCNC: 65 UG/DL (ref 50–170)
T PALLIDUM AB SER QL IA: NONREACTIVE
TOTAL IRON BINDING CAPACITY: 300 UG/DL (ref 250–425)
TRANSFERRIN SERPL-MCNC: 246 MG/DL (ref 250–380)

## 2025-08-25 ENCOUNTER — TELEPHONE (OUTPATIENT)
Dept: INTERNAL MEDICINE CLINIC | Facility: CLINIC | Age: 60
End: 2025-08-25

## 2025-08-25 DIAGNOSIS — R21 RASH AND NONSPECIFIC SKIN ERUPTION: Primary | ICD-10-CM

## (undated) DIAGNOSIS — M62.838 TRAPEZIUS MUSCLE SPASM: Primary | ICD-10-CM

## (undated) DEVICE — 10FT COMBINED O2 DELIVERY/CO2 MONITORING. FILTER WITH MICROSTREAM TYPE LUER: Brand: DUAL ADULT NASAL CANNULA

## (undated) DEVICE — PILLOW FX 56X38X15CM MED HIP

## (undated) DEVICE — UPPER EXTREMITY: Brand: MEDLINE INDUSTRIES, INC.

## (undated) DEVICE — SUTURE VICRYL 2-0 CT-1

## (undated) DEVICE — 11.1-M5 MULTIMODALITY KIT 5

## (undated) DEVICE — GAUZE TRAY STERILE 4X4 12PLY

## (undated) DEVICE — ENCORE® LATEX MICRO SIZE 6.5, STERILE LATEX POWDER-FREE SURGICAL GLOVE: Brand: ENCORE

## (undated) DEVICE — STERILE TETRA-FLEX CF, ELASTIC BANDAGE, 2" X 5.5YD: Brand: TETRA-FLEX™CF

## (undated) DEVICE — 3M™ RED DOT™ MONITORING ELECTRODE WITH FOAM TAPE AND STICKY GEL, 50/BAG, 20/CASE, 72/PLT 2570: Brand: RED DOT™

## (undated) DEVICE — 1200CC GUARDIAN II: Brand: GUARDIAN

## (undated) DEVICE — UNDYED BRAIDED (POLYGLACTIN 910), SYNTHETIC ABSORBABLE SUTURE: Brand: COATED VICRYL

## (undated) DEVICE — SUT PROL 4-0 18IN PS-2 NABSRB BLU L19MM 3/8 C

## (undated) DEVICE — V2 SPECIMEN COLLECTION MANIFOLD KIT: Brand: NEPTUNE

## (undated) DEVICE — PEN: MARKING STD PT 100/CS: Brand: MEDICAL ACTION INDUSTRIES

## (undated) DEVICE — SYRINGE 10ML LL TIP

## (undated) DEVICE — 1010 S-DRAPE TOWEL DRAPE 10/BX: Brand: STERI-DRAPE™

## (undated) DEVICE — .045" X 6" ST GUIDE WIRE: Brand: ACUMED

## (undated) DEVICE — PACK CDS TOTAL HIP

## (undated) DEVICE — SOL  .9 1000ML BTL

## (undated) DEVICE — PAD THRP WRPON PLR LNG STRAP

## (undated) DEVICE — CASED DISP BIPOLAR CORD

## (undated) DEVICE — Device

## (undated) DEVICE — ADHESIVE MASTISOL 2/3CC VL

## (undated) DEVICE — CENTRAL LINE DRESSING TRAY: Brand: CENTURION

## (undated) DEVICE — FORCEP RADIAL JAW 4

## (undated) DEVICE — TETRA-FLEX CF WOVEN LATEX FREE ELASTIC BANDAGE 4" X 5.5 YD: Brand: TETRA-FLEX™CF

## (undated) DEVICE — DISSECTOR SURGICAL 7CM 3MM

## (undated) DEVICE — Device: Brand: STABLECUT®

## (undated) DEVICE — SUCTION CANISTER, 3000CC,SAFELINER: Brand: DEROYAL

## (undated) DEVICE — IMPERVIOUS STOCKINETTE: Brand: DEROYAL

## (undated) DEVICE — SUT PROLENE 4-0 PS-2 8682G

## (undated) DEVICE — 2.8MM QUICK RELEASE DRILL: Brand: ACUMED

## (undated) DEVICE — SUTURE PROLENE 4-0 PS-2

## (undated) DEVICE — BLADE 11 SHRP BP SS SRG STRL

## (undated) DEVICE — CAMERA DRAPE

## (undated) DEVICE — GOWN SURG AERO BLUE PERF LG

## (undated) DEVICE — STERILE TETRA-FLEX CF, ELASTIC BANDAGEE, 3" X 5.5YD: Brand: TETRA-FLEX™CF

## (undated) DEVICE — TOWEL OR BLU 16X26 STRL

## (undated) DEVICE — TETRA-FLEX CF WOVEN LATEX FREE ELASTIC BANDAGE 2" X 5.5 YD: Brand: TETRA-FLEX™CF

## (undated) DEVICE — SOLUTION IRRIG 1000ML 0.9% NACL USP BTL

## (undated) DEVICE — KIT VLV 5 PC AIR H2O SUCT BX ENDOGATOR CONN

## (undated) DEVICE — SYRINGE MNJCT 35ML LF STRL LL

## (undated) DEVICE — NVM5 PROBE SNGL USE STER PKG

## (undated) DEVICE — WEBRIL COTTON UNDERCAST PADDING: Brand: WEBRIL

## (undated) DEVICE — 3M™ STERI-STRIP™ REINFORCED ADHESIVE SKIN CLOSURES, R1547, 1/2 IN X 4 IN (12 MM X 100 MM), 6 STRIPS/ENVELOPE: Brand: 3M™ STERI-STRIP™

## (undated) DEVICE — RETRIEVAL BALLOON CATHETER: Brand: EXTRACTOR™ PRO XL

## (undated) DEVICE — SPONGE 4X4 10PK

## (undated) DEVICE — 20 ML SYRINGE LUER-LOCK TIP: Brand: MONOJECT

## (undated) DEVICE — 3M™ IOBAN™ 2 ANTIMICROBIAL INCISE DRAPE 6650EZ: Brand: IOBAN™ 2

## (undated) DEVICE — SOL  .9 3000ML

## (undated) DEVICE — OSTEOTOME INTM SGL SRG CTRS

## (undated) DEVICE — PRECISION (9.0 X 0.51 X 31.0MM)

## (undated) DEVICE — CHLORAPREP 26ML APPLICATOR

## (undated) DEVICE — DRESSING CRTY CNFRM 3IN

## (undated) DEVICE — ENDOSCOPY PACK - LOWER: Brand: MEDLINE INDUSTRIES, INC.

## (undated) DEVICE — GAUZE SPONGES,12 PLY: Brand: CURITY

## (undated) DEVICE — STANDARD HYPODERMIC NEEDLE,POLYPROPYLENE HUB: Brand: MONOJECT

## (undated) DEVICE — FORCEP CUSH BAY BP DISP 7.5IN

## (undated) DEVICE — SUTURE VICRYL 0 CP-1

## (undated) DEVICE — SUPER SPONGES,MEDIUM: Brand: KERLIX

## (undated) DEVICE — PADDING CAST WYTEX 2\" STER

## (undated) DEVICE — SCREW, PELVIC, G2, 136MM: Brand: INTELLIJOINT HIP®

## (undated) DEVICE — CABLE BPLR L12FT FLYING LD DISP

## (undated) DEVICE — POWERLOC MAX 20G X 1 INCH WITH Y-SITE: Brand: PORT ACCESS NEEDLE

## (undated) DEVICE — EXOFIN TISSUE ADHESIVE 1.0ML

## (undated) DEVICE — SPECIALTY ARM SLING: Brand: DEROYAL

## (undated) DEVICE — MINILOC 20G X 0.75 INCH WITH Y-SITE: Brand: PORT ACCESS NEEDLE

## (undated) DEVICE — GAMMEX® PI HYBRID SIZE 8.5, STERILE POWDER-FREE SURGICAL GLOVE, POLYISOPRENE AND NEOPRENE BLEND: Brand: GAMMEX

## (undated) DEVICE — POLAR CARE CUBE COOLING UNIT

## (undated) DEVICE — FLOSEAL SEALENT STERILE 10ML

## (undated) DEVICE — GAMMEX® PI HYBRID SIZE 6.5, STERILE POWDER-FREE SURGICAL GLOVE, POLYISOPRENE AND NEOPRENE BLEND: Brand: GAMMEX

## (undated) DEVICE — HOOD: Brand: FLYTE

## (undated) DEVICE — VALVE KIT SGL USE DEFENDO

## (undated) DEVICE — FLOSEAL HEMOSTATIC MATRIX, 5ML: Brand: FLOSEAL HEMOSTATIC MATRIX

## (undated) DEVICE — COTTON UNDERCAST PADDING,REGULAR FINISH: Brand: WEBRIL

## (undated) DEVICE — DRAPE SRG 90X60IN BCK TBL CVR

## (undated) DEVICE — KIT CUSTOM ENDOPROCEDURE STERIS

## (undated) DEVICE — DRESSING AQUACEL AG 3.5 X 6

## (undated) DEVICE — FILTERLINE NASAL ADULT O2/CO2

## (undated) DEVICE — PRESSURE TUBING: Brand: TRUWAVE

## (undated) DEVICE — 3M™ COBAN™ NL STERILE NON-LATEX SELF-ADHERENT WRAP, 2084S, 4 IN X 5 YD (10 CM X 4,5 M), 18 ROLLS/CASE: Brand: 3M™ COBAN™

## (undated) DEVICE — TOWEL SURG OR 17X30IN BLUE

## (undated) DEVICE — CUFF TRNQT CYL 24X4IN 2 PORT 2

## (undated) DEVICE — BNDG,ELSTC,MATRIX,STRL,2"X5YD,LF,HOOK&LP: Brand: MEDLINE

## (undated) DEVICE — ENCORE® LATEX ACCLAIM SIZE 7, STERILE LATEX POWDER-FREE SURGICAL GLOVE: Brand: ENCORE

## (undated) DEVICE — BIOLOX® DELTA, CERAMIC FEMORAL HEAD, XL, Ø 36/+7, TAPER 12/14
Type: IMPLANTABLE DEVICE | Site: HIP
Brand: BIOLOX® DELTA

## (undated) DEVICE — APPLICATOR CHLORAPREP 26ML

## (undated) DEVICE — GAMMEX® NON-LATEX PI ORTHO SIZE 7, STERILE POLYISOPRENE POWDER-FREE SURGICAL GLOVE: Brand: GAMMEX

## (undated) DEVICE — LAMINECTOMY: Brand: MEDLINE INDUSTRIES, INC.

## (undated) DEVICE — SUTURE ETHILON 3-0 669H

## (undated) DEVICE — DISPOSABLE TOURNIQUET CUFF DUAL BLADDER, DUAL PORT AND QUICK CONNECT CONNECTOR: Brand: COLOR CUFF

## (undated) DEVICE — OCCLUSIVE GAUZE STRIP,3% BISMUTH TRIBROMOPHENATE IN PETROLATUM BLEND: Brand: XEROFORM

## (undated) DEVICE — GUIDEWIRE DREAM ANG .035 450

## (undated) DEVICE — GIJAW SINGLE-USE BIOPSY FORCEPS WITH NEEDLE: Brand: GIJAW

## (undated) DEVICE — GOWN,SIRUS,FAB REINF,RAGLAN,L,STERILE: Brand: MEDLINE

## (undated) DEVICE — GAMMEX® PI HYBRID SIZE 8, STERILE POWDER-FREE SURGICAL GLOVE, POLYISOPRENE AND NEOPRENE BLEND: Brand: GAMMEX

## (undated) DEVICE — WRAP THRP PLRCR500 BCK DRSG

## (undated) DEVICE — DRAPE C-ARM UNIVERSAL

## (undated) DEVICE — 3M™ TEGADERM™ TRANSPARENT FILM DRESSING, 1626W, 4 IN X 4-3/4 IN (10 CM X 12 CM), 50 EACH/CARTON, 4 CARTON/CASE: Brand: 3M™ TEGADERM™

## (undated) DEVICE — SCREW, FEMUR, G2, 26MM: Brand: INTELLIJOINT HIP®

## (undated) DEVICE — SPINOCAN® 18 GA. X 3-1/2 IN. (90 MM) SPINAL NEEDLE: Brand: SPINOCAN®

## (undated) DEVICE — 60 ML SYRINGE LUER-LOCK TIP: Brand: MONOJECT

## (undated) DEVICE — CONTAINER SPEC STR 4OZ GRY LID

## (undated) DEVICE — 2T8 #2 PDO 24 X 24: Brand: 2T8 #2 PDO 24 X 24

## (undated) DEVICE — COVER SGL STRL LGHT HNDL BLU

## (undated) DEVICE — C-ARMOR C-ARM EQUIPMENT COVERS CLEAR STERILE UNIVERSAL FIT 12 PER CASE: Brand: C-ARMOR

## (undated) DEVICE — SUTURE ETHIBOND 5-0 MB46G

## (undated) DEVICE — Device: Brand: DEFENDO AIR/WATER/SUCTION AND BIOPSY VALVE

## (undated) DEVICE — KIT DRN 1/8IN PVC 3 SPRG EVAC

## (undated) DEVICE — SUTURE MONOCRYL 4-0 Y835G

## (undated) DEVICE — SUTURE VICRYL 2-0 FS-1

## (undated) DEVICE — SOLUTION IRRIG 1000ML ST H2O AQUALITE PLAS

## (undated) DEVICE — SOL NACL IRRIG 0.9% 1000ML BTL

## (undated) DEVICE — BOWLS UTILITY 16OZ

## (undated) DEVICE — REM POLYHESIVE ADULT PATIENT RETURN ELECTRODE: Brand: VALLEYLAB

## (undated) DEVICE — BATTERY

## (undated) DEVICE — SUT MCRYL 4-0 18IN PC-3 ABSRB UD L16MM 1/2 CI

## (undated) DEVICE — SUT FBRWR 2-0 18IN N ABSRB BLU L17.9MM

## (undated) DEVICE — NV CLIP DSC&IN-LINE ACTIVATOR

## (undated) DEVICE — NON-ADHERENT PADS PREPACK: Brand: TELFA

## (undated) DEVICE — COTTON CANNULATOME: Brand: COTTON

## (undated) DEVICE — 3.0MM PRECISION NEURO (MATCH HEAD)

## (undated) DEVICE — 3M™ STERI-DRAPE™ U-DRAPE 1015: Brand: STERI-DRAPE™

## (undated) DEVICE — GLOVE SRG BIOGEL 8.5

## (undated) DEVICE — SUT VCRL 3-0 18IN PS-2 ABSRB UD L19MM 3/8 CIR

## (undated) DEVICE — TECH FEE

## (undated) DEVICE — BITEBLOCK ENDOSCP 60FR MAXI STRP

## (undated) DEVICE — DRAPE MN CARM

## (undated) DEVICE — BIOGUARD CLEANING ADAPTER

## (undated) DEVICE — SOLUTION  .9 1000ML BTL

## (undated) DEVICE — DRAPE SHEET LG

## (undated) DEVICE — 3.5MM X 5" QUICK RELEASE DRILL: Brand: ACUMED

## (undated) DEVICE — DIGIT TRAP FINGER GRASPING DEVICE: Brand: DIGIT TRAP

## (undated) DEVICE — SPONGE LAP 18X18IN WHT COT 4 PLY FLD STRUNG

## (undated) DEVICE — DRAPE SRG 70X60IN SPLT U IMPRV

## (undated) DEVICE — "MH-438 A/W VLVE F/140 EVIS-140": Brand: AIR/WATER VALVE

## (undated) DEVICE — SUTURE MONOCRYL 3-0 Y936H

## (undated) DEVICE — STERIS KITS

## (undated) DEVICE — SHEET,DRAPE,70X100,STERILE: Brand: MEDLINE

## (undated) DEVICE — ENDOSCOPY PACK UPPER: Brand: MEDLINE INDUSTRIES, INC.

## (undated) DEVICE — ENCORE® LATEX ACCLAIM SIZE 6, STERILE LATEX POWDER-FREE SURGICAL GLOVE: Brand: ENCORE

## (undated) DEVICE — PLATE TACK: Brand: ACUMED

## (undated) DEVICE — INTENDED FOR TISSUE SEPARATION, AND OTHER PROCEDURES THAT REQUIRE A SHARP SURGICAL BLADE TO PUNCTURE OR CUT.: Brand: BARD-PARKER ® STAINLESS STEEL BLADES

## (undated) DEVICE — HOOD, PEEL-AWAY: Brand: FLYTE

## (undated) DEVICE — BANDAGE GZE 75X2IN PLSTR RYN

## (undated) DEVICE — PACK CDS UPPER EXTREMITY

## (undated) DEVICE — 2.8 MM X 5 IN QUICK RELEASE DRILL: Brand: ACUMED

## (undated) DEVICE — SUTURE VICRYL 2-0 JJ42G

## (undated) DEVICE — NV I-PAS III BEVELED TIP STER

## (undated) DEVICE — GOWN SURG AERO BLUE PERF XLG

## (undated) DEVICE — C-ARM: Brand: UNBRANDED

## (undated) DEVICE — BAG SRG CLR 36X30IN

## (undated) DEVICE — SYRINGE 50ML LL TIP

## (undated) DEVICE — SPNG GZ W4XL4IN COT 12 PLY TYP

## (undated) DEVICE — ESMARK: Brand: DEROYAL

## (undated) DEVICE — SPHERES

## (undated) NOTE — LETTER
[facility] [address], [address] [place] [address]  Consent for Procedure/Sedation    Date: 04/03/2022    Time: 2230      1. I authorize the performance upon Servando Vickers the following:cardiac catheterization, left ventricular cineangiography, bilateral selective coronary angiography and/or right heart catheterization; possible percutaneous transluminal coronary angioplasty, coronary atherectomy, coronary stent, intracoronary thrombolytic therapy, antiplatelet therapy and/or intravascular ultrasound. 2. I authorize Dr. Ahsan Johnson (and whomever is designated as the doctor's assistant), to perform the above-mentioned procedures. 3. If any unforeseen conditions arise during this procedure calling for additional procedures, operations, or medications (including anesthesia and blood transfusion), I further request and authorize the doctor to do whatever he/she deems advisable in my interest.  4. I consent to the taking and reproduction of any photographs in the course of this procedure for professional purposes. 5. I consent to the administration of such sedation as may be considered necessary, or advisable by the physician responsible for this service. 6. I have been informed by my doctor of the nature and purpose of this procedure and sedation, possible alternative methods of treatment, risk involved and possible complications. In the event your procedure results in extended X-Ray/fluoroscopy time, you may develop a skin reaction. 7. If I have a Do Not Attempt Resuscitation (DNAR) order in place, that status will be suspended while in the operating room, procedural suite, and during the recovery period unless otherwise explicitly stated by me (or a person authorized to consent on my behalf). The surgeon or my attending physician will determine when the applicable recovery period ends for purposes of reinstating the DNAR order.     Signature of Patient: ____________________________________________________    Signature of person authorized                                     Relationship to  to consent for patient: _________________________ patient: ___________________    Witness: _______________________________ Date: _____________________    Printed: 4/3/2022   10:23 PM  Patient Name: Cynthia Jalloh        : 1965       Medical Record #: [id number]

## (undated) NOTE — ED AVS SNAPSHOT
Kwansyl Benítez   MRN: [id number]    Department:  [facility] Emergency Department   Date of Visit:  2/20/2018           Disclosure     Insurance plans vary and the physician(s) referred by the ER may not be covered by your plan.  Please contact you tell this physician (or your personal doctor if your instructions are to return to your personal doctor) about any new or lasting problems. The primary care or specialist physician will see patients referred from the [facility] Emergency Department.  Gautam Huizar

## (undated) NOTE — ED AVS SNAPSHOT
[facility] in [facility] [address] [address]    Phone:  [phone]    Fax:  [facility]   MRN: [id number]    Department:  [facility] in [place]   Date of Visit:  2/9/2017           Diagn [address], [address] [facility] [address]     Phone:  [phone]    - Olopatadine HCl 0.1 % Soln            Discharge References/Attachments     CONJUNCTIVITIS CAUSED BY IRRITATION (ENGLISH)    CONJUNCTIVITIS, ALLERGIC (ENGLISH)      Disclosure     [facility]. Follow-up care is at the discretion of that Physician. IF THERE IS ANY CHANGE OR WORSENING OF YOUR CONDITION, CALL YOUR PRIMARY CARE PHYSICIAN AT ONCE OR GO TO THE EMERGENCY DEPARTMENT.     If you have been prescribed any medication(s), - If you don’t have insurance, Priyanka Hubbard has partnered with [facility] to help you get signed up for insurance coverage.   [facility] is a Federal Navigator program that can help with your Affordable Care Act cover

## (undated) NOTE — MR AVS SNAPSHOT
[address] [place] [address]  [phone]                   After Visit Summary   10/25/2021    Rosie Raza   MRN: [id number]           Visit Information     Date & Time  10/25/2021 11:00 AM Pro 09/01/2009   Side Effect OTHER (SEE COMMENTS)    Restless legs, skin crawling    Pcn [bicillin L-a] 12/19/2007    RASH    ampicillin      Your Current Medications        Dosage    AIMOVIG 140 MG/ML Subcutaneous Solution Auto-injector ADMINISTER 1 ML(140 MG months. ValACYclovir HCl 500 MG Oral Tab Take 500 mg by mouth 2 (two) times daily. allopurinol 100 MG Oral Tab Take 100 mg by mouth nightly. Fenofibrate 145 MG Oral Tab Take 145 mg by mouth every other day.     Esomeprazole Magnesium 40 MG Oral the safety of our patients, visitors and care teams, all patients and visitors are required to wear a mask during their entire visit, only to be removed if asked to do so by your provider.   We are working to limit the number of people in our waiting rooms Range Units Status    Iron 83      50 - 170 ug/dL Final    Transferrin 344      200 - 360 mg/dL Final    Total Iron Binding Capacity 513      240 - 450 ug/dL Final    % Saturation 16      15 - 50 % Final       Component Value Flag Ref Range Units Status Basophil % 0.9       % Final    Immature Granulocyte % 0.2       % Final            Instructions    INDICATIONS  Monoferric is indicated for the treatment of iron deficiency anemia (ANASTASIYA) in adult patients:  who have intolerance to oral iron or have had uns hematocrit) and iron parameters (serum ferritin and transferrin saturation) during parenteral iron therapy. Do not administer Monoferric to patients with iron overload.   ADVERSE REACTIONS  Adverse reactions were reported in 8.6% ([id number]) of patients leandro

## (undated) NOTE — ED AVS SNAPSHOT
Ibis Ching   MRN: [id number]    Department:  [facility] Emergency Department   Date of Visit:  3/13/2020           Disclosure     Insurance plans vary and the physician(s) referred by the ER may not be covered by your plan.  Please contact you tell this physician (or your personal doctor if your instructions are to return to your personal doctor) about any new or lasting problems. The primary care or specialist physician will see patients referred from the [facility] Emergency Department.  Deb Oneill

## (undated) NOTE — LETTER
06/21/24    Dear Dr. Romero,       I saw your patient, Drea Kaba for an initial visit.  Please see my progress note enclosed below.  Let me know if you have any questions.    Thank you  Trace Estrada MD, Neurology  [facility]  Pager [phone]  Office:    [address] [place] [id number]  [phone]      6/21/2024    [facility] Progress Note    HPI  Chief Complaint   Patient presents with    Neurologic Problem     Referred by Cards, C/O neurological Sx's x 1yr, notes fatigue/lethargy/BLE heaviness and weakness/numbness bilat hands and feet/blurred and double vision L eye/feeling off balance/\"feeling like drunk\"    Other     S/P bilat carpal tunnel relase    Test Results     MRI/MRA Brain/Orbits 5/26     Patient is here as new patient but was previously seen in [facility] 4/2022 by this author.       She was originally seen for PRES syndrome 4/2022. She has been following with Dr. Romero from neurology for management of headaches. Patient expresses multiple concerns. She states since her PRES syndrome, she has improved but has not returned to baseline. She has episodes of not being able to speak with preservation of awareness, lasting ~5-10 minutes, with no preceding aura of odd tastes, smells or sounds and denies associated headaches. These episodes occur ~ once a month.  She also admits to urinating in bed at night. She admits to poor sleep for \"years\" and has poor sleep habits only sleeping ~4 hrs per night with broken sleep. She has restless legs syndrome and is on requip 2 mg in AM and 0.25 mg nightly and is on gabapentin 300 mg AM / 600 mg nightly. She has low iron and had infusion ~ 3 months ago with only minimal improvement.      She notes she has issues with getting up in the middle of the night to walk around.     She admits to intermittent episodes of not being able to understand others.      She is on Aimovig and Ubrelvy and Botox  injections for migraines, along with Keppra 500 mg bid for migraines.       EEG is normal recently.     She had improvement in double vision after PRES but has some intermittent difficulty still.       She admits she had sleep study in past and was told she does not get into REM - admits to having poor sleep and sometimes acts out dreams.     Past Medical History:    Arrhythmia    atrial tachycardia, svt    Back problem    Calculus of kidney    Connective tissue disease, undifferentiated (HCC)    Crohn disease (HCC)    Deep vein thrombosis (HCC)    Multiple DVTs: 2 in leg and 2 in arm    DEPRESSION    Depression    Esophageal reflux    Fibromyalgia    Gout    Hepatitis    from Medication    High blood pressure    High cholesterol    History of blood transfusion    CDH    HYPERLIPIDEMIA    HYPERTENSION    IBS (irritable bowel syndrome)    Irritable bowel syndrome    Kidney disease    RT Kidney no longer functions; LT kidney working about 30%; presently has hydronephrosi s and kidney stones    KIDNEY STONE    Lupus (systemic lupus erythematosus) ([facility])    Migraines    bright lights,wine,sulfites,cheeses,sweeteners,barometeric pressure    Muscle weakness    arm weakness    Neuropathy    Boths legs    OBESITY    Osteoarthritis    Pleurisy    Pneumonia due to organism    PONV (postoperative nausea and vomiting)    doesn't work sometimes    Port-A-Cath in place    Raynaud disease    Renal disorder    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    Renal disorder    LEFT functioning  stage 3    Rheumatoid arthritis (HCC)    Seizure disorder (HCC)    had x 1 year and went away/unknown etiology/ age 47    Sjogren's disease (HCC)    Visual impairment    wears glasses     Past Surgical History:   Procedure Laterality Date    Appendectomy      Back surgery      3/16 lumbar discectomy  9/2016 lumbar fusion    Biopsy salivary gland,incisional  12/1/09    Performed by SANTOSH MOYER at [facility]    Cholecystectomy       Colonoscopy  10/16/2019    Colonoscopy      Colonoscopy N/A 8/29/2023    Procedure: COLONOSCOPY W/ BIOPSIES;  Surgeon: Ryne Monzon MD;  Location:  ENDOSCOPY    Elbow surgery      Hernia surgery      Hip replacement surgery  1/14    right: Dr. Brody    Hysterectomy  1999    LUCA BSO for endometriosis    Ndsc ablation & rcnstj [facility] w/o bypass  08/2021    Other  11/2015    reconstructive surgery of left thumb joint    Other  8/2015    vaginal sling    Other Right     WRIST SURGERY    Other surgical history      parathyroidectomy    Other surgical history Right 2018    wrist surgery    Other surgical history Left 2018    left wrist surgery    Port, indwelling, imp  2019    Power Port    Repair enterocele,vag apprch  2012    Repair of rectocele  2012    Tonsillectomy      Total hip replacement Right      Family History   Problem Relation Age of Onset    Dementia Father     Hypertension Father     Prostate Cancer Father     High Cholesterol Father     Obesity Mother     Heart Disorder Mother     Depression Mother     Cancer Mother     Hypertension Mother     Stroke Maternal Grandmother     Depression Sister     Diabetes Other      Social History     Socioeconomic History    Marital status:    Tobacco Use    Smoking status: Never     Passive exposure: Never    Smokeless tobacco: Never   Vaping Use    Vaping status: Never Used   Substance and Sexual Activity    Alcohol use: Not Currently     Alcohol/week: 0.0 standard drinks of alcohol    Drug use: Yes     Types: Cannabis     Comment: occasional    Sexual activity: Not Currently     Partners: Male   Other Topics Concern    Caffeine Concern No    Exercise No       Allergies   Allergen Reactions    Adhesive Tape UNKNOWN and HIVES    Cipro [Ciprofloxacin] OTHER (SEE COMMENTS)     Drug induced hepatitis    Dihydroergotamine OTHER (SEE COMMENTS)     Hx of SVT s/p 2 ablations    Epinephrine SWELLING     Patient does not recall this allergy, thinks it possibly  made her restless but is unsure of the event.    Heparin OTHER (SEE COMMENTS)     HIT-Heparin \"eats up red blood cells\"    Latex UNKNOWN, OTHER (SEE COMMENTS) and RASH     POWDER \"CHOKES\" HER    unknown    Pain Relieving Patch SWELLING     Swelling with lidocaine patch. Has tolerated IV and Subcutaneous lidocaine multiple times since lido patch allergy recorded, no reaction. Possibly reacted to another ingredient in patch such as adhesive.     Prednisone UNKNOWN and OTHER (SEE COMMENTS)    Pregabalin ANAPHYLAXIS, ANGIOEDEMA and SWELLING    Radiology Contrast Iodinated Dyes RASH and OTHER (SEE COMMENTS)     History of iodine allergy and chronic renal failure      Topamax [Topiramate] ANAPHYLAXIS    Toradol OTHER (SEE COMMENTS)     Patient has stage 3 kidney failure    Compazine NAUSEA ONLY     Hyperactivity, restlessness, HA,     Feraheme [Ferumoxytol] FACE FLUSHING    Latex RASH     POWDER \"CHOKES\" HER    Phenergan [Promethazine Hcl] OTHER (SEE COMMENTS)     HYPERACTIVITY    Reglan [Metoclopramide Hcl] OTHER (SEE COMMENTS)     Restless legs, skin crawling    Hydromorphone Hcl UNKNOWN    Iodine (Topical) UNKNOWN    Nsaids OTHER (SEE COMMENTS)     CDK- has a solitary kidney    Tramadol UNKNOWN    Triptans OTHER (SEE COMMENTS)     Hx of SVT s/p 2 ablations    Dronedarone DIARRHEA and OTHER (SEE COMMENTS)    Pcn [Bicillin L-A] RASH     ampicillin    Povidone Iodine RASH     rash    Vancomycin ITCHING         Current Outpatient Medications:     sertraline 50 MG Oral Tab, Take 1 tablet (50 mg total) by mouth daily., Disp: 90 tablet, Rfl: 1    sertraline 100 MG Oral Tab, Take 1 tablet (100 mg total) by mouth daily., Disp: 90 tablet, Rfl: 3    cyanocobalamin 1000 MCG/ML Injection Solution, Inject 1 mL (1,000 mcg total) into the muscle every 30 (thirty) days., Disp: 3 mL, Rfl: 3    rivaroxaban (XARELTO) 20 MG Oral Tab, Take 1 tablet (20 mg total) by mouth every evening., Disp: 90 tablet, Rfl: 3    metoprolol succinate ER 50  MG Oral Tablet 24 Hr, Take 1 tablet (50 mg total) by mouth daily., Disp: , Rfl:     HUMIRA, 2 PEN, 40 MG/0.4ML Subcutaneous Pen-injector Kit, Inject 40 mg into the skin every 14 (fourteen) days., Disp: , Rfl:     dicyclomine 20 MG Oral Tab, Take 1 tablet (20 mg total) by mouth 4 (four) times daily before meals and nightly., Disp: , Rfl:     gabapentin 300 MG Oral Cap, Take 2 capsules (600 mg total) by mouth nightly., Disp: , Rfl:     valACYclovir 500 MG Oral Tab, Take 1 tablet (500 mg total) by mouth 2 (two) times daily., Disp: 180 tablet, Rfl: 3    mycophenolate mofetil 250 MG Oral Cap, Take 2 capsules (500 mg total) by mouth 2 (two) times daily before meals., Disp: , Rfl:     clonazePAM 0.125 MG Oral Tablet Dispersible, Take 1 tablet (0.125 mg total) by mouth nightly as needed., Disp: , Rfl:     levETIRAcetam  MG Oral Tablet 24 Hr, Take 1 tablet (500 mg total) by mouth in the morning and 1 tablet (500 mg total) before bedtime., Disp: , Rfl:     rOPINIRole 2 MG Oral Tab, Take 1 tablet (2 mg total) by mouth every morning., Disp: , Rfl:     ROPINIROLE 0.25 MG Oral Tab, TAKE 1 TABLET BY MOUTH NIGHTLY BUT CAN USE AN EXTRA TABLET IF NEEDED (Patient taking differently: every evening.), Disp: 180 tablet, Rfl: 3    AIMOVIG 140 MG/ML Subcutaneous Solution Auto-injector, INJECT 1 ML (140 MG TOTAL) INTO THE SKIN EVERY 30 DAYS, Disp: 3 mL, Rfl: 3    UBRELVY 50 MG Oral Tab, TAKE 1 TABLET BY MOUTH AT ONSET OF MIGRAINE. MAY REPEAT 1 TABLET IN 2 HOURS IF NEEDED., Disp: 10 tablet, Rfl: 0    flecainide 100 MG Oral Tab, Take 1 tablet (100 mg total) by mouth 2 (two) times daily. 50mg at noon also, Disp: , Rfl:     ivabradine 5 MG Oral Tab, Take 0.5 tablets (2.5 mg total) by mouth 3 (three) times daily., Disp: , Rfl:     Cevimeline HCl 30 MG Oral Cap, Take 1 capsule (30 mg total) by mouth 3 (three) times daily., Disp: , Rfl:     ondansetron (ZOFRAN) 4 mg tablet, Take 1 tablet (4 mg total) by mouth every 8 (eight) hours as needed  for Nausea., Disp: , Rfl:     Estradiol 2 MG Vaginal Ring, Place 2 mg vaginally every 3 (three) months., Disp: 1 each, Rfl: 4    allopurinol 100 MG Oral Tab, Take 1 tablet (100 mg total) by mouth nightly., Disp: , Rfl:     Esomeprazole Magnesium 40 MG Oral Capsule Delayed Release, Take 1 capsule (40 mg total) by mouth 2 (two) times daily., Disp: , Rfl:     Review of Systems:  No chest pain or palpitations; otherwise as noted in HPI.    Exam:  /68 (BP Location: Left arm, Patient Position: Sitting, Cuff Size: adult)   Pulse 65   Resp 16   Ht 65\"   Wt 154 lb (69.9 kg)   BMI 25.63 kg/m²   Estimated body mass index is 25.63 kg/m² as calculated from the following:    Height as of this encounter: 65\".    Weight as of this encounter: 154 lb (69.9 kg).    Gen: well developed, well nourished, no acute distress  HEENT: normocephalic  Heart; normal S1/S2, regular rate and rhythm  Lungs: clear to auscultation bilaterally  Extremities: no edema, peripheral pulses intact    Neck: supple, full range of motion; no carotid bruits    Fundoscopic Exam: optic discs sharp bilaterally    Neuro:  Mental status:  Orientation: Alert and oriented to person, place, time  Speech Fluent and conversational    CN: PERRL, EOMI with no nystagmus, VFF, smile symmetric, sensation intact, tongue and palate midline, SCM intact, otherwise, CN 2-12 intact  Motor: 5/5 strength throughout, tone normal  DTR: 2+ symmetric throughout, except trace ankle jerks; toes downgoing bilaterally, no clonus  Sensory: intact to light touch throughout  Coord: FNF intact with no tremor or dysmetria; rapid alternating movements intact bilaterally  Romberg: absent  Gait: normal casual, heel, toe and tandem gait    Labs:  Reviewed in EMR     Imaging:  Reviewed in EMR    Most recent as below    MRI brain (5/26/2024)     FINDINGS:    Motion artifact limits evaluation     GLOBES:               Negative.  No mass or abnormal signal.  EXTRAOCULAR MUSCLES:   Negative.   Bilateral symmetric rectus muscles.  OPTIC NERVES/CHAISM:      Negative.  No mass or abnormal signal.  INTRACONAL SPACES:  Negative.  Normal retrobulbar fat.  EXTRACONAL SPACES:   Negative.  Normal lacrimal glands.  CAVERNOUS SINUS:              Normal appearance.     Mild partial opacification of the left mastoid air cells is noted.                   Impression   CONCLUSION:  Normal MRI of the orbits.  Incidental partial opacification of the left mastoid air cells.     MRA head/neck with and without contrast (5/26/2024):   FINDINGS:    INTERNAL CAROTIDS:  No visible stenosis or aneurysm.    ANTERIOR CEREBRALS:  No visible stenosis or aneurysm.    MIDDLE CEREBRALS:  No visible stenosis or aneurysm.    POSTERIOR CEREBRALS:  No visible stenosis or aneurysm.    BASILAR:  No visible stenosis or aneurysm.    VERTEBRALS:  No visible stenosis or aneurysm.    OTHER:  Negative with no evidence of a vascular malformation.                     Impression   CONCLUSION:  Normal MRA of the brain.       Other procedures     EEG (6/4/2024):   INTERPRETATION:  This is a normal, awake and asleep electroencephalogram. There is no clear-cut focal abnormality or epileptiform activity seen in this record.          Impression/ Plan:  Drea Kaba is a 58 year old female with complicated PMHx including but not limited to Lupus and/or undifferentiated connective tissue disorder, neuropathy, restless legs syndrome, migraines, who presents for evaluation of episodes of confusion during the day with preservation of awareness.         She was originally seen for PRES syndrome 4/2022. She has been following with Dr. Romero from [facility] neurology for management of headaches and migraines as well as other neurologic symtpoms      Patient expresses multiple concerns. She states since her PRES syndrome, she has improved but has not returned to baseline. She has episodes of not being able to speak with preservation of awareness, lasting ~5-10  minutes, with no preceding aura of odd tastes, smells or sounds and denies associated headaches. These episodes occur ~ once a month.  She also admits to urinating in bed at night. She admits to poor sleep for \"years\" and has poor sleep habits only sleeping ~4 hrs per night with broken sleep. She has restless legs syndrome and is on requip 2 mg in AM and 0.25 mg nightly and is on gabapentin 300 mg AM / 600 mg nightly. She has low iron and had infusion ~ 3 months ago with only minimal improvement.      She notes she has issues with getting up in the middle of the night to walk around.     She admits to intermittent episodes of not being able to understand others.      She is on Aimovig and Ubrelvy and Botox injections for migraines, along with Keppra 500 mg bid for migraines.       EEG is normal recently.     She had improvement in double vision after PRES but has some intermittent difficulty still.       Exam is overall non-focal and normal and MRI brain no longer shows evidence for PRES and extensive workup thus far has not shown evidence for demyelinating disease / multiple sclerosis and EEG was also done and normal.      Furthermore, she is already on Keppra which may cover for any potential seizures, as well.       Given her complicated history, I recommend she continue to follow with her established neurologist rather than changing any medications at this time.     This being said, however, I would recommend consideration for consultation with sleep medicine specialist to consider detailed sleep study evaluating for other parasomnias (ie REM sleep behavior disorder) aside from SHERLY. She seems to have significant sleep deprivation and suspect some of these events of confusion are related to sleep deprivation rather than seizures or TIA. She admits to a maximum of 4 hrs sleep per night which is broken and this may be related to her restless legs syndrome. However, she is already on gabapentin and ropinirole for  treatment. I did discuss potentially increasing the night time dose of gabapentin from 600 mg nightly to 900 mg nightly but recommend she discuss with her primary neurologist and consider consultation with sleep medicine specialist, with neurological background, given prominent neurologic symptoms (restless legs syndrome and/or REM sleep behavior disorder). Otherwise, she was advised her management appears appropriate to this author and no additional labs, imaging, or medications are recommended.     1. Vestibular migraine  As noted above     2. Restless leg syndrome  As noted above     3. Chronic insomnia  As noted above     4. Rheumatoid arthritis involving right wrist, unspecified whether rheumatoid factor present (HCC)  As noted above     5. Lupus (HCC)  As noted above    45 total minutes spent, including chart review, discussion of pertinent labs and imaging with patient with plan of care as noted above;  patient allowed to ask questions and all questions answered to the best of my ability       Return if symptoms worsen or fail to improve.    Trace Estrada MD, Neurology  [facility]  Pager [phone]  6/19/2024

## (undated) NOTE — LETTER
ASTHMA ACTION PLAN for Marin Skinner     : 1965     Date: 2023  Provider:  Gareth Dunlap MD  Phone for doctor or clinic: [facility], [address], [address]  [place] [id number] [phone]    ACT Score: 22      You can use the colors of a traffic light to help learn about your asthma medicines. 1. Green - Go! % of Personal Best Peak Flow Use controller medicine. Breathing is good  No cough or wheeze  Can work and play Medicine How much to take When to take it          2. Yellow - Caution. 50-79% Personal Best Peak  Flow. Use reliever medicine to keep an asthma attack from getting bad. Cough  Wheezing  Tight Chest  Wake up at night Medicine How much to take When to take it           Additional instructions         3. Red - Stop! Danger!  <50% Personal Best Peak  Flow. Take these medications until  Get help from a doctor   Medicine not helping  Breathing is hard and fast  Nose opens wide  Can't walk  Ribs show  Can't talk well Medicine How much to take When to take it    Go to the nearest Emergency Room/Department right now! Additional Instructions If your symptoms do not improve and you cannot contact your doctor, [facility] or call 911 immediately! [x] Asthma Action Plan reviewed with patient (and caregiver if necessary) and a copy of the plan was given to the patient/caregiver. [] Asthma Action Plan reviewed with patient (and caregiver if necessary) on the phone and mailed copy to patient or submitted via [address]      Signatures:  Provider  Gareth Dunlap MD   Patient Caretaker

## (undated) NOTE — LETTER
[facility] [address], [address] [place] [address]  Consent for Procedure/Sedation    Date:     Time:       1. I authorize the performance upon Gideon Maurer the followin [facility]     2.  I authorize Dr. Kolby Mustafa (and whomev ________________________________    ___________________    Witness: _________________________      Date: ___________________    Printed: 2019   4:01 PM  Patient Name: Narda Patel        : 1965       Medical Record #: [id number]

## (undated) NOTE — LETTER
[facility] [address], [address]  Consent for Procedure/Sedation    Date:     Time:       1. I authorize the performance upon Rosie Raza the following:  VENOUS ACCESS PORT IMPLANT     2.  I authorize Dr. Phil Chatterjee (and whomever is ________________________________    ___________________    Witness: _________________________      Date: ___________________    Printed: 2019   11:39 AM  Patient Name: Dain Benítez        : 1965       Medical Record #: [id number]

## (undated) NOTE — MR AVS SNAPSHOT
After Visit Summary   10/25/2021    Aubree Kan   MRN: [id number]           Visit Information     Date & Time  10/25/2021 11:00 AM Provider  [id number]  [facility] Dept.  Phone  [phone] AIMOVIG 140 MG/ML Subcutaneous Solution Auto-injector ADMINISTER 1 ML(140 MG) UNDER THE SKIN EVERY 30 DAYS    cyclobenzaprine 5 MG Oral Tab Take 1 tablet (5 mg total) by mouth every 8 (eight) hours as needed.     droxidopa 100 MG Oral Cap Take 1 capsule (10 Take 145 mg by mouth every other day. Esomeprazole Magnesium 40 MG Oral Capsule Delayed Release Take 40 mg by mouth 2 (two) times daily. Sertraline HCl (ZOLOFT) 100 MG Oral Tab Take 150 mg by mouth daily.         Diagnoses for This Visit    Iron def unsatisfactory response to oral iron   who have non-hemodialysis dependent chronic kidney disease (NDD-CKD)  IMPORTANT SAFETY INFORMATION  CONTRAINDICATIONS  Monoferric is contraindicated in patients with a history of serious hypersensitivity to Monoferric with Monoferric. Adverse reactions related to treatment and reported by ?1% of the treated patients were nausea (1.2%) and rash (1%). Adjudicated serious or severe hypersensitivity reactions were reported in 6/2008 (0.3%) patients in the Monoferric group.

## (undated) NOTE — LETTER
February 20, 2018    Patient: Jorge Luis Lee   Date of Visit: 2/20/2018       To Whom It May Concern:    Sandor Johnson was seen and treated in our emergency department on 2/20/2018.     If you have any questions or concerns, please don't hesitate to ca

## (undated) NOTE — ED AVS SNAPSHOT
Tameak Pollard   MRN: [id number]    Department:  [facility] Emergency Department   Date of Visit:  10/27/2019           Disclosure     Insurance plans vary and the physician(s) referred by the ER may not be covered by your plan.  Please contact yo tell this physician (or your personal doctor if your instructions are to return to your personal doctor) about any new or lasting problems. The primary care or specialist physician will see patients referred from the [facility] Emergency Department.  Valentino Feng